# Patient Record
Sex: MALE | Race: WHITE | NOT HISPANIC OR LATINO | Employment: OTHER | URBAN - METROPOLITAN AREA
[De-identification: names, ages, dates, MRNs, and addresses within clinical notes are randomized per-mention and may not be internally consistent; named-entity substitution may affect disease eponyms.]

---

## 2017-01-06 ENCOUNTER — OFFICE VISIT (OUTPATIENT)
Dept: MEDICAL GROUP | Facility: MEDICAL CENTER | Age: 62
End: 2017-01-06
Payer: COMMERCIAL

## 2017-01-06 ENCOUNTER — OFFICE VISIT (OUTPATIENT)
Dept: CARDIOLOGY | Facility: MEDICAL CENTER | Age: 62
End: 2017-01-06
Payer: COMMERCIAL

## 2017-01-06 VITALS
HEIGHT: 71 IN | WEIGHT: 208 LBS | BODY MASS INDEX: 29.12 KG/M2 | OXYGEN SATURATION: 95 % | HEART RATE: 86 BPM | DIASTOLIC BLOOD PRESSURE: 70 MMHG | SYSTOLIC BLOOD PRESSURE: 100 MMHG

## 2017-01-06 VITALS
TEMPERATURE: 97.2 F | HEIGHT: 69 IN | WEIGHT: 208 LBS | BODY MASS INDEX: 30.81 KG/M2 | OXYGEN SATURATION: 96 % | RESPIRATION RATE: 16 BRPM | HEART RATE: 81 BPM | SYSTOLIC BLOOD PRESSURE: 142 MMHG | DIASTOLIC BLOOD PRESSURE: 74 MMHG

## 2017-01-06 DIAGNOSIS — Z00.00 HEALTH CARE MAINTENANCE: ICD-10-CM

## 2017-01-06 DIAGNOSIS — I25.84 CORONARY ARTERY DISEASE DUE TO CALCIFIED CORONARY LESION: ICD-10-CM

## 2017-01-06 DIAGNOSIS — E78.5 DYSLIPIDEMIA: ICD-10-CM

## 2017-01-06 DIAGNOSIS — F32.A DEPRESSION, UNSPECIFIED DEPRESSION TYPE: ICD-10-CM

## 2017-01-06 DIAGNOSIS — E66.9 OBESITY (BMI 30-39.9): ICD-10-CM

## 2017-01-06 DIAGNOSIS — I25.10 CORONARY ARTERY DISEASE DUE TO CALCIFIED CORONARY LESION: ICD-10-CM

## 2017-01-06 DIAGNOSIS — K21.9 GASTROESOPHAGEAL REFLUX DISEASE WITHOUT ESOPHAGITIS: ICD-10-CM

## 2017-01-06 PROCEDURE — 99204 OFFICE O/P NEW MOD 45 MIN: CPT | Performed by: FAMILY MEDICINE

## 2017-01-06 PROCEDURE — 99204 OFFICE O/P NEW MOD 45 MIN: CPT | Performed by: INTERNAL MEDICINE

## 2017-01-06 RX ORDER — POTASSIUM CHLORIDE 1500 MG/1
20 TABLET, EXTENDED RELEASE ORAL 2 TIMES DAILY
COMMUNITY
End: 2017-01-06

## 2017-01-06 RX ORDER — CLOPIDOGREL BISULFATE 75 MG/1
75 TABLET ORAL DAILY
COMMUNITY
End: 2017-01-06 | Stop reason: SDUPTHER

## 2017-01-06 RX ORDER — SERTRALINE HYDROCHLORIDE 25 MG/1
25 TABLET, FILM COATED ORAL DAILY
Qty: 30 TAB | Refills: 1 | Status: SHIPPED | OUTPATIENT
Start: 2017-01-06 | End: 2017-02-14 | Stop reason: SDUPTHER

## 2017-01-06 RX ORDER — ATORVASTATIN CALCIUM 40 MG/1
40 TABLET, FILM COATED ORAL DAILY
Qty: 30 TAB | Refills: 11 | Status: SHIPPED | OUTPATIENT
Start: 2017-01-06 | End: 2017-06-05

## 2017-01-06 RX ORDER — ATORVASTATIN CALCIUM 40 MG/1
40 TABLET, FILM COATED ORAL NIGHTLY
COMMUNITY
End: 2017-01-06 | Stop reason: SDUPTHER

## 2017-01-06 RX ORDER — CLOPIDOGREL BISULFATE 75 MG/1
75 TABLET ORAL DAILY
Qty: 30 TAB | Refills: 11 | Status: ON HOLD | OUTPATIENT
Start: 2017-01-06 | End: 2017-06-20

## 2017-01-06 RX ORDER — FUROSEMIDE 40 MG/1
40 TABLET ORAL 2 TIMES DAILY
COMMUNITY
End: 2017-01-06

## 2017-01-06 ASSESSMENT — PATIENT HEALTH QUESTIONNAIRE - PHQ9
8. MOVING OR SPEAKING SO SLOWLY THAT OTHER PEOPLE COULD HAVE NOTICED. OR THE OPPOSITE, BEING SO FIGETY OR RESTLESS THAT YOU HAVE BEEN MOVING AROUND A LOT MORE THAN USUAL: 0
4. FEELING TIRED OR HAVING LITTLE ENERGY: 0
SUM OF ALL RESPONSES TO PHQ9 QUESTIONS 1 AND 2: 3
1. LITTLE INTEREST OR PLEASURE IN DOING THINGS: 2
6. FEELING BAD ABOUT YOURSELF - OR THAT YOU ARE A FAILURE OR HAVE LET YOURSELF OR YOUR FAMILY DOWN: 1
5. POOR APPETITE OR OVEREATING: 0
7. TROUBLE CONCENTRATING ON THINGS, SUCH AS READING THE NEWSPAPER OR WATCHING TELEVISION: 1
3. TROUBLE FALLING OR STAYING ASLEEP OR SLEEPING TOO MUCH: 1
9. THOUGHTS THAT YOU WOULD BE BETTER OFF DEAD, OR OF HURTING YOURSELF: 0
2. FEELING DOWN, DEPRESSED, IRRITABLE, OR HOPELESS: 1
SUM OF ALL RESPONSES TO PHQ QUESTIONS 1-9: 6
CLINICAL INTERPRETATION OF PHQ2 SCORE: 1

## 2017-01-06 ASSESSMENT — ENCOUNTER SYMPTOMS
NERVOUS/ANXIOUS: 1
BLURRED VISION: 1
BRUISES/BLEEDS EASILY: 1
SHORTNESS OF BREATH: 1
HEARTBURN: 1
MEMORY LOSS: 1
DEPRESSION: 1
SPEECH CHANGE: 1
FOCAL WEAKNESS: 1

## 2017-01-06 NOTE — MR AVS SNAPSHOT
"Talib Ortiz   2017 1:15 PM   Office Visit   MRN: [id number]    Department:  [facility]   Dept Phone:  [phone]    Description:  Male : 1955   Provider:  Sukhi Frias M.D.           Reason for Visit     New Patient           Allergies as of 2017     No Known Allergies      You were diagnosed with     Coronary artery disease due to calcified coronary lesion   [1469225]       Dyslipidemia   [714198]         Vital Signs     Blood Pressure Pulse Height Weight Body Mass Index Oxygen Saturation    100/70 mmHg 86 1.803 m (5' 10.98\") 94.348 kg (208 lb) 29.02 kg/m2 95%    Smoking Status                   Former Smoker           Basic Information     Date Of Birth Sex Race Ethnicity Preferred Language    1955 Male White Non- English      Problem List              ICD-10-CM Priority Class Noted - Resolved    Obesity (BMI 30-39.9) E66.9   2015 - Present    Tobacco abuse Z72.0   2015 - Present    BPH (benign prostatic hyperplasia) N40.0   2015 - Present    Dyslipidemia E78.5   3/2/2015 - Present    Osteoarthritis M19.90   3/2/2015 - Present    Coronary artery disease due to calcified coronary lesion: CABG ×2 in 2016 I25.10, I25.84   2017 - Present      Health Maintenance        Date Due Completion Dates    IMM DTaP/Tdap/Td Vaccine (1 - Tdap) 10/10/1974 ---    COLONOSCOPY 10/10/2005 ---    IMM ZOSTER VACCINE 10/10/2015 ---    IMM INFLUENZA (1) 2016 10/6/2014            Current Immunizations     Influenza TIV (IM) 10/6/2014    Pneumococcal polysaccharide vaccine (PPSV-23) 10/6/2014      Below and/or attached are the medications your provider expects you to take. Review all of your home medications and newly ordered medications with your provider and/or pharmacist. Follow medication instructions as directed by your provider and/or pharmacist. Please keep your medication list with you and share with your provider. Update the information when medications " are discontinued, doses are changed, or new medications (including over-the-counter products) are added; and carry medication information at all times in the event of emergency situations     Allergies:  No Known Allergies          Medications  Valid as of: January 06, 2017 -  2:00 PM    Generic Name Brand Name Tablet Size Instructions for use    Aspirin (Tablet Delayed Response) ECOTRIN 81 MG Take 81 mg by mouth every day.        Atorvastatin Calcium (Tab) LIPITOR 40 MG Take 1 Tab by mouth every day.        Cinnamon (Cap) Cinnamon 500 MG Take 2 Caps by mouth every day.        Clopidogrel Bisulfate (Tab) PLAVIX 75 MG Take 1 Tab by mouth every day.        Garlic   Take 1 Cap by mouth every day.        Melatonin (Tab) Melatonin 10 MG Take 1 Tab by mouth at bedtime as needed.        Metoprolol Tartrate (Tab) LOPRESSOR 25 MG Take 1 Tab by mouth 2 times a day.        Misc Natural Products (Tab) URINOZINC PLUS  Take 1 Tab by mouth 2 Times a Day.        Misc Natural Products   Take  by mouth.        Omega-3 Fatty Acids   Take  by mouth.        RaNITidine HCl   Take  by mouth every day.        .                 Medicines prescribed today were sent to:     [facility] [address] [place] [address] - [address] [address]    Phone: [phone] Fax: [phone]    Open 24 Hours?: No    [facility] - [place], [place] - [address] [facility]    [address] [facility] [address]    Phone: [phone] Fax: [phone]    Open 24 Hours?: No      Medication refill instructions:       If your prescription bottle indicates you have medication refills left, it is not necessary to call your provider’s office. Please contact your pharmacy and they will refill your medication.    If your prescription bottle indicates you do not have any refills left, you may request refills at any time through one of the following ways: The online [employer] system (except Urgent Care), by  calling your provider’s office, or by asking your pharmacy to contact your provider’s office with a refill request. Medication refills are processed only during regular business hours and may not be available until the next business day. Your provider may request additional information or to have a follow-up visit with you prior to refilling your medication.   *Please Note: Medication refills are assigned a new Rx number when refilled electronically. Your pharmacy may indicate that no refills were authorized even though a new prescription for the same medication is available at the pharmacy. Please request the medicine by name with the pharmacy before contacting your provider for a refill.        Your To Do List     Future Labs/Procedures Complete By Expires    BTYPE NATRIURETIC PEPTIDE  As directed 1/6/2018    COMP METABOLIC PANEL  As directed 1/6/2018    Echocardiogram Comp w/o Cont  As directed 1/6/2018    Scheduling Instructions:    Less than one month    LIPID PROFILE  As directed 1/6/2018         [facility] Access Code: Activation code not generated  Current [facility] Status: Active

## 2017-01-06 NOTE — PROGRESS NOTES
Subjective:   Talib Ortiz is a 61 y.o. male who presents today for new patient evaluation because of a history of coronary artery disease and dyslipidemia. He was previously followed by cardiology at [facility].    In August, he suffered a myocardial infarction. He subsequently underwent cardiac catheterization and ultimately two-vessel bypass graft surgery.    He is walking 3-4 miles daily without difficulty. He does have some dyspnea if he walks uphill but otherwise is feeling fine. He notes no PND or orthopnea. He's had no edema, palpitations or lightheadedness. He still has some slight muscular skeletal-type chest pain. He's had no anginal type chest pain.    Past Medical History   Diagnosis Date   • Obesity (BMI 30-39.9) 2/12/2015   • Hyperlipidemia    • Kidney disease    • Kidney stone    • Osteoarthritis 3/2/2015     Past Surgical History   Procedure Laterality Date   • Knee arthroscopy     • Laminotomy     • Multiple coronary artery bypass       Family History   Problem Relation Age of Onset   • Arthritis Mother    • Hyperlipidemia Mother    • Hyperlipidemia Father    • Heart Attack Father 61     History   Smoking status   • Former Smoker -- 0.25 packs/day for 10 years   • Types: Cigarettes   • Quit date: 03/19/2016   Smokeless tobacco   • Never Used     No Known Allergies  Outpatient Encounter Prescriptions as of 1/6/2017   Medication Sig Dispense Refill   • atorvastatin (LIPITOR) 40 MG Tab Take 40 mg by mouth every evening.     • metoprolol (LOPRESSOR) 25 MG Tab Take 25 mg by mouth 2 times a day.     • clopidogrel (PLAVIX) 75 MG Tab Take 75 mg by mouth every day.     • RaNITidine HCl (RANITIDINE 75 PO) Take  by mouth every day.     • potassium Chloride ER (K-TAB) 20 MEQ Tab CR tablet Take 20 mEq by mouth 2 times a day.     • furosemide (LASIX) 40 MG Tab Take 40 mg by mouth 2 Times a Day.     • aspirin EC (ECOTRIN) 81 MG Tablet Delayed Response Take 81 mg by mouth every day.     • Misc  "Natural Products (GLUCOSAMINE CHOND COMPLEX/MSM PO) Take  by mouth.     • Omega-3 Fatty Acids (FISH OIL BURP-LESS PO) Take  by mouth.     • Cinnamon 500 MG CAPS Take 2 Caps by mouth every day.     • GARLIC PO Take 1 Cap by mouth every day.     • [DISCONTINUED] lovastatin (MEVACOR) 20 MG TABS Take 1 Tab by mouth every day. 90 Tab 3   • Misc Natural Products (URINOZINC PLUS) TABS Take 1 Tab by mouth 2 Times a Day.     • Melatonin 10 MG TABS Take 1 Tab by mouth at bedtime as needed.       No facility-administered encounter medications on file as of 1/6/2017.     Review of Systems   HENT: Positive for congestion, hearing loss and tinnitus.    Eyes: Positive for blurred vision.   Respiratory: Positive for shortness of breath.    Cardiovascular: Positive for chest pain.   Gastrointestinal: Positive for heartburn.   Genitourinary: Positive for urgency and frequency.   Neurological: Positive for speech change and focal weakness.   Endo/Heme/Allergies: Bruises/bleeds easily.   Psychiatric/Behavioral: Positive for depression and memory loss. The patient is nervous/anxious.         Objective:   /70 mmHg  Pulse 86  Ht 1.803 m (5' 10.98\")  Wt 94.348 kg (208 lb)  BMI 29.02 kg/m2  SpO2 95%    Physical Exam   Constitutional: He is oriented to person, place, and time. He appears well-developed. No distress.   HENT:   Mouth/Throat: Mucous membranes are normal.   Eyes: Conjunctivae and EOM are normal.   Neck: No JVD present. No tracheal deviation present. No thyroid mass and no thyromegaly present.   Cardiovascular: Normal rate, regular rhythm and intact distal pulses.    No murmur heard.  Pulmonary/Chest: Effort normal and breath sounds normal. No respiratory distress. He exhibits no tenderness.   Abdominal: Soft. There is no tenderness.   Musculoskeletal: Normal range of motion. He exhibits no edema.   Neurological: He is alert and oriented to person, place, and time. He has normal strength. He displays no tremor.   Skin: " Skin is warm and dry. He is not diaphoretic.   Psychiatric: He has a normal mood and affect. His behavior is normal.   Vitals reviewed.      Assessment:     1. Coronary artery disease due to calcified coronary lesion: CABG ×2 in August 2016     2. Dyslipidemia         Medical Decision Making:  Today's Assessment / Status / Plan:     Mr. Ortiz is clinically stable. He did have mild reduction in his ejection fraction perioperatively. We will reassess him with an echocardiogram and a BNP. He will also have a lipid and CMP prior to his follow-up appointment. He has had some difficulty with mood swings since his surgery. We will refer him to a PCP for possible anti-anxiety or antidepressant therapy. He will stop his furosemide therapy at the present time. He'll give us a call if he develops any increase in edema or dyspnea. However, he does have excellent exercise tolerance and he will probably tolerate going off of this medication. He will follow-up in about a month.

## 2017-01-06 NOTE — MR AVS SNAPSHOT
"Talib Ortiz   2017 3:40 PM   Office Visit   MRN: [id number]    Department:  [address] [place] [address]   Dept Phone:  [phone]    Description:  Male : 1955   Provider:  Belen Knowles M.D.           Reason for Visit     New Patient establish      Allergies as of 2017     No Known Allergies      You were diagnosed with     Coronary artery disease due to calcified coronary lesion   [6518634]       Dyslipidemia   [098828]       Depression, unspecified depression type   [5729992]       Gastroesophageal reflux disease without esophagitis   [437941]       Health care maintenance   [051899]       Obesity (BMI 30-39.9)   [226392]         Vital Signs     Blood Pressure Pulse Temperature Respirations Height Weight    142/74 mmHg 81 36.2 °C (97.2 °F) 16 1.753 m (5' 9.02\") 94.348 kg (208 lb)    Body Mass Index Oxygen Saturation Smoking Status             30.70 kg/m2 96% Former Smoker         Basic Information     Date Of Birth Sex Race Ethnicity Preferred Language    1955 Male White Non- English      Your appointments     2017 10:15 AM   ECHO with ECHO [facility] EXAM 12   ECHOCARDIOLOGY [facility] ([facility])    [address] [facility]  [place] [address]   [phone]           No prep            2017 10:00 AM   FOLLOW UP with Sukhi Frias M.D.   [facility] (--)    [address]  [place] [address] [phone]            2017 10:20 AM   Established Patient with Belen Knowles M.D.   [facility] [address]    [address]  [place] [address] [phone]           You will be receiving a confirmation call a few days before your appointment from our automated call confirmation system.              Problem List              ICD-10-CM Priority Class Noted - Resolved    Obesity (BMI 30-39.9) E66.9   2015 - Present    Tobacco abuse Z72.0   2015 - Present   " Dyslipidemia E78.5   3/2/2015 - Present    Osteoarthritis M19.90   3/2/2015 - Present    Coronary artery disease due to calcified coronary lesion: CABG ×2 in August 2016 I25.10, I25.84   1/6/2017 - Present      Health Maintenance        Date Due Completion Dates    IMM DTaP/Tdap/Td Vaccine (1 - Tdap) 10/10/1974 ---    COLONOSCOPY 10/10/2005 ---    IMM ZOSTER VACCINE 10/10/2015 ---    IMM INFLUENZA (1) 9/1/2016 10/6/2014            Current Immunizations     Influenza TIV (IM) 10/6/2014    Pneumococcal polysaccharide vaccine (PPSV-23) 10/6/2014      Below and/or attached are the medications your provider expects you to take. Review all of your home medications and newly ordered medications with your provider and/or pharmacist. Follow medication instructions as directed by your provider and/or pharmacist. Please keep your medication list with you and share with your provider. Update the information when medications are discontinued, doses are changed, or new medications (including over-the-counter products) are added; and carry medication information at all times in the event of emergency situations     Allergies:  No Known Allergies          Medications  Valid as of: January 06, 2017 -  4:23 PM    Generic Name Brand Name Tablet Size Instructions for use    Aspirin (Tablet Delayed Response) ECOTRIN 81 MG Take 81 mg by mouth every day.        Atorvastatin Calcium (Tab) LIPITOR 40 MG Take 1 Tab by mouth every day.        Cinnamon (Cap) Cinnamon 500 MG Take 2 Caps by mouth every day.        Clopidogrel Bisulfate (Tab) PLAVIX 75 MG Take 1 Tab by mouth every day.        Garlic   Take 1 Cap by mouth every day.        Melatonin (Tab) Melatonin 10 MG Take 1 Tab by mouth at bedtime as needed.        Metoprolol Tartrate (Tab) LOPRESSOR 25 MG Take 1 Tab by mouth 2 times a day.        Misc Natural Products (Tab) URINOZINC PLUS  Take 1 Tab by mouth 2 Times a Day.        Misc Natural Products   Take  by mouth.        Omega-3 Fatty  Acids   Take  by mouth.        RaNITidine HCl   Take  by mouth every day.        Sertraline HCl (Tab) ZOLOFT 25 MG Take 1 Tab by mouth every day.        .                 Medicines prescribed today were sent to:     [facility] [address] [place] [address] - [address] [facility]    [address] [facility] [place] [address]    Phone: [phone] Fax: [phone]    Open 24 Hours?: No    [facility] - [place], [place] - [address] [facility]    [address] [facility] [address]    Phone: [phone] Fax: [phone]    Open 24 Hours?: No      Medication refill instructions:       If your prescription bottle indicates you have medication refills left, it is not necessary to call your provider’s office. Please contact your pharmacy and they will refill your medication.    If your prescription bottle indicates you do not have any refills left, you may request refills at any time through one of the following ways: The online [employer] system (except Urgent Care), by calling your provider’s office, or by asking your pharmacy to contact your provider’s office with a refill request. Medication refills are processed only during regular business hours and may not be available until the next business day. Your provider may request additional information or to have a follow-up visit with you prior to refilling your medication.   *Please Note: Medication refills are assigned a new Rx number when refilled electronically. Your pharmacy may indicate that no refills were authorized even though a new prescription for the same medication is available at the pharmacy. Please request the medicine by name with the pharmacy before contacting your provider for a refill.        Your To Do List     Future Labs/Procedures Complete By Expires    CBC WITH DIFFERENTIAL  As directed 1/6/2018    TSH  As directed 1/7/2018      Referral     A referral request has been sent to our patient care coordination department. Please  allow 3-5 business days for us to process this request and contact you either by phone or mail. If you do not hear from us by the 5th business day, please call us at [phone].           [employer] Access Code: Activation code not generated  Current [employer] Status: Active

## 2017-01-07 NOTE — PROGRESS NOTES
CC: Establish new PCP.    HPI:  Talib presents today to establish primary care relationship. Did not see his previous PCP for more than 5 years.    Lives with his wife. Active, and independent with all ADLs. Has the following medical issues:    Coronary artery disease underwent CABG ×2 in August 2016. He has been stable, and asymptomatic.Denies chest pain, and SOB. has been  on Plavix, BB, and statin.He follows up with cardiology Dr Sukhi Fontana.    Dyslipidemia, he has been tolerating the statin. Denies muscle pain LFTs has been normal, currently on Lipitor 40 mg daily.    Depression, patient stated that his mood has not been as it was before the by pass surgery. He has been anxious, ad early irritable. He used to have a very calm personality. Does not enjoy what he used to enjoy.Denies any suicidal ideation. Wants to start antidepressant to help with his mood. PHQ9 score is 6.     Gastroesophageal reflux disease, has been doing fine with Zantac 75 mg bid.Denies epigastric and heart burn.    Obesity , his BMI is 30.7.The medical rationale for weight loss in obese individuals is that obesity is associated with a significant increase in mortality, and many health risks including type 2 diabetes mellitus, hypertension, dyslipidemia, and coronary heart disease. The benefits of weight loss include a reduction in the rate of progression from impaired glucose tolerance to diabetes, blood pressure in hypertensive patients, and lipid levels in higher risk patients. Other noncardiac benefits of weight loss include reductions in urinary incontinence, sleep apnea, and depression, and improvements in quality of life, physical functioning, and mobility. Recommend lifestyle modification: exercise 30 minutes per day 5 days per week. Recommend also portion control.    Pneumonia vaccine, and flu shot are UTD.Due for colonoscopy ( has never had one in the life).    Patient Active Problem List    Diagnosis Date Noted   • Coronary  artery disease due to calcified coronary lesion: CABG ×2 in August 2016 01/06/2017   • Dyslipidemia 03/02/2015   • Osteoarthritis 03/02/2015   • Obesity (BMI 30-39.9) 02/12/2015   • Tobacco abuse 02/12/2015       Current Outpatient Prescriptions   Medication Sig Dispense Refill   • aspirin EC (ECOTRIN) 81 MG Tablet Delayed Response Take 81 mg by mouth every day.     • sertraline (ZOLOFT) 25 MG tablet Take 1 Tab by mouth every day. 30 Tab 1   • RaNITidine HCl (RANITIDINE 75 PO) Take  by mouth every day.     • Misc Natural Products (GLUCOSAMINE CHOND COMPLEX/MSM PO) Take  by mouth.     • Omega-3 Fatty Acids (FISH OIL BURP-LESS PO) Take  by mouth.     • atorvastatin (LIPITOR) 40 MG Tab Take 1 Tab by mouth every day. 30 Tab 11   • clopidogrel (PLAVIX) 75 MG Tab Take 1 Tab by mouth every day. 30 Tab 11   • metoprolol (LOPRESSOR) 25 MG Tab Take 1 Tab by mouth 2 times a day. 60 Tab 11   • Cinnamon 500 MG CAPS Take 2 Caps by mouth every day.     • GARLIC PO Take 1 Cap by mouth every day.     • Misc Natural Products (URINOZINC PLUS) TABS Take 1 Tab by mouth 2 Times a Day.     • Melatonin 10 MG TABS Take 1 Tab by mouth at bedtime as needed.       No current facility-administered medications for this visit.         Allergies as of 01/06/2017   • (No Known Allergies)        Social History     Social History   • Marital Status:      Spouse Name: N/A   • Number of Children: N/A   • Years of Education: N/A     Occupational History   • Not on file.     Social History Main Topics   • Smoking status: Former Smoker -- 0.25 packs/day for 10 years     Types: Cigarettes     Quit date: 03/19/2016   • Smokeless tobacco: Never Used   • Alcohol Use: No      Comment: Quit years   • Drug Use: No   • Sexual Activity:     Partners: Female     Other Topics Concern   • Not on file     Social History Narrative       Family History   Problem Relation Age of Onset   • Arthritis Mother    • Hyperlipidemia Mother    • Hyperlipidemia Father    •  "Heart Attack Father 61       Past Surgical History   Procedure Laterality Date   • Knee arthroscopy     • Laminotomy     • Multiple coronary artery bypass         ROS:  Denies any Headache, Blurred Vision, Confusion Chest pain,  Shortness of breath,  Abdominal pain, Changes of bowel or bladder, Lower ext edema, Fevers, Nights sweats, Weight Changes, Focal weakness or numbness.  All other systems are negative.    /74 mmHg  Pulse 81  Temp(Src) 36.2 °C (97.2 °F)  Resp 16  Ht 1.753 m (5' 9.02\")  Wt 94.348 kg (208 lb)  BMI 30.70 kg/m2  SpO2 96%    Physical Exam:  Gen:         Alert and oriented, No apparent distress.  HEENT:   Perrla, TM clear,  Oralpharynx no erythema or exudates.  Neck:       No Jugular venous distension, Lymphadenopathy, Thyromegaly, Bruits.  Lungs:     Clear to auscultation bilaterally  CV:          Regular rate and rhythm. No murmurs, rubs or gallops.  Abd:         Soft non tender, non distended. Normal active bowel sounds. No hepatosplenomegaly, No pulsatile masses.  Ext:          No clubbing, cyanosis, edema.      Assessment and Plan.   61 y.o. male     1. Coronary artery disease : CABG ×2 in August 2016  Stable.Asymptomatic.  Continue on Plavix, BB, and statin.  Continue follow up with cardiology Dr Sukhi Fontana.    - CBC WITH DIFFERENTIAL; Future    2. Dyslipidemia  He has been tolerating the statin. Denies muscle pain LFTs has been normal  Continue on Lipitor 40 mg daily.    3. Depression, unspecified depression type  Probably reactive ( after the CABG), but mild depression. PHQ-9 score is 6. Denies suicidal ideation.  Will start on small dose of Zoloft ( 25 mg daily).    - CBC WITH DIFFERENTIAL; Future  - TSH; Future  - sertraline (ZOLOFT) 25 MG tablet; Take 1 Tab by mouth every day.  Dispense: 30 Tab; Refill: 1    4. Gastroesophageal reflux disease without esophagitis  Has been doing fine with Zantac 75 mg bid.  - CBC WITH DIFFERENTIAL; Future    5. Health care " maintenance  Pneumonia vaccine, and flu shot are UTD.  Due for colonoscopy ( has never had one in the life).    - REFERRAL TO GI FOR COLONOSCOPY    6. Obesity (BMI 30-39.9)  BMI is 30.7.  Patient is counseled about life style modification ( exercise, and low carb , salt, and fat diet).

## 2017-01-18 ENCOUNTER — HOSPITAL ENCOUNTER (OUTPATIENT)
Dept: LAB | Facility: MEDICAL CENTER | Age: 62
End: 2017-01-18
Attending: INTERNAL MEDICINE
Payer: COMMERCIAL

## 2017-01-18 DIAGNOSIS — I25.84 CORONARY ARTERY DISEASE DUE TO CALCIFIED CORONARY LESION: ICD-10-CM

## 2017-01-18 DIAGNOSIS — I25.10 CORONARY ARTERY DISEASE DUE TO CALCIFIED CORONARY LESION: ICD-10-CM

## 2017-01-18 DIAGNOSIS — E78.5 DYSLIPIDEMIA: ICD-10-CM

## 2017-01-18 LAB
ALBUMIN SERPL BCP-MCNC: 4.5 G/DL (ref 3.2–4.9)
ALBUMIN/GLOB SERPL: 1.7 G/DL
ALP SERPL-CCNC: 42 U/L (ref 30–99)
ALT SERPL-CCNC: 19 U/L (ref 2–50)
ANION GAP SERPL CALC-SCNC: 10 MMOL/L (ref 0–11.9)
AST SERPL-CCNC: 19 U/L (ref 12–45)
BILIRUB SERPL-MCNC: 0.6 MG/DL (ref 0.1–1.5)
BNP SERPL-MCNC: 85 PG/ML (ref 0–100)
BUN SERPL-MCNC: 15 MG/DL (ref 8–22)
CALCIUM SERPL-MCNC: 9.8 MG/DL (ref 8.5–10.5)
CHLORIDE SERPL-SCNC: 104 MMOL/L (ref 96–112)
CHOLEST SERPL-MCNC: 187 MG/DL (ref 100–199)
CO2 SERPL-SCNC: 25 MMOL/L (ref 20–33)
CREAT SERPL-MCNC: 1.16 MG/DL (ref 0.5–1.4)
GLOBULIN SER CALC-MCNC: 2.7 G/DL (ref 1.9–3.5)
GLUCOSE SERPL-MCNC: 93 MG/DL (ref 65–99)
HDLC SERPL-MCNC: 52 MG/DL
LDLC SERPL CALC-MCNC: 102 MG/DL
POTASSIUM SERPL-SCNC: 4.3 MMOL/L (ref 3.6–5.5)
PROT SERPL-MCNC: 7.2 G/DL (ref 6–8.2)
SODIUM SERPL-SCNC: 139 MMOL/L (ref 135–145)
TRIGL SERPL-MCNC: 165 MG/DL (ref 0–149)

## 2017-01-18 PROCEDURE — 80061 LIPID PANEL: CPT

## 2017-01-18 PROCEDURE — 83880 ASSAY OF NATRIURETIC PEPTIDE: CPT

## 2017-01-18 PROCEDURE — 36415 COLL VENOUS BLD VENIPUNCTURE: CPT

## 2017-01-18 PROCEDURE — 80053 COMPREHEN METABOLIC PANEL: CPT

## 2017-01-23 ENCOUNTER — HOSPITAL ENCOUNTER (OUTPATIENT)
Dept: CARDIOLOGY | Facility: MEDICAL CENTER | Age: 62
End: 2017-01-23
Attending: INTERNAL MEDICINE
Payer: COMMERCIAL

## 2017-01-23 DIAGNOSIS — I25.10 CORONARY ARTERY DISEASE DUE TO CALCIFIED CORONARY LESION: ICD-10-CM

## 2017-01-23 DIAGNOSIS — I25.84 CORONARY ARTERY DISEASE DUE TO CALCIFIED CORONARY LESION: ICD-10-CM

## 2017-01-23 PROCEDURE — 93306 TTE W/DOPPLER COMPLETE: CPT | Mod: 26 | Performed by: INTERNAL MEDICINE

## 2017-01-23 PROCEDURE — 93306 TTE W/DOPPLER COMPLETE: CPT

## 2017-01-24 LAB
LV EJECT FRACT  99904: 55
LV EJECT FRACT MOD 2C 99903: 52.49
LV EJECT FRACT MOD 4C 99902: 56.41
LV EJECT FRACT MOD BP 99901: 54.57

## 2017-02-01 ENCOUNTER — PATIENT MESSAGE (OUTPATIENT)
Dept: MEDICAL GROUP | Facility: MEDICAL CENTER | Age: 62
End: 2017-02-01

## 2017-02-07 ENCOUNTER — OFFICE VISIT (OUTPATIENT)
Dept: CARDIOLOGY | Facility: MEDICAL CENTER | Age: 62
End: 2017-02-07
Payer: COMMERCIAL

## 2017-02-07 VITALS
HEIGHT: 69 IN | DIASTOLIC BLOOD PRESSURE: 80 MMHG | BODY MASS INDEX: 32.29 KG/M2 | WEIGHT: 218 LBS | HEART RATE: 62 BPM | SYSTOLIC BLOOD PRESSURE: 120 MMHG

## 2017-02-07 DIAGNOSIS — I25.84 CORONARY ARTERY DISEASE DUE TO CALCIFIED CORONARY LESION: ICD-10-CM

## 2017-02-07 DIAGNOSIS — I25.10 CORONARY ARTERY DISEASE DUE TO CALCIFIED CORONARY LESION: ICD-10-CM

## 2017-02-07 DIAGNOSIS — E78.5 DYSLIPIDEMIA: ICD-10-CM

## 2017-02-07 PROCEDURE — 99214 OFFICE O/P EST MOD 30 MIN: CPT | Performed by: INTERNAL MEDICINE

## 2017-02-07 RX ORDER — ROSUVASTATIN CALCIUM 40 MG/1
40 TABLET, COATED ORAL DAILY
Qty: 30 TAB | Refills: 11 | Status: SHIPPED | OUTPATIENT
Start: 2017-02-07 | End: 2018-02-09 | Stop reason: SDUPTHER

## 2017-02-07 NOTE — MR AVS SNAPSHOT
"        Talib Ortiz   2017 10:15 AM   Office Visit   MRN: [id number]    Department:  [facility]   Dept Phone:  [phone]    Description:  Male : 1955   Provider:  Sukhi Frias M.D.           Reason for Visit     Follow-Up labs in [facility] 2017, need to talk about Rx's      Allergies as of 2017     No Known Allergies      You were diagnosed with     Coronary artery disease due to calcified coronary lesion   [6118017]       Dyslipidemia   [984096]         Vital Signs     Blood Pressure Pulse Height Weight Body Mass Index Smoking Status    120/80 mmHg 62 1.753 m (5' 9\") 98.884 kg (218 lb) 32.18 kg/m2 Former Smoker      Basic Information     Date Of Birth Sex Race Ethnicity Preferred Language    1955 Male White Non- English      Your appointments     2017 10:20 AM   Established Patient with Belen Knowles M.D.   [facility] [address]    [address] [facility] [address]  [facility] [id number] [phone]           You will be receiving a confirmation call a few days before your appointment from our automated call confirmation system.              Problem List              ICD-10-CM Priority Class Noted - Resolved    Obesity (BMI 30-39.9) E66.9   2015 - Present    Tobacco abuse Z72.0   2015 - Present    Dyslipidemia E78.5   3/2/2015 - Present    Osteoarthritis M19.90   3/2/2015 - Present    Coronary artery disease due to calcified coronary lesion: CABG ×2 in 2016 I25.10, I25.84   2017 - Present      Health Maintenance        Date Due Completion Dates    IMM DTaP/Tdap/Td Vaccine (1 - Tdap) 10/10/1974 ---    COLONOSCOPY 10/10/2005 ---    IMM ZOSTER VACCINE 10/10/2015 ---    IMM INFLUENZA (1) 2016 10/6/2014            Current Immunizations     Influenza TIV (IM) 10/6/2014    Pneumococcal polysaccharide vaccine (PPSV-23) 10/6/2014      Below and/or attached are the medications your provider expects you to take. Review all of " your home medications and newly ordered medications with your provider and/or pharmacist. Follow medication instructions as directed by your provider and/or pharmacist. Please keep your medication list with you and share with your provider. Update the information when medications are discontinued, doses are changed, or new medications (including over-the-counter products) are added; and carry medication information at all times in the event of emergency situations     Allergies:  No Known Allergies          Medications  Valid as of: February 07, 2017 - 10:42 AM    Generic Name Brand Name Tablet Size Instructions for use    Aspirin (Tablet Delayed Response) ECOTRIN 81 MG Take 81 mg by mouth every day.        Atorvastatin Calcium (Tab) LIPITOR 40 MG Take 1 Tab by mouth every day.        Cinnamon (Cap) Cinnamon 500 MG Take 2 Caps by mouth every day.        Clopidogrel Bisulfate (Tab) PLAVIX 75 MG Take 1 Tab by mouth every day.        Garlic   Take 1 Cap by mouth every day.        Melatonin (Tab) Melatonin 10 MG Take 1 Tab by mouth at bedtime as needed.        Metoprolol Tartrate (Tab) LOPRESSOR 25 MG Take 1 Tab by mouth 2 times a day.        Misc Natural Products (Tab) URINOZINC PLUS  Take 1 Tab by mouth 2 Times a Day.        Misc Natural Products   Take  by mouth.        Omega-3 Fatty Acids   Take  by mouth.        RaNITidine HCl   Take  by mouth every day.        Rosuvastatin Calcium (Tab) CRESTOR 40 MG Take 1 Tab by mouth every day.        Sertraline HCl (Tab) ZOLOFT 25 MG Take 1 Tab by mouth every day.        .                 Medicines prescribed today were sent to:     [facility] [address] [place] [address] - [address] [place] [address]    Phone: [phone] Fax: [phone]    Open 24 Hours?: No    [facility] - [place], [place] - [address]    [address] [facility] [address]    Phone: [phone] Fax: [phone]    Open 24 Hours?: No       Medication refill instructions:       If your prescription bottle indicates you have medication refills left, it is not necessary to call your provider’s office. Please contact your pharmacy and they will refill your medication.    If your prescription bottle indicates you do not have any refills left, you may request refills at any time through one of the following ways: The online [address] system (except Urgent Care), by calling your provider’s office, or by asking your pharmacy to contact your provider’s office with a refill request. Medication refills are processed only during regular business hours and may not be available until the next business day. Your provider may request additional information or to have a follow-up visit with you prior to refilling your medication.   *Please Note: Medication refills are assigned a new Rx number when refilled electronically. Your pharmacy may indicate that no refills were authorized even though a new prescription for the same medication is available at the pharmacy. Please request the medicine by name with the pharmacy before contacting your provider for a refill.        Your To Do List     Future Labs/Procedures Complete By Expires    COMP METABOLIC PANEL  As directed 2/7/2018    HEPATIC FUNCTION PANEL  As directed 2/7/2018    LIPID PROFILE  As directed 2/7/2018    LIPID PROFILE  As directed 2/7/2018         [address] Access Code: Activation code not generated  Current [address] Status: Active

## 2017-02-07 NOTE — Clinical Note
[facility]   [address] [facility] [place] [facility]  [place] [address]  Phone: [phone]  Fax: [phone]              Talib Ortiz  1955    Encounter Date: 2/7/2017    Sukhi Frias M.D.          PROGRESS NOTE:  Subjective:   Talib Ortiz is a 61 y.o. male who presents today for follow-up evaluation because of a history of coronary artery disease and dyslipidemia.     In August 2016, he suffered a myocardial infarction. He subsequently underwent cardiac catheterization and ultimately two-vessel bypass graft surgery.    He is walking daily for 3-4 miles. He has no dyspnea on exertion except if he is walking uphill. He has noted no PND or orthopnea. He denies any chest discomfort, edema, palpitations or lightheadedness.        Past Medical History   Diagnosis Date   • Obesity (BMI 30-39.9) 2/12/2015   • Hyperlipidemia    • Kidney disease    • Kidney stone    • Osteoarthritis 3/2/2015   • Heart attack (CMS-[facility])      Past Surgical History   Procedure Laterality Date   • Knee arthroscopy     • Laminotomy     • Multiple coronary artery bypass       Family History   Problem Relation Age of Onset   • Arthritis Mother    • Hyperlipidemia Mother    • Hyperlipidemia Father    • Heart Attack Father 61     History   Smoking status   • Former Smoker -- 0.25 packs/day for 10 years   • Types: Cigarettes   • Quit date: 03/19/2016   Smokeless tobacco   • Never Used     No Known Allergies  Outpatient Encounter Prescriptions as of 2/7/2017   Medication Sig Dispense Refill   • RaNITidine HCl (RANITIDINE 75 PO) Take  by mouth every day.     • aspirin EC (ECOTRIN) 81 MG Tablet Delayed Response Take 81 mg by mouth every day.     • Misc Natural Products (GLUCOSAMINE CHOND COMPLEX/MSM PO) Take  by mouth.     • Omega-3 Fatty Acids (FISH OIL BURP-LESS PO) Take  by mouth.     • atorvastatin (LIPITOR) 40 MG Tab Take 1 Tab by mouth every day. 30 Tab 11   • clopidogrel (PLAVIX) 75 MG Tab Take 1 Tab by  "mouth every day. 30 Tab 11   • metoprolol (LOPRESSOR) 25 MG Tab Take 1 Tab by mouth 2 times a day. 60 Tab 11   • sertraline (ZOLOFT) 25 MG tablet Take 1 Tab by mouth every day. 30 Tab 1   • Cinnamon 500 MG CAPS Take 2 Caps by mouth every day.     • GARLIC PO Take 1 Cap by mouth every day.     • Misc Natural Products (URINOZINC PLUS) TABS Take 1 Tab by mouth 2 Times a Day.     • Melatonin 10 MG TABS Take 1 Tab by mouth at bedtime as needed.       No facility-administered encounter medications on file as of 2/7/2017.     ROS     Objective:   /80 mmHg  Pulse 62  Ht 1.753 m (5' 9\")  Wt 98.884 kg (218 lb)  BMI 32.18 kg/m2    Physical Exam   Neck: No JVD present.   Cardiovascular: Normal rate and regular rhythm.  Exam reveals no gallop.    No murmur heard.  Pulmonary/Chest: Effort normal. He has no rales.   Abdominal: Soft. There is no tenderness.   Musculoskeletal: He exhibits no edema.     Lab Results   Component Value Date/Time    CHOLESTEROL, 01/18/2017 09:43 AM    * 01/18/2017 09:43 AM    HDL 52 01/18/2017 09:43 AM    TRIGLYCERIDES 165* 01/18/2017 09:43 AM       Lab Results   Component Value Date/Time    SODIUM 139 01/18/2017 09:43 AM    POTASSIUM 4.3 01/18/2017 09:43 AM    CHLORIDE 104 01/18/2017 09:43 AM    CO2 25 01/18/2017 09:43 AM    GLUCOSE 93 01/18/2017 09:43 AM    BUN 15 01/18/2017 09:43 AM    CREATININE 1.16 01/18/2017 09:43 AM    BUN-CREATININE RATIO 13 02/16/2015 08:55 AM     Lab Results   Component Value Date/Time    ALKALINE PHOSPHATASE 42 01/18/2017 09:43 AM    AST(SGOT) 19 01/18/2017 09:43 AM    ALT(SGPT) 19 01/18/2017 09:43 AM    TOTAL BILIRUBIN 0.6 01/18/2017 09:43 AM      Lab Results   Component Value Date/Time    B NATRIURETIC PEPTIDE 85 01/18/2017 09:43 AM      Echocardiogram:  CONCLUSIONS  Normal left ventricular systolic function.  Left ventricular ejection fraction is visually estimated to be 55%.  Mild concentric left ventricular hypertrophy.  Unable to estimate " pulmonary artery pressure due to an inadequate   tricuspid regurgitant jet.  Hypokinesis of the distal septum and portion of the apex.  Exam Date:         01/23/2017      Assessment:     1. Coronary artery disease due to calcified coronary lesion: CABG ×2 in August 2016     2. Dyslipidemia         Medical Decision Making:  Today's Assessment / Status / Plan:     Mr. Ortiz is clinically stable. He is walking regularly without difficulty. His lipid status is not under optimal control. At this time, we will change him from atorvastatin to rosuvastatin 40 mg daily. He will have lab work in about 6 weeks and prior to follow-up in 6 months. If we do not get a significant improvement in his LDL status, we may consider ezetimibe or a PCS K-9 inhibitor.      Belen Knowles M.D.  [address] [place] [address] [address]  [address]  [facility] [address]  VIA In Basket

## 2017-02-07 NOTE — PROGRESS NOTES
Subjective:   Talib Ortiz is a 61 y.o. male who presents today for follow-up evaluation because of a history of coronary artery disease and dyslipidemia.     In August 2016, he suffered a myocardial infarction. He subsequently underwent cardiac catheterization and ultimately two-vessel bypass graft surgery.    He is walking daily for 3-4 miles. He has no dyspnea on exertion except if he is walking uphill. He has noted no PND or orthopnea. He denies any chest discomfort, edema, palpitations or lightheadedness.        Past Medical History   Diagnosis Date   • Obesity (BMI 30-39.9) 2/12/2015   • Hyperlipidemia    • Kidney disease    • Kidney stone    • Osteoarthritis 3/2/2015   • Heart attack (CMS-[facility])      Past Surgical History   Procedure Laterality Date   • Knee arthroscopy     • Laminotomy     • Multiple coronary artery bypass       Family History   Problem Relation Age of Onset   • Arthritis Mother    • Hyperlipidemia Mother    • Hyperlipidemia Father    • Heart Attack Father 61     History   Smoking status   • Former Smoker -- 0.25 packs/day for 10 years   • Types: Cigarettes   • Quit date: 03/19/2016   Smokeless tobacco   • Never Used     No Known Allergies  Outpatient Encounter Prescriptions as of 2/7/2017   Medication Sig Dispense Refill   • RaNITidine HCl (RANITIDINE 75 PO) Take  by mouth every day.     • aspirin EC (ECOTRIN) 81 MG Tablet Delayed Response Take 81 mg by mouth every day.     • Misc Natural Products (GLUCOSAMINE CHOND COMPLEX/MSM PO) Take  by mouth.     • Omega-3 Fatty Acids (FISH OIL BURP-LESS PO) Take  by mouth.     • atorvastatin (LIPITOR) 40 MG Tab Take 1 Tab by mouth every day. 30 Tab 11   • clopidogrel (PLAVIX) 75 MG Tab Take 1 Tab by mouth every day. 30 Tab 11   • metoprolol (LOPRESSOR) 25 MG Tab Take 1 Tab by mouth 2 times a day. 60 Tab 11   • sertraline (ZOLOFT) 25 MG tablet Take 1 Tab by mouth every day. 30 Tab 1   • Cinnamon 500 MG CAPS Take 2 Caps by mouth every day.     • GARLIC PO  "Take 1 Cap by mouth every day.     • Misc Natural Products (URINOZINC PLUS) TABS Take 1 Tab by mouth 2 Times a Day.     • Melatonin 10 MG TABS Take 1 Tab by mouth at bedtime as needed.       No facility-administered encounter medications on file as of 2/7/2017.     ROS     Objective:   /80 mmHg  Pulse 62  Ht 1.753 m (5' 9\")  Wt 98.884 kg (218 lb)  BMI 32.18 kg/m2    Physical Exam   Neck: No JVD present.   Cardiovascular: Normal rate and regular rhythm.  Exam reveals no gallop.    No murmur heard.  Pulmonary/Chest: Effort normal. He has no rales.   Abdominal: Soft. There is no tenderness.   Musculoskeletal: He exhibits no edema.     Lab Results   Component Value Date/Time    CHOLESTEROL, 01/18/2017 09:43 AM    * 01/18/2017 09:43 AM    HDL 52 01/18/2017 09:43 AM    TRIGLYCERIDES 165* 01/18/2017 09:43 AM       Lab Results   Component Value Date/Time    SODIUM 139 01/18/2017 09:43 AM    POTASSIUM 4.3 01/18/2017 09:43 AM    CHLORIDE 104 01/18/2017 09:43 AM    CO2 25 01/18/2017 09:43 AM    GLUCOSE 93 01/18/2017 09:43 AM    BUN 15 01/18/2017 09:43 AM    CREATININE 1.16 01/18/2017 09:43 AM    BUN-CREATININE RATIO 13 02/16/2015 08:55 AM     Lab Results   Component Value Date/Time    ALKALINE PHOSPHATASE 42 01/18/2017 09:43 AM    AST(SGOT) 19 01/18/2017 09:43 AM    ALT(SGPT) 19 01/18/2017 09:43 AM    TOTAL BILIRUBIN 0.6 01/18/2017 09:43 AM      Lab Results   Component Value Date/Time    B NATRIURETIC PEPTIDE 85 01/18/2017 09:43 AM      Echocardiogram:  CONCLUSIONS  Normal left ventricular systolic function.  Left ventricular ejection fraction is visually estimated to be 55%.  Mild concentric left ventricular hypertrophy.  Unable to estimate pulmonary artery pressure due to an inadequate   tricuspid regurgitant jet.  Hypokinesis of the distal septum and portion of the apex.  Exam Date:         01/23/2017      Assessment:     1. Coronary artery disease due to calcified coronary lesion: CABG ×2 in August " 2016     2. Dyslipidemia         Medical Decision Making:  Today's Assessment / Status / Plan:     Mr. Ortiz is clinically stable. He is walking regularly without difficulty. His lipid status is not under optimal control. At this time, we will change him from atorvastatin to rosuvastatin 40 mg daily. He will have lab work in about 6 weeks and prior to follow-up in 6 months. If we do not get a significant improvement in his LDL status, we may consider ezetimibe or a PCS K-9 inhibitor.

## 2017-02-14 ENCOUNTER — TELEPHONE (OUTPATIENT)
Dept: CARDIOLOGY | Facility: MEDICAL CENTER | Age: 62
End: 2017-02-14

## 2017-02-14 DIAGNOSIS — F32.A DEPRESSION, UNSPECIFIED DEPRESSION TYPE: ICD-10-CM

## 2017-02-14 RX ORDER — SERTRALINE HYDROCHLORIDE 25 MG/1
25 TABLET, FILM COATED ORAL 2 TIMES DAILY
Qty: 60 TAB | Refills: 0 | Status: SHIPPED | OUTPATIENT
Start: 2017-02-14 | End: 2017-03-21 | Stop reason: SDUPTHER

## 2017-02-14 NOTE — TELEPHONE ENCOUNTER
----- Message from Elizabeth Shannon sent at 2/14/2017 11:19 AM PST -----  Regarding: clearance follow up  Contact: [phone]  NICO/adal Mcfarlane calling to follow up on clearance faxed in 1/11, she is refaxing you now at [phone].  Please call Maeve to advise.

## 2017-02-14 NOTE — TELEPHONE ENCOUNTER
Received clearance request for patient to have an EGD/Colonoscopy with Dr. Dakota Dawson. Patient will need to hold Plavix for 5 days prior to procedure.    Forwarded to Dr. Frias, please advise. Thank you.    HINA BELTRAN

## 2017-03-21 ENCOUNTER — PATIENT MESSAGE (OUTPATIENT)
Dept: MEDICAL GROUP | Facility: MEDICAL CENTER | Age: 62
End: 2017-03-21

## 2017-03-21 DIAGNOSIS — F32.A DEPRESSION, UNSPECIFIED DEPRESSION TYPE: ICD-10-CM

## 2017-03-21 RX ORDER — SERTRALINE HYDROCHLORIDE 25 MG/1
25 TABLET, FILM COATED ORAL 2 TIMES DAILY
Qty: 60 TAB | Refills: 0 | Status: SHIPPED | OUTPATIENT
Start: 2017-03-21 | End: 2017-04-28 | Stop reason: SDUPTHER

## 2017-03-21 NOTE — TELEPHONE ENCOUNTER
From: Talib Ortiz  To: Belen Knowles M.D.  Sent: 3/21/2017 9:54 AM PDT  Subject: Medication Renewal Request    Original authorizing provider: SAROJ Pascual would like a refill of the following medications:  sertraline (ZOLOFT) 25 MG tablet [Belen Knowles M.D.]    Preferred pharmacy: [facility] [address] [place] [address], [place] - [address]    Comment:

## 2017-03-23 ENCOUNTER — PATIENT MESSAGE (OUTPATIENT)
Dept: MEDICAL GROUP | Facility: MEDICAL CENTER | Age: 62
End: 2017-03-23

## 2017-04-05 ENCOUNTER — HOSPITAL ENCOUNTER (OUTPATIENT)
Dept: LAB | Facility: MEDICAL CENTER | Age: 62
End: 2017-04-05
Attending: INTERNAL MEDICINE
Payer: COMMERCIAL

## 2017-04-05 DIAGNOSIS — I25.10 CORONARY ARTERY DISEASE DUE TO CALCIFIED CORONARY LESION: ICD-10-CM

## 2017-04-05 DIAGNOSIS — I25.84 CORONARY ARTERY DISEASE DUE TO CALCIFIED CORONARY LESION: ICD-10-CM

## 2017-04-05 DIAGNOSIS — E78.5 DYSLIPIDEMIA: ICD-10-CM

## 2017-04-05 LAB
ALBUMIN SERPL BCP-MCNC: 4.2 G/DL (ref 3.2–4.9)
ALP SERPL-CCNC: 35 U/L (ref 30–99)
ALT SERPL-CCNC: 22 U/L (ref 2–50)
AST SERPL-CCNC: 21 U/L (ref 12–45)
BILIRUB CONJ SERPL-MCNC: <0.1 MG/DL (ref 0.1–0.5)
BILIRUB INDIRECT SERPL-MCNC: NORMAL MG/DL (ref 0–1)
BILIRUB SERPL-MCNC: 0.3 MG/DL (ref 0.1–1.5)
CHOLEST SERPL-MCNC: 122 MG/DL (ref 100–199)
HDLC SERPL-MCNC: 50 MG/DL
LDLC SERPL CALC-MCNC: 59 MG/DL
PROT SERPL-MCNC: 7.2 G/DL (ref 6–8.2)
TRIGL SERPL-MCNC: 67 MG/DL (ref 0–149)

## 2017-04-05 PROCEDURE — 36415 COLL VENOUS BLD VENIPUNCTURE: CPT

## 2017-04-05 PROCEDURE — 80061 LIPID PANEL: CPT

## 2017-04-05 PROCEDURE — 80076 HEPATIC FUNCTION PANEL: CPT

## 2017-04-06 ENCOUNTER — OFFICE VISIT (OUTPATIENT)
Dept: MEDICAL GROUP | Facility: MEDICAL CENTER | Age: 62
End: 2017-04-06
Payer: COMMERCIAL

## 2017-04-06 ENCOUNTER — HOSPITAL ENCOUNTER (OUTPATIENT)
Dept: RADIOLOGY | Facility: MEDICAL CENTER | Age: 62
End: 2017-04-06
Attending: FAMILY MEDICINE
Payer: COMMERCIAL

## 2017-04-06 VITALS
HEART RATE: 64 BPM | DIASTOLIC BLOOD PRESSURE: 80 MMHG | SYSTOLIC BLOOD PRESSURE: 118 MMHG | RESPIRATION RATE: 14 BRPM | BODY MASS INDEX: 33.5 KG/M2 | TEMPERATURE: 97.6 F | OXYGEN SATURATION: 98 % | WEIGHT: 226.19 LBS | HEIGHT: 69 IN

## 2017-04-06 DIAGNOSIS — I25.10 CORONARY ARTERY DISEASE DUE TO CALCIFIED CORONARY LESION: ICD-10-CM

## 2017-04-06 DIAGNOSIS — I25.84 CORONARY ARTERY DISEASE DUE TO CALCIFIED CORONARY LESION: ICD-10-CM

## 2017-04-06 DIAGNOSIS — E78.5 DYSLIPIDEMIA: ICD-10-CM

## 2017-04-06 DIAGNOSIS — F32.A DEPRESSION, UNSPECIFIED DEPRESSION TYPE: ICD-10-CM

## 2017-04-06 DIAGNOSIS — M17.12 PRIMARY OSTEOARTHRITIS OF LEFT KNEE: ICD-10-CM

## 2017-04-06 DIAGNOSIS — K21.9 GASTROESOPHAGEAL REFLUX DISEASE WITHOUT ESOPHAGITIS: ICD-10-CM

## 2017-04-06 PROCEDURE — 99214 OFFICE O/P EST MOD 30 MIN: CPT | Performed by: FAMILY MEDICINE

## 2017-04-06 PROCEDURE — 73562 X-RAY EXAM OF KNEE 3: CPT | Mod: LT

## 2017-04-06 NOTE — PROGRESS NOTES
CC: Multiple medica;l problems/ left Knee pain    HPI:   Talib presents today to discuss the following medical issues:    Coronary artery disease due to calcified coronary lesion: CABG ×2 in August 2016, he has been stable, and asymptomatic.No new chest pain, or SOB.has been on Plavix, BB, and statin.He follows up with cardiology Dr Sukhi Fontana.    Dyslipidemia, he has been tolerating the statin. Denies muscle pain LFTs has been normal, has been on Lipitor 40 mg daily.Most recent lipid panel was normal.    Gastroesophageal reflux disease , denies epigastric pain, nausea, vomiting, and heart burn. He has been doing fine with Zantac 75 mg bid.    Depression, patient stated that his mood has improved since he started the Zoloft.However he reported mild increase in his weight. He has been on Zoloft 25 mg daily.    Primary osteoarthritis of left knee, patient has been having the pain on and off but for the past 3 days it has been constant , to the extent that he uses a cane to help with ambulation.However Naproxen has been very helpful. Patient has had arthroscopic cleaning of this knee before.Triued the cortisone shot in the past and it helped.      Patient Active Problem List    Diagnosis Date Noted   • Primary osteoarthritis of left knee 04/06/2017   • Depression 04/06/2017   • Gastroesophageal reflux disease without esophagitis 04/06/2017   • Coronary artery disease due to calcified coronary lesion: CABG ×2 in August 2016 01/06/2017   • Dyslipidemia 03/02/2015   • Osteoarthritis 03/02/2015   • Obesity (BMI 30-39.9) 02/12/2015       Current Outpatient Prescriptions   Medication Sig Dispense Refill   • sertraline (ZOLOFT) 25 MG tablet Take 1 Tab by mouth 2 Times a Day. 60 Tab 0   • rosuvastatin (CRESTOR) 40 MG tablet Take 1 Tab by mouth every day. 30 Tab 11   • RaNITidine HCl (RANITIDINE 75 PO) Take  by mouth every day.     • aspirin EC (ECOTRIN) 81 MG Tablet Delayed Response Take 81 mg by mouth every day.     •  "Misc Natural Products (GLUCOSAMINE CHOND COMPLEX/MSM PO) Take  by mouth.     • Omega-3 Fatty Acids (FISH OIL BURP-LESS PO) Take  by mouth.     • clopidogrel (PLAVIX) 75 MG Tab Take 1 Tab by mouth every day. 30 Tab 11   • metoprolol (LOPRESSOR) 25 MG Tab Take 1 Tab by mouth 2 times a day. 60 Tab 11   • Melatonin 10 MG TABS Take 1 Tab by mouth at bedtime as needed.     • atorvastatin (LIPITOR) 40 MG Tab Take 1 Tab by mouth every day. 30 Tab 11   • Cinnamon 500 MG CAPS Take 2 Caps by mouth every day.     • GARLIC PO Take 1 Cap by mouth every day.     • Misc Natural Products (URINOZINC PLUS) TABS Take 1 Tab by mouth 2 Times a Day.       No current facility-administered medications for this visit.         Allergies as of 04/06/2017   • (No Known Allergies)        ROS: Denies any chest pain, Shortness of breath, Changes bowel or bladder, Lower extremity edema.    Physical Exam:  /80 mmHg  Pulse 64  Temp(Src) 36.4 °C (97.6 °F)  Resp 14  Ht 1.753 m (5' 9.02\")  Wt 102.6 kg (226 lb 3.1 oz)  BMI 33.39 kg/m2  SpO2 98%  Gen.: Well-developed, well-nourished, no apparent distress,pleasant and cooperative with the examination  Skin:  Warm and dry with good turgor. No rashes or suspicious lesions in visible areas  HEENT:Sinuses nontender with palpation, TMs clear, nares patent with pink mucosa and clear rhinorrhea,no septal deviation ,polyps or lesions. lips without lesions, oropharynx clear.  Neck: Trachea midline,no masses or adenopathy. No JVD.  Cor: Regular rate and rhythm without murmur, gallop or rub.  Lungs: Respirations unlabored.Clear to auscultation with equal breath sounds bilaterally. No wheezes, rhonchi.  Extremities: No cyanosis, clubbing or edema.Left knee ;Decreased ROM, no swelling or tenderness.      Assessment and Plan.   61 y.o. male     1. Coronary artery disease due to calcified coronary lesion: CABG ×2 in August 2016  Stable.Asymptomatic.  Continue on Plavix, BB, and statin.  Continue follow up " with cardiology Dr Sukhi Fontana.    2. Dyslipidemia  He has been tolerating the statin. Denies muscle pain LFTs has been normal  Continue on Lipitor 40 mg daily.    3. Gastroesophageal reflux disease without esophagitis  Has been doing fine with Zantac 75 mg bid.    4. Depression, unspecified depression type  Mood improved with Zoloft.  Continue on Zoloft 25 mg daily.    5. Primary osteoarthritis of left knee  Has been having pain on and off but has been constant for the past 3 days, uses a cane to help with ambulation.  Will do an x ray, consider a knee injection next week 9 patient advised to make an appt early next week). Wants to be seen by ortho for more evaluation.    - DX-KNEE 3 VIEWS LEFT; Future  - REFERRAL TO ORTHOPEDICS

## 2017-04-06 NOTE — MR AVS SNAPSHOT
"        Talib Angel   2017 10:20 AM   Office Visit   MRN: [id number]    Department:  [address] [place] [address]   Dept Phone:  [phone]    Description:  Male : 1955   Provider:  Belen Knowles M.D.           Reason for Visit     Knee Pain Left knee pain, states has had work done on it. Can't walk on it. left leg vein has been removed due to blood clots.      Allergies as of 2017     No Known Allergies      You were diagnosed with     Coronary artery disease due to calcified coronary lesion   [3223866]       Dyslipidemia   [142061]       Gastroesophageal reflux disease without esophagitis   [717342]       Depression, unspecified depression type   [3503258]       Primary osteoarthritis of left knee   [968064]         Vital Signs     Blood Pressure Pulse Temperature Respirations Height Weight    118/80 mmHg 64 36.4 °C (97.6 °F) 14 1.753 m (5' 9.02\") 102.6 kg (226 lb 3.1 oz)    Body Mass Index Oxygen Saturation Smoking Status             33.39 kg/m2 98% Former Smoker         Basic Information     Date Of Birth Sex Race Ethnicity Preferred Language    1955 Male White Non- English      Your appointments     2017  8:00 AM   Established Patient with Belen Knowles M.D.   [facility] [address]    [address] [place] [id number]   [phone]           You will be receiving a confirmation call a few days before your appointment from our automated call confirmation system.              Problem List              ICD-10-CM Priority Class Noted - Resolved    Obesity (BMI 30-39.9) E66.9   2015 - Present    Dyslipidemia E78.5   3/2/2015 - Present    Osteoarthritis M19.90   3/2/2015 - Present    Coronary artery disease due to calcified coronary lesion: CABG ×2 in 2016 I25.10, I25.84   2017 - Present    Primary osteoarthritis of left knee M17.12   2017 - Present    Depression F32.9   2017 - Present    Gastroesophageal " reflux disease without esophagitis K21.9   4/6/2017 - Present      Health Maintenance        Date Due Completion Dates    IMM DTaP/Tdap/Td Vaccine (1 - Tdap) 10/10/1974 ---    IMM ZOSTER VACCINE 10/10/2015 ---    COLONOSCOPY 3/3/2020 3/3/2017            Current Immunizations     Influenza TIV (IM) 10/6/2014    Pneumococcal polysaccharide vaccine (PPSV-23) 10/6/2014      Below and/or attached are the medications your provider expects you to take. Review all of your home medications and newly ordered medications with your provider and/or pharmacist. Follow medication instructions as directed by your provider and/or pharmacist. Please keep your medication list with you and share with your provider. Update the information when medications are discontinued, doses are changed, or new medications (including over-the-counter products) are added; and carry medication information at all times in the event of emergency situations     Allergies:  No Known Allergies          Medications  Valid as of: April 06, 2017 - 10:40 AM    Generic Name Brand Name Tablet Size Instructions for use    Aspirin (Tablet Delayed Response) ECOTRIN 81 MG Take 81 mg by mouth every day.        Atorvastatin Calcium (Tab) LIPITOR 40 MG Take 1 Tab by mouth every day.        Cinnamon (Cap) Cinnamon 500 MG Take 2 Caps by mouth every day.        Clopidogrel Bisulfate (Tab) PLAVIX 75 MG Take 1 Tab by mouth every day.        Garlic   Take 1 Cap by mouth every day.        Melatonin (Tab) Melatonin 10 MG Take 1 Tab by mouth at bedtime as needed.        Metoprolol Tartrate (Tab) LOPRESSOR 25 MG Take 1 Tab by mouth 2 times a day.        Misc Natural Products (Tab) URINOZINC PLUS  Take 1 Tab by mouth 2 Times a Day.        Misc Natural Products   Take  by mouth.        Omega-3 Fatty Acids   Take  by mouth.        RaNITidine HCl   Take  by mouth every day.        Rosuvastatin Calcium (Tab) CRESTOR 40 MG Take 1 Tab by mouth every day.        Sertraline HCl (Tab)  ZOLOFT 25 MG Take 1 Tab by mouth 2 Times a Day.        .                 Medicines prescribed today were sent to:     [facility] [place], [place] - [address] [facility]    [address] [facility] [place] [address]    Phone: [phone] Fax: [phone]    Open 24 Hours?: No    [facility] - [place], [place] - [address] [facility] [address] [facility]    [address] [facility] [address]    Phone: [phone] Fax: [phone]    Open 24 Hours?: No      Medication refill instructions:       If your prescription bottle indicates you have medication refills left, it is not necessary to call your provider’s office. Please contact your pharmacy and they will refill your medication.    If your prescription bottle indicates you do not have any refills left, you may request refills at any time through one of the following ways: The online [employer] system (except Urgent Care), by calling your provider’s office, or by asking your pharmacy to contact your provider’s office with a refill request. Medication refills are processed only during regular business hours and may not be available until the next business day. Your provider may request additional information or to have a follow-up visit with you prior to refilling your medication.   *Please Note: Medication refills are assigned a new Rx number when refilled electronically. Your pharmacy may indicate that no refills were authorized even though a new prescription for the same medication is available at the pharmacy. Please request the medicine by name with the pharmacy before contacting your provider for a refill.        Your To Do List     Future Labs/Procedures Complete By Expires    DX-KNEE 3 VIEWS LEFT  As directed 4/6/2018      Referral     A referral request has been sent to our patient care coordination department. Please allow 3-5 business days for us to process this request and contact you either by phone or mail. If you do not hear from us by the  5th business day, please call us at [phone].           [employer] Access Code: Activation code not generated  Current [employer] Status: Active

## 2017-04-12 ENCOUNTER — HOSPITAL ENCOUNTER (OUTPATIENT)
Dept: RADIOLOGY | Facility: MEDICAL CENTER | Age: 62
End: 2017-04-12
Attending: FAMILY MEDICINE
Payer: COMMERCIAL

## 2017-04-12 ENCOUNTER — OFFICE VISIT (OUTPATIENT)
Dept: MEDICAL GROUP | Facility: MEDICAL CENTER | Age: 62
End: 2017-04-12
Payer: COMMERCIAL

## 2017-04-12 VITALS
BODY MASS INDEX: 32.14 KG/M2 | WEIGHT: 217 LBS | DIASTOLIC BLOOD PRESSURE: 74 MMHG | HEART RATE: 95 BPM | RESPIRATION RATE: 14 BRPM | TEMPERATURE: 98.6 F | HEIGHT: 69 IN | SYSTOLIC BLOOD PRESSURE: 122 MMHG | OXYGEN SATURATION: 97 %

## 2017-04-12 DIAGNOSIS — R05.9 COUGH: ICD-10-CM

## 2017-04-12 DIAGNOSIS — G89.29 CHRONIC PAIN OF LEFT KNEE: ICD-10-CM

## 2017-04-12 DIAGNOSIS — M25.562 CHRONIC PAIN OF LEFT KNEE: ICD-10-CM

## 2017-04-12 DIAGNOSIS — R93.89 ABNORMAL CXR: ICD-10-CM

## 2017-04-12 PROCEDURE — 99214 OFFICE O/P EST MOD 30 MIN: CPT | Mod: 25 | Performed by: FAMILY MEDICINE

## 2017-04-12 PROCEDURE — 20610 DRAIN/INJ JOINT/BURSA W/O US: CPT | Performed by: FAMILY MEDICINE

## 2017-04-12 PROCEDURE — 71020 DX-CHEST-2 VIEWS: CPT

## 2017-04-12 RX ORDER — METHYLPREDNISOLONE ACETATE 80 MG/ML
80 INJECTION, SUSPENSION INTRA-ARTICULAR; INTRALESIONAL; INTRAMUSCULAR; SOFT TISSUE ONCE
Status: COMPLETED | OUTPATIENT
Start: 2017-04-12 | End: 2017-04-12

## 2017-04-12 RX ADMIN — METHYLPREDNISOLONE ACETATE 80 MG: 80 INJECTION, SUSPENSION INTRA-ARTICULAR; INTRALESIONAL; INTRAMUSCULAR; SOFT TISSUE at 08:26

## 2017-04-12 NOTE — MR AVS SNAPSHOT
"        Talib Ortiz   2017 8:00 AM   Office Visit   MRN: [id number]    Department:  [address] [place] [address]   Dept Phone:  [phone]    Description:  Male : 1955   Provider:  Belen Knowles M.D.           Reason for Visit     Knee Pain           Allergies as of 2017     No Known Allergies      You were diagnosed with     Chronic pain of left knee   [761184]       Cough   [786.2.ICD-9-CM]         Vital Signs     Blood Pressure Pulse Temperature Respirations Height Weight    122/74 mmHg 95 37 °C (98.6 °F) 14 1.753 m (5' 9.02\") 98.431 kg (217 lb)    Body Mass Index Oxygen Saturation Smoking Status             32.03 kg/m2 97% Former Smoker         Basic Information     Date Of Birth Sex Race Ethnicity Preferred Language    1955 Male White Non- English      Problem List              ICD-10-CM Priority Class Noted - Resolved    Obesity (BMI 30-39.9) E66.9   2015 - Present    Dyslipidemia E78.5   3/2/2015 - Present    Osteoarthritis M19.90   3/2/2015 - Present    Coronary artery disease due to calcified coronary lesion: CABG ×2 in 2016 I25.10, I25.84   2017 - Present    Primary osteoarthritis of left knee M17.12   2017 - Present    Depression F32.9   2017 - Present    Gastroesophageal reflux disease without esophagitis K21.9   2017 - Present      Health Maintenance        Date Due Completion Dates    IMM DTaP/Tdap/Td Vaccine (1 - Tdap) 10/10/1974 ---    IMM ZOSTER VACCINE 10/10/2015 ---    COLONOSCOPY 3/3/2020 3/3/2017            Current Immunizations     Influenza TIV (IM) 10/6/2014    Pneumococcal polysaccharide vaccine (PPSV-23) 10/6/2014      Below and/or attached are the medications your provider expects you to take. Review all of your home medications and newly ordered medications with your provider and/or pharmacist. Follow medication instructions as directed by your provider and/or pharmacist. Please keep your medication list with you and share " with your provider. Update the information when medications are discontinued, doses are changed, or new medications (including over-the-counter products) are added; and carry medication information at all times in the event of emergency situations     Allergies:  No Known Allergies          Medications  Valid as of: April 12, 2017 -  9:26 AM    Generic Name Brand Name Tablet Size Instructions for use    Aspirin (Tablet Delayed Response) ECOTRIN 81 MG Take 81 mg by mouth every day.        Atorvastatin Calcium (Tab) LIPITOR 40 MG Take 1 Tab by mouth every day.        Cinnamon (Cap) Cinnamon 500 MG Take 2 Caps by mouth every day.        Clopidogrel Bisulfate (Tab) PLAVIX 75 MG Take 1 Tab by mouth every day.        Garlic   Take 1 Cap by mouth every day.        Melatonin (Tab) Melatonin 10 MG Take 1 Tab by mouth at bedtime as needed.        Metoprolol Tartrate (Tab) LOPRESSOR 25 MG Take 1 Tab by mouth 2 times a day.        Misc Natural Products (Tab) URINOZINC PLUS  Take 1 Tab by mouth 2 Times a Day.        Misc Natural Products   Take  by mouth.        Omega-3 Fatty Acids   Take  by mouth.        RaNITidine HCl   Take  by mouth every day.        Rosuvastatin Calcium (Tab) CRESTOR 40 MG Take 1 Tab by mouth every day.        Sertraline HCl (Tab) ZOLOFT 25 MG Take 1 Tab by mouth 2 Times a Day.        .                 Medicines prescribed today were sent to:     [facility] [address] [place] [address] - [address] [address]    Phone: [phone] Fax: [phone]    Open 24 Hours?: No    [facility] - [place], [place] - [address] [facility]    [address] [facility] [address]    Phone: [phone] Fax: [phone]    Open 24 Hours?: No      Medication refill instructions:       If your prescription bottle indicates you have medication refills left, it is not necessary to call your provider’s office. Please contact your pharmacy and they will refill  your medication.    If your prescription bottle indicates you do not have any refills left, you may request refills at any time through one of the following ways: The online [employer] system (except Urgent Care), by calling your provider’s office, or by asking your pharmacy to contact your provider’s office with a refill request. Medication refills are processed only during regular business hours and may not be available until the next business day. Your provider may request additional information or to have a follow-up visit with you prior to refilling your medication.   *Please Note: Medication refills are assigned a new Rx number when refilled electronically. Your pharmacy may indicate that no refills were authorized even though a new prescription for the same medication is available at the pharmacy. Please request the medicine by name with the pharmacy before contacting your provider for a refill.        Your To Do List     Future Labs/Procedures Complete By Expires    DX-CHEST-2 VIEWS  As directed 4/12/2018         [employer] Access Code: Activation code not generated  Current [employer] Status: Active

## 2017-04-12 NOTE — PROGRESS NOTES
CC: Knee pain, came in for intraarticular cortisone shot/ cough    HPI:   Talib presents today for intracuticular left knee cortisone shot, to be evaluated for his 3 days cough.    Primary osteoarthritis of left knee, patient has been having the pain on and off but for the past 2 weeks, and it has been constant , to the extent that he uses a cane to help with ambulation.However Naproxen has been very helpful. Patient has had arthroscopic cleaning of this knee before.Tried the cortisone shot in the past and it helped, wants to try it again.    He has been having cough for 3 days, it is dry, associated with low grade fever ( subjective), and mild SOB specially at night. Denies chest pain, and labored breathing. His temp in the clinic today is normal. Denies sweating and loss of weight. Has been having normal appetite.    Patient Active Problem List    Diagnosis Date Noted   • Primary osteoarthritis of left knee 04/06/2017   • Depression 04/06/2017   • Gastroesophageal reflux disease without esophagitis 04/06/2017   • Coronary artery disease due to calcified coronary lesion: CABG ×2 in August 2016 01/06/2017   • Dyslipidemia 03/02/2015   • Osteoarthritis 03/02/2015   • Obesity (BMI 30-39.9) 02/12/2015       Current Outpatient Prescriptions   Medication Sig Dispense Refill   • sertraline (ZOLOFT) 25 MG tablet Take 1 Tab by mouth 2 Times a Day. 60 Tab 0   • rosuvastatin (CRESTOR) 40 MG tablet Take 1 Tab by mouth every day. 30 Tab 11   • RaNITidine HCl (RANITIDINE 75 PO) Take  by mouth every day.     • aspirin EC (ECOTRIN) 81 MG Tablet Delayed Response Take 81 mg by mouth every day.     • Misc Natural Products (GLUCOSAMINE CHOND COMPLEX/MSM PO) Take  by mouth.     • Omega-3 Fatty Acids (FISH OIL BURP-LESS PO) Take  by mouth.     • atorvastatin (LIPITOR) 40 MG Tab Take 1 Tab by mouth every day. 30 Tab 11   • clopidogrel (PLAVIX) 75 MG Tab Take 1 Tab by mouth every day. 30 Tab 11   • metoprolol (LOPRESSOR) 25 MG Tab Take 1  "Tab by mouth 2 times a day. 60 Tab 11   • Cinnamon 500 MG CAPS Take 2 Caps by mouth every day.     • GARLIC PO Take 1 Cap by mouth every day.     • Misc Natural Products (URINOZINC PLUS) TABS Take 1 Tab by mouth 2 Times a Day.     • Melatonin 10 MG TABS Take 1 Tab by mouth at bedtime as needed.       Current Facility-Administered Medications   Medication Dose Route Frequency Provider Last Rate Last Dose   • methylPREDNISolone acetate (DEPO-MEDROL) injection 80 mg  80 mg Intramuscular Once Belen Knowles M.D.             Allergies as of 04/12/2017   • (No Known Allergies)        ROS: Denies any chest pain, Shortness of breath, Changes bowel or bladder, Lower extremity edema.    Physical Exam:    /74 mmHg  Pulse 95  Temp(Src) 37 °C (98.6 °F)  Resp 14  Ht 1.753 m (5' 9.02\")  Wt 98.431 kg (217 lb)  BMI 32.03 kg/m2  SpO2 97%  Gen.: Well-developed, well-nourished, no apparent distress,pleasant and cooperative with the examination  HEENT:Sinuses nontender with palpation, TMs clear, nares patent with pink mucosa and clear rhinorrhea,no septal deviation ,polyps or lesions. lips without lesions, oropharynx clear.  Neck: Trachea midline,no masses or adenopathy. No JVD.  Cor: Regular rate and rhythm without murmur, gallop or rub.  Lungs: Respirations unlabored.Clear to auscultation with equal breath sounds bilaterally. No wheezes, rhonchi.  Left Knee: No swelling, or tenderness, decreased ROM      Knee injection.  Patient was consented. Risks and benefits discussed.  Rt knee was exposed. Site of injection was marked ( A point between lateral patellar ligament, lateral tibial plateau, and lateral femoral condyle). The skin was sterilized with (2% chlorhexidine , and 70% Isopropyl alcohol), the area was numbed with ethyl cholride , then 1 ml of DepoMedrol 80 mg + 4 ml of Lidocaine 1% were injected into the joint.    Assessment and Plan.   61 y.o. male     1. Chronic pain of left knee  Intra-articular " injection of the left knee joint is given.Patient joint movement improved to about 50% immediately after the injection.Precaustion was given to avoid bleeding . Patient advised to to uses worm compresses 2 times daily for 3 days, and RTC if any continuous pain on the joint.    - NE DRAIN/INJECT LARGE JOINT/BURSA  - methylPREDNISolone acetate (DEPO-MEDROL) injection 80 mg; 1 mL by Intramuscular route Once.      2. Cough  Probably viral URI.  Hydration  Cough drops.  Patient reported low grade fever, and mild SOB at home, however his O2 sat in the clinic is normal, and does not look short of breath, so will do CXR.    - DX-CHEST-2 VIEWS; Future    Addendum:  CXR showed:No active cardiopulmonary abnormalities , has a 3.5 mm a nodule in the right lung base.     A/P : patient has no previous CXR to compare so CT could be helpful for further evaluation ( ordered)    - Abnormal CXR  - CT-CHEST (THORAX) WITH; Future

## 2017-04-13 ENCOUNTER — HOSPITAL ENCOUNTER (OUTPATIENT)
Dept: LAB | Facility: MEDICAL CENTER | Age: 62
End: 2017-04-13
Attending: FAMILY MEDICINE
Payer: COMMERCIAL

## 2017-04-13 DIAGNOSIS — R93.89 ABNORMAL CXR: ICD-10-CM

## 2017-04-13 LAB
BUN SERPL-MCNC: 17 MG/DL (ref 8–22)
CREAT SERPL-MCNC: 0.99 MG/DL (ref 0.5–1.4)
GFR SERPL CREATININE-BSD FRML MDRD: >60 ML/MIN/1.73 M 2

## 2017-04-13 PROCEDURE — 36415 COLL VENOUS BLD VENIPUNCTURE: CPT

## 2017-04-13 PROCEDURE — 84520 ASSAY OF UREA NITROGEN: CPT

## 2017-04-13 PROCEDURE — 82565 ASSAY OF CREATININE: CPT

## 2017-04-19 ENCOUNTER — HOSPITAL ENCOUNTER (OUTPATIENT)
Dept: RADIOLOGY | Facility: MEDICAL CENTER | Age: 62
End: 2017-04-19
Attending: FAMILY MEDICINE
Payer: COMMERCIAL

## 2017-04-19 DIAGNOSIS — R93.89 ABNORMAL CXR: ICD-10-CM

## 2017-04-19 PROCEDURE — 700117 HCHG RX CONTRAST REV CODE 255: Performed by: FAMILY MEDICINE

## 2017-04-19 PROCEDURE — 71260 CT THORAX DX C+: CPT

## 2017-04-19 RX ADMIN — IOHEXOL 75 ML: 350 INJECTION, SOLUTION INTRAVENOUS at 09:15

## 2017-04-28 DIAGNOSIS — F32.A DEPRESSION, UNSPECIFIED DEPRESSION TYPE: ICD-10-CM

## 2017-04-28 RX ORDER — SERTRALINE HYDROCHLORIDE 25 MG/1
25 TABLET, FILM COATED ORAL 2 TIMES DAILY
Qty: 60 TAB | Refills: 1 | Status: SHIPPED | OUTPATIENT
Start: 2017-04-28 | End: 2017-07-18 | Stop reason: SDUPTHER

## 2017-05-09 ENCOUNTER — TELEPHONE (OUTPATIENT)
Dept: CARDIOLOGY | Facility: MEDICAL CENTER | Age: 62
End: 2017-05-09

## 2017-05-09 NOTE — TELEPHONE ENCOUNTER
Received a clearance request for the patient to have a left total knee arthroplasty with Dr. Charles Castellanos.    Forwarded to Dr. Frias, please advise. Thank you.    HINA BELTRAN

## 2017-05-09 NOTE — Clinical Note
PROCEDURE/SURGERY CLEARANCE FORM      Encounter Date: 5/9/2017    Patient: Talib Ortiz  YOB: 1955    CARDIOLOGIST:  Sukhi Frias M.D.    REFERRING DOCTOR:  Charles Castellanos M.D.    PATIENT DOES NOT HAVE A PPM OR AICD    The above patient is cleared from a cardiology standpoint to have the following procedure/surgery:   Left total knee arthroplasty                           MD Signature   Sukhi Frias M.D.

## 2017-05-11 NOTE — TELEPHONE ENCOUNTER
Clearance letter faxed to [facility] [place] at fax # [phone].    HINA RN

## 2017-06-05 DIAGNOSIS — Z01.812 PRE-OPERATIVE LABORATORY EXAMINATION: ICD-10-CM

## 2017-06-05 DIAGNOSIS — Z01.810 PRE-OPERATIVE CARDIOVASCULAR EXAMINATION: ICD-10-CM

## 2017-06-05 LAB
ANION GAP SERPL CALC-SCNC: 8 MMOL/L (ref 0–11.9)
APPEARANCE UR: CLEAR
BASOPHILS # BLD AUTO: 2.1 % (ref 0–1.8)
BASOPHILS # BLD: 0.14 K/UL (ref 0–0.12)
BILIRUB UR QL STRIP.AUTO: NEGATIVE
BUN SERPL-MCNC: 14 MG/DL (ref 8–22)
CALCIUM SERPL-MCNC: 9.6 MG/DL (ref 8.5–10.5)
CHLORIDE SERPL-SCNC: 106 MMOL/L (ref 96–112)
CO2 SERPL-SCNC: 24 MMOL/L (ref 20–33)
COLOR UR: YELLOW
CREAT SERPL-MCNC: 1.23 MG/DL (ref 0.5–1.4)
CULTURE IF INDICATED INDCX: NO UA CULTURE
EOSINOPHIL # BLD AUTO: 0.18 K/UL (ref 0–0.51)
EOSINOPHIL NFR BLD: 2.7 % (ref 0–6.9)
ERYTHROCYTE [DISTWIDTH] IN BLOOD BY AUTOMATED COUNT: 47.4 FL (ref 35.9–50)
GFR SERPL CREATININE-BSD FRML MDRD: 60 ML/MIN/1.73 M 2
GLUCOSE SERPL-MCNC: 98 MG/DL (ref 65–99)
GLUCOSE UR STRIP.AUTO-MCNC: NEGATIVE MG/DL
HCT VFR BLD AUTO: 36 % (ref 42–52)
HGB BLD-MCNC: 11.2 G/DL (ref 14–18)
IMM GRANULOCYTES # BLD AUTO: 0.03 K/UL (ref 0–0.11)
IMM GRANULOCYTES NFR BLD AUTO: 0.5 % (ref 0–0.9)
KETONES UR STRIP.AUTO-MCNC: NEGATIVE MG/DL
LEUKOCYTE ESTERASE UR QL STRIP.AUTO: NEGATIVE
LYMPHOCYTES # BLD AUTO: 1.59 K/UL (ref 1–4.8)
LYMPHOCYTES NFR BLD: 23.9 % (ref 22–41)
MCH RBC QN AUTO: 26.2 PG (ref 27–33)
MCHC RBC AUTO-ENTMCNC: 31.1 G/DL (ref 33.7–35.3)
MCV RBC AUTO: 84.3 FL (ref 81.4–97.8)
MICRO URNS: NORMAL
MONOCYTES # BLD AUTO: 0.6 K/UL (ref 0–0.85)
MONOCYTES NFR BLD AUTO: 9 % (ref 0–13.4)
NEUTROPHILS # BLD AUTO: 4.1 K/UL (ref 1.82–7.42)
NEUTROPHILS NFR BLD: 61.8 % (ref 44–72)
NITRITE UR QL STRIP.AUTO: NEGATIVE
NRBC # BLD AUTO: 0 K/UL
NRBC BLD AUTO-RTO: 0 /100 WBC
PH UR STRIP.AUTO: 6 [PH]
PLATELET # BLD AUTO: 372 K/UL (ref 164–446)
PMV BLD AUTO: 10.1 FL (ref 9–12.9)
POTASSIUM SERPL-SCNC: 4.8 MMOL/L (ref 3.6–5.5)
PROT UR QL STRIP: NEGATIVE MG/DL
RBC # BLD AUTO: 4.27 M/UL (ref 4.7–6.1)
RBC UR QL AUTO: NEGATIVE
SCCMEC + MECA PNL NOSE NAA+PROBE: NEGATIVE
SCCMEC + MECA PNL NOSE NAA+PROBE: POSITIVE
SODIUM SERPL-SCNC: 138 MMOL/L (ref 135–145)
SP GR UR STRIP.AUTO: 1.02
WBC # BLD AUTO: 6.6 K/UL (ref 4.8–10.8)

## 2017-06-05 PROCEDURE — 87640 STAPH A DNA AMP PROBE: CPT

## 2017-06-05 PROCEDURE — 36415 COLL VENOUS BLD VENIPUNCTURE: CPT

## 2017-06-05 PROCEDURE — 87641 MR-STAPH DNA AMP PROBE: CPT

## 2017-06-05 PROCEDURE — 85025 COMPLETE CBC W/AUTO DIFF WBC: CPT

## 2017-06-05 PROCEDURE — 80048 BASIC METABOLIC PNL TOTAL CA: CPT

## 2017-06-05 PROCEDURE — 81003 URINALYSIS AUTO W/O SCOPE: CPT

## 2017-06-05 PROCEDURE — 93005 ELECTROCARDIOGRAM TRACING: CPT

## 2017-06-05 PROCEDURE — 93010 ELECTROCARDIOGRAM REPORT: CPT | Performed by: INTERNAL MEDICINE

## 2017-06-05 RX ORDER — RANITIDINE 150 MG/1
150 TABLET ORAL 2 TIMES DAILY
COMMUNITY
End: 2022-07-27

## 2017-06-05 RX ORDER — HYDROCORTISONE ACETATE 0.5 %
CREAM (GRAM) TOPICAL DAILY
COMMUNITY
End: 2018-05-09

## 2017-06-05 NOTE — OR NURSING
Preadmit appointment:  Patient instructed to continue regularly prescribed medications through day before surgery.  Instructed to take the following medications the  day of surgery with a sip of water per anesthesia protocol: Metoprolol, Zantac

## 2017-06-05 NOTE — DISCHARGE PLANNING
DISCHARGE PLANNING NOTE - TOTAL JOINT     Procedure: Procedure(s):  KNEE ARTHROPLASTY TOTAL  Procedure Date: 6/19/2017  Insurance:  Payor: [employer] / Plan: [facility] / Product Type: *No Product type* /   Equipment currently available at home? cane and front-wheel walker  Steps into the home? 2  Steps within the home? 0  Toilet height? Standard  Type of shower? walk-in shower  Who will be with you during your recovery? spouse  Is Outpatient Physical Therapy set up after surgery? Yes   Did you take the Total Joint Class and where? Yes, at [facility]     Plan: Met Talib in pre-admit.  Information given for bedside commode and shower chair.  Has PT arranged for 3 days after surgery at [facility].  Wife is having back surgery 2 weeks after his TKA, and he will be her caregiver.  He may need home care during this time.  No further questions or DME needs identified.

## 2017-06-06 LAB — EKG IMPRESSION: NORMAL

## 2017-06-19 ENCOUNTER — APPOINTMENT (OUTPATIENT)
Dept: RADIOLOGY | Facility: MEDICAL CENTER | Age: 62
DRG: 469 | End: 2017-06-19
Attending: NURSE PRACTITIONER
Payer: COMMERCIAL

## 2017-06-19 ENCOUNTER — HOSPITAL ENCOUNTER (INPATIENT)
Facility: MEDICAL CENTER | Age: 62
LOS: 1 days | DRG: 469 | End: 2017-06-20
Attending: ORTHOPAEDIC SURGERY | Admitting: ORTHOPAEDIC SURGERY
Payer: COMMERCIAL

## 2017-06-19 PROBLEM — M17.12 DEGENERATIVE ARTHRITIS OF LEFT KNEE: Status: ACTIVE | Noted: 2017-06-19

## 2017-06-19 PROBLEM — N00.0: Status: ACTIVE | Noted: 2017-06-19

## 2017-06-19 PROCEDURE — 160002 HCHG RECOVERY MINUTES (STAT): Performed by: ORTHOPAEDIC SURGERY

## 2017-06-19 PROCEDURE — 770006 HCHG ROOM/CARE - MED/SURG/GYN SEMI*

## 2017-06-19 PROCEDURE — 501838 HCHG SUTURE GENERAL: Performed by: ORTHOPAEDIC SURGERY

## 2017-06-19 PROCEDURE — 501486 HCHG STRYKER IRRIG SET HC W/TUBING: Performed by: ORTHOPAEDIC SURGERY

## 2017-06-19 PROCEDURE — 502579 HCHG PACK, TOTAL KNEE: Performed by: ORTHOPAEDIC SURGERY

## 2017-06-19 PROCEDURE — 501487 HCHG STRYKER TIP: Performed by: ORTHOPAEDIC SURGERY

## 2017-06-19 PROCEDURE — A9270 NON-COVERED ITEM OR SERVICE: HCPCS | Performed by: NURSE PRACTITIONER

## 2017-06-19 PROCEDURE — 700111 HCHG RX REV CODE 636 W/ 250 OVERRIDE (IP): Performed by: NURSE PRACTITIONER

## 2017-06-19 PROCEDURE — 500811 HCHG LENS/HOOD FOR SPACESUIT: Performed by: ORTHOPAEDIC SURGERY

## 2017-06-19 PROCEDURE — 160009 HCHG ANES TIME/MIN: Performed by: ORTHOPAEDIC SURGERY

## 2017-06-19 PROCEDURE — A9270 NON-COVERED ITEM OR SERVICE: HCPCS

## 2017-06-19 PROCEDURE — 160048 HCHG OR STATISTICAL LEVEL 1-5: Performed by: ORTHOPAEDIC SURGERY

## 2017-06-19 PROCEDURE — 700105 HCHG RX REV CODE 258: Performed by: NURSE PRACTITIONER

## 2017-06-19 PROCEDURE — 160036 HCHG PACU - EA ADDL 30 MINS PHASE I: Performed by: ORTHOPAEDIC SURGERY

## 2017-06-19 PROCEDURE — 700101 HCHG RX REV CODE 250

## 2017-06-19 PROCEDURE — 160029 HCHG SURGERY MINUTES - 1ST 30 MINS LEVEL 4: Performed by: ORTHOPAEDIC SURGERY

## 2017-06-19 PROCEDURE — 94760 N-INVAS EAR/PLS OXIMETRY 1: CPT

## 2017-06-19 PROCEDURE — 700112 HCHG RX REV CODE 229: Performed by: NURSE PRACTITIONER

## 2017-06-19 PROCEDURE — 302135 SEQUENTIAL COMPRESSION MACHINE: Performed by: ORTHOPAEDIC SURGERY

## 2017-06-19 PROCEDURE — 160041 HCHG SURGERY MINUTES - EA ADDL 1 MIN LEVEL 4: Performed by: ORTHOPAEDIC SURGERY

## 2017-06-19 PROCEDURE — 73560 X-RAY EXAM OF KNEE 1 OR 2: CPT | Mod: LT

## 2017-06-19 PROCEDURE — 500094 HCHG BONE CEMENT MIXER (MIX-E-VAC II): Performed by: ORTHOPAEDIC SURGERY

## 2017-06-19 PROCEDURE — 500088 HCHG BLADE, SAGITTAL: Performed by: ORTHOPAEDIC SURGERY

## 2017-06-19 PROCEDURE — 500096 HCHG BONE CEMENT, SIMPLEX ANTIBIOTIC: Performed by: ORTHOPAEDIC SURGERY

## 2017-06-19 PROCEDURE — 700102 HCHG RX REV CODE 250 W/ 637 OVERRIDE(OP): Performed by: NURSE PRACTITIONER

## 2017-06-19 PROCEDURE — 500054 HCHG BANDAGE, ELASTIC 6: Performed by: ORTHOPAEDIC SURGERY

## 2017-06-19 PROCEDURE — 700102 HCHG RX REV CODE 250 W/ 637 OVERRIDE(OP)

## 2017-06-19 PROCEDURE — 160022 HCHG BLOCK: Performed by: ORTHOPAEDIC SURGERY

## 2017-06-19 PROCEDURE — 700111 HCHG RX REV CODE 636 W/ 250 OVERRIDE (IP)

## 2017-06-19 PROCEDURE — 160035 HCHG PACU - 1ST 60 MINS PHASE I: Performed by: ORTHOPAEDIC SURGERY

## 2017-06-19 PROCEDURE — 502000 HCHG MISC OR IMPLANTS RC 0278: Performed by: ORTHOPAEDIC SURGERY

## 2017-06-19 PROCEDURE — 501480 HCHG STOCKINETTE: Performed by: ORTHOPAEDIC SURGERY

## 2017-06-19 PROCEDURE — 0SRD0J9 REPLACEMENT OF LEFT KNEE JOINT WITH SYNTHETIC SUBSTITUTE, CEMENTED, OPEN APPROACH: ICD-10-PCS | Performed by: ORTHOPAEDIC SURGERY

## 2017-06-19 DEVICE — IMPLANT GNS II CMT TIB SIZE 6 LEFT (1EA): Type: IMPLANTABLE DEVICE | Status: FUNCTIONAL

## 2017-06-19 DEVICE — BONE CEMENT SIMPLEX ANTIBIO - (10/PK): Type: IMPLANTABLE DEVICE | Status: FUNCTIONAL

## 2017-06-19 DEVICE — IMPLANT LGN CR HIGH FLEX XLPE SZ 5-6 9MM: Type: IMPLANTABLE DEVICE | Status: FUNCTIONAL

## 2017-06-19 DEVICE — IMPLANTABLE DEVICE: Type: IMPLANTABLE DEVICE | Status: FUNCTIONAL

## 2017-06-19 DEVICE — IMPLANT GNS II RESURF PAT 35MM (1EA): Type: IMPLANTABLE DEVICE | Status: FUNCTIONAL

## 2017-06-19 RX ORDER — DIPHENHYDRAMINE HYDROCHLORIDE 50 MG/ML
25 INJECTION INTRAMUSCULAR; INTRAVENOUS EVERY 6 HOURS PRN
Status: DISCONTINUED | OUTPATIENT
Start: 2017-06-19 | End: 2017-06-20 | Stop reason: HOSPADM

## 2017-06-19 RX ORDER — ENEMA 19; 7 G/133ML; G/133ML
1 ENEMA RECTAL
Status: DISCONTINUED | OUTPATIENT
Start: 2017-06-19 | End: 2017-06-20 | Stop reason: HOSPADM

## 2017-06-19 RX ORDER — SODIUM CHLORIDE, SODIUM LACTATE, POTASSIUM CHLORIDE, CALCIUM CHLORIDE 600; 310; 30; 20 MG/100ML; MG/100ML; MG/100ML; MG/100ML
1000 INJECTION, SOLUTION INTRAVENOUS
Status: DISCONTINUED | OUTPATIENT
Start: 2017-06-19 | End: 2017-06-20 | Stop reason: HOSPADM

## 2017-06-19 RX ORDER — POLYETHYLENE GLYCOL 3350 17 G/17G
1 POWDER, FOR SOLUTION ORAL DAILY
Status: DISCONTINUED | OUTPATIENT
Start: 2017-06-19 | End: 2017-06-20 | Stop reason: HOSPADM

## 2017-06-19 RX ORDER — MORPHINE SULFATE 4 MG/ML
1-3 INJECTION, SOLUTION INTRAMUSCULAR; INTRAVENOUS
Status: DISCONTINUED | OUTPATIENT
Start: 2017-06-19 | End: 2017-06-20 | Stop reason: HOSPADM

## 2017-06-19 RX ORDER — TRAMADOL HYDROCHLORIDE 50 MG/1
50 TABLET ORAL EVERY 6 HOURS PRN
Status: DISCONTINUED | OUTPATIENT
Start: 2017-06-19 | End: 2017-06-20 | Stop reason: HOSPADM

## 2017-06-19 RX ORDER — OXYCODONE HCL 10 MG/1
TABLET, FILM COATED, EXTENDED RELEASE ORAL
Status: COMPLETED
Start: 2017-06-19 | End: 2017-06-19

## 2017-06-19 RX ORDER — DEXAMETHASONE SODIUM PHOSPHATE 4 MG/ML
4 INJECTION, SOLUTION INTRA-ARTICULAR; INTRALESIONAL; INTRAMUSCULAR; INTRAVENOUS; SOFT TISSUE
Status: DISCONTINUED | OUTPATIENT
Start: 2017-06-19 | End: 2017-06-20 | Stop reason: HOSPADM

## 2017-06-19 RX ORDER — LIDOCAINE HYDROCHLORIDE 10 MG/ML
INJECTION, SOLUTION INFILTRATION; PERINEURAL
Status: COMPLETED
Start: 2017-06-19 | End: 2017-06-19

## 2017-06-19 RX ORDER — TRAMADOL HYDROCHLORIDE 50 MG/1
50-100 TABLET ORAL EVERY 6 HOURS PRN
Status: DISCONTINUED | OUTPATIENT
Start: 2017-06-19 | End: 2017-06-19

## 2017-06-19 RX ORDER — ROSUVASTATIN CALCIUM 10 MG/1
40 TABLET, COATED ORAL EVERY EVENING
Status: DISCONTINUED | OUTPATIENT
Start: 2017-06-19 | End: 2017-06-20 | Stop reason: HOSPADM

## 2017-06-19 RX ORDER — SCOLOPAMINE TRANSDERMAL SYSTEM 1 MG/1
1 PATCH, EXTENDED RELEASE TRANSDERMAL
Status: DISCONTINUED | OUTPATIENT
Start: 2017-06-19 | End: 2017-06-20 | Stop reason: HOSPADM

## 2017-06-19 RX ORDER — AMOXICILLIN 250 MG
1 CAPSULE ORAL
Status: DISCONTINUED | OUTPATIENT
Start: 2017-06-19 | End: 2017-06-20 | Stop reason: HOSPADM

## 2017-06-19 RX ORDER — KETOROLAC TROMETHAMINE 30 MG/ML
15 INJECTION, SOLUTION INTRAMUSCULAR; INTRAVENOUS EVERY 6 HOURS
Status: DISCONTINUED | OUTPATIENT
Start: 2017-06-19 | End: 2017-06-20 | Stop reason: HOSPADM

## 2017-06-19 RX ORDER — BISACODYL 10 MG
10 SUPPOSITORY, RECTAL RECTAL
Status: DISCONTINUED | OUTPATIENT
Start: 2017-06-19 | End: 2017-06-20 | Stop reason: HOSPADM

## 2017-06-19 RX ORDER — OXYCODONE HYDROCHLORIDE 10 MG/1
10 TABLET ORAL EVERY 4 HOURS PRN
Status: DISCONTINUED | OUTPATIENT
Start: 2017-06-19 | End: 2017-06-20 | Stop reason: HOSPADM

## 2017-06-19 RX ORDER — TRANEXAMIC ACID 100 MG/ML
INJECTION, SOLUTION INTRAVENOUS
Status: DISPENSED
Start: 2017-06-19 | End: 2017-06-19

## 2017-06-19 RX ORDER — DOCUSATE SODIUM 100 MG/1
100 CAPSULE, LIQUID FILLED ORAL 2 TIMES DAILY
Status: DISCONTINUED | OUTPATIENT
Start: 2017-06-19 | End: 2017-06-20 | Stop reason: HOSPADM

## 2017-06-19 RX ORDER — OXYCODONE HYDROCHLORIDE 10 MG/1
20 TABLET ORAL EVERY 4 HOURS PRN
Status: DISCONTINUED | OUTPATIENT
Start: 2017-06-19 | End: 2017-06-20 | Stop reason: HOSPADM

## 2017-06-19 RX ORDER — BUPIVACAINE HYDROCHLORIDE AND EPINEPHRINE 5; 5 MG/ML; UG/ML
INJECTION, SOLUTION EPIDURAL; INTRACAUDAL; PERINEURAL
Status: DISCONTINUED | OUTPATIENT
Start: 2017-06-19 | End: 2017-06-19 | Stop reason: HOSPADM

## 2017-06-19 RX ORDER — OXYCODONE HYDROCHLORIDE 10 MG/1
10-20 TABLET ORAL EVERY 4 HOURS PRN
Status: DISCONTINUED | OUTPATIENT
Start: 2017-06-19 | End: 2017-06-19

## 2017-06-19 RX ORDER — SODIUM CHLORIDE 9 MG/ML
INJECTION, SOLUTION INTRAVENOUS CONTINUOUS
Status: DISCONTINUED | OUTPATIENT
Start: 2017-06-19 | End: 2017-06-20 | Stop reason: HOSPADM

## 2017-06-19 RX ORDER — RANITIDINE 150 MG/1
150 TABLET ORAL 2 TIMES DAILY
Status: DISCONTINUED | OUTPATIENT
Start: 2017-06-19 | End: 2017-06-20

## 2017-06-19 RX ORDER — AMOXICILLIN 250 MG
1 CAPSULE ORAL NIGHTLY
Status: DISCONTINUED | OUTPATIENT
Start: 2017-06-19 | End: 2017-06-20 | Stop reason: HOSPADM

## 2017-06-19 RX ORDER — TRANEXAMIC ACID 100 MG/ML
INJECTION, SOLUTION INTRAVENOUS
Status: DISCONTINUED | OUTPATIENT
Start: 2017-06-19 | End: 2017-06-19 | Stop reason: HOSPADM

## 2017-06-19 RX ORDER — TRAMADOL HYDROCHLORIDE 50 MG/1
100 TABLET ORAL EVERY 6 HOURS PRN
Status: DISCONTINUED | OUTPATIENT
Start: 2017-06-19 | End: 2017-06-20 | Stop reason: HOSPADM

## 2017-06-19 RX ORDER — DIPHENHYDRAMINE HCL 25 MG
25 TABLET ORAL EVERY 6 HOURS PRN
Status: DISCONTINUED | OUTPATIENT
Start: 2017-06-19 | End: 2017-06-20 | Stop reason: HOSPADM

## 2017-06-19 RX ORDER — ONDANSETRON 2 MG/ML
4 INJECTION INTRAMUSCULAR; INTRAVENOUS EVERY 4 HOURS PRN
Status: DISCONTINUED | OUTPATIENT
Start: 2017-06-19 | End: 2017-06-20 | Stop reason: HOSPADM

## 2017-06-19 RX ORDER — ACETAMINOPHEN 500 MG
TABLET ORAL
Status: COMPLETED
Start: 2017-06-19 | End: 2017-06-19

## 2017-06-19 RX ORDER — DEXAMETHASONE SODIUM PHOSPHATE 4 MG/ML
10 INJECTION, SOLUTION INTRA-ARTICULAR; INTRALESIONAL; INTRAMUSCULAR; INTRAVENOUS; SOFT TISSUE ONCE
Status: COMPLETED | OUTPATIENT
Start: 2017-06-20 | End: 2017-06-20

## 2017-06-19 RX ORDER — ROSUVASTATIN CALCIUM 10 MG/1
TABLET, COATED ORAL
Status: COMPLETED
Start: 2017-06-19 | End: 2017-06-19

## 2017-06-19 RX ORDER — HYDROCODONE BITARTRATE AND ACETAMINOPHEN 10; 325 MG/1; MG/1
1-2 TABLET ORAL EVERY 6 HOURS PRN
Status: DISCONTINUED | OUTPATIENT
Start: 2017-06-19 | End: 2017-06-20 | Stop reason: HOSPADM

## 2017-06-19 RX ADMIN — ACETAMINOPHEN 1000 MG: 500 TABLET, COATED ORAL at 10:48

## 2017-06-19 RX ADMIN — SODIUM CHLORIDE: 9 INJECTION, SOLUTION INTRAVENOUS at 21:19

## 2017-06-19 RX ADMIN — LIDOCAINE HYDROCHLORIDE 0.1 ML: 10 INJECTION, SOLUTION INFILTRATION; PERINEURAL at 10:15

## 2017-06-19 RX ADMIN — OXYCODONE HYDROCHLORIDE 10 MG: 10 TABLET ORAL at 21:18

## 2017-06-19 RX ADMIN — DOCUSATE SODIUM 100 MG: 100 CAPSULE, LIQUID FILLED ORAL at 19:31

## 2017-06-19 RX ADMIN — HYDROCODONE BITARTRATE AND ACETAMINOPHEN 1 TABLET: 10; 325 TABLET ORAL at 13:31

## 2017-06-19 RX ADMIN — SODIUM CHLORIDE, SODIUM LACTATE, POTASSIUM CHLORIDE, CALCIUM CHLORIDE 1000 ML: 600; 310; 30; 20 INJECTION, SOLUTION INTRAVENOUS at 10:15

## 2017-06-19 RX ADMIN — KETOROLAC TROMETHAMINE 15 MG: 30 INJECTION, SOLUTION INTRAMUSCULAR at 14:20

## 2017-06-19 RX ADMIN — SODIUM CHLORIDE 2 G: 9 INJECTION, SOLUTION INTRAVENOUS at 18:42

## 2017-06-19 RX ADMIN — VANCOMYCIN HYDROCHLORIDE 1.5 G: 1 INJECTION, POWDER, LYOPHILIZED, FOR SOLUTION INTRAVENOUS at 10:15

## 2017-06-19 RX ADMIN — KETOROLAC TROMETHAMINE 15 MG: 30 INJECTION, SOLUTION INTRAMUSCULAR at 19:31

## 2017-06-19 RX ADMIN — DOCUSATE SODIUM AND SENNOSIDES 1 TABLET: 8.6; 5 TABLET, FILM COATED ORAL at 19:31

## 2017-06-19 RX ADMIN — OXYCODONE HYDROCHLORIDE 10 MG: 10 TABLET, FILM COATED, EXTENDED RELEASE ORAL at 10:48

## 2017-06-19 RX ADMIN — OXYCODONE HYDROCHLORIDE 20 MG: 10 TABLET ORAL at 17:08

## 2017-06-19 RX ADMIN — ROSUVASTATIN CALCIUM 40 MG: 10 TABLET ORAL at 19:31

## 2017-06-19 ASSESSMENT — PAIN SCALES - GENERAL
PAINLEVEL_OUTOF10: ASSUMED PAIN PRESENT
PAINLEVEL_OUTOF10: 4
PAINLEVEL_OUTOF10: 5
PAINLEVEL_OUTOF10: 4
PAINLEVEL_OUTOF10: 0
PAINLEVEL_OUTOF10: 0
PAINLEVEL_OUTOF10: 4
PAINLEVEL_OUTOF10: 0
PAINLEVEL_OUTOF10: 7
PAINLEVEL_OUTOF10: 7

## 2017-06-19 ASSESSMENT — LIFESTYLE VARIABLES
EVER_SMOKED: YES
ALCOHOL_USE: NO
EVER_SMOKED: YES

## 2017-06-19 NOTE — OP REPORT
DATE OF SERVICE: 6/19/17     PREOPERATIVE DIAGNOSES:  1. left knee advanced tricompartmental arthritis.     POSTOPERATIVE DIAGNOSES:  1. left knee advanced tricompartmental arthritis.     PROCEDURE PERFORMED: left total knee arthroplasty    SURGEON: Charles Castellanos MD.     ASSISTANT: ROMEO Sheridan    ANESTHESIA: General with Adductor canal femoral nerve block for postoperative pain control.     ANESTHESIOLOGIST: Mallory    ANTIBIOTICS: Ancef and Vancomycin.     IMPLANTS: [employer] II system, size 7 oxinium femur, 6 tibial baseplate, 9 poly, and 35 patellar button with 1 bag of antibiotic Simplex cement.     COMPLICATIONS: None    BLOOD LOSS: minimal    INDICATIONS: The patient presents with a chief complaint of knee pain recalcitrant to conservative treatment. The patient has advanced knee arthritis. The risks of the surgery were discussed at length. All questions were answered, and no guarantees given. The patient elected to proceed with the proposed procedure.     DESCRIPTION OF PROCEDURE: The patient was properly identified in the preoperative holding room and the left knee was marked as the correct surgical site. The patient was taken to the operative suite and placed in supine on the operative table. The patient underwent general anesthesia. After review of allergies, antibiotics were administered, a time out was called. The left knee and lower extremity was sterilely prepped and draped in standard fashion. The limb was exsanguinated and tourniquet was inflated to 250mmHG. A standard midline incision was made followed by medial patellar arthrotomy. The medial and lateral meniscus were removed as well as the ACL. The PCL was protected. There was significant tricompartmental arthritis. The knee was placed in 90 degrees of flexion. A drill hole was made on the distal femur. Anterior referencing measured size 7. The anterior cut followed by a distal cut, followed by a 4-in-1 cutting block  followed by extramedullary tibial guide after checking appropriate rotations, slope and height. The tibia was drilled and pinned in place. The proximal tibia cut was performed. Appropriate soft tissue balance at 0 - 30 degrees. A size 6  tibial base had good coverage without overhang, was repaired and trialed, brought in extension with a 11 poly liner. The patella was measured with a caliper, a saw cut was made, drilled for a 35 button. This struck well. The femoral punch and tibial punch was used. The trial implants were removed. The implants were cemented in place, first the tibia, then the femur, brought in extension with # 11 poly and the the patella. This was soaked with betadine and saline and once the cement was thoroughly hard, the tourniquet was deflated. Good hemostasis was obtained. Once again, retrialed and the final 9 CR poly was used, irrigated and then closed in flexion with with # 2 Quill, reinforced with # 1 Vicryl, 2-0 Vicryl and staples on skin. All counts were correct. ROMEO Sheridan, was present throughout the operation and key essential part of the success of the operation assisting with patient positioning, instrumentation, retraction, and closure.

## 2017-06-19 NOTE — OR NURSING
1206 received from or  lma intact  resp spont left knee dressing c/d/i  Dp1+  Foot warm  Ice pack applied   1220 awake  lma dc'd  resp spont  No c/o pain  1310 meets discharge criteria

## 2017-06-19 NOTE — IP AVS SNAPSHOT
" Home Care Instructions                                                                                                                  Name:Talib Ortiz  Medical Record Number:[id number]  CSN: [id number]    YOB: 1955   Age: 61 y.o.  Sex: male  HT:1.803 m (5' 10.98\") WT: 100.9 kg (222 lb 7.1 oz)          Admit Date: 6/19/2017     Discharge Date:   Today's Date: 6/20/2017  Attending Doctor:  Charles Castellanos M.D.                  Allergies:  Review of patient's allergies indicates no known allergies.            Discharge Instructions       Ice. Elevate. Call for incision redness, heat, pus drainage, edema, or fever, chills, nausea, vomiting, diarrhea. Take daily stool softeners or laxatives prn as narcotic pain medications cause constipation.  Ok to shower but keep incision dry and covered. Keep dressings in place for 5 days, change as needed for drainage. Keep incision covered with clean dressing until staples/stitches are removed. There is no need to place any ointment over the incision.  F/U with Dr. Castellanos in 7-10 days as scheduled. Weight Bearing as tolerated. START Physical Therapy This Week. Place Octaviano Hose Bilateral lower extremities. You may remove them for one hour daily and for therapy; otherwise keep on until seen by Dr. Castellanos.        Discharge Instructions    Discharged to home by car with relative. Discharged via wheelchair, hospital escort: Yes.  Special equipment needed: Not Applicable    Be sure to schedule a follow-up appointment with your primary care doctor or any specialists as instructed.     Discharge Plan:   Influenza Vaccine Indication: Not indicated: Previously immunized this influenza season and > 8 years of age    I understand that a diet low in cholesterol, fat, and sodium is recommended for good health. Unless I have been given specific instructions below for another diet, I accept this instruction as my diet prescription.   Other diet: Regular      Special Instructions: " Discharge instructions for the Orthopedic Patient    Follow up with Primary Care Physician within 2 weeks of discharge to home, regarding:  Review of medications and diagnostic testing.  Surveillance for medical complications.  Workup and treatment of osteoporosis, if appropriate.     -Is this a Joint Replacement patient? Yes Total Joint Knee Replacement Discharge Instructions    Pain  - The goal is to slowly wean off the prescription pain medicine.  - Ice can be used for pain control.  20 minutes at a time is recommended, and never directly against your skin or incision.  - Most patients are off the pain pills by 3 weeks; others may require a low level of pain medications for many months.  If your pain continues to be severe, follow up with your physician.  Infection    Knee joint infections; occur in fewer than 2% of patients. The most common causes of infection following total knee replacement surgery are from bacteria that enter the bloodstream during dental procedures, urinary tract infections, or skin infections. These bacteria can lodge around your knee replacement and cause an infection.  - Keep the incision as clean and dry as possible.  - Always wash your hands before touching your incision.  - Skin infections tend to develop around 7-10 days after surgery; most can be treated with oral antibiotics.  - Dental Care should be delayed for 3 months after surgery, your surgeon recommends taking a dose of antibiotics 1 hour prior to any dental procedure. After 2 years, most surgeons recommend antibiotics only before an extensive procedure.  Ask your surgeon what he recommends.  - Signs and symptoms of infection can include:  low grade fever, redness, pain, swelling and drainage from your incision.  Notify your surgeon immediately if you develop any of these symptoms.  Other instructions  - Bowel habits - constipation is extremely common and is caused by a combination of anesthesia, lack of mobility and pain  medicine.  Use stool softeners or laxatives if necessary. It is important not to ignore this problem, as bowel obstructions can be a serious complication after joint replacement surgery.  - Mood/Energy Level - Many patients experience a lack of energy and endurance for up to 2-3 months after surgery.  Some may also feel down and can even become depressed.  This is likely due to the postoperative anemia, change in activity level, lack of sleep, pain medicine and just the emotional reaction to the surgery itself that is a big disruption in a person’s life.  This usually passes.  If symptoms persist, follow up with your primary physician.  - Returning to work - Your surgeon will give you more specific instructions. Depending on the type of activities you perform, it may be 6 to 8 weeks before you return to work.   Generally, if you work a sedentary job requiring little standing or walking, most patients may return within 2-6 weeks.  Manual labor jobs involving walking, lifting and standing may take longer. Your surgeon’s office can provide a release to part-time or light duty work early on in your recovery and progress you to full duty as able.    - Driving - If your left knee was replaced and you have an automatic transmission, you may be able to begin driving in a week or so, provided you are no longer taking narcotic pain medication. If your right knee was replaced, avoid driving for 6 to 8 weeks. Remember that your reflexes may not be as sharp as before your surgery. Ask your surgeon for specific instructions.   - Avoiding falls - A fall during the first few weeks after surgery can damage your new knee and may result in a need for further surgery.   throw rugs and tack down loose carpeting.  Be aware of floor hazards such as pets, small objects or uneven surfaces.    - [place] Metal Detectors - The sensitivity of metal detectors varies and it is likely that your prosthesis will cause an alarm.  Inform the   of your artificial joint.  Diet  - Resume your normal diet as tolerated.  - It is important to achieve a healthy nutritional status by eating a well balanced diet on a regular basis.  - Your physician may recommend that you take iron and vitamin supplements.   - Continue to drink plenty of fluids.  Shower/Bathing  - You may shower as soon as you get home from the hospital unless otherwise instructed.  - Keep your incision out of water.  To keep the incision dry when showering, cover it with a plastic bag or plastic wrap.  - Pat incision dry if it gets wet.  Don’t rub.  - Do not submerge in a bath until staples are out and the incision is completely healed. (Approximately 6-8 weeks)  Dressing Change:  Procedure (if recommended by your physician)  - Wash hands.  - Open all new dressing change materials.  - Remove old dressing and discard.  - Inspect incision for redness, increase in clear drainage, yellow/green drainage, odor and surrounding skin hot to touch.  -  ABD (large gauze) pad or “island dressing” by one corner and lay over the incision.  Be careful not to touch the inside of the dressing that will lay over the incision.  - Secure in place as instructed (Ace wrap or tape).    Swelling/Bruising    - Swelling can last from 3-6 months.  - Elevate your leg higher than your heart while reclining.   The first week you are home you should elevate your leg an equal amount of time, as you are active.    - Anti-inflammatory pills can be taken once you have stopped the blood thinners.  - The swelling is usually worse after you go home since you are upright for longer periods of time.  - Bruising is common and can involve the entire leg including the thigh, calf and even foot. Bruising often does not appear until after you arrive home and it can be quite dramatic- purple, black, and green.  The bruising you can see is not usually concerning and will subside without any treatment.      Blood Clot  Prevention  Blood clots in the legs and the less common, but frightening, clots that travel to the lungs are a real focus of our preventative. Most patients are at standard risk for them, but those patients who are at higher risk include people who have had previous clots, a family history of clotting, smoking, diabetes, obesity, advanced age, use of estrogen and a sedentary lifestyle.    - Signs of blood clots in legs - Swelling in thigh, calf or ankle that does not go down with elevation.  Pain, heat and tenderness in calf, back of calf or groin area.  NOTE: blood clots can occur in either leg.  - You have been receiving anticoagulant therapy (blood thinners) in the hospital and you may be instructed to continue at home depending on your risk factors.  - Your risk for developing a clot continues for up to 2-3 months after surgery.  You should avoid prolonged sitting and dehydration during that time (long air trips and car trips).  If you do take a trip during this time, please get up and move around every 1- 1.5 hours.  - If you are prescribed blood-thinning medication for home, follow instructions as directed. (Handouts provided if applicable).      Activity  Once home, you should continue to stay active. The key is to remember not to overdo it! While you can expect some good days and some bad days, you should notice a gradual improvement and a gradual increase in your endurance over the next 6 to 12 months. Exercise is a critical component of home care, particularly during the first few weeks after surgery.     - Normal activities of daily living You should be able to resume most within 3 to 6 weeks following surgery. Some pain with activity and at night is common for several weeks after surgery  -  Physical Therapy Exercises - Continue to do the exercises prescribed for at least two months after surgery. Riding a stationary bicycle can help maintain muscle tone and keep your knee flexible. Try to achieve the  maximum degree of bending and extension possible. (handout provided by Therapist).  - Sexual Activity -. Your surgeon can tell you when it safe to resume sexual activity.    - Sleeping Positions - You can safely sleep on your back, on either side, or on your stomach.   - Other Activities - Walk as much as you like, but remember that walking is no substitute for the exercises your doctor and physical therapist will prescribe. Lower impact activities are preferred.  If you have specific questions, consult your Surgeon.    When to Call the Doctor   Call the physician if:   - Fever over 100.5? F  - Increased pain, drainage, redness, odor or heat around the incision area  - Shaking chills  - Increased knee pain with activity and rest  - Increased pain in calf, tenderness or redness above or below the knee  - Increased swelling of calf, ankle, foot  - Sudden increased shortness of breath, sudden onset of chest pain, localized chest pain with coughing  - Incision opening  Or, if there are any questions or concerns about medications or care.       -Is this patient being discharged with medication to prevent blood clots?  Yes, Xarelto Rivaroxaban oral tablets  What is this medicine?  RIVAROXABAN (ri va LETIICA a ban) is an anticoagulant (blood thinner). It is used to treat blood clots in the lungs or in the veins. It is also used after knee or hip surgeries to prevent blood clots. It is also used to lower the chance of stroke in people with a medical condition called atrial fibrillation.  This medicine may be used for other purposes; ask your health care provider or pharmacist if you have questions.  COMMON BRAND NAME(S): Xarelto  What should I tell my health care provider before I take this medicine?  They need to know if you have any of these conditions:  -bleeding disorders  -bleeding in the brain  -blood in your stools (black or tarry stools) or if you have blood in your vomit  -history of stomach bleeding  -kidney  disease  -liver disease  -low blood counts, like low white cell, platelet, or red cell counts  -recent or planned spinal or epidural procedure  -take medicines that treat or prevent blood clots  -an unusual or allergic reaction to rivaroxaban, other medicines, foods, dyes, or preservatives  -pregnant or trying to get pregnant  -breast-feeding  How should I use this medicine?  Take this medicine by mouth with a glass of water. Follow the directions on the prescription label. Take your medicine at regular intervals. Do not take it more often than directed. Do not stop taking except on your doctor's advice.  If you are taking this medicine after hip or knee replacement surgery, take it with or without food.  If you are taking this medicine for atrial fibrillation, take it with your evening meal. If you are taking this medicine to treat blood clots, take it with food at the same time each day. If you are unable to swallow your tablet, you may crush the tablet and mix it in applesauce. Then, immediately eat the applesauce. You should eat more food right after you eat the applesauce containing the crushed tablet.  Talk to your pediatrician regarding the use of this medicine in children. Special care may be needed.  Overdosage: If you think you've taken too much of this medicine contact a poison control center or emergency room at once.  Overdosage: If you think you have taken too much of this medicine contact a poison control center or emergency room at once.  NOTE: This medicine is only for you. Do not share this medicine with others.  What if I miss a dose?  If you take your medicine once a day and miss a dose, take the missed dose as soon as you remember. If you take your medicine twice a day and miss a dose, take the missed dose immediately. In this instance, 2 tablets may be taken at the same time. The next day you should take 1 tablet twice a day as directed.  What may interact with this medicine?  -aspirin and  aspirin-like medicines  -certain antibiotics like erythromycin, azithromycin, and clarithromycin  -certain medicines for fungal infections like ketoconazole and itraconazole  -certain medicines for irregular heart beat like amiodarone, quinidine, dronedarone  -certain medicines for seizures like carbamazepine, phenytoin  -certain medicines that treat or prevent blood clots like warfarin, enoxaparin, and dalteparin   -conivaptan  -diltiazem  -felodipine  -indinavir  -lopinavir; ritonavir  -NSAIDS, medicines for pain and inflammation, like ibuprofen or naproxen  -ranolazine  -rifampin  -ritonavir  -Isaias's wort  -verapamil  This list may not describe all possible interactions. Give your health care provider a list of all the medicines, herbs, non-prescription drugs, or dietary supplements you use. Also tell them if you smoke, drink alcohol, or use illegal drugs. Some items may interact with your medicine.  What should I watch for while using this medicine?  Do not stop taking this medicine without first talking to your doctor. Stopping this medicine may increase your risk of having a stroke. Be sure to refill your prescription before you run out of medicine.  This medicine may increase your risk to bruise or bleed. Call your doctor or health care professional if you notice any unusual bleeding.  Be careful brushing and flossing your teeth or using a toothpick because you may bleed more easily. If you have any dental work done, tell your dentist you are receiving this medicine.  What side effects may I notice from receiving this medicine?  Side effects that you should report to your doctor or health care professional as soon as possible:  -allergic reactions like skin rash, itching or hives, swelling of the face, lips, or tongue  -back pain  -bloody or black, tarry stools  -changes in vision  -confusion, trouble speaking or understanding  -red or dark-brown urine  -redness, blistering, peeling or loosening of the  skin, including inside the mouth  -severe headaches  -spitting up blood or brown material that looks like coffee grounds  -sudden numbness or weakness of the face, arm or leg  -trouble walking, dizziness, loss of balance or coordination  -unusual bruising or bleeding from the eye, gums, or nose   Side effects that usually do not require medical attention (Report these to your doctor or health care professional if they continue or are bothersome.):  -dizziness  -muscle pain  This list may not describe all possible side effects. Call your doctor for medical advice about side effects. You may report side effects to FDA at 5-511-FDA-8826.  Where should I keep my medicine?  Keep out of the reach of children.  Store at room temperature between 15 and 30 degrees C (59 and 86 degrees F). Throw away any unused medicine after the expiration date.  NOTE: This sheet is a summary. It may not cover all possible information. If you have questions about this medicine, talk to your doctor, pharmacist, or health care provider.  © 2014, [employer]. (3/17/2014 3:32:09 PM)      · Is patient discharged on Warfarin / Coumadin?   No     · Is patient Post Blood Transfusion?  No    Depression / Suicide Risk    As you are discharged from this [facility] facility, it is important to learn how to keep safe from harming yourself.    Recognize the warning signs:  · Abrupt changes in personality, positive or negative- including increase in energy   · Giving away possessions  · Change in eating patterns- significant weight changes-  positive or negative  · Change in sleeping patterns- unable to sleep or sleeping all the time   · Unwillingness or inability to communicate  · Depression  · Unusual sadness, discouragement and loneliness  · Talk of wanting to die  · Neglect of personal appearance   · Rebelliousness- reckless behavior  · Withdrawal from people/activities they love  · Confusion- inability to concentrate     If you or a  loved one observes any of these behaviors or has concerns about self-harm, here's what you can do:  · Talk about it- your feelings and reasons for harming yourself  · Remove any means that you might use to hurt yourself (examples: pills, rope, extension cords, firearm)  · Get professional help from the community (Mental Health, Substance Abuse, psychological counseling)  · Do not be alone:Call your Safe Contact- someone whom you trust who will be there for you.  · Call your local CRISIS HOTLINE 561-1038 or 847-476-8290  · Call your local Children's Mobile Crisis Response Team [place] (456) 446-5743 or www.Uzabase  · Call the toll free National Suicide Prevention Hotlines   · National Suicide Prevention Lifeline 071-515-ZDNC (9383)  · [employer] Research Line Network 800-SUICIDE (413-4169)        Follow-up Information     1. Schedule an appointment as soon as possible for a visit with Charles Castellanos M.D..    Specialty:  Orthopaedics    Why:  Follow up with Dr. Castellanos in 7-10 days.     Contact information    [address]  [address]  [facility] [address]  [phone]           Discharge Medication Instructions:    Below are the medications your physician expects you to take upon discharge:    Review all your home medications and newly ordered medications with your doctor and/or pharmacist. Follow medication instructions as directed by your doctor and/or pharmacist.    Please keep your medication list with you and share with your physician.               Medication List      START taking these medications        Instructions    Morning Afternoon Evening Bedtime    oxycodone-acetaminophen  MG Tabs   Commonly known as:  PERCOCET-10        Take 1-2 Tabs by mouth every 6 hours as needed for Severe Pain (max 8 tabs daily. Wean off pain medications as tolerated.).   Dose:  1-2 Tab                        rivaroxaban 10 MG Tabs tablet   Last time this was given:  10 mg on 6/20/2017  8:19 AM   Commonly known as:   XARELTO        Take 1 Tab by mouth every day for 15 days.   Dose:  10 mg                          CHANGE how you take these medications        Instructions    Morning Afternoon Evening Bedtime    rosuvastatin 40 MG tablet   What changed:  when to take this   Last time this was given:  40 mg on 6/19/2017  7:31 PM   Commonly known as:  CRESTOR        Take 1 Tab by mouth every day.   Dose:  40 mg                        sertraline 25 MG tablet   What changed:  when to take this   Last time this was given:  25 mg on 6/20/2017  8:20 AM   Commonly known as:  ZOLOFT        Take 1 Tab by mouth 2 Times a Day.   Dose:  25 mg                          CONTINUE taking these medications        Instructions    Morning Afternoon Evening Bedtime    FISH OIL BURP-LESS PO        Take  by mouth every day.                        GLUCOSAMINE 1500 COMPLEX Caps        Take  by mouth every day.                        Melatonin 10 MG Tabs        Take 1 Tab by mouth at bedtime as needed.   Dose:  1 Tab                        metoprolol 25 MG Tabs   Last time this was given:  25 mg on 6/20/2017  8:19 AM   Commonly known as:  LOPRESSOR        Take 1 Tab by mouth 2 times a day.   Dose:  25 mg                        ranitidine 150 MG Tabs   Commonly known as:  ZANTAC        Take 150 mg by mouth 2 times a day.   Dose:  150 mg                        URINOZINC PLUS Tabs        Take 1 Tab by mouth 2 Times a Day.   Dose:  1 Tab                          STOP taking these medications     aspirin EC 81 MG Tbec   Commonly known as:  ECOTRIN               clopidogrel 75 MG Tabs   Commonly known as:  PLAVIX                    Where to Get Your Medications      You can get these medications from any pharmacy     Bring a paper prescription for each of these medications    - oxycodone-acetaminophen  MG Tabs  - rivaroxaban 10 MG Tabs tablet            Instructions           Diet / Nutrition:    Follow any diet instructions given to you by your doctor or  the dietician, including how much salt (sodium) you are allowed each day.    If you are overweight, talk to your doctor about a weight reduction plan.    Activity:    Remain physically active following your doctor's instructions about exercise and activity.    Rest often.     Any time you become even a little tired or short of breath, SIT DOWN and rest.    Worsening Symptoms:    Report any of the following signs and symptoms to the doctor's office immediately:    *Pain of jaw, arm, or neck  *Chest pain not relieved by medication                               *Dizziness or loss of consciousness  *Difficulty breathing even when at rest   *More tired than usual                                       *Bleeding drainage or swelling of surgical site  *Swelling of feet, ankles, legs or stomach                 *Fever (>100ºF)  *Pink or blood tinged sputum  *Weight gain (3lbs/day or 5lbs /week)           *Shock from internal defibrillator (if applicable)  *Palpitations or irregular heartbeats                *Cool and/or numb extremities    Stroke Awareness    Common Risk Factors for Stroke include:    Age  Atrial Fibrillation  Carotid Artery Stenosis  Diabetes Mellitus  Excessive alcohol consumption  High blood pressure  Overweight   Physical inactivity  Smoking    Warning signs and symptoms of a stroke include:    *Sudden numbness or weakness of the face, arm or leg (especially on one side of the body).  *Sudden confusion, trouble speaking or understanding.  *Sudden trouble seeing in one or both eyes.  *Sudden trouble walking, dizziness, loss of balance or coordination.Sudden severe headache with no known cause.    It is very important to get treatment quickly when a stroke occurs. If you experience any of the above warning signs, call 911 immediately.                   Disclaimer         Quit Smoking / Tobacco Use:    I understand the use of any tobacco products increases my chance of suffering from future heart disease or  stroke and could cause other illnesses which may shorten my life. Quitting the use of tobacco products is the single most important thing I can do to improve my health. For further information on smoking / tobacco cessation call a Toll Free Quit Line at 1-887.893.5532 (*National [employer] [place]) or 1-306.264.9356 (American Lung Association) or you can access the web based program at www.lungusa.org.    [place] Tobacco Users Help Line:  (564) 566-3064       Toll Free: 1-102.837.4000  Quit Tobacco Program [facility] ([phone]    Crisis Hotline:    [place] Crisis Hotline:  2-618-JXBJRUJ or 1-857.297.1348    [place] Crisis Hotline:    1-170.326.4318 or 196-056-9710    Discharge Survey:   Thank you for choosing [facility]. We hope we did everything we could to make your hospital stay a pleasant one. You may be receiving a phone survey and we would appreciate your time and participation in answering the questions. Your input is very valuable to us in our efforts to improve our service to our patients and their families.        My signature on this form indicates that:    1. I have reviewed and understand the above information.  2. My questions regarding this information have been answered to my satisfaction.  3. I have formulated a plan with my discharge nurse to obtain my prescribed medications for home.                  Disclaimer         __________________________________                     __________       ________                       Patient Signature                                                 Date                    Time

## 2017-06-19 NOTE — IP AVS SNAPSHOT
" <p align=\"LEFT\"><IMG SRC=\"//[email or web address]$/[email or web address]\" alt=\"Image\" WIDTH=\"50%\" HEIGHT=\"200\" BORDER=\"\"></p>                   Name:Talib Ortiz  Medical Record Number:[id number]  CSN: [id number]    YOB: 1955   Age: 61 y.o.  Sex: male  HT:1.803 m (5' 10.98\") WT: 100.9 kg (222 lb 7.1 oz)          Admit Date: 6/19/2017     Discharge Date:   Today's Date: 6/20/2017  Attending Doctor:  Charles Castellanos M.D.                  Allergies:  Review of patient's allergies indicates no known allergies.          Follow-up Information     1. Schedule an appointment as soon as possible for a visit with Charles Castellanos M.D..    Specialty:  Orthopaedics    Why:  Follow up with Dr. Castellanos in 7-10 days.     Contact information    [address]  [address]  [facility] [address]  [phone]           Medication List      Take these Medications        Instructions    FISH OIL BURP-LESS PO    Take  by mouth every day.       GLUCOSAMINE 1500 COMPLEX Caps    Take  by mouth every day.       Melatonin 10 MG Tabs    Take 1 Tab by mouth at bedtime as needed.   Dose:  1 Tab       metoprolol 25 MG Tabs   Commonly known as:  LOPRESSOR    Take 1 Tab by mouth 2 times a day.   Dose:  25 mg       oxycodone-acetaminophen  MG Tabs   Commonly known as:  PERCOCET-10    Take 1-2 Tabs by mouth every 6 hours as needed for Severe Pain (max 8 tabs daily. Wean off pain medications as tolerated.).   Dose:  1-2 Tab       ranitidine 150 MG Tabs   Commonly known as:  ZANTAC    Take 150 mg by mouth 2 times a day.   Dose:  150 mg       rivaroxaban 10 MG Tabs tablet   Commonly known as:  XARELTO    Take 1 Tab by mouth every day for 15 days.   Dose:  10 mg       rosuvastatin 40 MG tablet   What changed:  when to take this   Commonly known as:  CRESTOR    Take 1 Tab by mouth every day.   Dose:  40 mg       sertraline 25 MG tablet   What changed:  when to take this   Commonly known as:  ZOLOFT    Take 1 Tab by mouth 2 Times a Day.   Dose:  25 mg      " URINOZINC PLUS Tabs    Take 1 Tab by mouth 2 Times a Day.   Dose:  1 Tab

## 2017-06-19 NOTE — IP AVS SNAPSHOT
6/20/2017    Talib Ortiz  [address] [place] [address]    Dear Talib:    [facility] wants to ensure your discharge home is safe and you or your loved ones have had all of your questions answered regarding your care after you leave the hospital.    Below is a list of resources and contact information should you have any questions regarding your hospital stay, follow-up instructions, or active medical symptoms.    Questions or Concerns Regarding… Contact   Medical Questions Related to Your Discharge  (7 days a week, 8am-5pm) Contact a Nurse Care Coordinator   [phone]   Medical Questions Not Related to Your Discharge  (24 hours a day / 7 days a week)  Contact the Nurse Health Line   [phone]    Medications or Discharge Instructions Refer to your discharge packet   or contact your [facility] Primary Care Provider   [phone]   Follow-up Appointment(s) Schedule your appointment via Wote   or contact Scheduling [phone]   Billing Review your statement via Wote  or contact Billing [phone]   Medical Records Review your records via Wote   or contact Medical Records [phone]     You may receive a telephone call within two days of discharge. This call is to make certain you understand your discharge instructions and have the opportunity to have any questions answered. You can also easily access your medical information, test results and upcoming appointments via the Wote free online health management tool. You can learn more and sign up at [employer]/Wote. For assistance setting up your Wote account, please call [phone].    Once again, we want to ensure your discharge home is safe and that you have a clear understanding of any next steps in your care. If you have any questions or concerns, please do not hesitate to contact us, we are here for you. Thank you for choosing [facility] for your healthcare needs.    Sincerely,    Your [facility] Healthcare Team

## 2017-06-19 NOTE — IP AVS SNAPSHOT
[employer] Access Code: Activation code not generated  Current [employer] Status: Active    [employer]  A secure, online tool to manage your health information     [employer]® is a secure, online tool that connects you to your personalized health information from the privacy of your home -- day or night - making it very easy for you to manage your healthcare. Once the activation process is completed, you can even access your medical information using the [employer] jhonny, which is available for free in the Apple Jhonny store or Google Play store.     [employer] provides the following levels of access (as shown below):   My Chart Features   [facility] Primary Care Doctor [facility]  Specialists [facility]  Urgent  Care Non-[facility]  Primary Care  Doctor   Email your healthcare team securely and privately 24/7 X X X X   Manage appointments: schedule your next appointment; view details of past/upcoming appointments X      Request prescription refills. X      View recent personal medical records, including lab and immunizations X X X X   View health record, including health history, allergies, medications X X X X   Read reports about your outpatient visits, procedures, consult and ER notes X X X X   See your discharge summary, which is a recap of your hospital and/or ER visit that includes your diagnosis, lab results, and care plan. X X       How to register for [employer]:  1. Go to  [email or web address].  2. Click on the Sign Up Now box, which takes you to the New Member Sign Up page. You will need to provide the following information:  a. Enter your [employer] Access Code exactly as it appears at the top of this page. (You will not need to use this code after you’ve completed the sign-up process. If you do not sign up before the expiration date, you must request a new code.)   b. Enter your date of birth.   c. Enter your home email address.   d. Click Submit, and follow the next screen’s instructions.  3. Create a [employer] ID. This will  be your Viropro login ID and cannot be changed, so think of one that is secure and easy to remember.  4. Create a Viropro password. You can change your password at any time.  5. Enter your Password Reset Question and Answer. This can be used at a later time if you forget your password.   6. Enter your e-mail address. This allows you to receive e-mail notifications when new information is available in Viropro.  7. Click Sign Up. You can now view your health information.    For assistance activating your Viropro account, call [phone]

## 2017-06-20 VITALS
TEMPERATURE: 98 F | RESPIRATION RATE: 18 BRPM | SYSTOLIC BLOOD PRESSURE: 91 MMHG | DIASTOLIC BLOOD PRESSURE: 54 MMHG | HEIGHT: 71 IN | HEART RATE: 62 BPM | WEIGHT: 222.44 LBS | BODY MASS INDEX: 31.14 KG/M2 | OXYGEN SATURATION: 96 %

## 2017-06-20 PROBLEM — M17.12 DEGENERATIVE ARTHRITIS OF LEFT KNEE: Status: RESOLVED | Noted: 2017-06-19 | Resolved: 2017-06-20

## 2017-06-20 LAB
ERYTHROCYTE [DISTWIDTH] IN BLOOD BY AUTOMATED COUNT: 47.3 FL (ref 35.9–50)
HCT VFR BLD AUTO: 30 % (ref 42–52)
HGB BLD-MCNC: 9.2 G/DL (ref 14–18)
MCH RBC QN AUTO: 25.4 PG (ref 27–33)
MCHC RBC AUTO-ENTMCNC: 30.7 G/DL (ref 33.7–35.3)
MCV RBC AUTO: 82.9 FL (ref 81.4–97.8)
PLATELET # BLD AUTO: 253 K/UL (ref 164–446)
PMV BLD AUTO: 10 FL (ref 9–12.9)
RBC # BLD AUTO: 3.62 M/UL (ref 4.7–6.1)
WBC # BLD AUTO: 13.2 K/UL (ref 4.8–10.8)

## 2017-06-20 PROCEDURE — 700111 HCHG RX REV CODE 636 W/ 250 OVERRIDE (IP): Performed by: NURSE PRACTITIONER

## 2017-06-20 PROCEDURE — 700102 HCHG RX REV CODE 250 W/ 637 OVERRIDE(OP): Performed by: ORTHOPAEDIC SURGERY

## 2017-06-20 PROCEDURE — A9270 NON-COVERED ITEM OR SERVICE: HCPCS | Performed by: NURSE PRACTITIONER

## 2017-06-20 PROCEDURE — G8978 MOBILITY CURRENT STATUS: HCPCS | Mod: CK

## 2017-06-20 PROCEDURE — A9270 NON-COVERED ITEM OR SERVICE: HCPCS | Performed by: ORTHOPAEDIC SURGERY

## 2017-06-20 PROCEDURE — G8980 MOBILITY D/C STATUS: HCPCS | Mod: CJ

## 2017-06-20 PROCEDURE — 700102 HCHG RX REV CODE 250 W/ 637 OVERRIDE(OP): Performed by: NURSE PRACTITIONER

## 2017-06-20 PROCEDURE — 700105 HCHG RX REV CODE 258: Performed by: NURSE PRACTITIONER

## 2017-06-20 PROCEDURE — 97535 SELF CARE MNGMENT TRAINING: CPT

## 2017-06-20 PROCEDURE — G8979 MOBILITY GOAL STATUS: HCPCS | Mod: CJ

## 2017-06-20 PROCEDURE — 700112 HCHG RX REV CODE 229: Performed by: NURSE PRACTITIONER

## 2017-06-20 PROCEDURE — 97161 PT EVAL LOW COMPLEX 20 MIN: CPT

## 2017-06-20 PROCEDURE — 36415 COLL VENOUS BLD VENIPUNCTURE: CPT

## 2017-06-20 PROCEDURE — 85027 COMPLETE CBC AUTOMATED: CPT

## 2017-06-20 RX ORDER — FAMOTIDINE 20 MG/1
20 TABLET, FILM COATED ORAL 2 TIMES DAILY
Status: DISCONTINUED | OUTPATIENT
Start: 2017-06-20 | End: 2017-06-20 | Stop reason: HOSPADM

## 2017-06-20 RX ORDER — OXYCODONE AND ACETAMINOPHEN 10; 325 MG/1; MG/1
1-2 TABLET ORAL EVERY 6 HOURS PRN
Qty: 65 TAB | Refills: 0 | Status: SHIPPED | OUTPATIENT
Start: 2017-06-20 | End: 2018-05-09

## 2017-06-20 RX ADMIN — OXYCODONE HYDROCHLORIDE 10 MG: 10 TABLET ORAL at 12:00

## 2017-06-20 RX ADMIN — KETOROLAC TROMETHAMINE 15 MG: 30 INJECTION, SOLUTION INTRAMUSCULAR at 05:29

## 2017-06-20 RX ADMIN — TRAMADOL HYDROCHLORIDE 100 MG: 50 TABLET, FILM COATED ORAL at 05:26

## 2017-06-20 RX ADMIN — DOCUSATE SODIUM 100 MG: 100 CAPSULE, LIQUID FILLED ORAL at 08:19

## 2017-06-20 RX ADMIN — POLYETHYLENE GLYCOL 3350 1 PACKET: 17 POWDER, FOR SOLUTION ORAL at 08:20

## 2017-06-20 RX ADMIN — FAMOTIDINE 20 MG: 20 TABLET ORAL at 09:42

## 2017-06-20 RX ADMIN — SODIUM CHLORIDE 2 G: 9 INJECTION, SOLUTION INTRAVENOUS at 03:10

## 2017-06-20 RX ADMIN — DEXAMETHASONE SODIUM PHOSPHATE 10 MG: 4 INJECTION, SOLUTION INTRAMUSCULAR; INTRAVENOUS at 05:26

## 2017-06-20 RX ADMIN — KETOROLAC TROMETHAMINE 15 MG: 30 INJECTION, SOLUTION INTRAMUSCULAR at 00:50

## 2017-06-20 RX ADMIN — SODIUM CHLORIDE: 9 INJECTION, SOLUTION INTRAVENOUS at 07:00

## 2017-06-20 RX ADMIN — RIVAROXABAN 10 MG: 10 TABLET, FILM COATED ORAL at 08:19

## 2017-06-20 RX ADMIN — SERTRALINE HYDROCHLORIDE 25 MG: 50 TABLET ORAL at 08:20

## 2017-06-20 RX ADMIN — METOPROLOL TARTRATE 25 MG: 25 TABLET ORAL at 08:19

## 2017-06-20 RX ADMIN — HYDROCODONE BITARTRATE AND ACETAMINOPHEN 1 TABLET: 10; 325 TABLET ORAL at 00:50

## 2017-06-20 ASSESSMENT — ENCOUNTER SYMPTOMS
SHORTNESS OF BREATH: 0
FEVER: 0
VOMITING: 0
ABDOMINAL PAIN: 0
NAUSEA: 0
DIZZINESS: 0
CHILLS: 0

## 2017-06-20 ASSESSMENT — ACTIVITIES OF DAILY LIVING (ADL): TOILETING: INDEPENDENT

## 2017-06-20 ASSESSMENT — GAIT ASSESSMENTS
ASSISTIVE DEVICE: CRUTCHES
DEVIATION: STEP TO
GAIT LEVEL OF ASSIST: STAND BY ASSIST
DISTANCE (FEET): 200

## 2017-06-20 ASSESSMENT — PAIN SCALES - GENERAL
PAINLEVEL_OUTOF10: 4
PAINLEVEL_OUTOF10: 3
PAINLEVEL_OUTOF10: 3

## 2017-06-20 NOTE — THERAPY
"Occupational Therapy Education only completed.   Functional Status:  Pt was up walking earlier with PT, denied need to mobilize again with OT.  Pt's spouse took clothing home with her, so pt unable to dress with OT.  Educated pt on role of OT and Total Knee Replacement Protocol.  Reviewed application of precautions and use of Adaptive Equipment for dressing, bathing, toileting, grooming, kitchen activities and general functional mobility in the home. Reviewed the use of reacher, as well as shower chair and raised toilet seat to assist with ADL's.  Reviewed stall shower transfers. Pt has obtained needed items already for home.  Educated on covering incision/dressing with extra plastic wrap and waterproof tape to ensure that incision does not get wet.  Educated on around the clock pain management strategies so that pt does not wake up in a pain crisis.  Pt verbalized understanding of all education provided.      Plan of Care: Patient with no further skilled OT needs in the acute care setting at this time  Discharge Recommendations:  Equipment: No Equipment Needed. Post-acute therapy Discharge to home with outpatient or home health for additional skilled therapy services.    See \"Rehab Therapy-Acute\" Patient Summary Report for complete documentation.    "

## 2017-06-20 NOTE — DISCHARGE INSTRUCTIONS
Ice. Elevate. Call for incision redness, heat, pus drainage, edema, or fever, chills, nausea, vomiting, diarrhea. Take daily stool softeners or laxatives prn as narcotic pain medications cause constipation.  Ok to shower but keep incision dry and covered. Keep dressings in place for 5 days, change as needed for drainage. Keep incision covered with clean dressing until staples/stitches are removed. There is no need to place any ointment over the incision.  F/U with Dr. Castellanos in 7-10 days as scheduled. Weight Bearing as tolerated. START Physical Therapy This Week. Place Octaviano Hose Bilateral lower extremities. You may remove them for one hour daily and for therapy; otherwise keep on until seen by Dr. Castellanos.        Discharge Instructions    Discharged to home by car with relative. Discharged via wheelchair, hospital escort: Yes.  Special equipment needed: Not Applicable    Be sure to schedule a follow-up appointment with your primary care doctor or any specialists as instructed.     Discharge Plan:   Influenza Vaccine Indication: Not indicated: Previously immunized this influenza season and > 8 years of age    I understand that a diet low in cholesterol, fat, and sodium is recommended for good health. Unless I have been given specific instructions below for another diet, I accept this instruction as my diet prescription.   Other diet: Regular      Special Instructions: Discharge instructions for the Orthopedic Patient    Follow up with Primary Care Physician within 2 weeks of discharge to home, regarding:  Review of medications and diagnostic testing.  Surveillance for medical complications.  Workup and treatment of osteoporosis, if appropriate.     -Is this a Joint Replacement patient? Yes Total Joint Knee Replacement Discharge Instructions    Pain  - The goal is to slowly wean off the prescription pain medicine.  - Ice can be used for pain control.  20 minutes at a time is recommended, and never directly against  your skin or incision.  - Most patients are off the pain pills by 3 weeks; others may require a low level of pain medications for many months.  If your pain continues to be severe, follow up with your physician.  Infection    Knee joint infections; occur in fewer than 2% of patients. The most common causes of infection following total knee replacement surgery are from bacteria that enter the bloodstream during dental procedures, urinary tract infections, or skin infections. These bacteria can lodge around your knee replacement and cause an infection.  - Keep the incision as clean and dry as possible.  - Always wash your hands before touching your incision.  - Skin infections tend to develop around 7-10 days after surgery; most can be treated with oral antibiotics.  - Dental Care should be delayed for 3 months after surgery, your surgeon recommends taking a dose of antibiotics 1 hour prior to any dental procedure. After 2 years, most surgeons recommend antibiotics only before an extensive procedure.  Ask your surgeon what he recommends.  - Signs and symptoms of infection can include:  low grade fever, redness, pain, swelling and drainage from your incision.  Notify your surgeon immediately if you develop any of these symptoms.  Other instructions  - Bowel habits - constipation is extremely common and is caused by a combination of anesthesia, lack of mobility and pain medicine.  Use stool softeners or laxatives if necessary. It is important not to ignore this problem, as bowel obstructions can be a serious complication after joint replacement surgery.  - Mood/Energy Level - Many patients experience a lack of energy and endurance for up to 2-3 months after surgery.  Some may also feel down and can even become depressed.  This is likely due to the postoperative anemia, change in activity level, lack of sleep, pain medicine and just the emotional reaction to the surgery itself that is a big disruption in a person’s life.   This usually passes.  If symptoms persist, follow up with your primary physician.  - Returning to work - Your surgeon will give you more specific instructions. Depending on the type of activities you perform, it may be 6 to 8 weeks before you return to work.   Generally, if you work a sedentary job requiring little standing or walking, most patients may return within 2-6 weeks.  Manual labor jobs involving walking, lifting and standing may take longer. Your surgeon’s office can provide a release to part-time or light duty work early on in your recovery and progress you to full duty as able.    - Driving - If your left knee was replaced and you have an automatic transmission, you may be able to begin driving in a week or so, provided you are no longer taking narcotic pain medication. If your right knee was replaced, avoid driving for 6 to 8 weeks. Remember that your reflexes may not be as sharp as before your surgery. Ask your surgeon for specific instructions.   - Avoiding falls - A fall during the first few weeks after surgery can damage your new knee and may result in a need for further surgery.   throw rugs and tack down loose carpeting.  Be aware of floor hazards such as pets, small objects or uneven surfaces.    - [place] Metal Detectors - The sensitivity of metal detectors varies and it is likely that your prosthesis will cause an alarm.  Inform the  of your artificial joint.  Diet  - Resume your normal diet as tolerated.  - It is important to achieve a healthy nutritional status by eating a well balanced diet on a regular basis.  - Your physician may recommend that you take iron and vitamin supplements.   - Continue to drink plenty of fluids.  Shower/Bathing  - You may shower as soon as you get home from the hospital unless otherwise instructed.  - Keep your incision out of water.  To keep the incision dry when showering, cover it with a plastic bag or plastic wrap.  - Pat incision dry  if it gets wet.  Don’t rub.  - Do not submerge in a bath until staples are out and the incision is completely healed. (Approximately 6-8 weeks)  Dressing Change:  Procedure (if recommended by your physician)  - Wash hands.  - Open all new dressing change materials.  - Remove old dressing and discard.  - Inspect incision for redness, increase in clear drainage, yellow/green drainage, odor and surrounding skin hot to touch.  -  ABD (large gauze) pad or “island dressing” by one corner and lay over the incision.  Be careful not to touch the inside of the dressing that will lay over the incision.  - Secure in place as instructed (Ace wrap or tape).    Swelling/Bruising    - Swelling can last from 3-6 months.  - Elevate your leg higher than your heart while reclining.   The first week you are home you should elevate your leg an equal amount of time, as you are active.    - Anti-inflammatory pills can be taken once you have stopped the blood thinners.  - The swelling is usually worse after you go home since you are upright for longer periods of time.  - Bruising is common and can involve the entire leg including the thigh, calf and even foot. Bruising often does not appear until after you arrive home and it can be quite dramatic- purple, black, and green.  The bruising you can see is not usually concerning and will subside without any treatment.      Blood Clot Prevention  Blood clots in the legs and the less common, but frightening, clots that travel to the lungs are a real focus of our preventative. Most patients are at standard risk for them, but those patients who are at higher risk include people who have had previous clots, a family history of clotting, smoking, diabetes, obesity, advanced age, use of estrogen and a sedentary lifestyle.    - Signs of blood clots in legs - Swelling in thigh, calf or ankle that does not go down with elevation.  Pain, heat and tenderness in calf, back of calf or groin area.   NOTE: blood clots can occur in either leg.  - You have been receiving anticoagulant therapy (blood thinners) in the hospital and you may be instructed to continue at home depending on your risk factors.  - Your risk for developing a clot continues for up to 2-3 months after surgery.  You should avoid prolonged sitting and dehydration during that time (long air trips and car trips).  If you do take a trip during this time, please get up and move around every 1- 1.5 hours.  - If you are prescribed blood-thinning medication for home, follow instructions as directed. (Handouts provided if applicable).      Activity  Once home, you should continue to stay active. The key is to remember not to overdo it! While you can expect some good days and some bad days, you should notice a gradual improvement and a gradual increase in your endurance over the next 6 to 12 months. Exercise is a critical component of home care, particularly during the first few weeks after surgery.     - Normal activities of daily living You should be able to resume most within 3 to 6 weeks following surgery. Some pain with activity and at night is common for several weeks after surgery  -  Physical Therapy Exercises - Continue to do the exercises prescribed for at least two months after surgery. Riding a stationary bicycle can help maintain muscle tone and keep your knee flexible. Try to achieve the maximum degree of bending and extension possible. (handout provided by Therapist).  - Sexual Activity -. Your surgeon can tell you when it safe to resume sexual activity.    - Sleeping Positions - You can safely sleep on your back, on either side, or on your stomach.   - Other Activities - Walk as much as you like, but remember that walking is no substitute for the exercises your doctor and physical therapist will prescribe. Lower impact activities are preferred.  If you have specific questions, consult your Surgeon.    When to Call the Doctor   Call the  physician if:   - Fever over 100.5? F  - Increased pain, drainage, redness, odor or heat around the incision area  - Shaking chills  - Increased knee pain with activity and rest  - Increased pain in calf, tenderness or redness above or below the knee  - Increased swelling of calf, ankle, foot  - Sudden increased shortness of breath, sudden onset of chest pain, localized chest pain with coughing  - Incision opening  Or, if there are any questions or concerns about medications or care.       -Is this patient being discharged with medication to prevent blood clots?  Yes, Xarelto Rivaroxaban oral tablets  What is this medicine?  RIVAROXABAN (ri va LETICIA a ban) is an anticoagulant (blood thinner). It is used to treat blood clots in the lungs or in the veins. It is also used after knee or hip surgeries to prevent blood clots. It is also used to lower the chance of stroke in people with a medical condition called atrial fibrillation.  This medicine may be used for other purposes; ask your health care provider or pharmacist if you have questions.  COMMON BRAND NAME(S): Xarelto  What should I tell my health care provider before I take this medicine?  They need to know if you have any of these conditions:  -bleeding disorders  -bleeding in the brain  -blood in your stools (black or tarry stools) or if you have blood in your vomit  -history of stomach bleeding  -kidney disease  -liver disease  -low blood counts, like low white cell, platelet, or red cell counts  -recent or planned spinal or epidural procedure  -take medicines that treat or prevent blood clots  -an unusual or allergic reaction to rivaroxaban, other medicines, foods, dyes, or preservatives  -pregnant or trying to get pregnant  -breast-feeding  How should I use this medicine?  Take this medicine by mouth with a glass of water. Follow the directions on the prescription label. Take your medicine at regular intervals. Do not take it more often than directed. Do not  stop taking except on your doctor's advice.  If you are taking this medicine after hip or knee replacement surgery, take it with or without food.  If you are taking this medicine for atrial fibrillation, take it with your evening meal. If you are taking this medicine to treat blood clots, take it with food at the same time each day. If you are unable to swallow your tablet, you may crush the tablet and mix it in applesauce. Then, immediately eat the applesauce. You should eat more food right after you eat the applesauce containing the crushed tablet.  Talk to your pediatrician regarding the use of this medicine in children. Special care may be needed.  Overdosage: If you think you've taken too much of this medicine contact a poison control center or emergency room at once.  Overdosage: If you think you have taken too much of this medicine contact a poison control center or emergency room at once.  NOTE: This medicine is only for you. Do not share this medicine with others.  What if I miss a dose?  If you take your medicine once a day and miss a dose, take the missed dose as soon as you remember. If you take your medicine twice a day and miss a dose, take the missed dose immediately. In this instance, 2 tablets may be taken at the same time. The next day you should take 1 tablet twice a day as directed.  What may interact with this medicine?  -aspirin and aspirin-like medicines  -certain antibiotics like erythromycin, azithromycin, and clarithromycin  -certain medicines for fungal infections like ketoconazole and itraconazole  -certain medicines for irregular heart beat like amiodarone, quinidine, dronedarone  -certain medicines for seizures like carbamazepine, phenytoin  -certain medicines that treat or prevent blood clots like warfarin, enoxaparin, and dalteparin   -conivaptan  -diltiazem  -felodipine  -indinavir  -lopinavir; ritonavir  -NSAIDS, medicines for pain and inflammation, like ibuprofen or  naproxen  -ranolazine  -rifampin  -ritonavir  -[place]'s wort  -verapamil  This list may not describe all possible interactions. Give your health care provider a list of all the medicines, herbs, non-prescription drugs, or dietary supplements you use. Also tell them if you smoke, drink alcohol, or use illegal drugs. Some items may interact with your medicine.  What should I watch for while using this medicine?  Do not stop taking this medicine without first talking to your doctor. Stopping this medicine may increase your risk of having a stroke. Be sure to refill your prescription before you run out of medicine.  This medicine may increase your risk to bruise or bleed. Call your doctor or health care professional if you notice any unusual bleeding.  Be careful brushing and flossing your teeth or using a toothpick because you may bleed more easily. If you have any dental work done, tell your dentist you are receiving this medicine.  What side effects may I notice from receiving this medicine?  Side effects that you should report to your doctor or health care professional as soon as possible:  -allergic reactions like skin rash, itching or hives, swelling of the face, lips, or tongue  -back pain  -bloody or black, tarry stools  -changes in vision  -confusion, trouble speaking or understanding  -red or dark-brown urine  -redness, blistering, peeling or loosening of the skin, including inside the mouth  -severe headaches  -spitting up blood or brown material that looks like coffee grounds  -sudden numbness or weakness of the face, arm or leg  -trouble walking, dizziness, loss of balance or coordination  -unusual bruising or bleeding from the eye, gums, or nose   Side effects that usually do not require medical attention (Report these to your doctor or health care professional if they continue or are bothersome.):  -dizziness  -muscle pain  This list may not describe all possible side effects. Call your doctor for  medical advice about side effects. You may report side effects to FDA at 4-319-ZRG-5790.  Where should I keep my medicine?  Keep out of the reach of children.  Store at room temperature between 15 and 30 degrees C (59 and 86 degrees F). Throw away any unused medicine after the expiration date.  NOTE: This sheet is a summary. It may not cover all possible information. If you have questions about this medicine, talk to your doctor, pharmacist, or health care provider.  © 2014, [employer]. (3/17/2014 3:32:09 PM)      · Is patient discharged on Warfarin / Coumadin?   No     · Is patient Post Blood Transfusion?  No    Depression / Suicide Risk    As you are discharged from this [facility] facility, it is important to learn how to keep safe from harming yourself.    Recognize the warning signs:  · Abrupt changes in personality, positive or negative- including increase in energy   · Giving away possessions  · Change in eating patterns- significant weight changes-  positive or negative  · Change in sleeping patterns- unable to sleep or sleeping all the time   · Unwillingness or inability to communicate  · Depression  · Unusual sadness, discouragement and loneliness  · Talk of wanting to die  · Neglect of personal appearance   · Rebelliousness- reckless behavior  · Withdrawal from people/activities they love  · Confusion- inability to concentrate     If you or a loved one observes any of these behaviors or has concerns about self-harm, here's what you can do:  · Talk about it- your feelings and reasons for harming yourself  · Remove any means that you might use to hurt yourself (examples: pills, rope, extension cords, firearm)  · Get professional help from the community (Mental Health, Substance Abuse, psychological counseling)  · Do not be alone:Call your Safe Contact- someone whom you trust who will be there for you.  · Call your local CRISIS HOTLINE 244-7170 or 005-804-7556  · Call your local Children's  Mobile Crisis Response Team [place] (048) 254-5115 or www.[address]  · Call the toll free National Suicide Prevention Hotlines   · National Suicide Prevention Lifeline 886-023-OYWB (7397)  · National Hope Line Network 800-SUICIDE (451-7850)

## 2017-06-20 NOTE — CARE PLAN
Problem: Venous Thromboembolism (VTW)/Deep Vein Thrombosis (DVT) Prevention:  Goal: Patient will participate in Venous Thrombosis (VTE)/Deep Vein Thrombosis (DVT)Prevention Measures    06/19/17 1931   OTHER   Risk Assessment Score 5   VTE RISK Very High   Mechanical Prophylaxis AV Foot Pumps with NICOLE Hose (Graduated Compression Stockings);SCDs, Sequentials (Intermittent Pneumatic Compression Devices)   Pharmacologic Prophylaxis Used Rivaroxaban (Xarelto) - (ONLY for Total Knee and Total Hip Surgery)         Problem: Pain Management  Goal: Pain level will decrease to patient’s comfort goal  Scheduled & PRN pain medications given - see MAR

## 2017-06-20 NOTE — PROGRESS NOTES
Discharging pt home per MD order. Discussed discharge instructions, follow up appointments, prescriptions, and home care for Left Total Knee. Pt voiding and ambulating without difficulty, pain controlled, tolerating diet. Family at bedside, all questions answered. Pt discharged off unit with hospital escort at 1205.

## 2017-06-20 NOTE — PROGRESS NOTES
Progress Note               Author: Joanne TINAJERO Wardas Date & Time created: 2017  7:13 AM     Interval History:  POD # 1 TKA  Doing well.   Block wearing off.   Up to chair.   Voiding.   Tolerating regular diet.       Review of Systems:  Review of Systems   Constitutional: Negative for fever and chills.   Respiratory: Negative for shortness of breath.    Cardiovascular: Negative for chest pain.   Gastrointestinal: Negative for nausea, vomiting and abdominal pain.   Neurological: Negative for dizziness.       Physical Exam:  Physical Exam   AAO x 4.   Appropriate  DNVI  PF/DF intact.   Dressing c/d/i      Labs:        Invalid input(s): [id number]      No results for input(s): SODIUM, POTASSIUM, CHLORIDE, CO2, BUN, CREATININE, MAGNESIUM, PHOSPHORUS, CALCIUM in the last 72 hours.  No results for input(s): ALTSGPT, ASTSGOT, ALKPHOSPHAT, TBILIRUBIN, DBILIRUBIN, GAMMAGT, AMYLASE, LIPASE, ALB, PREALBUMIN, GLUCOSE in the last 72 hours.  Recent Labs      17   RBC  3.62*   HEMOGLOBIN  9.2*   HEMATOCRIT  30.0*   PLATELETCT  253     Recent Labs      17   WBC  13.2*     Hemodynamics:  Temp (24hrs), Av.5 °C (97.7 °F), Min:36.1 °C (97 °F), Max:36.6 °C (97.9 °F)  Temperature: 36.4 °C (97.5 °F)  Pulse  Av.8  Min: 64  Max: 75   Blood Pressure: (!) 90/55 mmHg, NIBP: 113/71 mmHg     Respiratory:    Respiration: 16, Pulse Oximetry: 97 %, O2 Daily Delivery Respiratory : Nasal Cannula        RUL Breath Sounds: Clear, RML Breath Sounds: Clear, RLL Breath Sounds: Clear;Diminished, MCKINLEY Breath Sounds: Clear, LLL Breath Sounds: Clear;Diminished  Fluids:    Intake/Output Summary (Last 24 hours) at 17 0724  Last data filed at 17 0529   Gross per 24 hour   Intake   1600 ml   Output   1775 ml   Net   -175 ml        GI/Nutrition:  Orders Placed This Encounter   Procedures   • DIET ORDER     Standing Status: Standing      Number of Occurrences: 1      Standing Expiration Date:      Order  Specific Question:  Diet:     Answer:  Regular [1]     Medical Decision Making, by Problem:  Active Hospital Problems    Diagnosis   • Degenerative arthritis of left knee [M17.12]   • Acute nephritic syndrome, minor glomerular abnormality [N00.0]       Plan:  Doing well.   Cont. PT/OT  Discussed d/c instructions.   If meeting criteria and cleared by therapy, may d/c home today.   Has outpatient therapy scheduled and a walker for home.     Core Measures

## 2017-06-20 NOTE — PROGRESS NOTES
Received report from NOC RN; assumed pt care. Pt A&Ox4, resting comfortably in recliner. +CMS, pt able to dorsi/plantar flex w/o difficulty. No pain noted. Pt complains of heart burn, medicated per MAR. Pt notified to call for assistance.

## 2017-07-18 RX ORDER — SERTRALINE HYDROCHLORIDE 25 MG/1
TABLET, FILM COATED ORAL
Qty: 180 TAB | Refills: 3 | Status: SHIPPED | OUTPATIENT
Start: 2017-07-18 | End: 2019-01-20 | Stop reason: SDUPTHER

## 2017-08-25 ENCOUNTER — HOSPITAL ENCOUNTER (OUTPATIENT)
Dept: LAB | Facility: MEDICAL CENTER | Age: 62
End: 2017-08-25
Attending: ORTHOPAEDIC SURGERY
Payer: COMMERCIAL

## 2017-08-25 LAB
BASOPHILS # BLD AUTO: 1.9 % (ref 0–1.8)
BASOPHILS # BLD: 0.12 K/UL (ref 0–0.12)
CRP SERPL HS-MCNC: 0.13 MG/DL (ref 0–0.75)
EOSINOPHIL # BLD AUTO: 0.23 K/UL (ref 0–0.51)
EOSINOPHIL NFR BLD: 3.6 % (ref 0–6.9)
ERYTHROCYTE [DISTWIDTH] IN BLOOD BY AUTOMATED COUNT: 56.4 FL (ref 35.9–50)
ERYTHROCYTE [SEDIMENTATION RATE] IN BLOOD BY WESTERGREN METHOD: 19 MM/HOUR (ref 0–20)
HCT VFR BLD AUTO: 37.4 % (ref 42–52)
HGB BLD-MCNC: 11.5 G/DL (ref 14–18)
IMM GRANULOCYTES # BLD AUTO: 0.01 K/UL (ref 0–0.11)
IMM GRANULOCYTES NFR BLD AUTO: 0.2 % (ref 0–0.9)
LYMPHOCYTES # BLD AUTO: 1.8 K/UL (ref 1–4.8)
LYMPHOCYTES NFR BLD: 28.3 % (ref 22–41)
MCH RBC QN AUTO: 24.8 PG (ref 27–33)
MCHC RBC AUTO-ENTMCNC: 30.7 G/DL (ref 33.7–35.3)
MCV RBC AUTO: 80.8 FL (ref 81.4–97.8)
MONOCYTES # BLD AUTO: 0.61 K/UL (ref 0–0.85)
MONOCYTES NFR BLD AUTO: 9.6 % (ref 0–13.4)
NEUTROPHILS # BLD AUTO: 3.59 K/UL (ref 1.82–7.42)
NEUTROPHILS NFR BLD: 56.4 % (ref 44–72)
NRBC # BLD AUTO: 0 K/UL
NRBC BLD AUTO-RTO: 0 /100 WBC
PLATELET # BLD AUTO: 387 K/UL (ref 164–446)
PMV BLD AUTO: 9.8 FL (ref 9–12.9)
RBC # BLD AUTO: 4.63 M/UL (ref 4.7–6.1)
WBC # BLD AUTO: 6.4 K/UL (ref 4.8–10.8)

## 2017-08-25 PROCEDURE — 36415 COLL VENOUS BLD VENIPUNCTURE: CPT

## 2017-08-25 PROCEDURE — 86140 C-REACTIVE PROTEIN: CPT

## 2017-08-25 PROCEDURE — 85652 RBC SED RATE AUTOMATED: CPT

## 2017-08-25 PROCEDURE — 85025 COMPLETE CBC W/AUTO DIFF WBC: CPT

## 2018-02-09 ENCOUNTER — OFFICE VISIT (OUTPATIENT)
Dept: CARDIOLOGY | Facility: MEDICAL CENTER | Age: 63
End: 2018-02-09
Payer: COMMERCIAL

## 2018-02-09 VITALS
BODY MASS INDEX: 33.18 KG/M2 | SYSTOLIC BLOOD PRESSURE: 124 MMHG | HEIGHT: 69 IN | DIASTOLIC BLOOD PRESSURE: 72 MMHG | HEART RATE: 84 BPM | WEIGHT: 224 LBS | OXYGEN SATURATION: 97 %

## 2018-02-09 DIAGNOSIS — I25.10 CORONARY ARTERY DISEASE DUE TO CALCIFIED CORONARY LESION: ICD-10-CM

## 2018-02-09 DIAGNOSIS — E78.5 DYSLIPIDEMIA: ICD-10-CM

## 2018-02-09 DIAGNOSIS — I25.84 CORONARY ARTERY DISEASE DUE TO CALCIFIED CORONARY LESION: ICD-10-CM

## 2018-02-09 PROCEDURE — 99214 OFFICE O/P EST MOD 30 MIN: CPT | Performed by: NURSE PRACTITIONER

## 2018-02-09 RX ORDER — ROSUVASTATIN CALCIUM 40 MG/1
40 TABLET, COATED ORAL EVERY EVENING
Qty: 90 TAB | Refills: 3 | Status: SHIPPED | OUTPATIENT
Start: 2018-02-09 | End: 2019-06-10 | Stop reason: SDUPTHER

## 2018-02-09 RX ORDER — CLOPIDOGREL BISULFATE 75 MG/1
75 TABLET ORAL DAILY
COMMUNITY
End: 2018-02-09 | Stop reason: SDUPTHER

## 2018-02-09 RX ORDER — CLOPIDOGREL BISULFATE 75 MG/1
75 TABLET ORAL DAILY
Qty: 90 TAB | Refills: 3 | Status: SHIPPED | OUTPATIENT
Start: 2018-02-09 | End: 2019-06-04 | Stop reason: SDUPTHER

## 2018-02-09 ASSESSMENT — ENCOUNTER SYMPTOMS
COUGH: 0
MYALGIAS: 0
ABDOMINAL PAIN: 0
SHORTNESS OF BREATH: 1
ORTHOPNEA: 0
PALPITATIONS: 0
FEVER: 0
DIZZINESS: 0
CLAUDICATION: 0
PND: 0

## 2018-02-09 NOTE — PROGRESS NOTES
"Subjective:   Talib Ortiz is a 62 y.o. male who presents today for follow up on his CAD.     Patient of Dr. Frias. Patient was last seen 2/7/17. Over the year, patient has been feeling well. He does report shortness of breath with exertion and knee pain. Patient did have a knee operation in June 2017. Patient has not been very active since his left knee surgery and feels that his shortness of breath is related to deconditioning.    Patient denies chest pain, shortness of breath with rest and ADLs, palpitations, dizziness/lightheadedness, orthopnea, PND or Edema.     Additonally, patient has the following medical problems:    -CAD with CABG ×2 or 3 in 2016 at [facility]     -Dyslipidemia: Taking rosuvastatin 40 mg nightly    Past Medical History:   Diagnosis Date   • Arthritis    • Blood clotting disorder (CMS-HCC) 1996    leg   • CAD (coronary artery disease)    • Heart attack 8/16/2016    cardiology, Dr. Frias   • Heart burn    • High cholesterol    • Hyperlipidemia    • Kidney disease    • Kidney stone    • Obesity (BMI 30-39.9) 2/12/2015   • Osteoarthritis 3/2/2015   • Pneumonia 2016   • Psychiatric problem     Depression      Past Surgical History:   Procedure Laterality Date   • KNEE ARTHROPLASTY TOTAL Left 6/19/2017    Procedure: KNEE ARTHROPLASTY TOTAL;  Surgeon: Charles Castellanos M.D.;  Location: SURGERY [facility];  Service:    • MULTIPLE CORONARY ARTERY BYPASS  8/16/2016   • LAMINOTOMY  2012    lumbar   • KNEE ARTHROSCOPY Left 1990's   • OTHER SURGICAL PROCEDURE Left 1990's    \"removal of vein left leg due to blood clot\"   • SHOULDER ARTHROTOMY Right 1980's     Family History   Problem Relation Age of Onset   • Arthritis Mother    • Hyperlipidemia Mother    • Heart Disease Mother      bypass x4   • Diabetes Mother    • Hyperlipidemia Father    • Heart Attack Father 61   • Other Sister      back surgery   • Other Brother      joint problems     History   Smoking Status   • Former Smoker   • " "Packs/day: 0.25   • Years: 10.00   • Types: Cigarettes   • Quit date: 3/19/2016   Smokeless Tobacco   • Never Used     No Known Allergies  Outpatient Encounter Prescriptions as of 2/9/2018   Medication Sig Dispense Refill   • clopidogrel (PLAVIX) 75 MG Tab Take 75 mg by mouth every day.     • sertraline (ZOLOFT) 25 MG tablet TAKE ONE TABLET BY MOUTH TWICE DAILY 180 Tab 3   • oxycodone-acetaminophen (PERCOCET-10)  MG Tab Take 1-2 Tabs by mouth every 6 hours as needed for Severe Pain (max 8 tabs daily. Wean off pain medications as tolerated.). 65 Tab 0   • ranitidine (ZANTAC) 150 MG Tab Take 150 mg by mouth 2 times a day.     • Glucosamine-Chondroit-Vit C-Mn (GLUCOSAMINE 1500 COMPLEX) Cap Take  by mouth every day.     • rosuvastatin (CRESTOR) 40 MG tablet Take 1 Tab by mouth every day. (Patient taking differently: Take 40 mg by mouth every evening.) 30 Tab 11   • metoprolol (LOPRESSOR) 25 MG Tab Take 1 Tab by mouth 2 times a day. 60 Tab 11   • [DISCONTINUED] Omega-3 Fatty Acids (FISH OIL BURP-LESS PO) Take  by mouth every day.     • [DISCONTINUED] Misc Natural Products (URINOZINC PLUS) TABS Take 1 Tab by mouth 2 Times a Day.     • [DISCONTINUED] Melatonin 10 MG TABS Take 1 Tab by mouth at bedtime as needed.       No facility-administered encounter medications on file as of 2/9/2018.      Review of Systems   Constitutional: Negative for fever and malaise/fatigue.   Respiratory: Positive for shortness of breath. Negative for cough.    Cardiovascular: Negative for chest pain, palpitations, orthopnea, claudication, leg swelling and PND.   Gastrointestinal: Negative for abdominal pain.   Musculoskeletal: Positive for joint pain. Negative for myalgias.   Neurological: Negative for dizziness.   All other systems reviewed and are negative.       Objective:   /72   Pulse 84   Ht 1.753 m (5' 9.02\")   Wt 101.6 kg (224 lb)   SpO2 97%   BMI 33.06 kg/m²     Physical Exam   Constitutional: He is oriented to person, " place, and time. He appears well-developed and well-nourished.   HENT:   Head: Normocephalic and atraumatic.   Eyes: EOM are normal.   Neck: Normal range of motion. Neck supple. No JVD present.   Cardiovascular: Normal rate, regular rhythm, normal heart sounds and intact distal pulses.    No murmur heard.  Pulmonary/Chest: Effort normal and breath sounds normal. No respiratory distress. He has no wheezes. He has no rales.   Abdominal: Soft. Bowel sounds are normal.   Musculoskeletal: Normal range of motion. He exhibits no edema.   Neurological: He is alert and oriented to person, place, and time.   Skin: Skin is warm and dry.   Psychiatric: He has a normal mood and affect.   Nursing note and vitals reviewed.       Lab Results   Component Value Date/Time    CHOLSTRLTOT 122 04/05/2017 10:39 AM    LDL 59 04/05/2017 10:39 AM    HDL 50 04/05/2017 10:39 AM    TRIGLYCERIDE 67 04/05/2017 10:39 AM       Lab Results   Component Value Date/Time    SODIUM 138 06/05/2017 10:35 AM    POTASSIUM 4.8 06/05/2017 10:35 AM    CHLORIDE 106 06/05/2017 10:35 AM    CO2 24 06/05/2017 10:35 AM    GLUCOSE 98 06/05/2017 10:35 AM    BUN 14 06/05/2017 10:35 AM    CREATININE 1.23 06/05/2017 10:35 AM    BUNCREATRAT 13 02/16/2015 08:55 AM     Lab Results   Component Value Date/Time    ALKPHOSPHAT 35 04/05/2017 10:39 AM    ASTSGOT 21 04/05/2017 10:39 AM    ALTSGPT 22 04/05/2017 10:39 AM    TBILIRUBIN 0.3 04/05/2017 10:39 AM      Transthoracic Echo Report 1/23/17  Normal left ventricular systolic function.  Left ventricular ejection fraction is visually estimated to be 55%.  Mild concentric left ventricular hypertrophy.  Unable to estimate pulmonary artery pressure due to an inadequate   tricuspid regurgitant jet.  Hypokinesis of the distal septum and portion of the apex.    Assessment:     1. Coronary artery disease due to calcified coronary lesion: CABG ×2 in August 2016     2. Dyslipidemia         Medical Decision Making:  Today's Assessment /  Status / Plan:   1. CAD, Hx CABG x 3/ DLD: asymptomatic  -Continue Plavix 75 mg daily  -Continue metoprolol 25 mg twice a day  -Continue rosuvastatin 40 mg nightly  -Repeat lipid panel and BMP before next visit    FU in clinic in 3-4 months with Dr. Frias. Sooner if needed.    Patient verbalizes understanding and agrees with the plan of care.     Collaborating MD: Michele Harvey MD

## 2018-04-20 ENCOUNTER — OFFICE VISIT (OUTPATIENT)
Dept: MEDICAL GROUP | Facility: MEDICAL CENTER | Age: 63
End: 2018-04-20
Payer: COMMERCIAL

## 2018-04-20 VITALS
OXYGEN SATURATION: 99 % | SYSTOLIC BLOOD PRESSURE: 110 MMHG | DIASTOLIC BLOOD PRESSURE: 64 MMHG | RESPIRATION RATE: 16 BRPM | HEIGHT: 69 IN | WEIGHT: 231.04 LBS | HEART RATE: 85 BPM | BODY MASS INDEX: 34.22 KG/M2 | TEMPERATURE: 98 F

## 2018-04-20 DIAGNOSIS — R21 SKIN RASH: ICD-10-CM

## 2018-04-20 DIAGNOSIS — K21.9 GASTROESOPHAGEAL REFLUX DISEASE WITHOUT ESOPHAGITIS: ICD-10-CM

## 2018-04-20 DIAGNOSIS — E78.5 DYSLIPIDEMIA: ICD-10-CM

## 2018-04-20 DIAGNOSIS — E66.9 OBESITY (BMI 30-39.9): ICD-10-CM

## 2018-04-20 DIAGNOSIS — I25.84 CORONARY ARTERY DISEASE DUE TO CALCIFIED CORONARY LESION: ICD-10-CM

## 2018-04-20 DIAGNOSIS — F32.4 MAJOR DEPRESSIVE DISORDER WITH SINGLE EPISODE, IN PARTIAL REMISSION (HCC): ICD-10-CM

## 2018-04-20 DIAGNOSIS — I25.10 CORONARY ARTERY DISEASE DUE TO CALCIFIED CORONARY LESION: ICD-10-CM

## 2018-04-20 PROCEDURE — 99214 OFFICE O/P EST MOD 30 MIN: CPT | Performed by: FAMILY MEDICINE

## 2018-04-20 RX ORDER — KETOCONAZOLE 20 MG/G
CREAM TOPICAL
Qty: 2 TUBE | Refills: 1 | Status: SHIPPED | OUTPATIENT
Start: 2018-04-20 | End: 2018-05-09

## 2018-04-20 NOTE — PROGRESS NOTES
CC: skin rash, CAD, HLD, acid reflux, depression.    HPI:   Talib presents today to discuss the following medical issues:    Coronary artery disease due to calcified coronary lesion: CABG ×2 in August 2016,   He has been stable, and asymptomatic.No new chest pain, or SOB.has been on Plavix, BB, and statin.He follows up with cardiology Dr Sukhi Fonatna.     Dyslipidemia,   He has been tolerating the statin. Denies muscle pain LFTs has been normal, has been on Lipitor 40 mg daily.Most recent lipid panel was normal.     Gastroesophageal reflux disease ,   Has has been asymptomatic, he denies epigastric pain, nausea, vomiting, and heart burn. He has been doing fine with Zantac 75 mg bid.     Depression,   Patient stated that his mood has improved since he started the Zoloft.However he reported mild increase in his weight. He has been on Zoloft 25 mg daily.    Skin rash  Started 6 weeks ago when he was in [place], he was staying a a motel there, tried otc antifungal it worked a little bit but still has the itchy rash, started as small circles, then it gets bigger , till it reaches the max size ( 2 cmX 2 cm)      Patient Active Problem List    Diagnosis Date Noted   • Primary osteoarthritis of left knee 04/06/2017   • Depression 04/06/2017   • Gastroesophageal reflux disease without esophagitis 04/06/2017   • Coronary artery disease due to calcified coronary lesion: CABG ×2 in August 2016 01/06/2017   • Dyslipidemia 03/02/2015   • Osteoarthritis 03/02/2015   • Obesity (BMI 30-39.9) 02/12/2015       Current Outpatient Prescriptions   Medication Sig Dispense Refill   • clopidogrel (PLAVIX) 75 MG Tab Take 1 Tab by mouth every day. 90 Tab 3   • metoprolol (LOPRESSOR) 25 MG Tab Take 1 Tab by mouth 2 times a day. 180 Tab 3   • rosuvastatin (CRESTOR) 40 MG tablet Take 1 Tab by mouth every evening. 90 Tab 3   • sertraline (ZOLOFT) 25 MG tablet TAKE ONE TABLET BY MOUTH TWICE DAILY 180 Tab 3   • ranitidine (ZANTAC) 150 MG Tab Take  "150 mg by mouth 2 times a day.     • oxycodone-acetaminophen (PERCOCET-10)  MG Tab Take 1-2 Tabs by mouth every 6 hours as needed for Severe Pain (max 8 tabs daily. Wean off pain medications as tolerated.). 65 Tab 0   • Glucosamine-Chondroit-Vit C-Mn (GLUCOSAMINE 1500 COMPLEX) Cap Take  by mouth every day.       No current facility-administered medications for this visit.          Allergies as of 04/20/2018   • (No Known Allergies)        ROS: Denies any chest pain, Shortness of breath, Changes bowel or bladder, Lower extremity edema.    Physical Exam:  /64   Pulse 85   Temp 36.7 °C (98 °F)   Resp 16   Ht 1.753 m (5' 9\")   Wt 104.8 kg (231 lb 0.7 oz)   SpO2 99%   BMI 34.12 kg/m²   Gen.: Well-developed, well-nourished, no apparent distress,pleasant and cooperative with the examination  Skin:  Warm and dry with good turgor. 3 Red itchy circles ,biggest one is 2 cm X 2 cm.  HEENT:Sinuses nontender with palpation, TMs clear, nares patent with pink mucosa and clear rhinorrhea,no septal deviation ,polyps or lesions. lips without lesions, oropharynx clear.  Neck: Trachea midline,no masses or adenopathy. No JVD.  Cor: Regular rate and rhythm without murmur, gallop or rub.  Lungs: Respirations unlabored.Clear to auscultation with equal breath sounds bilaterally. No wheezes, rhonchi.  Extremities: No cyanosis, clubbing or edema.        Assessment and Plan.   62 y.o. male     1. Coronary artery disease due to calcified coronary lesion: CABG ×2 in August 2016  Stable.Asymptomatic.  Continue on Plavix, BB, and statin.  Continue follow up with cardiology Dr Sukhi Fontana.     - CBC WITH DIFFERENTIAL; Future  - COMP METABOLIC PANEL; Future    2. Dyslipidemia  He has been tolerating the statin. Denies muscle pain LFTs has been normal  Continue on Lipitor 40 mg daily.      - LIPID PANEL  - TSH; Future    3. Gastroesophageal reflux disease without esophagitis  Has been doing fine with Zantac 75 mg bid.     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  Continue on Zoloft 25 mg daily.    - TSH; Future    5. Obesity (BMI 30-39.9)  BMI is 34.  Patient is counseled about life style modification.    - Patient identified as having weight management issue.  Appropriate orders and counseling given.    6. Skin rash  Probably a ring worm.    - ketoconazole (NIZORAL) 2 % Cream; Apply to affected area 2 times a day.  Dispense: 2 Tube; Refill: 1

## 2018-04-24 ENCOUNTER — HOSPITAL ENCOUNTER (OUTPATIENT)
Dept: LAB | Facility: MEDICAL CENTER | Age: 63
End: 2018-04-24
Attending: FAMILY MEDICINE
Payer: COMMERCIAL

## 2018-04-24 DIAGNOSIS — E78.5 DYSLIPIDEMIA: ICD-10-CM

## 2018-04-24 DIAGNOSIS — I25.10 CORONARY ARTERY DISEASE DUE TO CALCIFIED CORONARY LESION: ICD-10-CM

## 2018-04-24 DIAGNOSIS — I25.84 CORONARY ARTERY DISEASE DUE TO CALCIFIED CORONARY LESION: ICD-10-CM

## 2018-04-24 DIAGNOSIS — F32.4 MAJOR DEPRESSIVE DISORDER WITH SINGLE EPISODE, IN PARTIAL REMISSION (HCC): ICD-10-CM

## 2018-04-24 LAB
ALBUMIN SERPL BCP-MCNC: 4.4 G/DL (ref 3.2–4.9)
ALBUMIN/GLOB SERPL: 1.5 G/DL
ALP SERPL-CCNC: 31 U/L (ref 30–99)
ALT SERPL-CCNC: 15 U/L (ref 2–50)
ANION GAP SERPL CALC-SCNC: 11 MMOL/L (ref 0–11.9)
ANISOCYTOSIS BLD QL SMEAR: ABNORMAL
AST SERPL-CCNC: 16 U/L (ref 12–45)
BASOPHILS # BLD AUTO: 1.4 % (ref 0–1.8)
BASOPHILS # BLD: 0.08 K/UL (ref 0–0.12)
BILIRUB SERPL-MCNC: 0.5 MG/DL (ref 0.1–1.5)
BUN SERPL-MCNC: 13 MG/DL (ref 8–22)
CALCIUM SERPL-MCNC: 9.2 MG/DL (ref 8.5–10.5)
CHLORIDE SERPL-SCNC: 106 MMOL/L (ref 96–112)
CHOLEST SERPL-MCNC: 133 MG/DL (ref 100–199)
CO2 SERPL-SCNC: 22 MMOL/L (ref 20–33)
COMMENT 1642: NORMAL
CREAT SERPL-MCNC: 1 MG/DL (ref 0.5–1.4)
EOSINOPHIL # BLD AUTO: 0.08 K/UL (ref 0–0.51)
EOSINOPHIL NFR BLD: 1.4 % (ref 0–6.9)
ERYTHROCYTE [DISTWIDTH] IN BLOOD BY AUTOMATED COUNT: 47.2 FL (ref 35.9–50)
GLOBULIN SER CALC-MCNC: 3 G/DL (ref 1.9–3.5)
GLUCOSE SERPL-MCNC: 107 MG/DL (ref 65–99)
HCT VFR BLD AUTO: 29.5 % (ref 42–52)
HDLC SERPL-MCNC: 44 MG/DL
HGB BLD-MCNC: 8.4 G/DL (ref 14–18)
HYPOCHROMIA BLD QL SMEAR: ABNORMAL
IMM GRANULOCYTES # BLD AUTO: 0.02 K/UL (ref 0–0.11)
IMM GRANULOCYTES NFR BLD AUTO: 0.3 % (ref 0–0.9)
LDLC SERPL CALC-MCNC: 66 MG/DL
LYMPHOCYTES # BLD AUTO: 1.42 K/UL (ref 1–4.8)
LYMPHOCYTES NFR BLD: 24 % (ref 22–41)
MACROCYTES BLD QL SMEAR: ABNORMAL
MCH RBC QN AUTO: 20.7 PG (ref 27–33)
MCHC RBC AUTO-ENTMCNC: 28.5 G/DL (ref 33.7–35.3)
MCV RBC AUTO: 72.7 FL (ref 81.4–97.8)
MONOCYTES # BLD AUTO: 0.59 K/UL (ref 0–0.85)
MONOCYTES NFR BLD AUTO: 10 % (ref 0–13.4)
MORPHOLOGY BLD-IMP: NORMAL
NEUTROPHILS # BLD AUTO: 3.72 K/UL (ref 1.82–7.42)
NEUTROPHILS NFR BLD: 62.9 % (ref 44–72)
NRBC # BLD AUTO: 0 K/UL
NRBC BLD-RTO: 0 /100 WBC
OVALOCYTES BLD QL SMEAR: NORMAL
PLATELET # BLD AUTO: 412 K/UL (ref 164–446)
PLATELET BLD QL SMEAR: NORMAL
PMV BLD AUTO: 9.5 FL (ref 9–12.9)
POIKILOCYTOSIS BLD QL SMEAR: NORMAL
POLYCHROMASIA BLD QL SMEAR: NORMAL
POTASSIUM SERPL-SCNC: 4.1 MMOL/L (ref 3.6–5.5)
PROT SERPL-MCNC: 7.4 G/DL (ref 6–8.2)
RBC # BLD AUTO: 4.06 M/UL (ref 4.7–6.1)
RBC BLD AUTO: PRESENT
SODIUM SERPL-SCNC: 139 MMOL/L (ref 135–145)
TRIGL SERPL-MCNC: 115 MG/DL (ref 0–149)
TSH SERPL DL<=0.005 MIU/L-ACNC: 2.37 UIU/ML (ref 0.38–5.33)
WBC # BLD AUTO: 5.9 K/UL (ref 4.8–10.8)

## 2018-04-24 PROCEDURE — 80053 COMPREHEN METABOLIC PANEL: CPT

## 2018-04-24 PROCEDURE — 36415 COLL VENOUS BLD VENIPUNCTURE: CPT

## 2018-04-24 PROCEDURE — 80061 LIPID PANEL: CPT

## 2018-04-24 PROCEDURE — 85025 COMPLETE CBC W/AUTO DIFF WBC: CPT

## 2018-04-24 PROCEDURE — 84443 ASSAY THYROID STIM HORMONE: CPT

## 2018-05-09 ENCOUNTER — HOSPITAL ENCOUNTER (OUTPATIENT)
Dept: LAB | Facility: MEDICAL CENTER | Age: 63
End: 2018-05-09
Attending: FAMILY MEDICINE
Payer: COMMERCIAL

## 2018-05-09 ENCOUNTER — OFFICE VISIT (OUTPATIENT)
Dept: MEDICAL GROUP | Facility: MEDICAL CENTER | Age: 63
End: 2018-05-09
Payer: COMMERCIAL

## 2018-05-09 ENCOUNTER — APPOINTMENT (OUTPATIENT)
Dept: RADIOLOGY | Facility: MEDICAL CENTER | Age: 63
DRG: 812 | End: 2018-05-09
Attending: EMERGENCY MEDICINE
Payer: COMMERCIAL

## 2018-05-09 ENCOUNTER — HOSPITAL ENCOUNTER (INPATIENT)
Facility: MEDICAL CENTER | Age: 63
LOS: 2 days | DRG: 812 | End: 2018-05-11
Attending: EMERGENCY MEDICINE | Admitting: HOSPITALIST
Payer: COMMERCIAL

## 2018-05-09 VITALS
BODY MASS INDEX: 32.65 KG/M2 | TEMPERATURE: 97.8 F | WEIGHT: 220.46 LBS | OXYGEN SATURATION: 99 % | RESPIRATION RATE: 16 BRPM | HEIGHT: 69 IN | SYSTOLIC BLOOD PRESSURE: 104 MMHG | HEART RATE: 74 BPM | DIASTOLIC BLOOD PRESSURE: 60 MMHG

## 2018-05-09 DIAGNOSIS — D64.9 ANEMIA, UNSPECIFIED TYPE: ICD-10-CM

## 2018-05-09 DIAGNOSIS — D50.9 HYPOCHROMIC MICROCYTIC ANEMIA: ICD-10-CM

## 2018-05-09 PROBLEM — R21 RASH: Status: ACTIVE | Noted: 2018-05-09

## 2018-05-09 LAB
ABO GROUP BLD: NORMAL
ABO GROUP BLD: NORMAL
ALBUMIN SERPL BCP-MCNC: 4 G/DL (ref 3.2–4.9)
ALBUMIN/GLOB SERPL: 1.4 G/DL
ALP SERPL-CCNC: 28 U/L (ref 30–99)
ALT SERPL-CCNC: 13 U/L (ref 2–50)
ANION GAP SERPL CALC-SCNC: 11 MMOL/L (ref 0–11.9)
ANISOCYTOSIS BLD QL SMEAR: ABNORMAL
APTT PPP: 26.9 SEC (ref 24.7–36)
AST SERPL-CCNC: 15 U/L (ref 12–45)
BARCODED ABORH UBTYP: 600
BARCODED ABORH UBTYP: 6200
BARCODED PRD CODE UBPRD: NORMAL
BARCODED PRD CODE UBPRD: NORMAL
BARCODED UNIT NUM UBUNT: NORMAL
BARCODED UNIT NUM UBUNT: NORMAL
BASOPHILS # BLD AUTO: 1.5 % (ref 0–1.8)
BASOPHILS # BLD AUTO: 4.4 % (ref 0–1.8)
BASOPHILS # BLD: 0.1 K/UL (ref 0–0.12)
BASOPHILS # BLD: 0.33 K/UL (ref 0–0.12)
BILIRUB SERPL-MCNC: 0.4 MG/DL (ref 0.1–1.5)
BLD GP AB SCN SERPL QL: NORMAL
BUN SERPL-MCNC: 21 MG/DL (ref 8–22)
CALCIUM SERPL-MCNC: 9.3 MG/DL (ref 8.5–10.5)
CHLORIDE SERPL-SCNC: 109 MMOL/L (ref 96–112)
CO2 SERPL-SCNC: 20 MMOL/L (ref 20–33)
COMMENT 1642: NORMAL
COMPONENT R 8504R: NORMAL
COMPONENT R 8504R: NORMAL
CREAT SERPL-MCNC: 1.06 MG/DL (ref 0.5–1.4)
CRP SERPL HS-MCNC: 0.06 MG/DL (ref 0–0.75)
EKG IMPRESSION: NORMAL
EOSINOPHIL # BLD AUTO: 0.15 K/UL (ref 0–0.51)
EOSINOPHIL # BLD AUTO: 0.33 K/UL (ref 0–0.51)
EOSINOPHIL NFR BLD: 2.3 % (ref 0–6.9)
EOSINOPHIL NFR BLD: 4.4 % (ref 0–6.9)
ERYTHROCYTE [DISTWIDTH] IN BLOOD BY AUTOMATED COUNT: 44.7 FL (ref 35.9–50)
ERYTHROCYTE [DISTWIDTH] IN BLOOD BY AUTOMATED COUNT: 44.8 FL (ref 35.9–50)
ERYTHROCYTE [SEDIMENTATION RATE] IN BLOOD BY WESTERGREN METHOD: 16 MM/HOUR (ref 0–20)
FERRITIN SERPL-MCNC: 4.4 NG/ML (ref 22–322)
GLOBULIN SER CALC-MCNC: 2.9 G/DL (ref 1.9–3.5)
GLUCOSE SERPL-MCNC: 106 MG/DL (ref 65–99)
HCT VFR BLD AUTO: 26.5 % (ref 42–52)
HCT VFR BLD AUTO: 27 % (ref 42–52)
HGB BLD-MCNC: 7.6 G/DL (ref 14–18)
HGB BLD-MCNC: 7.7 G/DL (ref 14–18)
HGB RETIC QN AUTO: 22 PG/CELL (ref 29–35)
HYPOCHROMIA BLD QL SMEAR: ABNORMAL
IMM GRANULOCYTES # BLD AUTO: 0.03 K/UL (ref 0–0.11)
IMM GRANULOCYTES NFR BLD AUTO: 0.5 % (ref 0–0.9)
IMM RETICS NFR: 35.6 % (ref 9.3–17.4)
INR PPP: 1.13 (ref 0.87–1.13)
IRON SATN MFR SERPL: ABNORMAL % (ref 15–55)
IRON SERPL-MCNC: <10 UG/DL (ref 50–180)
LDH SERPL L TO P-CCNC: 105 U/L (ref 107–266)
LG PLATELETS BLD QL SMEAR: NORMAL
LYMPHOCYTES # BLD AUTO: 1.35 K/UL (ref 1–4.8)
LYMPHOCYTES # BLD AUTO: 1.67 K/UL (ref 1–4.8)
LYMPHOCYTES NFR BLD: 17.7 % (ref 22–41)
LYMPHOCYTES NFR BLD: 25.1 % (ref 22–41)
MANUAL DIFF BLD: NORMAL
MCH RBC QN AUTO: 20.4 PG (ref 27–33)
MCH RBC QN AUTO: 20.5 PG (ref 27–33)
MCHC RBC AUTO-ENTMCNC: 28.5 G/DL (ref 33.7–35.3)
MCHC RBC AUTO-ENTMCNC: 28.7 G/DL (ref 33.7–35.3)
MCV RBC AUTO: 71.4 FL (ref 81.4–97.8)
MCV RBC AUTO: 71.6 FL (ref 81.4–97.8)
METAMYELOCYTES NFR BLD MANUAL: 0.9 %
MICROCYTES BLD QL SMEAR: ABNORMAL
MICROCYTES BLD QL SMEAR: ABNORMAL
MONOCYTES # BLD AUTO: 0.27 K/UL (ref 0–0.85)
MONOCYTES # BLD AUTO: 0.66 K/UL (ref 0–0.85)
MONOCYTES NFR BLD AUTO: 3.6 % (ref 0–13.4)
MONOCYTES NFR BLD AUTO: 9.9 % (ref 0–13.4)
MORPHOLOGY BLD-IMP: NORMAL
MORPHOLOGY BLD-IMP: NORMAL
NEUTROPHILS # BLD AUTO: 4.05 K/UL (ref 1.82–7.42)
NEUTROPHILS # BLD AUTO: 5.24 K/UL (ref 1.82–7.42)
NEUTROPHILS NFR BLD: 60.7 % (ref 44–72)
NEUTROPHILS NFR BLD: 69 % (ref 44–72)
NRBC # BLD AUTO: 0 K/UL
NRBC # BLD AUTO: 0 K/UL
NRBC BLD-RTO: 0 /100 WBC
NRBC BLD-RTO: 0 /100 WBC
PLATELET # BLD AUTO: 371 K/UL (ref 164–446)
PLATELET # BLD AUTO: 378 K/UL (ref 164–446)
PLATELET BLD QL SMEAR: NORMAL
PMV BLD AUTO: 8.8 FL (ref 9–12.9)
PMV BLD AUTO: 9.3 FL (ref 9–12.9)
POIKILOCYTOSIS BLD QL SMEAR: NORMAL
POLYCHROMASIA BLD QL SMEAR: NORMAL
POTASSIUM SERPL-SCNC: 4.1 MMOL/L (ref 3.6–5.5)
PRODUCT TYPE UPROD: NORMAL
PRODUCT TYPE UPROD: NORMAL
PROT SERPL-MCNC: 6.9 G/DL (ref 6–8.2)
PROTHROMBIN TIME: 14.2 SEC (ref 12–14.6)
RBC # BLD AUTO: 3.7 M/UL (ref 4.7–6.1)
RBC # BLD AUTO: 3.78 M/UL (ref 4.7–6.1)
RBC BLD AUTO: PRESENT
RBC BLD AUTO: PRESENT
RETICS # AUTO: 0.08 M/UL (ref 0.04–0.06)
RETICS/RBC NFR: 2.2 % (ref 0.8–2.1)
RH BLD: NORMAL
RH BLD: NORMAL
SCHISTOCYTES BLD QL SMEAR: NORMAL
SODIUM SERPL-SCNC: 140 MMOL/L (ref 135–145)
TIBC SERPL-MCNC: 529 UG/DL (ref 250–450)
TRANSFERRIN SERPL-MCNC: 387 MG/DL (ref 200–370)
UNIT STATUS USTAT: NORMAL
UNIT STATUS USTAT: NORMAL
WBC # BLD AUTO: 6.7 K/UL (ref 4.8–10.8)
WBC # BLD AUTO: 7.6 K/UL (ref 4.8–10.8)

## 2018-05-09 PROCEDURE — 86923 COMPATIBILITY TEST ELECTRIC: CPT

## 2018-05-09 PROCEDURE — 700105 HCHG RX REV CODE 258

## 2018-05-09 PROCEDURE — 85025 COMPLETE CBC W/AUTO DIFF WBC: CPT

## 2018-05-09 PROCEDURE — 85730 THROMBOPLASTIN TIME PARTIAL: CPT

## 2018-05-09 PROCEDURE — 700102 HCHG RX REV CODE 250 W/ 637 OVERRIDE(OP): Performed by: STUDENT IN AN ORGANIZED HEALTH CARE EDUCATION/TRAINING PROGRAM

## 2018-05-09 PROCEDURE — 86850 RBC ANTIBODY SCREEN: CPT

## 2018-05-09 PROCEDURE — 36415 COLL VENOUS BLD VENIPUNCTURE: CPT

## 2018-05-09 PROCEDURE — 71045 X-RAY EXAM CHEST 1 VIEW: CPT

## 2018-05-09 PROCEDURE — 80053 COMPREHEN METABOLIC PANEL: CPT

## 2018-05-09 PROCEDURE — 82270 OCCULT BLOOD FECES: CPT

## 2018-05-09 PROCEDURE — 99285 EMERGENCY DEPT VISIT HI MDM: CPT

## 2018-05-09 PROCEDURE — 30233N1 TRANSFUSION OF NONAUTOLOGOUS RED BLOOD CELLS INTO PERIPHERAL VEIN, PERCUTANEOUS APPROACH: ICD-10-PCS | Performed by: HOSPITALIST

## 2018-05-09 PROCEDURE — 99214 OFFICE O/P EST MOD 30 MIN: CPT | Performed by: FAMILY MEDICINE

## 2018-05-09 PROCEDURE — A9270 NON-COVERED ITEM OR SERVICE: HCPCS | Performed by: STUDENT IN AN ORGANIZED HEALTH CARE EDUCATION/TRAINING PROGRAM

## 2018-05-09 PROCEDURE — 86900 BLOOD TYPING SEROLOGIC ABO: CPT

## 2018-05-09 PROCEDURE — 82728 ASSAY OF FERRITIN: CPT

## 2018-05-09 PROCEDURE — P9016 RBC LEUKOCYTES REDUCED: HCPCS | Mod: 91

## 2018-05-09 PROCEDURE — 86901 BLOOD TYPING SEROLOGIC RH(D): CPT

## 2018-05-09 PROCEDURE — 82272 OCCULT BLD FECES 1-3 TESTS: CPT

## 2018-05-09 PROCEDURE — 85007 BL SMEAR W/DIFF WBC COUNT: CPT

## 2018-05-09 PROCEDURE — 83540 ASSAY OF IRON: CPT

## 2018-05-09 PROCEDURE — 36430 TRANSFUSION BLD/BLD COMPNT: CPT

## 2018-05-09 PROCEDURE — 93005 ELECTROCARDIOGRAM TRACING: CPT

## 2018-05-09 PROCEDURE — 85610 PROTHROMBIN TIME: CPT

## 2018-05-09 PROCEDURE — 85046 RETICYTE/HGB CONCENTRATE: CPT

## 2018-05-09 PROCEDURE — 85027 COMPLETE CBC AUTOMATED: CPT

## 2018-05-09 PROCEDURE — 86140 C-REACTIVE PROTEIN: CPT

## 2018-05-09 PROCEDURE — 83615 LACTATE (LD) (LDH) ENZYME: CPT

## 2018-05-09 PROCEDURE — 93005 ELECTROCARDIOGRAM TRACING: CPT | Performed by: EMERGENCY MEDICINE

## 2018-05-09 PROCEDURE — 84466 ASSAY OF TRANSFERRIN: CPT

## 2018-05-09 PROCEDURE — 85652 RBC SED RATE AUTOMATED: CPT

## 2018-05-09 PROCEDURE — 99223 1ST HOSP IP/OBS HIGH 75: CPT | Performed by: HOSPITALIST

## 2018-05-09 PROCEDURE — 83550 IRON BINDING TEST: CPT

## 2018-05-09 PROCEDURE — 770020 HCHG ROOM/CARE - TELE (206)

## 2018-05-09 RX ORDER — CLOPIDOGREL BISULFATE 75 MG/1
75 TABLET ORAL DAILY
Status: DISCONTINUED | OUTPATIENT
Start: 2018-05-10 | End: 2018-05-09

## 2018-05-09 RX ORDER — ACETAMINOPHEN 325 MG/1
650 TABLET ORAL EVERY 6 HOURS PRN
Status: DISCONTINUED | OUTPATIENT
Start: 2018-05-09 | End: 2018-05-11 | Stop reason: HOSPADM

## 2018-05-09 RX ORDER — BISACODYL 10 MG
10 SUPPOSITORY, RECTAL RECTAL
Status: DISCONTINUED | OUTPATIENT
Start: 2018-05-09 | End: 2018-05-11 | Stop reason: HOSPADM

## 2018-05-09 RX ORDER — AMOXICILLIN 250 MG
2 CAPSULE ORAL 2 TIMES DAILY
Status: DISCONTINUED | OUTPATIENT
Start: 2018-05-09 | End: 2018-05-11 | Stop reason: HOSPADM

## 2018-05-09 RX ORDER — FAMOTIDINE 20 MG/1
20 TABLET, FILM COATED ORAL 2 TIMES DAILY
Status: DISCONTINUED | OUTPATIENT
Start: 2018-05-09 | End: 2018-05-11 | Stop reason: HOSPADM

## 2018-05-09 RX ORDER — ROSUVASTATIN CALCIUM 20 MG/1
40 TABLET, COATED ORAL EVERY EVENING
Status: DISCONTINUED | OUTPATIENT
Start: 2018-05-09 | End: 2018-05-11 | Stop reason: HOSPADM

## 2018-05-09 RX ORDER — POLYETHYLENE GLYCOL 3350 17 G/17G
1 POWDER, FOR SOLUTION ORAL
Status: DISCONTINUED | OUTPATIENT
Start: 2018-05-09 | End: 2018-05-11 | Stop reason: HOSPADM

## 2018-05-09 RX ORDER — SODIUM CHLORIDE 9 MG/ML
INJECTION, SOLUTION INTRAVENOUS
Status: COMPLETED
Start: 2018-05-09 | End: 2018-05-09

## 2018-05-09 RX ADMIN — FAMOTIDINE 20 MG: 20 TABLET ORAL at 21:48

## 2018-05-09 RX ADMIN — SODIUM CHLORIDE: 9 INJECTION, SOLUTION INTRAVENOUS at 22:15

## 2018-05-09 RX ADMIN — METOPROLOL TARTRATE 25 MG: 25 TABLET, FILM COATED ORAL at 21:48

## 2018-05-09 RX ADMIN — SERTRALINE 25 MG: 50 TABLET, FILM COATED ORAL at 21:49

## 2018-05-09 RX ADMIN — ROSUVASTATIN CALCIUM 40 MG: 20 TABLET, FILM COATED ORAL at 21:48

## 2018-05-09 ASSESSMENT — PATIENT HEALTH QUESTIONNAIRE - PHQ9
2. FEELING DOWN, DEPRESSED, IRRITABLE, OR HOPELESS: NOT AT ALL
1. LITTLE INTEREST OR PLEASURE IN DOING THINGS: NOT AT ALL
SUM OF ALL RESPONSES TO PHQ9 QUESTIONS 1 AND 2: 0

## 2018-05-09 ASSESSMENT — ENCOUNTER SYMPTOMS
BLURRED VISION: 0
BLOOD IN STOOL: 0
DIAPHORESIS: 0
FEVER: 0
POLYDIPSIA: 0
SENSORY CHANGE: 0
ORTHOPNEA: 0
DIARRHEA: 0
FOCAL WEAKNESS: 0
SORE THROAT: 0
ABDOMINAL PAIN: 0
DEPRESSION: 0
PALPITATIONS: 0
COUGH: 0
SHORTNESS OF BREATH: 1
SEIZURES: 0
FALLS: 0
DIZZINESS: 1
WHEEZING: 0
CONSTIPATION: 0
SPEECH CHANGE: 0
NAUSEA: 0
CHILLS: 0
VOMITING: 0
BRUISES/BLEEDS EASILY: 0
MYALGIAS: 0
SPUTUM PRODUCTION: 0
HEMOPTYSIS: 0
HEADACHES: 0
DOUBLE VISION: 0

## 2018-05-09 ASSESSMENT — LIFESTYLE VARIABLES
EVER_SMOKED: YES
SUBSTANCE_ABUSE: 0

## 2018-05-09 NOTE — ED TRIAGE NOTES
".  Chief Complaint   Patient presents with   • Sent by MD     Sent by PCP   • Abnormal Labs     Hgb 7.7     ./60   Pulse 95   Temp 37.1 °C (98.8 °F)   Resp 18   Ht 1.803 m (5' 11\")   Wt 100.6 kg (221 lb 12.5 oz)   SpO2 95%   BMI 30.93 kg/m²     Ambulatory to triage c/o dizziness, \"exhaustion\", seen by PCP today and sent here for low Hgb, educated on triage process, placed in lobby with significant other, told to inform staff of any changes in condition.    "

## 2018-05-09 NOTE — PROGRESS NOTES
"CC: Symptomatic anemia    HPI:   Talib presents today to discuss his labs. His Hb is 8.4. He has been feeling tired and weak, he also gets short of breath easily with mild exertion. He denies any blood in the stool, abdominal pain, nausea, vomiting blood, constipation or diarrhea. He denies bleeding from ant orifice.      Patient Active Problem List    Diagnosis Date Noted   • Primary osteoarthritis of left knee 04/06/2017   • Depression 04/06/2017   • Gastroesophageal reflux disease without esophagitis 04/06/2017   • Coronary artery disease due to calcified coronary lesion: CABG ×2 in August 2016 01/06/2017   • Dyslipidemia 03/02/2015   • Osteoarthritis 03/02/2015   • Obesity (BMI 30-39.9) 02/12/2015       Current Outpatient Prescriptions   Medication Sig Dispense Refill   • clopidogrel (PLAVIX) 75 MG Tab Take 1 Tab by mouth every day. 90 Tab 3   • metoprolol (LOPRESSOR) 25 MG Tab Take 1 Tab by mouth 2 times a day. 180 Tab 3   • rosuvastatin (CRESTOR) 40 MG tablet Take 1 Tab by mouth every evening. 90 Tab 3   • sertraline (ZOLOFT) 25 MG tablet TAKE ONE TABLET BY MOUTH TWICE DAILY 180 Tab 3   • oxycodone-acetaminophen (PERCOCET-10)  MG Tab Take 1-2 Tabs by mouth every 6 hours as needed for Severe Pain (max 8 tabs daily. Wean off pain medications as tolerated.). 65 Tab 0   • ranitidine (ZANTAC) 150 MG Tab Take 150 mg by mouth 2 times a day.     • Glucosamine-Chondroit-Vit C-Mn (GLUCOSAMINE 1500 COMPLEX) Cap Take  by mouth every day.     • ketoconazole (NIZORAL) 2 % Cream Apply to affected area 2 times a day. 2 Tube 1     No current facility-administered medications for this visit.          Allergies as of 05/09/2018   • (No Known Allergies)        ROS: Denies any chest pain, Shortness of breath, Changes bowel or bladder, Lower extremity edema.    Physical Exam:  /60   Pulse 74   Temp 36.6 °C (97.8 °F)   Resp 16   Ht 1.753 m (5' 9\")   Wt 100 kg (220 lb 7.4 oz)   SpO2 99%   BMI 32.56 kg/m²   Gen.: " Well-developed, well-nourished, no apparent distress,pleasant and cooperative with the examination  Skin:  Pale  Neck:  No JVD.  Lungs: Respirations unlabored.Clear to auscultation with equal breath sounds bilaterally. No wheezes, rhonchi.  Extremities: No edema.      Assessment and Plan.   62 y.o. male     1.Hypochromic microcytic anemia  Symptomatic. Hb 8.4  Will repeat CBC.  Will check iron level.  Denies blood in the stool, so he needs stool for occult blood.  Patient reluctant to go to ER, so will evaluate all those blood test stat, and see if he needs to go to ER for emergency colonoscopy.    - CBC WITH DIFFERENTIAL; Future  - IRON; Future  - IRON/TOTAL IRON BIND; Future  - OCCULT BLOOD FECES IMMUNOASSAY; Future      Addendum ( after reviewing his CBC):    Hb today dropped to 7.7,very low iron level, unable to do the stool test, has been having severe tiredness, and SOB.  Patient advised to go to ER

## 2018-05-09 NOTE — ED NOTES
Pt to ED 17. Denies blood in stool, urine on recent surgery or injury. Denies ETOH abuse or any recent diet changes.

## 2018-05-10 LAB
BASOPHILS # BLD AUTO: 1.9 % (ref 0–1.8)
BASOPHILS # BLD: 0.12 K/UL (ref 0–0.12)
DAT C3D-SP REAG RBC QL: NORMAL
DAT IGG-SP REAG RBC QL: NORMAL
EOSINOPHIL # BLD AUTO: 0.31 K/UL (ref 0–0.51)
EOSINOPHIL NFR BLD: 4.8 % (ref 0–6.9)
ERYTHROCYTE [DISTWIDTH] IN BLOOD BY AUTOMATED COUNT: 50.4 FL (ref 35.9–50)
FOLATE SERPL-MCNC: 14.9 NG/ML
HCT VFR BLD AUTO: 31.6 % (ref 42–52)
HEMOCCULT SP1 STL QL: NEGATIVE
HGB BLD-MCNC: 9.4 G/DL (ref 14–18)
IMM GRANULOCYTES # BLD AUTO: 0.02 K/UL (ref 0–0.11)
IMM GRANULOCYTES NFR BLD AUTO: 0.3 % (ref 0–0.9)
LYMPHOCYTES # BLD AUTO: 1.82 K/UL (ref 1–4.8)
LYMPHOCYTES NFR BLD: 28.2 % (ref 22–41)
MCH RBC QN AUTO: 22.1 PG (ref 27–33)
MCHC RBC AUTO-ENTMCNC: 29.7 G/DL (ref 33.7–35.3)
MCV RBC AUTO: 74.4 FL (ref 81.4–97.8)
MONOCYTES # BLD AUTO: 0.69 K/UL (ref 0–0.85)
MONOCYTES NFR BLD AUTO: 10.7 % (ref 0–13.4)
NEUTROPHILS # BLD AUTO: 3.49 K/UL (ref 1.82–7.42)
NEUTROPHILS NFR BLD: 54.1 % (ref 44–72)
NRBC # BLD AUTO: 0 K/UL
NRBC BLD-RTO: 0 /100 WBC
PLATELET # BLD AUTO: 337 K/UL (ref 164–446)
PMV BLD AUTO: 9.4 FL (ref 9–12.9)
RBC # BLD AUTO: 4.25 M/UL (ref 4.7–6.1)
TSH SERPL DL<=0.005 MIU/L-ACNC: 2.62 UIU/ML (ref 0.38–5.33)
VIT B12 SERPL-MCNC: 267 PG/ML (ref 211–911)
WBC # BLD AUTO: 6.5 K/UL (ref 4.8–10.8)

## 2018-05-10 PROCEDURE — 82784 ASSAY IGA/IGD/IGG/IGM EACH: CPT

## 2018-05-10 PROCEDURE — 83010 ASSAY OF HAPTOGLOBIN QUANT: CPT

## 2018-05-10 PROCEDURE — A9270 NON-COVERED ITEM OR SERVICE: HCPCS | Performed by: STUDENT IN AN ORGANIZED HEALTH CARE EDUCATION/TRAINING PROGRAM

## 2018-05-10 PROCEDURE — 83516 IMMUNOASSAY NONANTIBODY: CPT

## 2018-05-10 PROCEDURE — 36415 COLL VENOUS BLD VENIPUNCTURE: CPT

## 2018-05-10 PROCEDURE — 770020 HCHG ROOM/CARE - TELE (206)

## 2018-05-10 PROCEDURE — 85025 COMPLETE CBC W/AUTO DIFF WBC: CPT

## 2018-05-10 PROCEDURE — 82607 VITAMIN B-12: CPT

## 2018-05-10 PROCEDURE — 99232 SBSQ HOSP IP/OBS MODERATE 35: CPT | Performed by: HOSPITALIST

## 2018-05-10 PROCEDURE — 700102 HCHG RX REV CODE 250 W/ 637 OVERRIDE(OP): Performed by: STUDENT IN AN ORGANIZED HEALTH CARE EDUCATION/TRAINING PROGRAM

## 2018-05-10 PROCEDURE — 82746 ASSAY OF FOLIC ACID SERUM: CPT

## 2018-05-10 PROCEDURE — 700111 HCHG RX REV CODE 636 W/ 250 OVERRIDE (IP): Performed by: STUDENT IN AN ORGANIZED HEALTH CARE EDUCATION/TRAINING PROGRAM

## 2018-05-10 PROCEDURE — 86880 COOMBS TEST DIRECT: CPT | Mod: 91

## 2018-05-10 PROCEDURE — 83021 HEMOGLOBIN CHROMOTOGRAPHY: CPT

## 2018-05-10 PROCEDURE — 84443 ASSAY THYROID STIM HORMONE: CPT

## 2018-05-10 RX ORDER — FERROUS SULFATE 325(65) MG
325 TABLET ORAL
Status: DISCONTINUED | OUTPATIENT
Start: 2018-05-11 | End: 2018-05-11 | Stop reason: HOSPADM

## 2018-05-10 RX ORDER — M-VIT,TX,IRON,MINS/CALC/FOLIC 27MG-0.4MG
1 TABLET ORAL DAILY
Status: DISCONTINUED | OUTPATIENT
Start: 2018-05-10 | End: 2018-05-11 | Stop reason: HOSPADM

## 2018-05-10 RX ORDER — ASCORBIC ACID 500 MG
500 TABLET ORAL DAILY
Status: DISCONTINUED | OUTPATIENT
Start: 2018-05-10 | End: 2018-05-11 | Stop reason: HOSPADM

## 2018-05-10 RX ORDER — CHOLECALCIFEROL (VITAMIN D3) 125 MCG
1000 CAPSULE ORAL DAILY
Status: DISCONTINUED | OUTPATIENT
Start: 2018-05-10 | End: 2018-05-11 | Stop reason: HOSPADM

## 2018-05-10 RX ADMIN — METOPROLOL TARTRATE 12.5 MG: 25 TABLET, FILM COATED ORAL at 20:33

## 2018-05-10 RX ADMIN — STANDARDIZED SENNA CONCENTRATE AND DOCUSATE SODIUM 2 TABLET: 8.6; 5 TABLET, FILM COATED ORAL at 16:50

## 2018-05-10 RX ADMIN — IRON SUCROSE 200 MG: 20 INJECTION, SOLUTION INTRAVENOUS at 08:52

## 2018-05-10 RX ADMIN — Medication 1000 MCG: at 16:51

## 2018-05-10 RX ADMIN — FAMOTIDINE 20 MG: 20 TABLET ORAL at 16:50

## 2018-05-10 RX ADMIN — METOPROLOL TARTRATE 25 MG: 25 TABLET, FILM COATED ORAL at 05:03

## 2018-05-10 RX ADMIN — SERTRALINE 25 MG: 50 TABLET, FILM COATED ORAL at 16:51

## 2018-05-10 RX ADMIN — MULTIPLE VITAMINS W/ MINERALS TAB 1 TABLET: TAB at 16:50

## 2018-05-10 RX ADMIN — FAMOTIDINE 20 MG: 20 TABLET ORAL at 05:03

## 2018-05-10 RX ADMIN — OXYCODONE HYDROCHLORIDE AND ACETAMINOPHEN 500 MG: 500 TABLET ORAL at 16:51

## 2018-05-10 RX ADMIN — ROSUVASTATIN CALCIUM 40 MG: 20 TABLET, FILM COATED ORAL at 17:48

## 2018-05-10 RX ADMIN — SERTRALINE 25 MG: 50 TABLET, FILM COATED ORAL at 05:02

## 2018-05-10 ASSESSMENT — ENCOUNTER SYMPTOMS
PSYCHIATRIC NEGATIVE: 1
CONSTITUTIONAL NEGATIVE: 1
RESPIRATORY NEGATIVE: 1
MUSCULOSKELETAL NEGATIVE: 1
GASTROINTESTINAL NEGATIVE: 1
CARDIOVASCULAR NEGATIVE: 1
EYES NEGATIVE: 1
NEUROLOGICAL NEGATIVE: 1

## 2018-05-10 ASSESSMENT — PAIN SCALES - GENERAL
PAINLEVEL_OUTOF10: 0

## 2018-05-10 NOTE — ASSESSMENT & PLAN NOTE
2-3 weeks fatigue, BANUELOS, lightheadedness. No hematochezia, melena, hematuria, or other s/s bleeding. No changes in stool caliber. Hb down from baseline of 15 two years ago, to ~11 last year; 8.4 two weeks ago and now 7.6. Iron <10 w/ TIBC 529. Retic 2.2% w/ RPI 1.4. LDH low. TSH wnl.  Stool guaiac negative. EGD/Colonoscopy 3/2017 by [facility]; EGD was done for GERD and showed schatzki's ring and hiatal hernia; colonoscopy showed 3 polyps, recommended f/u was for 5 yrs. Family h/o NILDA in mother requiring transfusions. This does not appear to be due to blood loss. DDX includes [place] given NILDA w/ Schatzki ring, endorses brittle nails but on exam no koilonychia or glossitis, could also consider IRIDA due to a possible genetic component, patients mother required frequent transfusions.   Plan:  - Admited to tele  - Received 2 units of PRBC's  - Hold plavix; SCDs for dvt ppx  - Heme/Onc to see today  - Patient receiving iron sucrose

## 2018-05-10 NOTE — PROGRESS NOTES
Received bedside report from NOC RN, Pt A&Ox4, Vitals stable, pt denies pain. No SOB Noted.  Pt updated on plan of care, Call light within reach, personal belongings within reach.  Bed is locked and in lowest position.  Pt ambulates via upself. Tele monitor on. Will continue to monitor. Hourly rounding in place.

## 2018-05-10 NOTE — CARE PLAN
Problem: Safety  Goal: Will remain free from injury  Outcome: PROGRESSING AS EXPECTED    Intervention: Provide assistance with mobility   05/09/18 2333   OTHER   Assistance / Tolerance No assistance required     Intervention: Collaborate with Interdisciplinary Team for safe transfer and mobilization techniques   05/09/18 2333   OTHER   Assistive Devices None

## 2018-05-10 NOTE — ED PROVIDER NOTES
ED Provider Note    CHIEF COMPLAINT  Chief Complaint   Patient presents with   • Sent by MD     Sent by PCP   • Abnormal Labs     Hgb 7.7       HPI  Talib Ortiz is a 62 y.o. male with history of coronary artery disease, status post MI, status post CABG, hyperlipidemia, hypercholesterolemia, osteoarthritis, kidney stones,  who presents with complaints of lightheadedness, dizziness, fatigue, shortness of breath the past 3 weeks. The patient was seen by his PCP Dr. Knowles today was sent to the ER for further evaluation. The patient initially began feeling poorly last month. He had blood work that showed a drop in his hemoglobin from 11.5-8.4.  The patient denied having episodes of bloody stools or black stools, denies any hematemesis, nausea, or vomiting. He returned to the office for follow-up today and had repeat lab work which showed a hemoglobin of 7.7.  The patient denies any fever, chills, sore throat, cough, congestion, difficulty breathing other than with exertional activity. He is had no nausea, vomit, or diarrhea. He denies any current chest pain, back pain, or abdominal pain.    REVIEW OF SYSTEMS  See HPI for further details. All other systems are negative.     PAST MEDICAL HISTORY  Past Medical History:   Diagnosis Date   • Arthritis    • Blood clotting disorder ([facility]) 1996    leg   • CAD (coronary artery disease)    • Heart attack ([facility]) 8/16/2016    cardiology, Dr. Frias   • Heart burn    • High cholesterol    • Hyperlipidemia    • Kidney disease    • Kidney stone    • Obesity (BMI 30-39.9) 2/12/2015   • Osteoarthritis 3/2/2015   • Pneumonia 2016   • Psychiatric problem     Depression        FAMILY HISTORY  Family History   Problem Relation Age of Onset   • Arthritis Mother    • Hyperlipidemia Mother    • Heart Disease Mother      bypass x4   • Diabetes Mother    • Hyperlipidemia Father    • Heart Attack Father 61   • Other Sister      back surgery   • Other Brother      joint problems       SOCIAL  "HISTORY  Social History     Social History   • Marital status:      Spouse name: N/A   • Number of children: N/A   • Years of education: N/A     Social History Main Topics   • Smoking status: Former Smoker     Packs/day: 0.25     Years: 10.00     Types: Cigarettes     Quit date: 3/19/2016   • Smokeless tobacco: Never Used   • Alcohol use No      Comment: Quit years   • Drug use: No   • Sexual activity: Yes     Partners: Female     Other Topics Concern   • Not on file     Social History Narrative   • No narrative on file       SURGICAL HISTORY  Past Surgical History:   Procedure Laterality Date   • KNEE ARTHROPLASTY TOTAL Left 6/19/2017    Procedure: KNEE ARTHROPLASTY TOTAL;  Surgeon: Charles Castellanos M.D.;  Location: SURGERY [facility];  Service:    • MULTIPLE CORONARY ARTERY BYPASS  8/16/2016   • LAMINOTOMY  2012    lumbar   • KNEE ARTHROSCOPY Left 1990's   • OTHER SURGICAL PROCEDURE Left 1990's    \"removal of vein left leg due to blood clot\"   • SHOULDER ARTHROTOMY Right 1980's       CURRENT MEDICATIONS  Home Medications    **Home medications have not yet been reviewed for this encounter**         ALLERGIES  No Known Allergies    PHYSICAL EXAM  VITAL SIGNS: /69   Pulse 69   Temp 36.7 °C (98.1 °F)   Resp 15   Ht 1.803 m (5' 11\")   Wt 100.6 kg (221 lb 12.5 oz)   SpO2 89%   BMI 30.93 kg/m²   Constitutional: Awake, alert, in no acute distress, Non-toxic appearance. Somewhat pale in appearance.  HENT: Atraumatic. Bilateral external ears normal, mucous membranes moist, throat nonerythematous without exudates, nose is normal.  Eyes: PERRL, EOMI, conjunctiva moist, noninjected.  Neck: Nontender, Normal range of motion, No nuchal rigidity, No stridor.   Lymphatic: No lymphadenopathy noted.   Cardiovascular: Regular rate and rhythm, no murmurs, rubs, gallops.  Thorax & Lungs:  Good breath sounds bilaterally, no wheezes, rales, or retractions.  No chest tenderness.  Abdomen: Bowel sounds normal, " Soft, nontender, nondistended, no rebound, guarding, masses.  Back: No CVA or spinal tenderness.  Extremities: Intact distal pulses, No edema, No tenderness.   Skin: Warm, Dry, there is a dry macular papular rash over the right lower legs with some flaking of the skin. No petechiae or purpura.  Rectal: Normal tone, brown stool, guaiac negative.  Musculoskeletal: No joint swelling or tenderness.  Neurologic: Alert & oriented x 3, cranial nerves II through XII intact, sensory and motor function normal. No focal deficits.   Psychiatric: Affect normal, Judgment normal, Mood normal.     EKG  12-lead EKG shows normal sinus rhythm, rate of 67, right bundle branch block, normal intervals, normal axis, no acute ST wave elevations, mild ST depressions in leads V3 through V6, 1, aVL, Q waves in leads 3, aVF, no evidence of an acute injury pattern, the ST depressions may indicate ischemia, there is no previous EKG for comparison.        RADIOLOGY/PROCEDURES  DX-CHEST-PORTABLE (1 VIEW)   Final Result      New airspace disease in the left base which may represent infiltrate and/or atelectasis.            COURSE & MEDICAL DECISION MAKING  Pertinent Labs & Imaging studies reviewed. (See chart for details)  The patient presents with above complaints. He appears to be quite anemic, may have had a acute blood loss about 3 weeks ago when he first became symptomatic. Rectal exam was performed which shows brown stool, guaiac negative.  Repeat laboratory shows a white count of 7600, hemoglobin 7.6, weight is 378, 69% polys, 18% lymphs, reticulocyte count 2.2, sedimentation rate 16, chemistry shows a glucose of 106, alk phos is 28, otherwise normal, , transferring 387, ferritin 4.4, C-reactive protein 0.06.  The patient appears to  have iron deficient anemia.  I initially discussed the case with Dr. Lynch on call for Dr. Dawson [facility]. The patient had a colonoscopy about 2 years ago which was unremarkable except for  some polyps. He felt the patient could  follow-up as an outpatient. I discussed the case with Dr. Sales and given that the patient is symptomatic, she recommended transfusion of 2 units of packed red blood cells. The first unit will be given in the ER. She will see the patient in consult tomorrow. Case was discussed with the medical school for admission    FINAL IMPRESSION  1. Acute anemia  2.   3.         Electronically signed by: Phan Robles, 5/9/2018 8:04 PM

## 2018-05-10 NOTE — ED NOTES
Med rec completed per pt at bedside  Interviewed pt with family at bedside with permission from pt  Allergies reviewed - NKDA  No ABX in last 30 days

## 2018-05-10 NOTE — PROGRESS NOTES
Report received and care assumed. Pt transported to floor and oriented to unit routine. 1st unit of blood infusing that was started in ER. Pt has no new complaints at this time. No signs of distress noted.

## 2018-05-10 NOTE — CARE PLAN
Problem: Communication  Goal: The ability to communicate needs accurately and effectively will improve  Listened to patients discussion of concerns related to disease process and treatment plan. Discussed with patient concerns, goals and management. Discussed importance of patient reporting new signs and symptoms related to disease process. Patient verbalized understanding and understands importance of communicating needs.  Supported and educated patient by addressing specific questions regarding diagnosis, risks, benefits of pain management treatment.              Problem: Safety  Goal: Will remain free from falls  No slip socks on pt. Bed is locked an in lowest position. Call light in place. Pt verbalized understanding of using call light for assistance.

## 2018-05-10 NOTE — CONSULTS
DATE OF SERVICE:  05/10/2018    ADMITTING PHYSICIAN:  Lul Grimes MD    REQUESTING PHYSICIAN:  Lul Grimes MD    CONSULTING PHYSICIAN:  Wen Sales MD    REASON FOR CONSULTATION:  Patient with severe iron-deficiency anemia to   further evaluation.    HISTORY OF PRESENT ILLNESS:  The patient is a 62-year-old male who does have a   history of coronary artery disease status post CABG in August 2016.  He had a   completely normal CBC in February 2015.  In June, he had a drop in his   hemoglobin and according to patient, he did have a left knee surgery at that   time.  Again, his blood counts have normalized.  He recently saw his primary   care physician for a routine physical exam and had a CBC.  He was checking his   blood work on [place] at [facility] and he noticed himself being anemic with a   hemoglobin of 8.4 and MCV of 72.7.  He called his primary care physician, made   an appointment with him to review the lab work.  In the meanwhile, within the   last 1 week, he has been having progressive dyspnea on exertion and also   fatigue.  No chest pain.  He denies of any black colored stools or bright red   blood per rectum.  No hematuria.  No hemoptysis or hematemesis.  He does not   recall taking any increased doses of NSAIDs either.  No abdominal pain,   nausea, vomiting or diarrhea.  No recent weight loss.  He had a colonoscopy 2   years ago by Dr. Dawson that was normal according to patient.  He denies of   any gastric surgeries.  On presentation to the ER, he had hemoglobin of 7.7   yesterday.  He had lab work that showed severe iron deficiency with a ferritin   of 4.4.  The patient received 2 units of PRBCs and also started on IV iron   Venofer by primary service.  Patient at the present time feels much better.    He also feels that energy level has improved.    PAST MEDICAL HISTORY:  Coronary artery disease, depression.    PAST SURGICAL HISTORY:  Left knee surgery on June 2017, right shoulder    surgery, CABG in August 2016, and colonoscopy in 2016.    ALLERGIES:  No known drug allergies.    MEDICATIONS:  Currently, he is on metoprolol, Venofer, famotidine, Crestor,   Zoloft, milk of magnesia, and Dulcolax.    SOCIAL HISTORY:  Patient was remote smoker, stopped in August 2016.  No   illicit drugs.  He is .  He does have 3 children.  He is retired.    FAMILY HISTORY:  Both parents are living in 80s, healthy.  Grandfather was   diagnosed with colon cancer on paternal side.  He has 3 siblings, all living   and healthy.    REVIEW OF SYSTEMS:  CONSTITUTIONAL:  Positive for fatigue; no fevers, chills or night sweats.  No   recent weight loss.  RESPIRATORY:  No cough or shortness of breath.  CARDIOVASCULAR:  No chest pain, dyspnea on exertion, no palpitations.  GASTROINTESTINAL:  No abdominal pain, nausea, vomiting or diarrhea.  No black   colored stools or bright red blood per rectum.  PSYCHIATRY:  Positive history of depression, on Zoloft, no anxiety.  NEUROLOGICAL:  No headaches, no seizure activity, no vision changes or focal   weakness.    PHYSICAL EXAMINATION:  GENERAL:  He is alert, oriented x3.  VITAL SIGNS:  Temperature 36.4, pulse was 55, respiratory rate was 16, blood   pressure 104/68, and on room air saturating about 94%.  HEENT:  Pupils are equal, reactive to light, anicteric.  NECK:  Supple.  CHEST:  Clear to auscultation bilaterally, symmetrical.  No wheezing, no   crackles.  CARDIAC:  S1, S2 heard; regular rate and rhythm.  ABDOMEN:  Soft, nontender, nondistended.  Positive bowel sounds, no hernia.  EXTREMITIES:  No edema bilaterally, no cyanosis or clubbing.  PSYCHIATRY:  Mood appropriate.  Normal affect.  NEUROLOGY:  Cranial nerves are intact.  Strength 5/5, sensation is intact.    LABORATORY DATA:  WBC count is 6.5, hemoglobin 9.4, MCV 74.4, RDW is 50.4,   platelet count is 337, normal differential.  Sodium 140, potassium 4.1, BUN is   21, creatinine 1.06, calcium is 9.3, AST 15, ALT  13, alkaline phosphatase 28,   total bilirubin 0.4.  Serum iron less than 10.  TIBC 529, ferritin level 4.4,   vitamin B 267, folic acid 14.9.  TSH 2.6, retic count 2.2 with absolute retic   count was 0.08, slightly high.  ESR was 16.    ASSESSMENT AND PLAN:  The patient is a 62-year-old male who was now diagnosed   with severe iron-deficiency anemia based on the iron studies with low ferritin   level, low serum iron and high TIBC, which is consistent,  Etiology unknown.    He denies of any obvious gastrointestinal bleed.  My recommendation would be   still to proceed with a GI workup both upper endoscopy and colonoscopy.  If   both are negative, patient will also need a pill endoscopy to look at a small   bowel for any source of bleeding.  If the GI workup is negative, then he might   have some absorption problems, which again the etiology is unknown since he   does not have any gastric procedures.  He has never had any gastric surgeries, but he will   need  outpatient periodic IV Iron infusions. I advised patient to make an   appointment with me after his GI workup if no source  identified.  Also,   his vitamin B12 is lower end of normal.  I would recommend primary service to   place him on oral subinguinal B12 supplements about 2000 mg daily.  I have   reviewed his lab work, no evidence of hemolysis.  Reticulocyte count was   slightly high, but bilirubin level, LDH and Kellee were negative.  I am still   concerned about chronic gastrointestinal loss that could explain the iron   deficiency anemia.  I will not be following this patient during   hospitalization.  As mentioned above, we will see him as an outpatient after   GI workup for evaluation.  If he gets discharged, he should follow up with his   primary care doctor for a repeat CBC again in 2 weeks to make sure that he   continues to improve.    Thank you for allowing me to participate in his care.  Please call with any   questions arise.        ____________________________________     MD SR Pradeep MARI    DD:  05/10/2018 11:40:14  DT:  05/10/2018 13:21:47    D#:  [id number]  Job#:  [id number]

## 2018-05-10 NOTE — ASSESSMENT & PLAN NOTE
Rash over bilateral lower legs, onset several weeks ago after staying in a motel in [place]. Has treated this w/ 14-day course of ketoconazole w/ no improvement.

## 2018-05-10 NOTE — SENIOR ADMIT NOTE
62 year old male with history of CAD s/p CABG, DLD, GERD is coming in with 3 week history of progressively worsening fatigue, dyspnea on exertion, lightheadedness, brittle nails and cold intolerance. He was noted to have Low HGB around 8.4 2 weeks ago and today in the ER he was found to have Hgb of 7,7 and symptomatic anemia. Family history of anemia requiring transfusions in mother. Denies any chest pain/palpitations, fever/chills, hematemesis, hematochezia, melena, hemoptysis and or visible signs of bleed or hematuria. He reports a rash bilateral around his ankles noticed 2 months ago after staying in a hotel in [place] and was antifungal, no other rashes/bruises around the body.     Heme/Onc was contacted(Dr Sales) and recommended transfusion of 2 units PRBCs, and they will see the pt tomorrow.     Per ERP note :Discussed case with Dr. Lynch on call for Dr. [facility]. The patient had a colonoscopy about 2 years ago which was unremarkable except for some polyps. He felt the patient could  follow-up as an outpatient.    Physical Exam:  Consitutional:Sitting in bed and looks comfortable eating and not in acute distress.  HEENT: No scleral icterus, looks pale.  CVS: S1, S2 normal with no rales/murmurs/gallops.  Resp: CTAB  Abd: Soft, normoactive bowel sounds.  Extremities: no cyanosis, edema, erythematous macular rash around ankles patchy.  Neuro:No focal deficits.  : guaic test done in ER negative.    Assessment:  1)Microcytic Anemia -Iron deficiency low iron stores. EGD/Colonoscopy 3/2017 [facility]; EGD was done for GERD and showed schatzki's ring and hiatal hernia; colonoscopy showed 3 polyps, recommended f/u was for 5 yrs.  2) GERD  3) History of CAD x2 s/p CABG  4)DLD        Plan:  - Transfuse 2 units PRBCs, cbc [id number], tele  -Follow recs of hematology.      DVT Prophylaxis: SCDs  Code Status: Full Code

## 2018-05-10 NOTE — PROGRESS NOTES
Internal Medicine Interval Note  Note Author: Rui Hameed M.D.     Name Talib Ortiz     1955   Age/Sex 62 y.o. male   MRN [id number]   Code Status Full Code     After 5PM or if no immediate response to page, please call for cross-coverage  Attending/Team: Dr. Westbrook See Patient List for primary contact information  Call [phone] to page    1st Call - Day Intern (R1):   Dr. Hameed 2nd Call - Day Sr. Resident (R2/R3):   Dr. Collins     Reason for interval visit  (Principal Problem)   Iron deficiency anemia    Interval Problem Daily Status Update  (24 hours)   Patient states his symptoms have improved after receiving transfusion. Patient relays that he has a diet which is heavy in red meat, he also relays eating wheat products without any bloating, diarrhea, or indigestion.     Anti tg and h.pylori labs sent. Consider IRIDA    Review of Systems   Constitutional: Negative.    HENT: Negative.    Eyes: Negative.    Respiratory: Negative.    Cardiovascular: Negative.    Gastrointestinal: Negative.    Genitourinary: Negative.    Musculoskeletal: Negative.    Skin: Negative.    Neurological: Negative.    Endo/Heme/Allergies: Negative.    Psychiatric/Behavioral: Negative.      Consultants/Specialty  GI  Hematology    Disposition  In patient for transfusions    Quality Measures  Quality-Core Measures   Reviewed items::  Labs reviewed and Medications reviewed  DVT prophylaxis pharmacological::  Contraindicated - Anemia requiring blood transfusion  DVT prophylaxis - mechanical:  SCDs  Ulcer Prophylaxis::  Yes    Physical Exam     Vitals:    05/10/18 0200 05/10/18 0400 05/10/18 0820 05/10/18 1230   BP: 105/64 (!) 98/60 104/68 100/66   Pulse: 66 (!) 58 (!) 55 (!) 58   Resp:  18 16 16   Temp: 36.7 °C (98.1 °F) 36.6 °C (97.9 °F) 36.4 °C (97.6 °F) 36.4 °C (97.5 °F)   SpO2: 95% 95% 94% 95%   Weight:       Height:         Body mass index is 30.56 kg/m². Weight: 99.4 kg (219 lb 2.2 oz)  Oxygen Therapy:   Pulse Oximetry: 95 %, O2 (LPM): 0, O2 Delivery: None (Room Air)    Physical Exam   Constitutional: He is oriented to person, place, and time and well-developed, well-nourished, and in no distress. No distress.   HENT:   Head: Normocephalic and atraumatic.   Eyes: EOM are normal. Pupils are equal, round, and reactive to light.   Neck: Normal range of motion. Neck supple.   Cardiovascular: Normal rate, regular rhythm and normal heart sounds.    Pulmonary/Chest: Effort normal and breath sounds normal. No respiratory distress. He has no wheezes.   Abdominal: Soft. Bowel sounds are normal.   Musculoskeletal: Normal range of motion. He exhibits no edema.   Neurological: He is alert and oriented to person, place, and time. Gait normal. GCS score is 15.   Skin: Skin is warm. Rash noted. He is not diaphoretic.   Psychiatric: Mood, memory, affect and judgment normal.     Lab Data Review:   5/10/2018  1:22 PM    Recent Labs      05/09/18   1635   SODIUM  140   POTASSIUM  4.1   CHLORIDE  109   CO2  20   BUN  21   CREATININE  1.06   CALCIUM  9.3     Recent Labs      05/09/18   1635   ALTSGPT  13   ASTSGOT  15   ALKPHOSPHAT  28*   TBILIRUBIN  0.4   GLUCOSE  106*     Recent Labs      05/09/18   1143  05/09/18   1635  05/10/18   0257   RBC  3.78*  3.70*  4.25*   HEMOGLOBIN  7.7*  7.6*  9.4*   HEMATOCRIT  27.0*  26.5*  31.6*   PLATELETCT  371  378  337   PROTHROMBTM   --   14.2   --    APTT   --   26.9   --    INR   --   1.13   --    IRON  <10*   --    --    FERRITIN   --   4.4*   --    TOTIRONBC  529*   --    --      Recent Labs      05/09/18   1143  05/09/18   1635  05/10/18   0257   WBC  6.7  7.6  6.5   NEUTSPOLYS  60.70  69.00  54.10   LYMPHOCYTES  25.10  17.70*  28.20   MONOCYTES  9.90  3.60  10.70   EOSINOPHILS  2.30  4.40  4.80   BASOPHILS  1.50  4.40*  1.90*   ASTSGOT   --   15   --    ALTSGPT   --   13   --    ALKPHOSPHAT   --   28*   --    TBILIRUBIN   --   0.4   --      Assessment/Plan     * Iron deficiency anemia    Assessment & Plan    2-3 weeks fatigue, BANUELOS, lightheadedness. No hematochezia, melena, hematuria, or other s/s bleeding. No changes in stool caliber. Hb down from baseline of 15 two years ago, to ~11 last year; 8.4 two weeks ago and now 7.6. Iron <10 w/ TIBC 529. Retic 2.2% w/ RPI 1.4. LDH low. TSH wnl.  Stool guaiac negative. EGD/Colonoscopy [facility]/2017 [facility]; EGD was done for GERD and showed schatzki's ring and hiatal hernia; colonoscopy showed 3 polyps, recommended f/u was for 5 yrs. Family h/o NILDA in mother requiring transfusions. This does not appear to be due to blood loss. DDX includes [place] given NILDA w/ Schatzki ring, endorses brittle nails but on exam no koilonychia or glossitis, thalassemia, could also consider IRIDA due to a possible genetic component, patients mother required frequent transfusions.   Plan:  - Admited to tele  - Received 2 units of PRBC's  - Hold plavix; SCDs for dvt ppx  - Heme/Onc to see today  - Patient receiving iron sucrose        Rash   Assessment & Plan    Rash over bilateral lower legs, onset several weeks ago after staying in a motel in [place]. Has treated this w/ 14-day course of ketoconazole w/ no improvement.         Gastroesophageal reflux disease without esophagitis- (present on admission)   Assessment & Plan    Cont famotidine        Depression- (present on admission)   Assessment & Plan    Cont sertraline        Coronary artery disease due to calcified coronary lesion: CABG ×2 in August 2016- (present on admission)   Assessment & Plan    Cont statin, metoprolol. Hold plavix for now        Dyslipidemia- (present on admission)   Assessment & Plan    Lipid panel 4/2018: , , HDL 44, LDL 66  Cont statin        Rui Hameed MD    The patient was seen by me face to face. I personally had a discussion with the patient. The case was discussed with our resident team, I examined the patient and confirmed the essential components of the history,  physical examination, diagnosis and treatment plan as needed. I agree with the patient care as documented by the resident and edited as above by me. See resident's note above for complete details of service. The overall treatment regimen will be carried out as described above.     Thank you:  Lobo Westbrook MD, FACP  [facility]  Pager: Use [employer] Text (use resident info under treatment team for day to day floor issues)  Office: [phone] Ext. [phone]  Fax: [phone] (non PHI only)

## 2018-05-10 NOTE — H&P
Internal Medicine Admitting History and Physical    Note Author: Lul Grimes M.D.       Name Talib Ortiz     1955   Age/Sex 62 y.o. male   MRN [id number]   Code Status Full     After 5PM or if no immediate response to page, please call for cross-coverage  Attending/Team: Dariana/Devonte See Patient List for primary contact information  Call [phone] to page    1st Call - Day Intern (R1):   Dr. Hameed 2nd Call - Day Sr. Resident (R2/R3):   Dr. Collins       Chief Complaint:  Fatigue/BANUELOS; sent from PCP for anemia    HPI:     Talib Ortiz is a very pleasant 61yo male PMHx CAD s/p CABG 2016, DLD, and GERD. He reports 2-3 weeks of progressive fatigue, dyspnea on exertion, and lightheadedness, as well as brittle nails and cold intolerance. He was following with his PCP for this and was noted to have a Hb of 8.4 at his visit 2 weeks ago. He followed up today and was found to have a Hb of 7.7, so his PCP referred him to the ED for emergency colonoscopy. Pt denies any hematochezia or melena, abd pain, diarrhea or changes in stool caliber, vomiting or hematemesis, hematuria, or easy bruising/bleeding. Denies any difficulty swallowing. His last colonscopy/EGD was 3/2017 by [facility]; EGD was done for GERD and showed schatzki's ring and hiatal hernia; colonoscopy showed 3 polyps, recommended f/u was for 5 yrs. He does report a family h/o iron deficiency anemia in his mother requiring transfusions. ROS negative for CP/palp, dyspnea at rest, cough/congestion, N/V, dysuria, or LE swelling. He does report a rash over his b/l LE which he noticed about 2 months ago after staying in a motel in [place]; he has been prescribed ketoconazole for this.      In the ED he was afebrile, w/ normal HR and bordline hypotension in the high 90's to low 100's. Labs significant for microcytic anemia w/ Hb 7.6; WBC and plt wnl. Iron <10 w/ TIBC 529. Retic 2.2% w/ RPI 1.4. Stool guaiac was negative. [facility] was  contacted and stated there was no need to be seen by GI while inpatient. Heme/Onc was contacted and recommended transfusion of 2 units PRBCs, and they will see the pt tomorrow.    Review of Systems   Constitutional: Positive for malaise/fatigue. Negative for chills, diaphoresis and fever.        Cold intolerance   HENT: Negative for congestion, nosebleeds and sore throat.    Eyes: Negative for blurred vision and double vision.   Respiratory: Positive for shortness of breath (on exertion). Negative for cough, hemoptysis, sputum production and wheezing.    Cardiovascular: Negative for chest pain, palpitations, orthopnea and leg swelling.   Gastrointestinal: Negative for abdominal pain, blood in stool, constipation, diarrhea, melena, nausea and vomiting.   Genitourinary: Negative for dysuria, frequency and urgency.   Musculoskeletal: Negative for falls, joint pain and myalgias.   Skin: Positive for itching and rash.   Neurological: Positive for dizziness (lightheadedness). Negative for sensory change, speech change, focal weakness, seizures and headaches.   Endo/Heme/Allergies: Negative for polydipsia. Does not bruise/bleed easily.   Psychiatric/Behavioral: Negative for depression, substance abuse and suicidal ideas.             Past Medical History:   Past Medical History:   Diagnosis Date   • Arthritis    • Blood clotting disorder (HCC) 1996    leg   • CAD (coronary artery disease)    • Heart attack (HCC) 8/16/2016    cardiology, Dr. Frias   • Heart burn    • High cholesterol    • Hyperlipidemia    • Kidney disease    • Kidney stone    • Obesity (BMI 30-39.9) 2/12/2015   • Osteoarthritis 3/2/2015   • Pneumonia 2016   • Psychiatric problem     Depression        Past Surgical History:  Past Surgical History:   Procedure Laterality Date   • KNEE ARTHROPLASTY TOTAL Left 6/19/2017    Procedure: KNEE ARTHROPLASTY TOTAL;  Surgeon: Charles Castellanos M.D.;  Location: SURGERY [facility];  Service:    • MULTIPLE CORONARY  "ARTERY BYPASS  8/16/2016   • LAMINOTOMY  2012    lumbar   • KNEE ARTHROSCOPY Left 1990's   • OTHER SURGICAL PROCEDURE Left 1990's    \"removal of vein left leg due to blood clot\"   • SHOULDER ARTHROTOMY Right 1980's       Current Outpatient Medications:  Home Medications     Reviewed by Farrukh Field (Pharmacy Tech) on 05/09/18 at 2012  Med List Status: Complete   Medication Last Dose Status   clopidogrel (PLAVIX) 75 MG Tab 5/8/2018 Active   metoprolol (LOPRESSOR) 25 MG Tab 5/9/2018 Active   ranitidine (ZANTAC) 150 MG Tab 5/9/2018 Active   rosuvastatin (CRESTOR) 40 MG tablet 5/8/2018 Active   sertraline (ZOLOFT) 25 MG tablet 5/9/2018 Active                Medication Allergy/Sensitivities:  No Known Allergies      Family History:  Family History   Problem Relation Age of Onset   • Arthritis Mother    • Hyperlipidemia Mother    • Heart Disease Mother      bypass x4   • Diabetes Mother    • Hyperlipidemia Father    • Heart Attack Father 61   • Other Sister      back surgery   • Other Brother      joint problems       Social History:  Social History     Social History   • Marital status:      Spouse name: N/A   • Number of children: N/A   • Years of education: N/A     Occupational History   • Not on file.     Social History Main Topics   • Smoking status: Former Smoker     Packs/day: 0.25     Years: 10.00     Types: Cigarettes     Quit date: 3/19/2016   • Smokeless tobacco: Never Used   • Alcohol use No      Comment: Quit years   • Drug use: No   • Sexual activity: Yes     Partners: Female     Other Topics Concern   • Not on file     Social History Narrative   • No narrative on file     PCP : Belen Knowles M.D.      Physical Exam     Vitals:    05/09/18 1930 05/09/18 1933 05/09/18 1945 05/09/18 1947   BP:  115/69     Pulse: 86 71 69    Resp: (!) 34 (!) 22 15    Temp:  36.9 °C (98.4 °F)  36.7 °C (98.1 °F)   SpO2: 95% 97% 89%    Weight:       Height:         Body mass index is 30.93 kg/m².  /69   " "Pulse 69   Temp 36.7 °C (98.1 °F)   Resp 15   Ht 1.803 m (5' 11\")   Wt 100.6 kg (221 lb 12.5 oz)   SpO2 89%   BMI 30.93 kg/m²   O2 therapy: Pulse Oximetry: 89 %, O2 Delivery: None (Room Air)    Physical Exam   Constitutional: He is oriented to person, place, and time and well-developed, well-nourished, and in no distress.   HENT:   Head: Normocephalic and atraumatic.   Mouth/Throat: Oropharynx is clear and moist. No oropharyngeal exudate.   No glossitis   Eyes: EOM are normal. Pupils are equal, round, and reactive to light. No scleral icterus.   Neck: No JVD present. No tracheal deviation present.   Cardiovascular: Normal rate, regular rhythm, normal heart sounds and intact distal pulses.    No murmur heard.  Pulmonary/Chest: Effort normal and breath sounds normal. No respiratory distress. He has no wheezes. He has no rales.   Abdominal: Soft. Bowel sounds are normal. He exhibits no distension. There is no tenderness. There is no rebound.   Musculoskeletal: Normal range of motion. He exhibits no edema or tenderness.   Lymphadenopathy:     He has no cervical adenopathy.   Neurological: He is alert and oriented to person, place, and time. No cranial nerve deficit.   Skin: Skin is warm and dry. Rash (b/l LE) noted. He is not diaphoretic.   No koilonychia   Psychiatric: Mood and affect normal.             Data Review       Old Records Request:   Completed  Current Records review and summary: Completed    Lab Data Review:  Recent Results (from the past 24 hour(s))   CBC WITH DIFFERENTIAL    Collection Time: 05/09/18 11:43 AM   Result Value Ref Range    WBC 6.7 4.8 - 10.8 K/uL    RBC 3.78 (L) 4.70 - 6.10 M/uL    Hemoglobin 7.7 (L) 14.0 - 18.0 g/dL    Hematocrit 27.0 (L) 42.0 - 52.0 %    MCV 71.4 (L) 81.4 - 97.8 fL    MCH 20.4 (L) 27.0 - 33.0 pg    MCHC 28.5 (L) 33.7 - 35.3 g/dL    RDW 44.7 35.9 - 50.0 fL    Platelet Count 371 164 - 446 K/uL    MPV 8.8 (L) 9.0 - 12.9 fL    Nucleated RBC 0.00 /100 WBC    NRBC " (Absolute) 0.00 K/uL    Neutrophils-Polys 60.70 44.00 - 72.00 %    Lymphocytes 25.10 22.00 - 41.00 %    Monocytes 9.90 0.00 - 13.40 %    Eosinophils 2.30 0.00 - 6.90 %    Basophils 1.50 0.00 - 1.80 %    Immature Granulocytes 0.50 0.00 - 0.90 %    Lymphs (Absolute) 1.67 1.00 - 4.80 K/uL    Monos (Absolute) 0.66 0.00 - 0.85 K/uL    Eos (Absolute) 0.15 0.00 - 0.51 K/uL    Baso (Absolute) 0.10 0.00 - 0.12 K/uL    Immature Granulocytes (abs) 0.03 0.00 - 0.11 K/uL    Neutrophils (Absolute) 4.05 1.82 - 7.42 K/uL    Hypochromia 1+     Microcytosis 2+    IRON/TOTAL IRON BIND    Collection Time: 05/09/18 11:43 AM   Result Value Ref Range    Iron <10 (L) 50 - 180 ug/dL    Total Iron Binding 529 (H) 250 - 450 ug/dL    % Saturation see below 15 - 55 %   PERIPHERAL SMEAR REVIEW    Collection Time: 05/09/18 11:43 AM   Result Value Ref Range    Peripheral Smear Review see below    PLATELET ESTIMATE    Collection Time: 05/09/18 11:43 AM   Result Value Ref Range    Plt Estimation Normal    MORPHOLOGY    Collection Time: 05/09/18 11:43 AM   Result Value Ref Range    RBC Morphology Present    DIFFERENTIAL COMMENT    Collection Time: 05/09/18 11:43 AM   Result Value Ref Range    Comments-Diff see below    CBC WITH DIFFERENTIAL    Collection Time: 05/09/18  4:35 PM   Result Value Ref Range    WBC 7.6 4.8 - 10.8 K/uL    RBC 3.70 (L) 4.70 - 6.10 M/uL    Hemoglobin 7.6 (L) 14.0 - 18.0 g/dL    Hematocrit 26.5 (L) 42.0 - 52.0 %    MCV 71.6 (L) 81.4 - 97.8 fL    MCH 20.5 (L) 27.0 - 33.0 pg    MCHC 28.7 (L) 33.7 - 35.3 g/dL    RDW 44.8 35.9 - 50.0 fL    Platelet Count 378 164 - 446 K/uL    MPV 9.3 9.0 - 12.9 fL    Nucleated RBC 0.00 /100 WBC    NRBC (Absolute) 0.00 K/uL    Neutrophils-Polys 69.00 44.00 - 72.00 %    Lymphocytes 17.70 (L) 22.00 - 41.00 %    Monocytes 3.60 0.00 - 13.40 %    Eosinophils 4.40 0.00 - 6.90 %    Basophils 4.40 (H) 0.00 - 1.80 %    Neutrophils (Absolute) 5.24 1.82 - 7.42 K/uL    Lymphs (Absolute) 1.35 1.00 - 4.80 K/uL     Monos (Absolute) 0.27 0.00 - 0.85 K/uL    Eos (Absolute) 0.33 0.00 - 0.51 K/uL    Baso (Absolute) 0.33 (H) 0.00 - 0.12 K/uL    Anisocytosis 1+     Microcytosis 1+    COMP METABOLIC PANEL    Collection Time: 05/09/18  4:35 PM   Result Value Ref Range    Sodium 140 135 - 145 mmol/L    Potassium 4.1 3.6 - 5.5 mmol/L    Chloride 109 96 - 112 mmol/L    Co2 20 20 - 33 mmol/L    Anion Gap 11.0 0.0 - 11.9    Glucose 106 (H) 65 - 99 mg/dL    Bun 21 8 - 22 mg/dL    Creatinine 1.06 0.50 - 1.40 mg/dL    Calcium 9.3 8.5 - 10.5 mg/dL    AST(SGOT) 15 12 - 45 U/L    ALT(SGPT) 13 2 - 50 U/L    Alkaline Phosphatase 28 (L) 30 - 99 U/L    Total Bilirubin 0.4 0.1 - 1.5 mg/dL    Albumin 4.0 3.2 - 4.9 g/dL    Total Protein 6.9 6.0 - 8.2 g/dL    Globulin 2.9 1.9 - 3.5 g/dL    A-G Ratio 1.4 g/dL   APTT    Collection Time: 05/09/18  4:35 PM   Result Value Ref Range    APTT 26.9 24.7 - 36.0 sec   PROTHROMBIN TIME (INR)    Collection Time: 05/09/18  4:35 PM   Result Value Ref Range    PT 14.2 12.0 - 14.6 sec    INR 1.13 0.87 - 1.13   FERRITIN    Collection Time: 05/09/18  4:35 PM   Result Value Ref Range    Ferritin 4.4 (L) 22.0 - 322.0 ng/mL   WESTERGREN SED RATE    Collection Time: 05/09/18  4:35 PM   Result Value Ref Range    Sed Rate Westergren 16 0 - 20 mm/hour   CRP QUANTITIVE (NON-CARDIAC)    Collection Time: 05/09/18  4:35 PM   Result Value Ref Range    Stat C-Reactive Protein 0.06 0.00 - 0.75 mg/dL   LDH    Collection Time: 05/09/18  4:35 PM   Result Value Ref Range    LDH Total 105 (L) 107 - 266 U/L   ABO AND RH CONFIRMATION    Collection Time: 05/09/18  4:35 PM   Result Value Ref Range    ABO Confirm A     Second Rh Group POS    RETICULOCYTES COUNT    Collection Time: 05/09/18  4:35 PM   Result Value Ref Range    Reticulocyte Count 2.2 (H) 0.8 - 2.1 %    Retic, Absolute 0.08 (H) 0.04 - 0.06 M/uL    Imm. Reticulocyte Fraction 35.6 (H) 9.3 - 17.4 %    Retic Hgb Equivalent 22.0 (L) 29.0 - 35.0 pg/cell   TRANSFERRIN    Collection  Time: 05/09/18  4:35 PM   Result Value Ref Range    Transferrin 387 (H) 200 - 370 mg/dL   ESTIMATED GFR    Collection Time: 05/09/18  4:35 PM   Result Value Ref Range    GFR If African American >60 >60 mL/min/1.73 m 2    GFR If Non African American >60 >60 mL/min/1.73 m 2   DIFFERENTIAL MANUAL    Collection Time: 05/09/18  4:35 PM   Result Value Ref Range    Metamyelocytes 0.90 %    Manual Diff Status PERFORMED    PERIPHERAL SMEAR REVIEW    Collection Time: 05/09/18  4:35 PM   Result Value Ref Range    Peripheral Smear Review see below    MORPHOLOGY    Collection Time: 05/09/18  4:35 PM   Result Value Ref Range    RBC Morphology Present     Large Platelets 1+     Polychromia 1+     Poikilocytosis 1+     Schistocytes 1+    COD (ADULT)    Collection Time: 05/09/18  4:45 PM   Result Value Ref Range    ABO Grouping Only A     Rh Grouping Only POS     Antibody Screen-Cod NEG     Component R       R3                  Red Blood Cells3    [id number]   transfused   05/09/18   19:22      Product Type Red Blood Cells LR Pheresis     Dispense Status Transfused     Unit Number (Barcoded) [id number]     Product Code (Barcoded) N1815K05     Blood Type (Barcoded) 6200        Imaging/Procedures Review:    ndependant Imaging Review: Completed  DX-CHEST-PORTABLE (1 VIEW)   Final Result      New airspace disease in the left base which may represent infiltrate and/or atelectasis.                      Assessment/Plan     * Iron deficiency anemia   Assessment & Plan    - 2-3 weeks fatigue, BANUELOS, lightheadedness. No hematochezia, melena, hematuria, or other s/s bleeding. No changes in stool caliber.    - Hb down from baseline of  15 two years ago, to ~11 last year; 8.4 two weeks ago and now 7.6.   - Iron <10 w/ TIBC 529. Retic 2.2% w/ RPI 1.4. LDH low. Stool guaiac negative.  - EGD/Colonoscopy 3/2017 by [facility]; EGD was done for GERD and showed schatzki's ring and hiatal hernia; colonoscopy showed 3 polyps, recommended f/u was  for 5 yrs.  - Family h/o NILDA in mother requiring transfusions  - This does not appear to be due to blood loss. ?[place] given NILDA w/ Schatzki ring, endorses brittle nails but on exam no koilonychia or glossitis.   Plan:  - Admit to tele  - Received 1 unit PRBC in ED; will transfuse 2nd unit on floor  - Further anemia w/u including B12/folate, haptoglobin.  - Fatigue and cold intolerance likely 2/2 anemia, though will also check TSH  - f/u CBC  - Hold plavix; SCDs for dvt ppx  - Heme/Onc to see tomorrow        Rash   Assessment & Plan    Rash over bilateral lower legs, onset several weeks ago after staying in a motel in [place]. Has treated this w/ 14-day course of ketoconazole w/ no improvement.         Gastroesophageal reflux disease without esophagitis- (present on admission)   Assessment & Plan    Cont famotidine        Depression- (present on admission)   Assessment & Plan    Cont sertraline        Coronary artery disease due to calcified coronary lesion: CABG ×2 in August 2016- (present on admission)   Assessment & Plan    Cont statin, metoprolol. Hold plavix for now        Dyslipidemia- (present on admission)   Assessment & Plan    Lipid panel 4/2018: , , HDL 44, LDL 66  Cont statin            Anticipated Hospital stay:  >2 midnights        Quality Measures  Quality-Core Measures   Reviewed items::  Labs reviewed, Medications reviewed and Radiology images reviewed  Marquez catheter::  No Marquez  DVT prophylaxis pharmacological::  Contraindicated - High bleeding risk  DVT prophylaxis - mechanical:  SCDs    Lul Grimes M.D.    The patient was seen by me face to face. I personally had a discussion with the patient. The case was discussed with our resident team, I examined the patient and confirmed the essential components of the history, physical examination, diagnosis and treatment plan as needed. I agree with the patient care as documented by the resident and edited as above by me. See  resident's note above for complete details of service. The overall treatment regimen will be carried out as described above.   Hem Dr. Sales to see today, iv iron.  Likely d/c tomorrow.   Thank you:  Lobo Westbrook MD, FACP  [facility]  Pager: Use Tiger Text (use resident info under treatment team for day to day floor issues)  Office: [phone] Ext. [phone]  Fax: [phone] (non PHI only)

## 2018-05-11 VITALS
DIASTOLIC BLOOD PRESSURE: 79 MMHG | OXYGEN SATURATION: 91 % | BODY MASS INDEX: 30.74 KG/M2 | RESPIRATION RATE: 16 BRPM | HEART RATE: 60 BPM | SYSTOLIC BLOOD PRESSURE: 116 MMHG | HEIGHT: 71 IN | TEMPERATURE: 97 F | WEIGHT: 219.58 LBS

## 2018-05-11 LAB
ANION GAP SERPL CALC-SCNC: 11 MMOL/L (ref 0–11.9)
BUN SERPL-MCNC: 13 MG/DL (ref 8–22)
CALCIUM SERPL-MCNC: 9 MG/DL (ref 8.5–10.5)
CHLORIDE SERPL-SCNC: 107 MMOL/L (ref 96–112)
CO2 SERPL-SCNC: 24 MMOL/L (ref 20–33)
CREAT SERPL-MCNC: 1.1 MG/DL (ref 0.5–1.4)
ERYTHROCYTE [DISTWIDTH] IN BLOOD BY AUTOMATED COUNT: 50.1 FL (ref 35.9–50)
GLUCOSE SERPL-MCNC: 104 MG/DL (ref 65–99)
HAPTOGLOB SERPL-MCNC: 123 MG/DL (ref 30–200)
HCT VFR BLD AUTO: 33.8 % (ref 42–52)
HGB A1 MFR BLD: 97.5 % (ref 95–97.9)
HGB A2 MFR BLD: 2.2 % (ref 2–3.5)
HGB BLD-MCNC: 10.2 G/DL (ref 14–18)
HGB C MFR BLD: 0 % (ref 0–0)
HGB E MFR BLD: 0 % (ref 0–0)
HGB F MFR BLD: 0.3 % (ref 0–2.1)
HGB FRACT BLD ELPH-IMP: NORMAL
HGB OTHER MFR BLD: 0 % (ref 0–0)
HGB S BLD QL SOLY: NORMAL
HGB S MFR BLD: 0 % (ref 0–0)
IGA SERPL-MCNC: 206 MG/DL (ref 68–408)
MCH RBC QN AUTO: 22.4 PG (ref 27–33)
MCHC RBC AUTO-ENTMCNC: 30.2 G/DL (ref 33.7–35.3)
MCV RBC AUTO: 74.1 FL (ref 81.4–97.8)
PATH INTERP BLD-IMP: NORMAL
PLATELET # BLD AUTO: 324 K/UL (ref 164–446)
PMV BLD AUTO: 8.9 FL (ref 9–12.9)
POTASSIUM SERPL-SCNC: 4 MMOL/L (ref 3.6–5.5)
RBC # BLD AUTO: 4.56 M/UL (ref 4.7–6.1)
SODIUM SERPL-SCNC: 142 MMOL/L (ref 135–145)
TTG IGA SER IA-ACNC: 1 U/ML (ref 0–3)
WBC # BLD AUTO: 8.8 K/UL (ref 4.8–10.8)

## 2018-05-11 PROCEDURE — A9270 NON-COVERED ITEM OR SERVICE: HCPCS | Performed by: STUDENT IN AN ORGANIZED HEALTH CARE EDUCATION/TRAINING PROGRAM

## 2018-05-11 PROCEDURE — 85027 COMPLETE CBC AUTOMATED: CPT

## 2018-05-11 PROCEDURE — 700102 HCHG RX REV CODE 250 W/ 637 OVERRIDE(OP): Performed by: STUDENT IN AN ORGANIZED HEALTH CARE EDUCATION/TRAINING PROGRAM

## 2018-05-11 PROCEDURE — 700111 HCHG RX REV CODE 636 W/ 250 OVERRIDE (IP): Performed by: STUDENT IN AN ORGANIZED HEALTH CARE EDUCATION/TRAINING PROGRAM

## 2018-05-11 PROCEDURE — 99239 HOSP IP/OBS DSCHRG MGMT >30: CPT | Performed by: HOSPITALIST

## 2018-05-11 PROCEDURE — 80048 BASIC METABOLIC PNL TOTAL CA: CPT

## 2018-05-11 PROCEDURE — 36415 COLL VENOUS BLD VENIPUNCTURE: CPT

## 2018-05-11 RX ORDER — FERROUS SULFATE 325(65) MG
325 TABLET ORAL
Qty: 30 TAB | Refills: 3 | Status: SHIPPED | OUTPATIENT
Start: 2018-05-12

## 2018-05-11 RX ORDER — ASCORBIC ACID 500 MG
500 TABLET ORAL DAILY
Qty: 30 TAB | Refills: 3 | Status: SHIPPED | OUTPATIENT
Start: 2018-05-12

## 2018-05-11 RX ORDER — M-VIT,TX,IRON,MINS/CALC/FOLIC 27MG-0.4MG
1 TABLET ORAL DAILY
Qty: 30 TAB | Refills: 11 | Status: SHIPPED | OUTPATIENT
Start: 2018-05-12

## 2018-05-11 RX ADMIN — STANDARDIZED SENNA CONCENTRATE AND DOCUSATE SODIUM 2 TABLET: 8.6; 5 TABLET, FILM COATED ORAL at 05:35

## 2018-05-11 RX ADMIN — FAMOTIDINE 20 MG: 20 TABLET ORAL at 05:35

## 2018-05-11 RX ADMIN — Medication 1000 MCG: at 05:35

## 2018-05-11 RX ADMIN — OXYCODONE HYDROCHLORIDE AND ACETAMINOPHEN 500 MG: 500 TABLET ORAL at 05:34

## 2018-05-11 RX ADMIN — IRON SUCROSE 200 MG: 20 INJECTION, SOLUTION INTRAVENOUS at 05:36

## 2018-05-11 RX ADMIN — METOPROLOL TARTRATE 12.5 MG: 25 TABLET, FILM COATED ORAL at 05:35

## 2018-05-11 RX ADMIN — Medication 325 MG: at 09:02

## 2018-05-11 RX ADMIN — MULTIPLE VITAMINS W/ MINERALS TAB 1 TABLET: TAB at 05:34

## 2018-05-11 RX ADMIN — SERTRALINE 25 MG: 50 TABLET, FILM COATED ORAL at 05:34

## 2018-05-11 ASSESSMENT — PAIN SCALES - GENERAL
PAINLEVEL_OUTOF10: 0
PAINLEVEL_OUTOF10: 0

## 2018-05-11 NOTE — PROGRESS NOTES
Pt discharged via walking with wife, to home. He is AOX4 at time of discharge. All PIVs removed, tele box taken off. Went over discharge instructions thoroughly including medications, diet, activity, signs and symptoms of infection, heart attack and stroke. Paperwork signed and all questions answered. Follow up appointments set and pt verbally understands that following through with the appointments is needed. All personal belongings with patient. Signed prescriptions given to patient and a copy of AVS discharge instructions.

## 2018-05-11 NOTE — PROGRESS NOTES
Report received and care assumed. Pt voices no complaints at this time. No signs of distress noted. Will continue to monitor.

## 2018-05-11 NOTE — CARE PLAN
Problem: Safety  Goal: Will remain free from injury  Outcome: PROGRESSING AS EXPECTED    Intervention: Provide assistance with mobility   05/10/18 2000   OTHER   Assistance / Tolerance No assistance required     Intervention: Collaborate with Interdisciplinary Team for safe transfer and mobilization techniques   05/10/18 0800   OTHER   Assistive Devices None

## 2018-05-11 NOTE — DISCHARGE INSTRUCTIONS
Discharge Instructions    Discharged to home by car with relative. Discharged via walking, hospital escort: Yes.  Special equipment needed: Not Applicable    Be sure to schedule a follow-up appointment with your primary care doctor or any specialists as instructed.     Discharge Plan:   Diet Plan: Discussed  Activity Level: Discussed  Confirmed Follow up Appointment: Appointment Scheduled  Confirmed Symptoms Management: Discussed  Medication Reconciliation Updated: Yes  Influenza Vaccine Indication: Not indicated: Previously immunized this influenza season and > 8 years of age    I understand that a diet low in cholesterol, fat, and sodium is recommended for good health. Unless I have been given specific instructions below for another diet, I accept this instruction as my diet prescription.   Other diet: heart healthy    Special Instructions: None    · Is patient discharged on Warfarin / Coumadin?   No     Depression / Suicide Risk    As you are discharged from this [facility] facility, it is important to learn how to keep safe from harming yourself.    Recognize the warning signs:  · Abrupt changes in personality, positive or negative- including increase in energy   · Giving away possessions  · Change in eating patterns- significant weight changes-  positive or negative  · Change in sleeping patterns- unable to sleep or sleeping all the time   · Unwillingness or inability to communicate  · Depression  · Unusual sadness, discouragement and loneliness  · Talk of wanting to die  · Neglect of personal appearance   · Rebelliousness- reckless behavior  · Withdrawal from people/activities they love  · Confusion- inability to concentrate     If you or a loved one observes any of these behaviors or has concerns about self-harm, here's what you can do:  · Talk about it- your feelings and reasons for harming yourself  · Remove any means that you might use to hurt yourself (examples: pills, rope, extension cords,  firearm)  · Get professional help from the community (Mental Health, Substance Abuse, psychological counseling)  · Do not be alone:Call your Safe Contact- someone whom you trust who will be there for you.  · Call your local CRISIS HOTLINE 796-6281 or 006-667-4478  · Call your local Children's Mobile Crisis Response Team [place] (946) 979-7015 or www.[place]  · Call the toll free National Suicide Prevention Hotlines   · National Suicide Prevention Lifeline 639-336-QTYP (1382)  · [employer] Line Network 800-SUICIDE (727-8627)    Anemia, Nonspecific  Anemia is a condition in which the concentration of red blood cells or hemoglobin in the blood is below normal. Hemoglobin is a substance in red blood cells that carries oxygen to the tissues of the body. Anemia results in not enough oxygen reaching these tissues.  What are the causes?  Common causes of anemia include:  · Excessive bleeding. Bleeding may be internal or external. This includes excessive bleeding from periods (in women) or from the intestine.  · Poor nutrition.  · Chronic kidney, thyroid, and liver disease.  · Bone marrow disorders that decrease red blood cell production.  · Cancer and treatments for cancer.  · HIV, AIDS, and their treatments.  · Spleen problems that increase red blood cell destruction.  · Blood disorders.  · Excess destruction of red blood cells due to infection, medicines, and autoimmune disorders.  What are the signs or symptoms?  · Minor weakness.  · Dizziness.  · Headache.  · Palpitations.  · Shortness of breath, especially with exercise.  · Paleness.  · Cold sensitivity.  · Indigestion.  · Nausea.  · Difficulty sleeping.  · Difficulty concentrating.  Symptoms may occur suddenly or they may develop slowly.  How is this diagnosed?  Additional blood tests are often needed. These help your health care provider determine the best treatment. Your health care provider will check your stool for blood and look for other causes  of blood loss.  How is this treated?  Treatment varies depending on the cause of the anemia. Treatment can include:  · Supplements of iron, vitamin B12, or folic acid.  · Hormone medicines.  · A blood transfusion. This may be needed if blood loss is severe.  · Hospitalization. This may be needed if there is significant continual blood loss.  · Dietary changes.  · Spleen removal.  Follow these instructions at home:  Keep all follow-up appointments. It often takes many weeks to correct anemia, and having your health care provider check on your condition and your response to treatment is very important.  Get help right away if:  · You develop extreme weakness, shortness of breath, or chest pain.  · You become dizzy or have trouble concentrating.  · You develop heavy vaginal bleeding.  · You develop a rash.  · You have bloody or black, tarry stools.  · You faint.  · You vomit up blood.  · You vomit repeatedly.  · You have abdominal pain.  · You have a fever or persistent symptoms for more than 2-3 days.  · You have a fever and your symptoms suddenly get worse.  · You are dehydrated.  This information is not intended to replace advice given to you by your health care provider. Make sure you discuss any questions you have with your health care provider.  Document Released: 01/25/2006 Document Revised: 05/31/2017 Document Reviewed: 06/13/2014  Elsevier Interactive Patient Education © 2017 [employer]

## 2018-05-11 NOTE — DISCHARGE SUMMARY
Internal Medicine Discharge Summary  Note Author: Rui Hameed M.D.       Admit Date:  5/9/2018       Discharge Date:   5/11/18    Service:   R Internal Medicine Red Team  Attending Physician(s):   Dr. Westbrook       Senior Resident(s):   Dr. Collins  [place] Resident(s):   Dr. Hameed      Primary Diagnosis:   Iron deficiency anemia    Secondary Diagnoses:                Principal Problem:    Iron deficiency anemia POA: Unknown  Active Problems:    Dyslipidemia POA: Yes      Overview: Has been below 300 in the past      Has been on meds in the past                        HDL 41          Coronary artery disease due to calcified coronary lesion: CABG ×2 in August 2016 POA: Yes    Depression POA: Yes    Gastroesophageal reflux disease without esophagitis POA: Yes    Rash POA: Unknown  Resolved Problems:    * No resolved hospital problems. *      Hospital Summary (Brief Narrative):       62 year old male, presented to the ED complaining of a  3 week history of fatigue, lightheadedness, dyspnea on exertion, and cold intolerance. Patient has a history of CAD s/p CABG 8/2016, DLD, and GERD. Patient had been following up with his PCP due to a previously low hgb, 8.4, with subsequent test revealing a level of 7.7, his PCP referred him to the ED for further work up. Patient did not have any GI complaints and denied dysphagia, nausea, vomiting, hematemesis, abdominal pain, hematochezia, melena, diarrhea, changes in stool caliber, hematuria, or easy bruising/bleeding. Patient last EGD and Colonoscopy was 3/2017 by [facility]; EGD was done for GERD and showed schatzki's ring and hiatal hernia; colonoscopy showed 3 polyps, recommended f/u was for 5 yrs. Patient relays family history of NILDA in his mother requiring transfusions. PE in the ED revealed a rash and was negative for blood in stool. Patient was transfused two units of PRBC and was started on iron sucrose infusions. Patient was seen by  hematology while in patient and recommended F/U with GI and Hematology. GI saw patient and recommended outpatient follow up. Patient was discharged with appointments for hematology and pcp, unable to make GI appointment due to GI not seeing patient in hospital. This appears to be an absorption issue with possible genetic factor.     Patient /Hospital Summary (Details -- Problem Oriented) :          * Iron deficiency anemia   Assessment & Plan    2-3 weeks fatigue, BANUELOS, lightheadedness. No hematochezia, melena, hematuria, or other s/s bleeding. No changes in stool caliber. Hb down from baseline of 15 two years ago, to ~11 last year; 8.4 two weeks ago and now 7.6. Iron <10 w/ TIBC 529. Retic 2.2% w/ RPI 1.4. LDH low. TSH wnl.  Stool guaiac negative. EGD/Colonoscopy 3/2017 by [facility]; EGD was done for GERD and showed schatzki's ring and hiatal hernia; colonoscopy showed 3 polyps, recommended f/u was for 5 yrs. Family h/o NILDA in mother requiring transfusions. This does not appear to be due to blood loss. DDX includes [place] given NILDA w/ Schatzki ring, endorses brittle nails but on exam no koilonychia or glossitis, could also consider IRIDA due to a possible genetic component, patients mother required frequent transfusions. Patient was transfused 2 units of PRBC's and was started on iron infusions, patient will be discharged on oral iron supplementation. He will also be discharged on multivitamin, B12, and vitamin C.      Rash   Assessment & Plan    Rash over bilateral lower legs, onset several weeks ago after staying in a motel in [place]. Has treated this w/ 14-day course of ketoconazole w/ no improvement.       Gastroesophageal reflux disease without esophagitis   Assessment & Plan    Cont famotidine      Depression   Assessment & Plan    Cont sertraline      Coronary artery disease due to calcified coronary lesion: CABG ×2 in August 2016   Assessment & Plan    Cont statin, metoprolol, andf plavix       Dyslipidemia   Assessment & Plan    Lipid panel 4/2018: , , HDL 44, LDL 66  Cont statin          Consultants:     Hematology    Procedures:        None    Imaging/ Testing:      DX-CHEST-PORTABLE (1 VIEW)   Final Result      New airspace disease in the left base which may represent infiltrate and/or atelectasis.            Discharge Medications:         Medication Reconciliation: Completed       Medication List      START taking these medications      Instructions   ascorbic acid 500 MG tablet  Start taking on:  5/12/2018  Commonly known as:  VITAMIN C   Take 1 Tab by mouth every day.  Dose:  500 mg     cyanocobalamin 1000 MCG Tabs  Start taking on:  5/12/2018  Commonly known as:  VITAMIN B12   Take 2 Tabs by mouth every day.  Dose:  2000 mcg     ferrous sulfate 325 (65 Fe) MG tablet  Start taking on:  5/12/2018   Take 1 Tab by mouth every morning with breakfast.  Dose:  325 mg     therapeutic multivitamin-minerals Tabs  Start taking on:  5/12/2018   Take 1 Tab by mouth every day.  Dose:  1 Tab        CHANGE how you take these medications      Instructions   metoprolol 25 MG Tabs  What changed:  how much to take  Commonly known as:  LOPRESSOR   Take 0.5 Tabs by mouth 2 times a day.  Dose:  12.5 mg        CONTINUE taking these medications      Instructions   clopidogrel 75 MG Tabs  Commonly known as:  PLAVIX   Take 1 Tab by mouth every day.  Dose:  75 mg     raNITidine 150 MG Tabs  Commonly known as:  ZANTAC   Take 150 mg by mouth 2 times a day.  Dose:  150 mg     rosuvastatin 40 MG tablet  Commonly known as:  CRESTOR   Take 1 Tab by mouth every evening.  Dose:  40 mg     sertraline 25 MG tablet  Commonly known as:  ZOLOFT   TAKE ONE TABLET BY MOUTH TWICE DAILY            Can use .DISCHARGEMEDSLIST if going to another facility         Disposition:   Medically stable for discharge    Diet:   As tolerated, heart healthy    Activity:   As tolerated    Instructions:        Follow up with PCP and  hematology     The patient was instructed to return to the ER in the event of worsening symptoms. I have counseled the patient on the importance of compliance and the patient has agreed to proceed with all medical recommendations and follow up plan indicated above.   The patient understands that all medications come with benefits and risks. Risks may include permanent injury or death and these risks can be minimized with close reassessment and monitoring.        Primary Care Provider:    Belen Knowles M.D.    Discharge summary faxed to primary care provider:  Deferred  Copy of discharge summary given to the patient: Completed      Follow up appointment details :      Please refer to appointment section for dates and times    Pending Studies:        CBC    Time spent on discharge day patient visit, preparing discharge paperwork and arranging for patient follow up.    Summary of follow up issues:   Follow up on CBC    Discharge Time (Minutes) :    45 minutes  Hospital Course Type:  Inpatient Stay >2 midnights      Condition on Discharge    ______________________________________________________________________    Interval history/exam for day of discharge:    Patient relays symptom improvement. No acute complaints.     Vitals:    05/10/18 1958 05/10/18 2301 05/11/18 0400 05/11/18 0800   BP: 104/58 103/52 100/57 116/79   Pulse: 63 60 95 60   Resp: 17 18 18 16   Temp: 36.8 °C (98.2 °F) 36.7 °C (98 °F) 36.7 °C (98.1 °F) 36.1 °C (97 °F)   SpO2: 93% 95% 95% 91%   Weight: 99.6 kg (219 lb 9.3 oz)      Height:         Weight/BMI: Body mass index is 30.62 kg/m².  Pulse Oximetry: 91 %, O2 (LPM): 0, O2 Delivery: None (Room Air)    Physical Exam   Constitutional: He is oriented to person, place, and time and well-developed, well-nourished, and in no distress. No distress.   HENT:   Head: Normocephalic and atraumatic.   Eyes: EOM are normal. Pupils are equal, round, and reactive to light.   Neck: Normal range of motion. Neck  supple.   Cardiovascular: Normal rate, regular rhythm and normal heart sounds.    Pulmonary/Chest: Effort normal and breath sounds normal. No respiratory distress. He has no wheezes.   Abdominal: Soft. Bowel sounds are normal.   Musculoskeletal: Normal range of motion. He exhibits no edema.   Neurological: He is alert and oriented to person, place, and time. Gait normal. GCS score is 15.   Skin: Skin is warm. Rash noted. He is not diaphoretic.   Psychiatric: Mood, memory, affect and judgment normal.     Most Recent Labs:    Lab Results   Component Value Date/Time    WBC 8.8 05/11/2018 02:52 AM    RBC 4.56 (L) 05/11/2018 02:52 AM    HEMOGLOBIN 10.2 (L) 05/11/2018 02:52 AM    HEMATOCRIT 33.8 (L) 05/11/2018 02:52 AM    MCV 74.1 (L) 05/11/2018 02:52 AM    MCH 22.4 (L) 05/11/2018 02:52 AM    MCHC 30.2 (L) 05/11/2018 02:52 AM    MPV 8.9 (L) 05/11/2018 02:52 AM    NEUTSPOLYS 54.10 05/10/2018 02:57 AM    LYMPHOCYTES 28.20 05/10/2018 02:57 AM    MONOCYTES 10.70 05/10/2018 02:57 AM    EOSINOPHILS 4.80 05/10/2018 02:57 AM    BASOPHILS 1.90 (H) 05/10/2018 02:57 AM    HYPOCHROMIA 1+ 05/09/2018 11:43 AM    ANISOCYTOSIS 1+ 05/09/2018 04:35 PM      Lab Results   Component Value Date/Time    SODIUM 142 05/11/2018 02:52 AM    POTASSIUM 4.0 05/11/2018 02:52 AM    CHLORIDE 107 05/11/2018 02:52 AM    CO2 24 05/11/2018 02:52 AM    GLUCOSE 104 (H) 05/11/2018 02:52 AM    BUN 13 05/11/2018 02:52 AM    CREATININE 1.10 05/11/2018 02:52 AM    BUNCREATRAT 13 02/16/2015 08:55 AM      Lab Results   Component Value Date/Time    ALTSGPT 13 05/09/2018 04:35 PM    ASTSGOT 15 05/09/2018 04:35 PM    ALKPHOSPHAT 28 (L) 05/09/2018 04:35 PM    TBILIRUBIN 0.4 05/09/2018 04:35 PM    DBILIRUBIN <0.1 04/05/2017 10:39 AM    ALBUMIN 4.0 05/09/2018 04:35 PM    GLOBULIN 2.9 05/09/2018 04:35 PM    INR 1.13 05/09/2018 04:35 PM    MACROCYTOSIS 1+ 04/24/2018 08:55 AM     Lab Results   Component Value Date/Time    PROTHROMBTM 14.2 05/09/2018 04:35 PM    INR 1.13  05/09/2018 04:35 PM      Rui Hameed MD    The patient was seen by me face to face. I personally had a discussion with the patient. The case was discussed with our resident team, I examined the patient and confirmed the essential components of the history, physical examination, diagnosis and treatment plan as needed. I agree with the patient care as documented by the resident and edited as above by me. See resident's note above for complete details of service. The overall treatment regimen will be carried out as described above.     Thank you:  Lobo Westbrook MD, [facility]  [facility] Internal Medicine  Pager: Use Tiger Text (use resident info under treatment team for day to day floor issues)  Office: [phone] Ext. [phone]  Fax: [phone] (non PHI only)

## 2018-05-12 NOTE — PROGRESS NOTES
Report received and care assumed. Pt has no concerns at this time. Will D/C this afternoon. No signs of distress noted. Will continue to monitor.

## 2018-05-14 ENCOUNTER — PATIENT OUTREACH (OUTPATIENT)
Dept: HEALTH INFORMATION MANAGEMENT | Facility: OTHER | Age: 63
End: 2018-05-14

## 2018-05-16 ENCOUNTER — OFFICE VISIT (OUTPATIENT)
Dept: MEDICAL GROUP | Facility: CLINIC | Age: 63
End: 2018-05-16
Payer: COMMERCIAL

## 2018-05-16 VITALS
TEMPERATURE: 97.8 F | WEIGHT: 219 LBS | DIASTOLIC BLOOD PRESSURE: 70 MMHG | SYSTOLIC BLOOD PRESSURE: 118 MMHG | HEIGHT: 69 IN | OXYGEN SATURATION: 99 % | HEART RATE: 76 BPM | BODY MASS INDEX: 32.44 KG/M2 | RESPIRATION RATE: 14 BRPM

## 2018-05-16 DIAGNOSIS — Z09 HOSPITAL DISCHARGE FOLLOW-UP: ICD-10-CM

## 2018-05-16 DIAGNOSIS — K21.9 GASTROESOPHAGEAL REFLUX DISEASE WITHOUT ESOPHAGITIS: ICD-10-CM

## 2018-05-16 DIAGNOSIS — E53.8 LOW SERUM VITAMIN B12: ICD-10-CM

## 2018-05-16 DIAGNOSIS — D50.9 IRON DEFICIENCY ANEMIA, UNSPECIFIED IRON DEFICIENCY ANEMIA TYPE: ICD-10-CM

## 2018-05-16 DIAGNOSIS — I25.84 CORONARY ARTERY DISEASE DUE TO CALCIFIED CORONARY LESION: ICD-10-CM

## 2018-05-16 DIAGNOSIS — I25.10 CORONARY ARTERY DISEASE DUE TO CALCIFIED CORONARY LESION: ICD-10-CM

## 2018-05-16 PROCEDURE — 99215 OFFICE O/P EST HI 40 MIN: CPT | Performed by: FAMILY MEDICINE

## 2018-05-16 NOTE — PATIENT INSTRUCTIONS
If you need further assistance, or have any questions; concerns or lingering symptoms before seeing your Primary Care Provider or specialist.     Do not hesitate to contact us.     Please contact us at the Post-Hospital Follow Up Program at [phone].   Our offices hours are Monday-Friday 8 am-5 pm.

## 2018-05-16 NOTE — PROGRESS NOTES
Subjective:     Talib Ortiz is a 62 y.o. male who presents for Hospital Follow-up.  Chart and discharge summary reviewed.   Date of discharge 5/11/18  48- hour post discharge RN call completed on 5/14/18 and documented in the medical record by Aline Byrnes RN:  POST DISCHARGE CALL:  Discharge Date:5/11/2018   Date of Outreach Call: 5/14/2018  1:23 PM  Now that you're home, how are you doing? Good  Comment:Patient reports still feeling tired. Pt reports  he was instructed to get CBC in the near future. Pt not sure  when to get labs. He will check at post D/C appt.  Do you have questions about your medications? No    Did you fill your medications? Yes    Do you have a follow-up appointment scheduled?Yes  Comment:Post discharge clinic on 5/16/18    Discharging Department: Telemetry 7    Number of Attempts: 1  Current or previous attempts completed within two business days of discharge? Yes  Provided education regarding treatment plan, medication, self-management, ADLs? Yes  Has patient completed Advance Directive? If yes, advise them to bring to appointment. Yes  Care Manager phone number provided? Yes  Is there anything else I can help you with? No        HPI: Recently hospitalized for acute on chronic iron deficiency anemia. His hemoglobin was 7.7 and he received a transfusion of 2 units packed red blood cells. He also received an iron transfusion. Upon discharge his hemoglobin was 10.2. Approximately one year ago he had a colonoscopy and endoscopy which did not reveal any bleeding site.  Hematology consultation was obtained with Dr. Sales who recommended GI follow-up to include capsule endoscopy. If this was negative she would see him as an outpatient. He was also found to have low normal B12 level so was started on B12 2000 µg daily.    Since returning home, patient reports feeling good.     The patient has questions regarding hospitalization or discharge: All his questions were answered  The patient  "denies weakness; denies difficulty taking care of self at home.  Patient reports taking medications as instructed.      Allergies:   Patient has no known allergies.    Social History:  Social History   Substance Use Topics   • Smoking status: Former Smoker     Packs/day: 0.25     Years: 10.00     Types: Cigarettes     Quit date: 3/19/2016   • Smokeless tobacco: Never Used   • Alcohol use No      Comment: Quit years        ROS:    Constitutional:  Denies fever, chills, night sweats, fatigue or malaise  HENT: Denies head congestion, ear pain or drainage, decreased hearing, sore throat  Eyes: Denies visual changes, eye drainage or redness, eye pain  Neck: Denies pain, swollen glands, decreased range of motion  Lungs: Denies shortness of breath, wheezing, cough  Cardiovascular: Denies chest pain, orthopnea, lower extremity edema, palpitations  Abdominal: Denies abdominal pain, change in bowel or bladder habits, nausea or vomiting  Musculoskeletal: Denies back pain, joint pains, decreased range of motion  Endocrine: Denies unexplained weight changes, heat or cold intolerance, hair loss, polyuria or polydipsia  Neurological: Denies dizziness, headache, confusion, focal weakness or numbness, memory loss  Psychiatric: Denies depression, anxiety, insomnia       Objective:     Blood pressure 118/70, pulse 76, temperature 36.6 °C (97.8 °F), resp. rate 14, height 1.753 m (5' 9\"), weight 99.3 kg (219 lb), SpO2 99 %.     Physical Exam:    GEN:  Alert, oriented, in no distress  HEENT:  PERRLA, EOMI  NECK;  Supple without adenopathy   LUNGS:  Clear to auscultation without rales, rhonci, or wheezes.  CV:  Heart RRR without murmurs or S3 or S4  EXT:  Without cyanosis, clubbing, or edema.  NEURO:  Cranial nerves II through XII intact.  Motor function and sensation intact.  SKIN: Rash bilateral lower legs which she says is resolving with ketoconazole cream  PSYCH:  Behavior is appropriate.      Assessment and Plan:     1. [facility] " follow-up:   Hospitalization and results reviewed with patient. High risk conditions requiring teaching or care coordination were identified and addressed.The patient demonstrate understanding of admission and underlying conditions. The patient understands discharge instructions and when to seek medical attention. Medications reviewed including instructions regarding high risk medications, dosing and side effects.    The patient is able to safely adhere to ADL/IADL, treatment and medication regimen, self-manage of high-risk conditions? Yes   The patient requires physical therapy/home health/DME referral? No   The patient requires referral to care coordination/behavioral health/social work?  No   Patient requires referral for pharmacy consult? No   Advance directive/POLST on file?  Yes   Required counseling on advance directive?  No      2. Iron deficiency anemia, unspecified iron deficiency anemia type    -Urgent REFERRAL TO GASTROENTEROLOGY  -Will continue iron supplements  -At discharge she was given an order to recheck CBC in 2 weeks.    3. Gastroesophageal reflux disease without esophagitis  -Continue Zantac    4. Coronary artery disease due to calcified coronary lesion: CABG ×2 in August 2016  -Continue Plavix, cardiology follow-up    5. Low serum vitamin B12  -Just started on B12 2000 µg daily.        Medication Reconciliation  Medication list at end of encounter:   Current Outpatient Prescriptions   Medication Sig Dispense Refill   • metoprolol (LOPRESSOR) 25 MG Tab Take 0.5 Tabs by mouth 2 times a day. 60 Tab 0   • cyanocobalamin (VITAMIN B12) 1000 MCG Tab Take 2 Tabs by mouth every day. 30 Tab 3   • ferrous sulfate 325 (65 Fe) MG tablet Take 1 Tab by mouth every morning with breakfast. 30 Tab 3   • therapeutic multivitamin-minerals (THERAGRAN-M) Tab Take 1 Tab by mouth every day. 30 Tab 11   • ascorbic acid (VITAMIN C) 500 MG tablet Take 1 Tab by mouth every day. 30 Tab 3   • clopidogrel (PLAVIX) 75 MG Tab  Take 1 Tab by mouth every day. 90 Tab 3   • rosuvastatin (CRESTOR) 40 MG tablet Take 1 Tab by mouth every evening. 90 Tab 3   • sertraline (ZOLOFT) 25 MG tablet TAKE ONE TABLET BY MOUTH TWICE DAILY 180 Tab 3   • ranitidine (ZANTAC) 150 MG Tab Take 150 mg by mouth 2 times a day.       No current facility-administered medications for this visit.        Primary care follow-up:  New health conditions identified during hospitalization? No   Labs/pathology/imaging requires future PCP follow-up?  Yes , CBC  Changes to medications during hospitalization or today? Yes     Recommended followup:  with Belen Knowles M.D. on 6/4/18  Future Appointments       Provider Department Center    5/18/2018 12:45 PM Sukhi Frias M.D. [facility]           Patient Instruction  Patient provided with educational material on discharge diagnosis and management of symptoms/red flags. Patient instructed to keep follow-up appointments and to bring written questions and and actual medications to each office visit. Patient instructed to call PCP/specialist with any problems/questions/concerns. Patient verbalizes understanding and has no further questions at this time.    Total of 40 minutes face-to-face time was spent with patient, with greater than 50% of the time spent in counseling and coordination of care.

## 2018-05-18 ENCOUNTER — OFFICE VISIT (OUTPATIENT)
Dept: CARDIOLOGY | Facility: MEDICAL CENTER | Age: 63
End: 2018-05-18
Payer: COMMERCIAL

## 2018-05-18 ENCOUNTER — TELEPHONE (OUTPATIENT)
Dept: HEALTH INFORMATION MANAGEMENT | Facility: OTHER | Age: 63
End: 2018-05-18

## 2018-05-18 VITALS
HEIGHT: 69 IN | BODY MASS INDEX: 32.88 KG/M2 | WEIGHT: 222 LBS | OXYGEN SATURATION: 96 % | HEART RATE: 94 BPM | DIASTOLIC BLOOD PRESSURE: 84 MMHG | SYSTOLIC BLOOD PRESSURE: 130 MMHG

## 2018-05-18 DIAGNOSIS — I25.84 CORONARY ARTERY DISEASE DUE TO CALCIFIED CORONARY LESION: ICD-10-CM

## 2018-05-18 DIAGNOSIS — R07.89 CHEST DISCOMFORT: ICD-10-CM

## 2018-05-18 DIAGNOSIS — I25.10 CORONARY ARTERY DISEASE DUE TO CALCIFIED CORONARY LESION: ICD-10-CM

## 2018-05-18 DIAGNOSIS — D50.9 IRON DEFICIENCY ANEMIA, UNSPECIFIED IRON DEFICIENCY ANEMIA TYPE: ICD-10-CM

## 2018-05-18 DIAGNOSIS — E78.5 DYSLIPIDEMIA: ICD-10-CM

## 2018-05-18 PROCEDURE — 99214 OFFICE O/P EST MOD 30 MIN: CPT | Performed by: INTERNAL MEDICINE

## 2018-05-18 NOTE — TELEPHONE ENCOUNTER
Patient called CM to clarify when he was suppose to get his next CBC. Patient states he was told by Dr. Hameed at discharge but can't remember when the CBC was to be completed.  CM did see in post discharge provider notes that CBC order for recheck was in 2 weeks.  CM provided patient with this information. Patient states he has the order and will get CBC completed at the end of next week. CM recommended if patient has any questions or concerns to contact his PCP. Pt verbalized understanding.

## 2018-05-18 NOTE — PROGRESS NOTES
Chief Complaint   Patient presents with   • Coronary Artery Disease       Subjective:   Talib Ortiz is a 62 y.o. male who presents today for follow-up evaluation because of a history of coronary artery disease and dyslipidemia.     He was just discharged 6 days ago after an admission for severe anemia which appears to be iron deficient.  He is scheduled for further GI evaluation though he did have a negative EGD and colonoscopy about a year ago.    When he was anemic with a hemoglobin down below about 8, he was quite dyspneic.  He was getting dyspneic with minimal exertion.  He has had no PND or orthopnea.  He has had a rash on his lower extremities but no edema.  He was lightheaded before his transfusions.  He has had no palpitations but has had some chest discomfort.    With activity and his dyspnea, he would note associated substernal chest discomfort which he describes as a dull ache.  This would resolve in less than a minute if he sat down to rest.  He rated this discomfort as 3/10.  It was associated with dyspnea but no nausea, vomiting or diaphoresis.  Once his hemoglobin was above 10 his symptoms resolved.    He continues have dyspnea on exertion about a block.  However, he has had no recurrent chest discomfort.        Past Medical History:   Diagnosis Date   • Arthritis    • Blood clotting disorder ([facility]) 1996    leg   • CAD (coronary artery disease)    • Heart attack (HCC) 8/16/2016    cardiology, Dr. Frias   • Heart burn    • High cholesterol    • Hyperlipidemia    • Kidney disease    • Kidney stone    • Obesity (BMI 30-39.9) 2/12/2015   • Osteoarthritis 3/2/2015   • Pneumonia 2016   • Psychiatric problem     Depression      Past Surgical History:   Procedure Laterality Date   • KNEE ARTHROPLASTY TOTAL Left 6/19/2017    Procedure: KNEE ARTHROPLASTY TOTAL;  Surgeon: Charles Castellanos M.D.;  Location: SURGERY [facility];  Service:    • MULTIPLE CORONARY ARTERY BYPASS  8/16/2016   • LAMINOTOMY   "2012    lumbar   • KNEE ARTHROSCOPY Left 1990's   • OTHER SURGICAL PROCEDURE Left 1990's    \"removal of vein left leg due to blood clot\"   • SHOULDER ARTHROTOMY Right 1980's     Family History   Problem Relation Age of Onset   • Arthritis Mother    • Hyperlipidemia Mother    • Heart Disease Mother      bypass x4   • Diabetes Mother    • Hyperlipidemia Father    • Heart Attack Father 61   • Other Sister      back surgery   • Other Brother      joint problems     Social History     Social History   • Marital status:      Spouse name: N/A   • Number of children: N/A   • Years of education: N/A     Occupational History   • Not on file.     Social History Main Topics   • Smoking status: Former Smoker     Packs/day: 0.25     Years: 10.00     Types: Cigarettes     Quit date: 3/19/2016   • Smokeless tobacco: Never Used   • Alcohol use No      Comment: Quit years   • Drug use: No   • Sexual activity: Yes     Partners: Female     Other Topics Concern   • Not on file     Social History Narrative   • No narrative on file     No Known Allergies  Outpatient Encounter Prescriptions as of 5/18/2018   Medication Sig Dispense Refill   • OMEPRAZOLE PO Take  by mouth.     • metoprolol (LOPRESSOR) 25 MG Tab Take 0.5 Tabs by mouth 2 times a day. 60 Tab 0   • cyanocobalamin (VITAMIN B12) 1000 MCG Tab Take 2 Tabs by mouth every day. 30 Tab 3   • ferrous sulfate 325 (65 Fe) MG tablet Take 1 Tab by mouth every morning with breakfast. 30 Tab 3   • therapeutic multivitamin-minerals (THERAGRAN-M) Tab Take 1 Tab by mouth every day. 30 Tab 11   • ascorbic acid (VITAMIN C) 500 MG tablet Take 1 Tab by mouth every day. 30 Tab 3   • clopidogrel (PLAVIX) 75 MG Tab Take 1 Tab by mouth every day. 90 Tab 3   • rosuvastatin (CRESTOR) 40 MG tablet Take 1 Tab by mouth every evening. 90 Tab 3   • sertraline (ZOLOFT) 25 MG tablet TAKE ONE TABLET BY MOUTH TWICE DAILY 180 Tab 3   • ranitidine (ZANTAC) 150 MG Tab Take 150 mg by mouth 2 times a day.   " "    No facility-administered encounter medications on file as of 5/18/2018.      ROS     Objective:   /84   Pulse 94   Ht 1.753 m (5' 9\")   Wt 100.7 kg (222 lb)   SpO2 96%   BMI 32.78 kg/m²     Physical Exam   Constitutional: He appears well-developed and well-nourished.   Neck: No JVD present.   Cardiovascular: Normal rate and regular rhythm.    No murmur heard.  Pulmonary/Chest: Effort normal and breath sounds normal. He has no rales.   Abdominal: Soft. There is no tenderness.   Musculoskeletal: He exhibits no edema.     Lab Results   Component Value Date/Time    CHOLSTRLTOT 133 04/24/2018 08:55 AM    LDL 66 04/24/2018 08:55 AM    HDL 44 04/24/2018 08:55 AM    TRIGLYCERIDE 115 04/24/2018 08:55 AM       Lab Results   Component Value Date/Time    SODIUM 142 05/11/2018 02:52 AM    POTASSIUM 4.0 05/11/2018 02:52 AM    CHLORIDE 107 05/11/2018 02:52 AM    CO2 24 05/11/2018 02:52 AM    GLUCOSE 104 (H) 05/11/2018 02:52 AM    BUN 13 05/11/2018 02:52 AM    CREATININE 1.10 05/11/2018 02:52 AM    BUNCREATRAT 13 02/16/2015 08:55 AM     Lab Results   Component Value Date/Time    ALKPHOSPHAT 28 (L) 05/09/2018 04:35 PM    ASTSGOT 15 05/09/2018 04:35 PM    ALTSGPT 13 05/09/2018 04:35 PM    TBILIRUBIN 0.4 05/09/2018 04:35 PM      Lab Results   Component Value Date/Time    BNPBTYPENAT 85 01/18/2017 09:43 AM          Assessment:     1. Coronary artery disease due to calcified coronary lesion: CABG ×2 in August 2016     2. Dyslipidemia     3. Chest discomfort     4. Iron deficiency anemia, unspecified iron deficiency anemia type           Medical Decision Making:  Today's Assessment / Status / Plan:     Mr. Ortiz had difficulty with chest discomfort when his hemoglobin dropped.  The exact etiology of his anemia is unclear but it is most likely due to chronic blood loss.  He is been evaluated by GI.  At this time, we will increase his beta-blocker therapy and have him undergo myocardial perfusion imaging.  He will follow-up " in about a week for repeat evaluation.  We will continue to increase his beta-blocker therapy as tolerated.  Unfortunately, until his GI issues have been evaluated and resolved, he will be difficult to proceed to coronary intervention because he would require dual antiplatelet therapy.  Will continue to adjust his medications as necessary.

## 2018-05-18 NOTE — LETTER
[facility]   [address], [address]  [place] [address]  Phone: [phone]  Fax: [phone]              Talib Ortiz  1955    Encounter Date: 5/18/2018    Sukhi Frias M.D.          PROGRESS NOTE:  Chief Complaint   Patient presents with   • Coronary Artery Disease       Subjective:   Talib Ortiz is a 62 y.o. male who presents today for follow-up evaluation because of a history of coronary artery disease and dyslipidemia.     He was just discharged 6 days ago after an admission for severe anemia which appears to be iron deficient.  He is scheduled for further GI evaluation though he did have a negative EGD and colonoscopy about a year ago.    When he was anemic with a hemoglobin down below about 8, he was quite dyspneic.  He was getting dyspneic with minimal exertion.  He has had no PND or orthopnea.  He has had a rash on his lower extremities but no edema.  He was lightheaded before his transfusions.  He has had no palpitations but has had some chest discomfort.    With activity and his dyspnea, he would note associated substernal chest discomfort which he describes as a dull ache.  This would resolve in less than a minute if he sat down to rest.  He rated this discomfort as 3/10.  It was associated with dyspnea but no nausea, vomiting or diaphoresis.  Once his hemoglobin was above 10 his symptoms resolved.    He continues have dyspnea on exertion about a block.  However, he has had no recurrent chest discomfort.        Past Medical History:   Diagnosis Date   • Arthritis    • Blood clotting disorder ([facility]) 1996    leg   • CAD (coronary artery disease)    • Heart attack (HCC) 8/16/2016    cardiology, Dr. Frias   • Heart burn    • High cholesterol    • Hyperlipidemia    • Kidney disease    • Kidney stone    • Obesity (BMI 30-39.9) 2/12/2015   • Osteoarthritis 3/2/2015   • Pneumonia 2016   • Psychiatric problem     Depression      Past Surgical  "History:   Procedure Laterality Date   • KNEE ARTHROPLASTY TOTAL Left 6/19/2017    Procedure: KNEE ARTHROPLASTY TOTAL;  Surgeon: Charles Castellanos M.D.;  Location: SURGERY [facility];  Service:    • MULTIPLE CORONARY ARTERY BYPASS  8/16/2016   • LAMINOTOMY  2012    lumbar   • KNEE ARTHROSCOPY Left 1990's   • OTHER SURGICAL PROCEDURE Left 1990's    \"removal of vein left leg due to blood clot\"   • SHOULDER ARTHROTOMY Right 1980's     Family History   Problem Relation Age of Onset   • Arthritis Mother    • Hyperlipidemia Mother    • Heart Disease Mother      bypass x4   • Diabetes Mother    • Hyperlipidemia Father    • Heart Attack Father 61   • Other Sister      back surgery   • Other Brother      joint problems     Social History     Social History   • Marital status:      Spouse name: N/A   • Number of children: N/A   • Years of education: N/A     Occupational History   • Not on file.     Social History Main Topics   • Smoking status: Former Smoker     Packs/day: 0.25     Years: 10.00     Types: Cigarettes     Quit date: 3/19/2016   • Smokeless tobacco: Never Used   • Alcohol use No      Comment: Quit years   • Drug use: No   • Sexual activity: Yes     Partners: Female     Other Topics Concern   • Not on file     Social History Narrative   • No narrative on file     No Known Allergies  Outpatient Encounter Prescriptions as of 5/18/2018   Medication Sig Dispense Refill   • OMEPRAZOLE PO Take  by mouth.     • metoprolol (LOPRESSOR) 25 MG Tab Take 0.5 Tabs by mouth 2 times a day. 60 Tab 0   • cyanocobalamin (VITAMIN B12) 1000 MCG Tab Take 2 Tabs by mouth every day. 30 Tab 3   • ferrous sulfate 325 (65 Fe) MG tablet Take 1 Tab by mouth every morning with breakfast. 30 Tab 3   • therapeutic multivitamin-minerals (THERAGRAN-M) Tab Take 1 Tab by mouth every day. 30 Tab 11   • ascorbic acid (VITAMIN C) 500 MG tablet Take 1 Tab by mouth every day. 30 Tab 3   • clopidogrel (PLAVIX) 75 MG Tab Take 1 Tab by mouth " "every day. 90 Tab 3   • rosuvastatin (CRESTOR) 40 MG tablet Take 1 Tab by mouth every evening. 90 Tab 3   • sertraline (ZOLOFT) 25 MG tablet TAKE ONE TABLET BY MOUTH TWICE DAILY 180 Tab 3   • ranitidine (ZANTAC) 150 MG Tab Take 150 mg by mouth 2 times a day.       No facility-administered encounter medications on file as of 5/18/2018.      ROS     Objective:   /84   Pulse 94   Ht 1.753 m (5' 9\")   Wt 100.7 kg (222 lb)   SpO2 96%   BMI 32.78 kg/m²      Physical Exam   Constitutional: He appears well-developed and well-nourished.   Neck: No JVD present.   Cardiovascular: Normal rate and regular rhythm.    No murmur heard.  Pulmonary/Chest: Effort normal and breath sounds normal. He has no rales.   Abdominal: Soft. There is no tenderness.   Musculoskeletal: He exhibits no edema.     Lab Results   Component Value Date/Time    CHOLSTRLTOT 133 04/24/2018 08:55 AM    LDL 66 04/24/2018 08:55 AM    HDL 44 04/24/2018 08:55 AM    TRIGLYCERIDE 115 04/24/2018 08:55 AM       Lab Results   Component Value Date/Time    SODIUM 142 05/11/2018 02:52 AM    POTASSIUM 4.0 05/11/2018 02:52 AM    CHLORIDE 107 05/11/2018 02:52 AM    CO2 24 05/11/2018 02:52 AM    GLUCOSE 104 (H) 05/11/2018 02:52 AM    BUN 13 05/11/2018 02:52 AM    CREATININE 1.10 05/11/2018 02:52 AM    BUNCREATRAT 13 02/16/2015 08:55 AM     Lab Results   Component Value Date/Time    ALKPHOSPHAT 28 (L) 05/09/2018 04:35 PM    ASTSGOT 15 05/09/2018 04:35 PM    ALTSGPT 13 05/09/2018 04:35 PM    TBILIRUBIN 0.4 05/09/2018 04:35 PM      Lab Results   Component Value Date/Time    BNPBTYPENAT 85 01/18/2017 09:43 AM          Assessment:     1. Coronary artery disease due to calcified coronary lesion: CABG ×2 in August 2016     2. Dyslipidemia     3. Chest discomfort     4. Iron deficiency anemia, unspecified iron deficiency anemia type           Medical Decision Making:  Today's Assessment / Status / Plan:     Mr. Ortiz had difficulty with chest discomfort when his " hemoglobin dropped.  The exact etiology of his anemia is unclear but it is most likely due to chronic blood loss.  He has been evaluated by GI.  At this time, we will increase his beta-blocker therapy and have him undergo myocardial perfusion imaging.  He will follow-up in about a week for repeat evaluation.  We will continue to increase his beta-blocker therapy as tolerated.         Belen Knowles M.D.  [address] [place] [address] [address] [place] [id number]  VIA In Basket

## 2018-05-23 ENCOUNTER — HOSPITAL ENCOUNTER (OUTPATIENT)
Dept: LAB | Facility: MEDICAL CENTER | Age: 63
End: 2018-05-23
Attending: STUDENT IN AN ORGANIZED HEALTH CARE EDUCATION/TRAINING PROGRAM
Payer: COMMERCIAL

## 2018-05-23 LAB
ERYTHROCYTE [DISTWIDTH] IN BLOOD BY AUTOMATED COUNT: 76.4 FL (ref 35.9–50)
HCT VFR BLD AUTO: 42.6 % (ref 42–52)
HGB BLD-MCNC: 12.7 G/DL (ref 14–18)
MCH RBC QN AUTO: 24.1 PG (ref 27–33)
MCHC RBC AUTO-ENTMCNC: 29.6 G/DL (ref 33.7–35.3)
MCV RBC AUTO: 81.5 FL (ref 81.4–97.8)
PLATELET # BLD AUTO: 345 K/UL (ref 164–446)
PMV BLD AUTO: 9.8 FL (ref 9–12.9)
RBC # BLD AUTO: 5.19 M/UL (ref 4.7–6.1)
WBC # BLD AUTO: 5.5 K/UL (ref 4.8–10.8)

## 2018-05-23 PROCEDURE — 85027 COMPLETE CBC AUTOMATED: CPT

## 2018-05-23 PROCEDURE — 36415 COLL VENOUS BLD VENIPUNCTURE: CPT

## 2018-05-25 ENCOUNTER — TELEPHONE (OUTPATIENT)
Dept: MEDICAL GROUP | Facility: MEDICAL CENTER | Age: 63
End: 2018-05-25

## 2018-05-25 ENCOUNTER — HOSPITAL ENCOUNTER (OUTPATIENT)
Dept: RADIOLOGY | Facility: MEDICAL CENTER | Age: 63
End: 2018-05-25
Attending: INTERNAL MEDICINE
Payer: COMMERCIAL

## 2018-05-25 DIAGNOSIS — R07.89 CHEST DISCOMFORT: ICD-10-CM

## 2018-05-25 DIAGNOSIS — I25.10 CORONARY ARTERY DISEASE DUE TO CALCIFIED CORONARY LESION: ICD-10-CM

## 2018-05-25 DIAGNOSIS — I25.84 CORONARY ARTERY DISEASE DUE TO CALCIFIED CORONARY LESION: ICD-10-CM

## 2018-05-25 PROCEDURE — 700111 HCHG RX REV CODE 636 W/ 250 OVERRIDE (IP)

## 2018-05-25 PROCEDURE — A9502 TC99M TETROFOSMIN: HCPCS

## 2018-05-25 RX ORDER — REGADENOSON 0.08 MG/ML
INJECTION, SOLUTION INTRAVENOUS
Status: COMPLETED
Start: 2018-05-25 | End: 2018-05-25

## 2018-05-25 RX ADMIN — REGADENOSON 0.4 MG: 0.08 INJECTION, SOLUTION INTRAVENOUS at 12:06

## 2018-05-25 NOTE — TELEPHONE ENCOUNTER
1. Caller Name: Johana                      Call Back Number: [phone] (home)       2. Message: Patient would like to know his CBC results done on the 23rd. They wanted to make sure they were ok before the long weekend    3. Patient approves office to leave a detailed voicemail/MyChart message: N\A

## 2018-05-30 ENCOUNTER — OFFICE VISIT (OUTPATIENT)
Dept: CARDIOLOGY | Facility: MEDICAL CENTER | Age: 63
End: 2018-05-30
Payer: COMMERCIAL

## 2018-05-30 VITALS
OXYGEN SATURATION: 93 % | HEART RATE: 78 BPM | BODY MASS INDEX: 33.18 KG/M2 | SYSTOLIC BLOOD PRESSURE: 106 MMHG | HEIGHT: 69 IN | DIASTOLIC BLOOD PRESSURE: 68 MMHG | RESPIRATION RATE: 14 BRPM | WEIGHT: 224 LBS

## 2018-05-30 DIAGNOSIS — I25.10 CORONARY ARTERY DISEASE DUE TO CALCIFIED CORONARY LESION: ICD-10-CM

## 2018-05-30 DIAGNOSIS — I25.84 CORONARY ARTERY DISEASE DUE TO CALCIFIED CORONARY LESION: ICD-10-CM

## 2018-05-30 DIAGNOSIS — E78.5 DYSLIPIDEMIA: ICD-10-CM

## 2018-05-30 DIAGNOSIS — R07.89 CHEST DISCOMFORT: ICD-10-CM

## 2018-05-30 PROCEDURE — 99214 OFFICE O/P EST MOD 30 MIN: CPT | Performed by: NURSE PRACTITIONER

## 2018-05-30 ASSESSMENT — ENCOUNTER SYMPTOMS
COUGH: 0
FEVER: 0
CLAUDICATION: 0
ORTHOPNEA: 0
ABDOMINAL PAIN: 0
DIZZINESS: 0
PALPITATIONS: 0
PND: 0
MYALGIAS: 0
SHORTNESS OF BREATH: 1

## 2018-05-30 NOTE — PROGRESS NOTES
"Subjective:   Talib Ortiz is a 62 y.o. male who presents today for follow up on his CAD.     Patient of Dr. Frias. Patient was last seen 5/18/18.  During his last visit, patient had some chest discomfort with low hemoglobin.  Metoprolol was increased to 25 mg twice a day and he was sent for a stress test.    Patient is being evaluated for anemia by GI.  Patient did have a EGD and colonoscopy but is planning on having the capsule endoscopy soon.    For his symptoms, he denies chest pain, shortness of breath with ADLs and rest, palpitations, fatigue, orthopnea, PND, edema, dizziness or lightheadedness.  He does report being winded with exertion.  Patient was able to mow the lawn this past weekend with minimal shortness of breath.    Additonally, patient has the following medical problems:    -CAD with CABG ×2 or 3 in 2016 at [place]     -Dyslipidemia: Taking rosuvastatin 40 mg nightly    Past Medical History:   Diagnosis Date   • Arthritis    • Blood clotting disorder ([facility]) 1996    leg   • CAD (coronary artery disease)    • Heart attack (HCC) 8/16/2016    cardiology, Dr. Frias   • Heart burn    • High cholesterol    • Hyperlipidemia    • Kidney disease    • Kidney stone    • Obesity (BMI 30-39.9) 2/12/2015   • Osteoarthritis 3/2/2015   • Pneumonia 2016   • Psychiatric problem     Depression      Past Surgical History:   Procedure Laterality Date   • KNEE ARTHROPLASTY TOTAL Left 6/19/2017    Procedure: KNEE ARTHROPLASTY TOTAL;  Surgeon: Charles Castellanos M.D.;  Location: SURGERY [facility];  Service:    • MULTIPLE CORONARY ARTERY BYPASS  8/16/2016   • LAMINOTOMY  2012    lumbar   • KNEE ARTHROSCOPY Left 1990's   • OTHER SURGICAL PROCEDURE Left 1990's    \"removal of vein left leg due to blood clot\"   • SHOULDER ARTHROTOMY Right 1980's     Family History   Problem Relation Age of Onset   • Arthritis Mother    • Hyperlipidemia Mother    • Heart Disease Mother      bypass x4   • Diabetes Mother    • " Hyperlipidemia Father    • Heart Attack Father 61   • Other Sister      back surgery   • Other Brother      joint problems     History   Smoking Status   • Former Smoker   • Packs/day: 0.25   • Years: 10.00   • Types: Cigarettes   • Quit date: 3/19/2016   Smokeless Tobacco   • Never Used     No Known Allergies  Outpatient Encounter Prescriptions as of 5/30/2018   Medication Sig Dispense Refill   • aspirin EC (ECOTRIN) 81 MG Tablet Delayed Response Take 81 mg by mouth every day.     • OMEPRAZOLE PO Take 2 Each by mouth every day at 6 PM. (20mg)     • metoprolol (LOPRESSOR) 25 MG Tab Take 1 Tab by mouth 2 times a day. 60 Tab 11   • cyanocobalamin (VITAMIN B12) 1000 MCG Tab Take 2 Tabs by mouth every day. (Patient taking differently: Take 1,000 mcg by mouth every day.) 30 Tab 3   • ferrous sulfate 325 (65 Fe) MG tablet Take 1 Tab by mouth every morning with breakfast. 30 Tab 3   • therapeutic multivitamin-minerals (THERAGRAN-M) Tab Take 1 Tab by mouth every day. 30 Tab 11   • ascorbic acid (VITAMIN C) 500 MG tablet Take 1 Tab by mouth every day. 30 Tab 3   • clopidogrel (PLAVIX) 75 MG Tab Take 1 Tab by mouth every day. 90 Tab 3   • rosuvastatin (CRESTOR) 40 MG tablet Take 1 Tab by mouth every evening. 90 Tab 3   • sertraline (ZOLOFT) 25 MG tablet TAKE ONE TABLET BY MOUTH TWICE DAILY 180 Tab 3   • ranitidine (ZANTAC) 150 MG Tab Take 150 mg by mouth 2 times a day.       No facility-administered encounter medications on file as of 5/30/2018.      Review of Systems   Constitutional: Negative for fever and malaise/fatigue.   Respiratory: Positive for shortness of breath. Negative for cough.    Cardiovascular: Negative for chest pain, palpitations, orthopnea, claudication, leg swelling and PND.   Gastrointestinal: Negative for abdominal pain.   Musculoskeletal: Positive for joint pain. Negative for myalgias.   Neurological: Negative for dizziness.   All other systems reviewed and are negative.       Objective:   /68    "Pulse 78   Resp 14   Ht 1.753 m (5' 9\")   Wt 101.6 kg (224 lb)   SpO2 93%   BMI 33.08 kg/m²     Physical Exam   Constitutional: He is oriented to person, place, and time. He appears well-developed and well-nourished.   HENT:   Head: Normocephalic and atraumatic.   Eyes: EOM are normal.   Neck: Normal range of motion. Neck supple. No JVD present.   Cardiovascular: Normal rate, regular rhythm, normal heart sounds and intact distal pulses.    No murmur heard.  Pulmonary/Chest: Effort normal and breath sounds normal. No respiratory distress. He has no wheezes. He has no rales.   Abdominal: Soft. Bowel sounds are normal.   Musculoskeletal: Normal range of motion. He exhibits no edema.   Neurological: He is alert and oriented to person, place, and time.   Skin: Skin is warm and dry.   Psychiatric: He has a normal mood and affect.   Nursing note and vitals reviewed.       Lab Results   Component Value Date/Time    CHOLSTRLTOT 133 04/24/2018 08:55 AM    LDL 66 04/24/2018 08:55 AM    HDL 44 04/24/2018 08:55 AM    TRIGLYCERIDE 115 04/24/2018 08:55 AM       Lab Results   Component Value Date/Time    SODIUM 142 05/11/2018 02:52 AM    POTASSIUM 4.0 05/11/2018 02:52 AM    CHLORIDE 107 05/11/2018 02:52 AM    CO2 24 05/11/2018 02:52 AM    GLUCOSE 104 (H) 05/11/2018 02:52 AM    BUN 13 05/11/2018 02:52 AM    CREATININE 1.10 05/11/2018 02:52 AM    BUNCREATRAT 13 02/16/2015 08:55 AM     Lab Results   Component Value Date/Time    ALKPHOSPHAT 28 (L) 05/09/2018 04:35 PM    ASTSGOT 15 05/09/2018 04:35 PM    ALTSGPT 13 05/09/2018 04:35 PM    TBILIRUBIN 0.4 05/09/2018 04:35 PM      Transthoracic Echo Report 1/23/17  Normal left ventricular systolic function.  Left ventricular ejection fraction is visually estimated to be 55%.  Mild concentric left ventricular hypertrophy.  Unable to estimate pulmonary artery pressure due to an inadequate   tricuspid regurgitant jet.  Hypokinesis of the distal septum and portion of the apex.   "   Myocardial Perfusion Report 5/25/18   NUCLEAR IMAGING INTERPRETATION   Small defect of moderately reduced uptake in the entire inferior wall that is more present in stress images than rest imaging suggestive of scar and ischemia.   Normal left ventricular wall motion.  LV ejection fraction = 61%.   ECG INTERPRETATION   Negative stress ECG for ischemia.    Assessment:     1. Coronary artery disease due to calcified coronary lesion: CABG ×2 in August 2016     2. Dyslipidemia     3. Chest discomfort         Medical Decision Making:  Today's Assessment / Status / Plan:   1. CAD, Hx CABG x 3/ DLD: asymptomatic at this time, except for being mild winded, no chest pain with exertion. Pt feels ok being on Metoprolol 25 mg BID and would like to continue medical management. Chest discomfort likely d/t anemia when Hemoglobin drops. Last LDL 66 on 4/24/18  -Continue Aspirin 81 mg  -Continue Plavix 75 mg daily  -Continue metoprolol 25 mg twice a day (unable to increase d/t bp)  -Continue rosuvastatin 40 mg nightly  -ER precautions discussed.    FU in clinic in 2 months with Dr. Frias. Sooner if needed.    Patient verbalizes understanding and agrees with the plan of care.     Collaborating MD: Michele Harvey MD    Physical Exam   Constitutional: He is oriented to person, place, and time. He appears well-developed and well-nourished.   HENT:   Head: Normocephalic and atraumatic.   Eyes: EOM are normal.   Neck: Normal range of motion. Neck supple. No JVD present.   Cardiovascular: Normal rate, regular rhythm, normal heart sounds and intact distal pulses.    No murmur heard.  Pulmonary/Chest: Effort normal and breath sounds normal. No respiratory distress. He has no wheezes. He has no rales.   Abdominal: Soft. Bowel sounds are normal.   Musculoskeletal: Normal range of motion. He exhibits no edema.   Neurological: He is alert and oriented to person, place, and time.   Skin: Skin is warm and dry.   Psychiatric: He has a normal mood  and affect.   Nursing note and vitals reviewed.

## 2018-06-04 ENCOUNTER — OFFICE VISIT (OUTPATIENT)
Dept: MEDICAL GROUP | Facility: MEDICAL CENTER | Age: 63
End: 2018-06-04
Payer: COMMERCIAL

## 2018-06-04 ENCOUNTER — HOSPITAL ENCOUNTER (OUTPATIENT)
Dept: LAB | Facility: MEDICAL CENTER | Age: 63
End: 2018-06-04
Attending: FAMILY MEDICINE
Payer: COMMERCIAL

## 2018-06-04 VITALS
WEIGHT: 223 LBS | HEART RATE: 100 BPM | DIASTOLIC BLOOD PRESSURE: 66 MMHG | HEIGHT: 69 IN | RESPIRATION RATE: 16 BRPM | OXYGEN SATURATION: 94 % | SYSTOLIC BLOOD PRESSURE: 122 MMHG | BODY MASS INDEX: 33.03 KG/M2 | TEMPERATURE: 98.2 F

## 2018-06-04 DIAGNOSIS — Z09 HOSPITAL DISCHARGE FOLLOW-UP: ICD-10-CM

## 2018-06-04 DIAGNOSIS — D64.9 ANEMIA, UNSPECIFIED TYPE: ICD-10-CM

## 2018-06-04 LAB
ANISOCYTOSIS BLD QL SMEAR: ABNORMAL
BASOPHILS # BLD AUTO: 0.7 % (ref 0–1.8)
BASOPHILS # BLD: 0.05 K/UL (ref 0–0.12)
EOSINOPHIL # BLD AUTO: 0.05 K/UL (ref 0–0.51)
EOSINOPHIL NFR BLD: 0.7 % (ref 0–6.9)
HCT VFR BLD AUTO: 45.2 % (ref 42–52)
HGB BLD-MCNC: 13.8 G/DL (ref 14–18)
HYPOCHROMIA BLD QL SMEAR: ABNORMAL
LYMPHOCYTES # BLD AUTO: 2.07 K/UL (ref 1–4.8)
LYMPHOCYTES NFR BLD: 26.9 % (ref 22–41)
MACROCYTES BLD QL SMEAR: ABNORMAL
MANUAL DIFF BLD: NORMAL
MCH RBC QN AUTO: 25.2 PG (ref 27–33)
MCHC RBC AUTO-ENTMCNC: 30.5 G/DL (ref 33.7–35.3)
MCV RBC AUTO: 82.5 FL (ref 81.4–97.8)
MONOCYTES # BLD AUTO: 0.23 K/UL (ref 0–0.85)
MONOCYTES NFR BLD AUTO: 3 % (ref 0–13.4)
MORPHOLOGY BLD-IMP: NORMAL
NEUTROPHILS # BLD AUTO: 5.29 K/UL (ref 1.82–7.42)
NEUTROPHILS NFR BLD: 68.7 % (ref 44–72)
NRBC # BLD AUTO: 0 K/UL
NRBC BLD-RTO: 0 /100 WBC
PLATELET # BLD AUTO: 321 K/UL (ref 164–446)
PLATELET BLD QL SMEAR: NORMAL
PMV BLD AUTO: 10.5 FL (ref 9–12.9)
RBC # BLD AUTO: 5.48 M/UL (ref 4.7–6.1)
RBC BLD AUTO: PRESENT
WBC # BLD AUTO: 7.7 K/UL (ref 4.8–10.8)

## 2018-06-04 PROCEDURE — 85007 BL SMEAR W/DIFF WBC COUNT: CPT

## 2018-06-04 PROCEDURE — 85027 COMPLETE CBC AUTOMATED: CPT

## 2018-06-04 PROCEDURE — 36415 COLL VENOUS BLD VENIPUNCTURE: CPT

## 2018-06-04 PROCEDURE — 99214 OFFICE O/P EST MOD 30 MIN: CPT | Performed by: FAMILY MEDICINE

## 2018-06-04 NOTE — PROGRESS NOTES
CC: Hospital discharge follow up for Anemia    HPI:   Patient  Was admitted to hospital on 5/9/2018 after I send her to ER for her anemia, his Hb dropped form 8.4 to 7.7.and has been feeling tired , lightheadedness, dyspnea on exertion, and cold intolerance.But did not have any GI complaints and denied dysphagia, nausea, vomiting, hematemesis, abdominal pain, hematochezia, melena, diarrhea, changes in stool caliber, hematuria, or easy bruising/bleeding. Patient last EGD and Colonoscopy was 3/2017 by GI consultants; EGD was done for GERD and showed schatzki's ring and hiatal hernia; colonoscopy showed 3 polyps, recommended f/u was for 5 yrs. Patient has family history of NILDA in his mother requiring transfusions. In ED FIT was negative for blood in stool. Patient was transfused two units of PRBC and was started on iron sucrose infusions. Patient was seen by hematology while in patient and recommended F/U with GI and Hematology. Patient was discharged on 5/11/2018 in a good condition.    Came in for follow up. His tiredness has improved, denies any lightheadedness, and SOB. Last Hb was 12.7. Was seen by hematology, recommend out patient follow up.Patient is being evaluated for anemia by GI before in March 2018.  Patient did have a EGD and colonoscopy but is planning on having the capsule endoscopy soon.         Patient Active Problem List    Diagnosis Date Noted   • Iron deficiency anemia 05/09/2018     Priority: High   • Chest discomfort 05/18/2018   • Low serum vitamin B12 05/16/2018   • Rash 05/09/2018   • Primary osteoarthritis of left knee 04/06/2017   • Depression 04/06/2017   • Gastroesophageal reflux disease without esophagitis 04/06/2017   • Coronary artery disease due to calcified coronary lesion: CABG ×2 in August 2016 01/06/2017   • Dyslipidemia 03/02/2015   • Osteoarthritis 03/02/2015   • Obesity (BMI 30-39.9) 02/12/2015       Current Outpatient Prescriptions   Medication Sig Dispense Refill   • aspirin EC  "(ECOTRIN) 81 MG Tablet Delayed Response Take 81 mg by mouth every day.     • OMEPRAZOLE PO Take 2 Each by mouth every day at 6 PM. (20mg)     • metoprolol (LOPRESSOR) 25 MG Tab Take 1 Tab by mouth 2 times a day. 60 Tab 11   • cyanocobalamin (VITAMIN B12) 1000 MCG Tab Take 2 Tabs by mouth every day. (Patient taking differently: Take 1,000 mcg by mouth every day.) 30 Tab 3   • ferrous sulfate 325 (65 Fe) MG tablet Take 1 Tab by mouth every morning with breakfast. 30 Tab 3   • therapeutic multivitamin-minerals (THERAGRAN-M) Tab Take 1 Tab by mouth every day. 30 Tab 11   • ascorbic acid (VITAMIN C) 500 MG tablet Take 1 Tab by mouth every day. 30 Tab 3   • clopidogrel (PLAVIX) 75 MG Tab Take 1 Tab by mouth every day. 90 Tab 3   • rosuvastatin (CRESTOR) 40 MG tablet Take 1 Tab by mouth every evening. 90 Tab 3   • sertraline (ZOLOFT) 25 MG tablet TAKE ONE TABLET BY MOUTH TWICE DAILY 180 Tab 3   • ranitidine (ZANTAC) 150 MG Tab Take 150 mg by mouth 2 times a day.       No current facility-administered medications for this visit.          Allergies as of 06/04/2018   • (No Known Allergies)        ROS: Denies any chest pain, Shortness of breath, Changes bowel or bladder, Lower extremity edema.    Physical Exam:  /66   Pulse 100   Temp 36.8 °C (98.2 °F)   Resp 16   Ht 1.753 m (5' 9\")   Wt 101.2 kg (223 lb)   SpO2 94%   BMI 32.93 kg/m²   Gen.: Well-developed, well-nourished, no apparent distress,pleasant and cooperative with the examination  Skin:  Warm and dry with good turgor. No rashes or suspicious lesions in visible areas  Neck: Trachea midline,no masses or adenopathy. No JVD.  Cor: Regular rate and rhythm without murmur, gallop or rub.  Lungs: Clear to auscultation with equal breath sounds bilaterally.   Extremities: No cyanosis, clubbing or edema.        Assessment and Plan.   62 y.o. male     1. Anemia, unspecified type  S/P 2 transfusion. Last Hb was 12.7.  Follow up with hematology ( referral placed " today).  Follow up with colonoscopy ( has scheduled appt), R/O Peng Gomes.    - REFERRAL TO HEMATOLOGY ONCOLOGY Referral to? [facility]  - CBC WITH DIFFERENTIAL; Future    2. Hospital discharge follow-up  Discharge stacie reviewed.

## 2018-06-14 ENCOUNTER — HOSPITAL ENCOUNTER (OUTPATIENT)
Dept: LAB | Facility: MEDICAL CENTER | Age: 63
End: 2018-06-14
Attending: NURSE PRACTITIONER
Payer: COMMERCIAL

## 2018-06-14 ENCOUNTER — HOSPITAL ENCOUNTER (OUTPATIENT)
Facility: MEDICAL CENTER | Age: 63
End: 2018-06-14
Attending: FAMILY MEDICINE
Payer: COMMERCIAL

## 2018-06-14 LAB
ANISOCYTOSIS BLD QL SMEAR: ABNORMAL
BASOPHILS # BLD AUTO: 1.8 % (ref 0–1.8)
BASOPHILS # BLD: 0.12 K/UL (ref 0–0.12)
COMMENT 1642: NORMAL
EOSINOPHIL # BLD AUTO: 0.17 K/UL (ref 0–0.51)
EOSINOPHIL NFR BLD: 2.6 % (ref 0–6.9)
HCT VFR BLD AUTO: 47 % (ref 42–52)
HGB BLD-MCNC: 14.5 G/DL (ref 14–18)
IMM GRANULOCYTES # BLD AUTO: 0.02 K/UL (ref 0–0.11)
IMM GRANULOCYTES NFR BLD AUTO: 0.3 % (ref 0–0.9)
IRON SATN MFR SERPL: 20 % (ref 15–55)
IRON SERPL-MCNC: 90 UG/DL (ref 50–180)
LYMPHOCYTES # BLD AUTO: 1.68 K/UL (ref 1–4.8)
LYMPHOCYTES NFR BLD: 25.9 % (ref 22–41)
MCH RBC QN AUTO: 25.8 PG (ref 27–33)
MCHC RBC AUTO-ENTMCNC: 30.9 G/DL (ref 33.7–35.3)
MCV RBC AUTO: 83.8 FL (ref 81.4–97.8)
MICROCYTES BLD QL SMEAR: ABNORMAL
MONOCYTES # BLD AUTO: 0.47 K/UL (ref 0–0.85)
MONOCYTES NFR BLD AUTO: 7.2 % (ref 0–13.4)
MORPHOLOGY BLD-IMP: NORMAL
NEUTROPHILS # BLD AUTO: 4.03 K/UL (ref 1.82–7.42)
NEUTROPHILS NFR BLD: 62.2 % (ref 44–72)
NRBC # BLD AUTO: 0 K/UL
NRBC BLD-RTO: 0 /100 WBC
PLATELET # BLD AUTO: 259 K/UL (ref 164–446)
PLATELET BLD QL SMEAR: NORMAL
PMV BLD AUTO: 10.3 FL (ref 9–12.9)
RBC # BLD AUTO: 5.61 M/UL (ref 4.7–6.1)
RBC BLD AUTO: PRESENT
TIBC SERPL-MCNC: 451 UG/DL (ref 250–450)
WBC # BLD AUTO: 6.5 K/UL (ref 4.8–10.8)

## 2018-06-14 PROCEDURE — 36415 COLL VENOUS BLD VENIPUNCTURE: CPT

## 2018-06-14 PROCEDURE — 82274 ASSAY TEST FOR BLOOD FECAL: CPT

## 2018-06-14 PROCEDURE — 83540 ASSAY OF IRON: CPT

## 2018-06-14 PROCEDURE — 83550 IRON BINDING TEST: CPT

## 2018-06-14 PROCEDURE — 85025 COMPLETE CBC W/AUTO DIFF WBC: CPT

## 2018-06-16 DIAGNOSIS — D64.9 ANEMIA, UNSPECIFIED TYPE: ICD-10-CM

## 2018-06-18 LAB — HEMOCCULT STL QL IA: POSITIVE

## 2018-06-19 ENCOUNTER — OFFICE VISIT (OUTPATIENT)
Dept: HEMATOLOGY ONCOLOGY | Facility: MEDICAL CENTER | Age: 63
End: 2018-06-19
Payer: COMMERCIAL

## 2018-06-19 ENCOUNTER — HOSPITAL ENCOUNTER (OUTPATIENT)
Facility: MEDICAL CENTER | Age: 63
End: 2018-06-19
Attending: FAMILY MEDICINE
Payer: COMMERCIAL

## 2018-06-19 VITALS
DIASTOLIC BLOOD PRESSURE: 75 MMHG | TEMPERATURE: 98.1 F | BODY MASS INDEX: 32.95 KG/M2 | RESPIRATION RATE: 18 BRPM | OXYGEN SATURATION: 93 % | HEIGHT: 69 IN | WEIGHT: 222.44 LBS | HEART RATE: 73 BPM | SYSTOLIC BLOOD PRESSURE: 127 MMHG

## 2018-06-19 DIAGNOSIS — D50.0 IRON DEFICIENCY ANEMIA DUE TO CHRONIC BLOOD LOSS: ICD-10-CM

## 2018-06-19 DIAGNOSIS — R19.5 OCCULT BLOOD POSITIVE STOOL: ICD-10-CM

## 2018-06-19 PROCEDURE — 82274 ASSAY TEST FOR BLOOD FECAL: CPT

## 2018-06-19 PROCEDURE — 99204 OFFICE O/P NEW MOD 45 MIN: CPT | Performed by: INTERNAL MEDICINE

## 2018-06-19 ASSESSMENT — PAIN SCALES - GENERAL: PAINLEVEL: NO PAIN

## 2018-06-19 NOTE — PROGRESS NOTES
"Consult Note: Hematology    Date of consultation: 6/19/2018     Referring provider: Belen Knowles M.D.    Reason for consultation:   CC: anemia    History of presenting illness:   First seen in on 6/19/2018  Talib Ortiz  is a 62 y.o. year old male admitted to the hospital on may 9 for progressive fatigue, dyspnea on exertion, and lightheadedness, as well as brittle nails and cold intolerance. He was diagnosed with iron def anemia and discharged with oral iron      Review of Systems:  Constitutional: No fever, chills, weight loss ,malaise/fatigue.      All other review of systems are negative except what was mentioned above in the HPI.    Past Medical History:    Past Medical History:   Diagnosis Date   • Arthritis    • Blood clotting disorder (HCC) 1996    leg   • CAD (coronary artery disease)    • Heart attack (HCC) 8/16/2016    cardiology, Dr. Frias   • Heart burn    • High cholesterol    • Hyperlipidemia    • Kidney disease    • Kidney stone    • Obesity (BMI 30-39.9) 2/12/2015   • Osteoarthritis 3/2/2015   • Pneumonia 2016   • Psychiatric problem     Depression        Past surgical history:    Past Surgical History:   Procedure Laterality Date   • KNEE ARTHROPLASTY TOTAL Left 6/19/2017    Procedure: KNEE ARTHROPLASTY TOTAL;  Surgeon: Charles Castellanos M.D.;  Location: SURGERY [facility];  Service:    • MULTIPLE CORONARY ARTERY BYPASS  8/16/2016   • LAMINOTOMY  2012    lumbar   • KNEE ARTHROSCOPY Left 1990's   • OTHER SURGICAL PROCEDURE Left 1990's    \"removal of vein left leg due to blood clot\"   • SHOULDER ARTHROTOMY Right 1980's       Allergies:  Patient has no known allergies.    Medications:    Current Outpatient Prescriptions   Medication Sig Dispense Refill   • aspirin EC (ECOTRIN) 81 MG Tablet Delayed Response Take 81 mg by mouth every day.     • OMEPRAZOLE PO Take 2 Each by mouth every day at 6 PM. (20mg)     • metoprolol (LOPRESSOR) 25 MG Tab Take 1 Tab by mouth 2 times a day. 60 Tab 11 " "  • cyanocobalamin (VITAMIN B12) 1000 MCG Tab Take 2 Tabs by mouth every day. (Patient taking differently: Take 1,000 mcg by mouth every day.) 30 Tab 3   • ferrous sulfate 325 (65 Fe) MG tablet Take 1 Tab by mouth every morning with breakfast. 30 Tab 3   • therapeutic multivitamin-minerals (THERAGRAN-M) Tab Take 1 Tab by mouth every day. 30 Tab 11   • ascorbic acid (VITAMIN C) 500 MG tablet Take 1 Tab by mouth every day. 30 Tab 3   • clopidogrel (PLAVIX) 75 MG Tab Take 1 Tab by mouth every day. 90 Tab 3   • rosuvastatin (CRESTOR) 40 MG tablet Take 1 Tab by mouth every evening. 90 Tab 3   • sertraline (ZOLOFT) 25 MG tablet TAKE ONE TABLET BY MOUTH TWICE DAILY 180 Tab 3   • ranitidine (ZANTAC) 150 MG Tab Take 150 mg by mouth 2 times a day.       No current facility-administered medications for this visit.        Social History:     Social History     Social History   • Marital status:      Spouse name: N/A   • Number of children: N/A   • Years of education: N/A     Occupational History   • Not on file.     Social History Main Topics   • Smoking status: Former Smoker     Packs/day: 0.25     Years: 10.00     Types: Cigarettes     Quit date: 3/19/2016   • Smokeless tobacco: Never Used   • Alcohol use No      Comment: Quit years   • Drug use: No   • Sexual activity: Yes     Partners: Female     Other Topics Concern   • Not on file     Social History Narrative   • No narrative on file       Family History:     Family History   Problem Relation Age of Onset   • Arthritis Mother    • Hyperlipidemia Mother    • Heart Disease Mother      bypass x4   • Diabetes Mother    • Hyperlipidemia Father    • Heart Attack Father 61   • Other Sister      back surgery   • Other Brother      joint problems       Physical Exam:  Vitals:   /75   Pulse 73   Temp 36.7 °C (98.1 °F)   Resp 18   Ht 1.753 m (5' 9\")   Wt 100.9 kg (222 lb 7.1 oz)   SpO2 93%   BMI 32.85 kg/m²     General: Not in acute distress,   HEENT: No pallor, " icterus. Oropharynx clear.   Neck: Supple, no palpable masses.  Lymph nodes: No palpable cervical, supraclavicular, axillary or inguinal lymphadenopathy.    CVS: regular rate and rhythm, no rubs or gallops  RESP: Clear to auscultate bilaterally, no wheezing or crackles.   ABD: Soft, non tender, non distended, positive bowel sounds, no palpable organomegaly  EXT: No edema or cyanosis  CNS: Alert and oriented x3, No focal deficits.  Skin- No rash      Labs:   Results for BRETT PATTERSON (MRN [id number]) as of 6/19/2018 13:18   5/10/2018 02:57 5/11/2018 02:52 5/23/2018 09:16 6/4/2018 12:05 6/14/2018 09:26   WBC 6.5 8.8 5.5 7.7 6.5   RBC 4.25 (L) 4.56 (L) 5.19 5.48 5.61   Hemoglobin 9.4 (L) 10.2 (L) 12.7 (L) 13.8 (L) 14.5   Hematocrit 31.6 (L) 33.8 (L) 42.6 45.2 47.0   MCV 74.4 (L) 74.1 (L) 81.5 82.5 83.8   MCH 22.1 (L) 22.4 (L) 24.1 (L) 25.2 (L) 25.8 (L)   MCHC 29.7 (L) 30.2 (L) 29.6 (L) 30.5 (L) 30.9 (L)   RDW 50.4 (H) 50.1 (H) 76.4 (H)     Platelet Count 337 324 345 321 259   MPV 9.4 8.9 (L) 9.8 10.5 10.3   Neutrophils-Polys 54.10   68.70 62.20   Neutrophils (Absolute) 3.49   5.29 4.03   Lymphocytes 28.20   26.90 25.90   Lymphs (Absolute) 1.82   2.07 1.68   Monocytes 10.70   3.00 7.20   Monos (Absolute) 0.69   0.23 0.47   Eosinophils 4.80   0.70 2.60   Eos (Absolute) 0.31   0.05 0.17   Basophils 1.90 (H)   0.70 1.80   Baso (Absolute) 0.12   0.05 0.12   Immature Granulocytes 0.30    0.30   Immature Granulocytes (abs) 0.02    0.02   Nucleated RBC 0.00   0.00 0.00   NRBC (Absolute) 0.00   0.00 0.00   Plt Estimation    Normal #CAC   RBC Morphology    Present Present   Hypochromia    1+    Anisocytosis    2+ 1+       Imaging:    Assessment and Plan:  Anemia   secondary to iron def, now improved with iron supplementation  Stool positive for occult blood    Iron def   patient had neg gi work up last year  Continue oral iron and B12  Follow up with GI as scheduled next month    Follow up with PCP , return to hematology clinic  as needed      He agreed and verbalized his agreement and understanding with the current plan.  I answered all questions and concerns he has at this time.  Dear  Belen Knowles M.D.,  Thank you for allowing me to participate in his care.    All labs and/or imaging studies mentioned in the note above were reviewed with and explained to the patient as a pertain to medical decision making.    Please note that this dictation was created using voice recognition software. I have made every reasonable attempt to correct obvious errors, but I expect that there are errors of grammar and possibly content that I did not discover before finalizing the note.      SIGNATURES:  Eh Nick    CC:  SAROJ Valdovinos Nazar E, M.D.

## 2018-06-24 DIAGNOSIS — R19.5 OCCULT BLOOD POSITIVE STOOL: ICD-10-CM

## 2018-06-26 LAB — HEMOCCULT STL QL IA: POSITIVE

## 2018-09-18 ENCOUNTER — OFFICE VISIT (OUTPATIENT)
Dept: CARDIOLOGY | Facility: MEDICAL CENTER | Age: 63
End: 2018-09-18
Payer: COMMERCIAL

## 2018-09-18 VITALS
OXYGEN SATURATION: 94 % | WEIGHT: 221 LBS | HEART RATE: 104 BPM | SYSTOLIC BLOOD PRESSURE: 110 MMHG | HEIGHT: 69 IN | DIASTOLIC BLOOD PRESSURE: 62 MMHG | BODY MASS INDEX: 32.73 KG/M2

## 2018-09-18 DIAGNOSIS — I25.10 CORONARY ARTERY DISEASE DUE TO CALCIFIED CORONARY LESION: ICD-10-CM

## 2018-09-18 DIAGNOSIS — I25.84 CORONARY ARTERY DISEASE DUE TO CALCIFIED CORONARY LESION: ICD-10-CM

## 2018-09-18 DIAGNOSIS — E78.5 DYSLIPIDEMIA: ICD-10-CM

## 2018-09-18 PROCEDURE — 99214 OFFICE O/P EST MOD 30 MIN: CPT | Performed by: INTERNAL MEDICINE

## 2018-09-18 NOTE — PROGRESS NOTES
"Chief Complaint   Patient presents with   • Coronary Artery Bypass Graft (CABG)       Subjective:   Talib Ortiz is a 62 y.o. male who presents today for follow-up evaluation because of a history of coronary artery disease and dyslipidemia.     He was hospitalized in May 2018 with severe anemia.  He had some difficulty with dyspnea with this.  He does have occult blood positive stools and is undergoing GI evaluation.  He has had an EGD and colonoscopy.  He is awaiting small bowel evaluation.    His dyspnea on exertion has improved but he still is dyspneic at 3-4 blocks when walking on level ground.  He has had no PND, orthopnea or edema.  He denies any chest discomfort, palpitations or lightheadedness.      Past Medical History:   Diagnosis Date   • Arthritis    • Blood clotting disorder ([facility]) 1996    leg   • CAD (coronary artery disease)    • Heart attack ([facility]) 8/16/2016    cardiology, Dr. Frias   • Heart burn    • High cholesterol    • Hyperlipidemia    • Kidney disease    • Kidney stone    • Obesity (BMI 30-39.9) 2/12/2015   • Osteoarthritis 3/2/2015   • Pneumonia 2016   • Psychiatric problem     Depression      Past Surgical History:   Procedure Laterality Date   • KNEE ARTHROPLASTY TOTAL Left 6/19/2017    Procedure: KNEE ARTHROPLASTY TOTAL;  Surgeon: Charles Castellanos M.D.;  Location: SURGERY [facility];  Service:    • MULTIPLE CORONARY ARTERY BYPASS  8/16/2016   • LAMINOTOMY  2012    lumbar   • KNEE ARTHROSCOPY Left 1990's   • OTHER SURGICAL PROCEDURE Left 1990's    \"removal of vein left leg due to blood clot\"   • SHOULDER ARTHROTOMY Right 1980's     Family History   Problem Relation Age of Onset   • Arthritis Mother    • Hyperlipidemia Mother    • Heart Disease Mother         bypass x4   • Diabetes Mother    • Hyperlipidemia Father    • Heart Attack Father 61   • Other Sister         back surgery   • Other Brother         joint problems     Social History     Social History   • Marital status: " "     Spouse name: N/A   • Number of children: N/A   • Years of education: N/A     Occupational History   • Not on file.     Social History Main Topics   • Smoking status: Former Smoker     Packs/day: 0.25     Years: 10.00     Types: Cigarettes     Quit date: 3/19/2016   • Smokeless tobacco: Never Used   • Alcohol use No      Comment: Quit years   • Drug use: No   • Sexual activity: Yes     Partners: Female     Other Topics Concern   • Not on file     Social History Narrative   • No narrative on file     No Known Allergies  Outpatient Encounter Prescriptions as of 9/18/2018   Medication Sig Dispense Refill   • aspirin EC (ECOTRIN) 81 MG Tablet Delayed Response Take 81 mg by mouth every day.     • metoprolol (LOPRESSOR) 25 MG Tab Take 1 Tab by mouth 2 times a day. 60 Tab 11   • clopidogrel (PLAVIX) 75 MG Tab Take 1 Tab by mouth every day. 90 Tab 3   • rosuvastatin (CRESTOR) 40 MG tablet Take 1 Tab by mouth every evening. 90 Tab 3   • sertraline (ZOLOFT) 25 MG tablet TAKE ONE TABLET BY MOUTH TWICE DAILY 180 Tab 3   • OMEPRAZOLE PO Take 2 Each by mouth every day at 6 PM. (20mg)     • cyanocobalamin (VITAMIN B12) 1000 MCG Tab Take 2 Tabs by mouth every day. (Patient taking differently: Take 1,000 mcg by mouth every day.) 30 Tab 3   • ferrous sulfate 325 (65 Fe) MG tablet Take 1 Tab by mouth every morning with breakfast. 30 Tab 3   • therapeutic multivitamin-minerals (THERAGRAN-M) Tab Take 1 Tab by mouth every day. 30 Tab 11   • ascorbic acid (VITAMIN C) 500 MG tablet Take 1 Tab by mouth every day. 30 Tab 3   • ranitidine (ZANTAC) 150 MG Tab Take 150 mg by mouth 2 times a day.       No facility-administered encounter medications on file as of 9/18/2018.      ROS       Objective:   /62   Pulse (!) 104   Ht 1.753 m (5' 9\")   Wt 100.2 kg (221 lb)   SpO2 94%   BMI 32.64 kg/m²     Physical Exam   Constitutional: He appears well-developed and well-nourished.   Neck: No JVD present.   Cardiovascular: Normal " rate and regular rhythm.    No murmur heard.  Pulmonary/Chest: Effort normal and breath sounds normal. He has no rales.   Abdominal: Soft. There is no tenderness.   Musculoskeletal: He exhibits no edema.     Lab Results   Component Value Date/Time    CHOLSTRLTOT 133 04/24/2018 08:55 AM    LDL 66 04/24/2018 08:55 AM    HDL 44 04/24/2018 08:55 AM    TRIGLYCERIDE 115 04/24/2018 08:55 AM       Lab Results   Component Value Date/Time    SODIUM 142 05/11/2018 02:52 AM    POTASSIUM 4.0 05/11/2018 02:52 AM    CHLORIDE 107 05/11/2018 02:52 AM    CO2 24 05/11/2018 02:52 AM    GLUCOSE 104 (H) 05/11/2018 02:52 AM    BUN 13 05/11/2018 02:52 AM    CREATININE 1.10 05/11/2018 02:52 AM    BUNCREATRAT 13 02/16/2015 08:55 AM     Lab Results   Component Value Date/Time    ALKPHOSPHAT 28 (L) 05/09/2018 04:35 PM    ASTSGOT 15 05/09/2018 04:35 PM    ALTSGPT 13 05/09/2018 04:35 PM    TBILIRUBIN 0.4 05/09/2018 04:35 PM      Lab Results   Component Value Date/Time    BNPBTYPENAT 85 01/18/2017 09:43 AM      Myocardial Perfusion   Report   NUCLEAR IMAGING INTERPRETATION   Small defect of moderately reduced uptake in the entire inferior wall that is      more present in stress images than rest imaging suggestive of scar and    ischemia.   Normal left ventricular wall motion.  LV ejection fraction = 61%.   ECG INTERPRETATION   Negative stress ECG for ischemia.   Exam Date: 05/25/2018 11:23    Assessment:     1. Coronary artery disease due to calcified coronary lesion: CABG ×2 in August 2016     2. Dyslipidemia           Medical Decision Making:  Today's Assessment / Status / Plan:     Mr. Ortiz is still undergoing GI evaluation.  He does have a mildly abnormal perfusion study was certainly could be secondary to ischemia.  However there is no evidence of significant infarction given the fact that the ejection fraction is normal and no segmental wall motion abnormality was present.  The defect was described as small in size.  In any event, we  cannot proceed any cardiac catheterization and revascularization until he has completed his GI evaluation and hopefully any cause of anemia has been corrected.  His lipid status appears to be under fairly good control on high-dose statin therapy.  He will follow-up in about 3 months.

## 2018-09-18 NOTE — LETTER
"     [facility]   [address]  [place] [address]  Phone: [phone]  Fax: [phone]              Talib Ortiz  1955    Encounter Date: 9/18/2018    Sukhi Frias M.D.          PROGRESS NOTE:  Chief Complaint   Patient presents with   • Coronary Artery Bypass Graft (CABG)       Subjective:   Talib Ortiz is a 62 y.o. male who presents today for follow-up evaluation because of a history of coronary artery disease and dyslipidemia.     He was hospitalized in May 2018 with severe anemia.  He had some difficulty with dyspnea with this.  He does have occult blood positive stools and is undergoing GI evaluation.  He has had an EGD and colonoscopy.  He is awaiting small bowel evaluation.    His dyspnea on exertion has improved but he still is dyspneic at 3-4 blocks when walking on level ground.  He has had no PND, orthopnea or edema.  He denies any chest discomfort, palpitations or lightheadedness.      Past Medical History:   Diagnosis Date   • Arthritis    • Blood clotting disorder (HCC) 1996    leg   • CAD (coronary artery disease)    • Heart attack (HCC) 8/16/2016    cardiology, Dr. Frias   • Heart burn    • High cholesterol    • Hyperlipidemia    • Kidney disease    • Kidney stone    • Obesity (BMI 30-39.9) 2/12/2015   • Osteoarthritis 3/2/2015   • Pneumonia 2016   • Psychiatric problem     Depression      Past Surgical History:   Procedure Laterality Date   • KNEE ARTHROPLASTY TOTAL Left 6/19/2017    Procedure: KNEE ARTHROPLASTY TOTAL;  Surgeon: Charles Castellanos M.D.;  Location: SURGERY [facility];  Service:    • MULTIPLE CORONARY ARTERY BYPASS  8/16/2016   • LAMINOTOMY  2012    lumbar   • KNEE ARTHROSCOPY Left 1990's   • OTHER SURGICAL PROCEDURE Left 1990's    \"removal of vein left leg due to blood clot\"   • SHOULDER ARTHROTOMY Right 1980's     Family History   Problem Relation Age of Onset   • Arthritis Mother    • Hyperlipidemia " Mother    • Heart Disease Mother         bypass x4   • Diabetes Mother    • Hyperlipidemia Father    • Heart Attack Father 61   • Other Sister         back surgery   • Other Brother         joint problems     Social History     Social History   • Marital status:      Spouse name: N/A   • Number of children: N/A   • Years of education: N/A     Occupational History   • Not on file.     Social History Main Topics   • Smoking status: Former Smoker     Packs/day: 0.25     Years: 10.00     Types: Cigarettes     Quit date: 3/19/2016   • Smokeless tobacco: Never Used   • Alcohol use No      Comment: Quit years   • Drug use: No   • Sexual activity: Yes     Partners: Female     Other Topics Concern   • Not on file     Social History Narrative   • No narrative on file     No Known Allergies  Outpatient Encounter Prescriptions as of 9/18/2018   Medication Sig Dispense Refill   • aspirin EC (ECOTRIN) 81 MG Tablet Delayed Response Take 81 mg by mouth every day.     • metoprolol (LOPRESSOR) 25 MG Tab Take 1 Tab by mouth 2 times a day. 60 Tab 11   • clopidogrel (PLAVIX) 75 MG Tab Take 1 Tab by mouth every day. 90 Tab 3   • rosuvastatin (CRESTOR) 40 MG tablet Take 1 Tab by mouth every evening. 90 Tab 3   • sertraline (ZOLOFT) 25 MG tablet TAKE ONE TABLET BY MOUTH TWICE DAILY 180 Tab 3   • OMEPRAZOLE PO Take 2 Each by mouth every day at 6 PM. (20mg)     • cyanocobalamin (VITAMIN B12) 1000 MCG Tab Take 2 Tabs by mouth every day. (Patient taking differently: Take 1,000 mcg by mouth every day.) 30 Tab 3   • ferrous sulfate 325 (65 Fe) MG tablet Take 1 Tab by mouth every morning with breakfast. 30 Tab 3   • therapeutic multivitamin-minerals (THERAGRAN-M) Tab Take 1 Tab by mouth every day. 30 Tab 11   • ascorbic acid (VITAMIN C) 500 MG tablet Take 1 Tab by mouth every day. 30 Tab 3   • ranitidine (ZANTAC) 150 MG Tab Take 150 mg by mouth 2 times a day.       No facility-administered encounter medications on file as of 9/18/2018.      "    ROS       Objective:   /62   Pulse (!) 104   Ht 1.753 m (5' 9\")   Wt 100.2 kg (221 lb)   SpO2 94%   BMI 32.64 kg/m²      Physical Exam   Constitutional: He appears well-developed and well-nourished.   Neck: No JVD present.   Cardiovascular: Normal rate and regular rhythm.    No murmur heard.  Pulmonary/Chest: Effort normal and breath sounds normal. He has no rales.   Abdominal: Soft. There is no tenderness.   Musculoskeletal: He exhibits no edema.     Lab Results   Component Value Date/Time    CHOLSTRLTOT 133 04/24/2018 08:55 AM    LDL 66 04/24/2018 08:55 AM    HDL 44 04/24/2018 08:55 AM    TRIGLYCERIDE 115 04/24/2018 08:55 AM       Lab Results   Component Value Date/Time    SODIUM 142 05/11/2018 02:52 AM    POTASSIUM 4.0 05/11/2018 02:52 AM    CHLORIDE 107 05/11/2018 02:52 AM    CO2 24 05/11/2018 02:52 AM    GLUCOSE 104 (H) 05/11/2018 02:52 AM    BUN 13 05/11/2018 02:52 AM    CREATININE 1.10 05/11/2018 02:52 AM    BUNCREATRAT 13 02/16/2015 08:55 AM     Lab Results   Component Value Date/Time    ALKPHOSPHAT 28 (L) 05/09/2018 04:35 PM    ASTSGOT 15 05/09/2018 04:35 PM    ALTSGPT 13 05/09/2018 04:35 PM    TBILIRUBIN 0.4 05/09/2018 04:35 PM      Lab Results   Component Value Date/Time    BNPBTYPENAT 85 01/18/2017 09:43 AM      Myocardial Perfusion   Report   NUCLEAR IMAGING INTERPRETATION   Small defect of moderately reduced uptake in the entire inferior wall that is      more present in stress images than rest imaging suggestive of scar and    ischemia.   Normal left ventricular wall motion.  LV ejection fraction = 61%.   ECG INTERPRETATION   Negative stress ECG for ischemia.   Exam Date: 05/25/2018 11:23    Assessment:     1. Coronary artery disease due to calcified coronary lesion: CABG ×2 in August 2016     2. Dyslipidemia           Medical Decision Making:  Today's Assessment / Status / Plan:     Mr. Ortiz is still undergoing GI evaluation.  He does have a mildly abnormal perfusion study was " certainly could be secondary to ischemia.  However there is no evidence of significant infarction given the fact that the ejection fraction is normal and no segmental wall motion abnormality was present.  The defect was described as small in size.  In any event, we cannot proceed any cardiac catheterization and revascularization until he has completed his GI evaluation and hopefully any cause of anemia has been corrected.  His lipid status appears to be under fairly good control on high-dose statin therapy.  He will follow-up in about 3 months.      Belen Knowles M.D.  [address] [place] [address]  [facility] [address]  VIA In Basket

## 2018-10-10 ENCOUNTER — HOSPITAL ENCOUNTER (OUTPATIENT)
Dept: LAB | Facility: MEDICAL CENTER | Age: 63
End: 2018-10-10
Attending: NURSE PRACTITIONER
Payer: COMMERCIAL

## 2018-10-10 LAB
BASOPHILS # BLD AUTO: 1.9 % (ref 0–1.8)
BASOPHILS # BLD: 0.1 K/UL (ref 0–0.12)
EOSINOPHIL # BLD AUTO: 0.28 K/UL (ref 0–0.51)
EOSINOPHIL NFR BLD: 5.3 % (ref 0–6.9)
ERYTHROCYTE [DISTWIDTH] IN BLOOD BY AUTOMATED COUNT: 50.2 FL (ref 35.9–50)
FERRITIN SERPL-MCNC: 29.8 NG/ML (ref 22–322)
HCT VFR BLD AUTO: 46.7 % (ref 42–52)
HGB BLD-MCNC: 14.8 G/DL (ref 14–18)
IMM GRANULOCYTES # BLD AUTO: 0.01 K/UL (ref 0–0.11)
IMM GRANULOCYTES NFR BLD AUTO: 0.2 % (ref 0–0.9)
IRON SATN MFR SERPL: 21 % (ref 15–55)
IRON SERPL-MCNC: 89 UG/DL (ref 50–180)
LYMPHOCYTES # BLD AUTO: 1.56 K/UL (ref 1–4.8)
LYMPHOCYTES NFR BLD: 29.7 % (ref 22–41)
MCH RBC QN AUTO: 31.8 PG (ref 27–33)
MCHC RBC AUTO-ENTMCNC: 31.7 G/DL (ref 33.7–35.3)
MCV RBC AUTO: 100.2 FL (ref 81.4–97.8)
MONOCYTES # BLD AUTO: 0.47 K/UL (ref 0–0.85)
MONOCYTES NFR BLD AUTO: 8.9 % (ref 0–13.4)
NEUTROPHILS # BLD AUTO: 2.84 K/UL (ref 1.82–7.42)
NEUTROPHILS NFR BLD: 54 % (ref 44–72)
NRBC # BLD AUTO: 0 K/UL
NRBC BLD-RTO: 0 /100 WBC
PLATELET # BLD AUTO: 253 K/UL (ref 164–446)
PMV BLD AUTO: 10.2 FL (ref 9–12.9)
RBC # BLD AUTO: 4.66 M/UL (ref 4.7–6.1)
TIBC SERPL-MCNC: 426 UG/DL (ref 250–450)
WBC # BLD AUTO: 5.3 K/UL (ref 4.8–10.8)

## 2018-10-10 PROCEDURE — 82728 ASSAY OF FERRITIN: CPT

## 2018-10-10 PROCEDURE — 85025 COMPLETE CBC W/AUTO DIFF WBC: CPT

## 2018-10-10 PROCEDURE — 83540 ASSAY OF IRON: CPT

## 2018-10-10 PROCEDURE — 83550 IRON BINDING TEST: CPT

## 2018-10-10 PROCEDURE — 36415 COLL VENOUS BLD VENIPUNCTURE: CPT

## 2018-10-15 ENCOUNTER — OFFICE VISIT (OUTPATIENT)
Dept: MEDICAL GROUP | Facility: MEDICAL CENTER | Age: 63
End: 2018-10-15
Payer: COMMERCIAL

## 2018-10-15 VITALS
OXYGEN SATURATION: 98 % | DIASTOLIC BLOOD PRESSURE: 72 MMHG | SYSTOLIC BLOOD PRESSURE: 112 MMHG | BODY MASS INDEX: 33.11 KG/M2 | TEMPERATURE: 98.5 F | HEIGHT: 69 IN | WEIGHT: 223.55 LBS | RESPIRATION RATE: 16 BRPM | HEART RATE: 82 BPM

## 2018-10-15 DIAGNOSIS — K21.9 GASTROESOPHAGEAL REFLUX DISEASE WITHOUT ESOPHAGITIS: ICD-10-CM

## 2018-10-15 DIAGNOSIS — F32.4 MAJOR DEPRESSIVE DISORDER WITH SINGLE EPISODE, IN PARTIAL REMISSION (HCC): ICD-10-CM

## 2018-10-15 DIAGNOSIS — Z95.1 S/P CABG X 2: ICD-10-CM

## 2018-10-15 DIAGNOSIS — E78.5 DYSLIPIDEMIA: ICD-10-CM

## 2018-10-15 DIAGNOSIS — D50.0 IRON DEFICIENCY ANEMIA DUE TO CHRONIC BLOOD LOSS: ICD-10-CM

## 2018-10-15 PROCEDURE — 99214 OFFICE O/P EST MOD 30 MIN: CPT | Performed by: FAMILY MEDICINE

## 2018-10-15 NOTE — PROGRESS NOTES
CC: CAD, hyperlipidemia, acid reflux, depression, I'll deficiency anemia    HPI:   Talib presents today to discuss the following issues    Coronary artery disease due to calcified coronary lesion: CABG ×2 in August 2016,   He has been stable, and asymptomatic.No new chest pain, or SOB.has been on Plavix, BB, and statin.He follows up with cardiology Dr Sukhi Fontana.     Dyslipidemia,   He has been tolerating the statin. Denies muscle pain LFTs has been normal, has been on Lipitor 40 mg daily.Most recent lipid panel was normal.     Gastroesophageal reflux disease ,   Has has been asymptomatic, he denies epigastric pain, nausea, vomiting, and heart burn. He has been doing fine with Zantac 75 mg bid.     Depression,   Patient stated that his mood has improved since he started the Zoloft.However he reported mild increase in his weight. He has been on Zoloft 25 mg daily.     Iron deficiency anemia due to chronic blood loss  Patient has been asymptomatic.Last CBC showed normal hemoglobin, and iron level was normal.Patient has had GI evaluation, capsule endoscopy reveal mild erosion in the small intestine otherwise normal.      Patient Active Problem List    Diagnosis Date Noted   • Iron deficiency anemia 05/09/2018     Priority: High   • Chest discomfort 05/18/2018   • Low serum vitamin B12 05/16/2018   • Rash 05/09/2018   • Primary osteoarthritis of left knee 04/06/2017   • Depression 04/06/2017   • Gastroesophageal reflux disease without esophagitis 04/06/2017   • Coronary artery disease due to calcified coronary lesion: CABG ×2 in August 2016 01/06/2017   • Dyslipidemia 03/02/2015   • Osteoarthritis 03/02/2015   • Obesity (BMI 30-39.9) 02/12/2015       Current Outpatient Prescriptions   Medication Sig Dispense Refill   • aspirin EC (ECOTRIN) 81 MG Tablet Delayed Response Take 81 mg by mouth every day.     • OMEPRAZOLE PO Take 2 Each by mouth every day at 6 PM. (20mg)     • metoprolol (LOPRESSOR) 25 MG Tab Take 1 Tab  "by mouth 2 times a day. 60 Tab 11   • cyanocobalamin (VITAMIN B12) 1000 MCG Tab Take 2 Tabs by mouth every day. (Patient taking differently: Take 1,000 mcg by mouth every day.) 30 Tab 3   • ferrous sulfate 325 (65 Fe) MG tablet Take 1 Tab by mouth every morning with breakfast. 30 Tab 3   • therapeutic multivitamin-minerals (THERAGRAN-M) Tab Take 1 Tab by mouth every day. 30 Tab 11   • ascorbic acid (VITAMIN C) 500 MG tablet Take 1 Tab by mouth every day. 30 Tab 3   • clopidogrel (PLAVIX) 75 MG Tab Take 1 Tab by mouth every day. 90 Tab 3   • rosuvastatin (CRESTOR) 40 MG tablet Take 1 Tab by mouth every evening. 90 Tab 3   • sertraline (ZOLOFT) 25 MG tablet TAKE ONE TABLET BY MOUTH TWICE DAILY 180 Tab 3   • ranitidine (ZANTAC) 150 MG Tab Take 150 mg by mouth 2 times a day.       No current facility-administered medications for this visit.          Allergies as of 10/15/2018   • (No Known Allergies)        ROS: Denies any chest pain, Shortness of breath, Changes bowel or bladder, Lower extremity edema.    Physical Exam:  /72 (BP Location: Right arm, Patient Position: Sitting, BP Cuff Size: Adult)   Pulse 82   Temp 36.9 °C (98.5 °F)   Resp 16   Ht 1.753 m (5' 9\")   Wt 101.4 kg (223 lb 8.7 oz)   SpO2 98%   BMI 33.01 kg/m²   Gen.: Well-developed, well-nourished, no apparent distress,pleasant and cooperative with the examination  Skin:  Warm and dry with good turgor. No rashes or suspicious lesions in visible areas  HEENT:Sinuses nontender with palpation, TMs clear, nares patent with pink mucosa and clear rhinorrhea,no septal deviation ,polyps or lesions. lips without lesions, oropharynx clear.  Neck: Trachea midline,no masses or adenopathy. No JVD.  Cor: Regular rate and rhythm without murmur, gallop or rub.  Lungs: Respirations unlabored.Clear to auscultation with equal breath sounds bilaterally. No wheezes, rhonchi.  Extremities: No cyanosis, clubbing or edema.      Assessment and Plan.   63 y.o. male "     1. S/P CABG x 2, in August 2016  Stable.Asymptomatic.  Continue on Plavix, BB, and statin.  Continue follow up with cardiology Dr Sukhi Fontana.    2. Dyslipidemia  He has been tolerating the statin. Denies muscle pain LFTs has been normal  Continue on Lipitor 40 mg daily.    3. Major depressive disorder with single episode, in partial remission (HCC)  Mood improved with Zoloft.  Continue on Zoloft 25 mg daily.    4. Gastroesophageal reflux disease without esophagitis  Has been doing fine with Zantac 75 mg bid.    5. Iron deficiency anemia due to chronic blood loss  Stable. Has improved. Last CBC showed normal hemoglobin, and iron level was normal.Has had GI evaluation, capsule endoscopy reveal mild erosion in the small intestine otherwise normal.

## 2018-12-19 ENCOUNTER — OFFICE VISIT (OUTPATIENT)
Dept: CARDIOLOGY | Facility: MEDICAL CENTER | Age: 63
End: 2018-12-19
Payer: COMMERCIAL

## 2018-12-19 VITALS
SYSTOLIC BLOOD PRESSURE: 128 MMHG | BODY MASS INDEX: 32.58 KG/M2 | DIASTOLIC BLOOD PRESSURE: 72 MMHG | WEIGHT: 220 LBS | HEIGHT: 69 IN | HEART RATE: 94 BPM | OXYGEN SATURATION: 94 %

## 2018-12-19 DIAGNOSIS — I25.84 CORONARY ARTERY DISEASE DUE TO CALCIFIED CORONARY LESION: ICD-10-CM

## 2018-12-19 DIAGNOSIS — E78.5 DYSLIPIDEMIA: ICD-10-CM

## 2018-12-19 DIAGNOSIS — I25.10 CORONARY ARTERY DISEASE DUE TO CALCIFIED CORONARY LESION: ICD-10-CM

## 2018-12-19 PROCEDURE — 99214 OFFICE O/P EST MOD 30 MIN: CPT | Performed by: INTERNAL MEDICINE

## 2018-12-19 NOTE — LETTER
"     [facility]   [address]  [place] [address]  Phone: [phone]  Fax: [phone]              Talib Ortiz  1955    Encounter Date: 12/19/2018    Sukhi Frias M.D.          PROGRESS NOTE:  Chief Complaint   Patient presents with   • Coronary Artery Disease       Subjective:   Talib Ortiz is a 63 y.o. male who presents today for follow-up evaluation because of a history of coronary artery disease and dyslipidemia.     He is walking 3 times a week for 30 minutes without difficulty.  He denies any chest discomfort, dyspnea on exertion, PND, orthopnea or edema.  He has had no palpitations or lightheadedness.        Past Medical History:   Diagnosis Date   • Arthritis    • Blood clotting disorder (HCC) 1996    leg   • CAD (coronary artery disease)    • Heart attack (HCC) 8/16/2016    cardiology, Dr. Frias   • Heart burn    • High cholesterol    • Hyperlipidemia    • Kidney disease    • Kidney stone    • Obesity (BMI 30-39.9) 2/12/2015   • Osteoarthritis 3/2/2015   • Pneumonia 2016   • Psychiatric problem     Depression      Past Surgical History:   Procedure Laterality Date   • KNEE ARTHROPLASTY TOTAL Left 6/19/2017    Procedure: KNEE ARTHROPLASTY TOTAL;  Surgeon: Charles Castellanos M.D.;  Location: SURGERY [facility];  Service:    • MULTIPLE CORONARY ARTERY BYPASS  8/16/2016   • LAMINOTOMY  2012    lumbar   • KNEE ARTHROSCOPY Left 1990's   • OTHER SURGICAL PROCEDURE Left 1990's    \"removal of vein left leg due to blood clot\"   • SHOULDER ARTHROTOMY Right 1980's     Family History   Problem Relation Age of Onset   • Arthritis Mother    • Hyperlipidemia Mother    • Heart Disease Mother         bypass x4   • Diabetes Mother    • Hyperlipidemia Father    • Heart Attack Father 61   • Other Sister         back surgery   • Other Brother         joint problems     Social History     Social History   • Marital status:      Spouse name: " "N/A   • Number of children: N/A   • Years of education: N/A     Occupational History   • Not on file.     Social History Main Topics   • Smoking status: Former Smoker     Packs/day: 0.25     Years: 10.00     Types: Cigarettes     Quit date: 3/19/2016   • Smokeless tobacco: Never Used   • Alcohol use No      Comment: Quit years   • Drug use: No   • Sexual activity: Yes     Partners: Female     Other Topics Concern   • Not on file     Social History Narrative   • No narrative on file     No Known Allergies  Outpatient Encounter Prescriptions as of 12/19/2018   Medication Sig Dispense Refill   • aspirin EC (ECOTRIN) 81 MG Tablet Delayed Response Take 81 mg by mouth every day.     • metoprolol (LOPRESSOR) 25 MG Tab Take 1 Tab by mouth 2 times a day. 60 Tab 11   • cyanocobalamin (VITAMIN B12) 1000 MCG Tab Take 2 Tabs by mouth every day. (Patient taking differently: Take 1,000 mcg by mouth every day.) 30 Tab 3   • ferrous sulfate 325 (65 Fe) MG tablet Take 1 Tab by mouth every morning with breakfast. 30 Tab 3   • therapeutic multivitamin-minerals (THERAGRAN-M) Tab Take 1 Tab by mouth every day. 30 Tab 11   • ascorbic acid (VITAMIN C) 500 MG tablet Take 1 Tab by mouth every day. 30 Tab 3   • clopidogrel (PLAVIX) 75 MG Tab Take 1 Tab by mouth every day. 90 Tab 3   • rosuvastatin (CRESTOR) 40 MG tablet Take 1 Tab by mouth every evening. 90 Tab 3   • sertraline (ZOLOFT) 25 MG tablet TAKE ONE TABLET BY MOUTH TWICE DAILY 180 Tab 3   • ranitidine (ZANTAC) 150 MG Tab Take 150 mg by mouth 2 times a day.     • OMEPRAZOLE PO Take 2 Each by mouth every day at 6 PM. (20mg)       No facility-administered encounter medications on file as of 12/19/2018.      ROS     Objective:   /72 (BP Location: Left arm, Patient Position: Sitting)   Pulse 94   Ht 1.753 m (5' 9\")   Wt 99.8 kg (220 lb)   SpO2 94%   BMI 32.49 kg/m²      Physical Exam   Constitutional: He appears well-developed and well-nourished.   Neck: No JVD present.   "   Cardiovascular: Normal rate and regular rhythm.    No murmur heard.  Pulmonary/Chest: Effort normal and breath sounds normal. He has no rales.   Abdominal: Soft. There is no tenderness.   Musculoskeletal: He exhibits no edema.     Lab Results   Component Value Date/Time    CHOLSTRLTOT 133 04/24/2018 08:55 AM    LDL 66 04/24/2018 08:55 AM    HDL 44 04/24/2018 08:55 AM    TRIGLYCERIDE 115 04/24/2018 08:55 AM       Lab Results   Component Value Date/Time    SODIUM 142 05/11/2018 02:52 AM    POTASSIUM 4.0 05/11/2018 02:52 AM    CHLORIDE 107 05/11/2018 02:52 AM    CO2 24 05/11/2018 02:52 AM    GLUCOSE 104 (H) 05/11/2018 02:52 AM    BUN 13 05/11/2018 02:52 AM    CREATININE 1.10 05/11/2018 02:52 AM    BUNCREATRAT 13 02/16/2015 08:55 AM     Lab Results   Component Value Date/Time    ALKPHOSPHAT 28 (L) 05/09/2018 04:35 PM    ASTSGOT 15 05/09/2018 04:35 PM    ALTSGPT 13 05/09/2018 04:35 PM    TBILIRUBIN 0.4 05/09/2018 04:35 PM      Lab Results   Component Value Date/Time    BNPBTYPENAT 85 01/18/2017 09:43 AM          Assessment:     1. Coronary artery disease due to calcified coronary lesion: CABG ×2 in August 2016     2. Dyslipidemia           Medical Decision Making:  Today's Assessment / Status / Plan:     Mr. Ortiz is clinically stable.  He is exercising regularly and asymptomatic from a vascular standpoint.  His lipid status appears to be under good control.  However, given the new guidelines, we will obtain a high sensitivity CRP along with his lipid and CMP at the time of his next follow-up visit in 6 months.      Belen Knowles M.D.  [address]  [facility] [address]  [place] [address]  VIA In Basket

## 2018-12-19 NOTE — PROGRESS NOTES
"Chief Complaint   Patient presents with   • Coronary Artery Disease       Subjective:   Talib Ortiz is a 63 y.o. male who presents today for follow-up evaluation because of a history of coronary artery disease and dyslipidemia.     He is walking 3 times a week for 30 minutes without difficulty.  He denies any chest discomfort, dyspnea on exertion, PND, orthopnea or edema.  He has had no palpitations or lightheadedness.        Past Medical History:   Diagnosis Date   • Arthritis    • Blood clotting disorder ([facility]) 1996    leg   • CAD (coronary artery disease)    • Heart attack ([facility]) 8/16/2016    cardiology, Dr. Frias   • Heart burn    • High cholesterol    • Hyperlipidemia    • Kidney disease    • Kidney stone    • Obesity (BMI 30-39.9) 2/12/2015   • Osteoarthritis 3/2/2015   • Pneumonia 2016   • Psychiatric problem     Depression      Past Surgical History:   Procedure Laterality Date   • KNEE ARTHROPLASTY TOTAL Left 6/19/2017    Procedure: KNEE ARTHROPLASTY TOTAL;  Surgeon: Charles Castellanos M.D.;  Location: SURGERY [facility];  Service:    • MULTIPLE CORONARY ARTERY BYPASS  8/16/2016   • LAMINOTOMY  2012    lumbar   • KNEE ARTHROSCOPY Left 1990's   • OTHER SURGICAL PROCEDURE Left 1990's    \"removal of vein left leg due to blood clot\"   • SHOULDER ARTHROTOMY Right 1980's     Family History   Problem Relation Age of Onset   • Arthritis Mother    • Hyperlipidemia Mother    • Heart Disease Mother         bypass x4   • Diabetes Mother    • Hyperlipidemia Father    • Heart Attack Father 61   • Other Sister         back surgery   • Other Brother         joint problems     Social History     Social History   • Marital status:      Spouse name: N/A   • Number of children: N/A   • Years of education: N/A     Occupational History   • Not on file.     Social History Main Topics   • Smoking status: Former Smoker     Packs/day: 0.25     Years: 10.00     Types: Cigarettes     Quit date: 3/19/2016   • Smokeless " "tobacco: Never Used   • Alcohol use No      Comment: Quit years   • Drug use: No   • Sexual activity: Yes     Partners: Female     Other Topics Concern   • Not on file     Social History Narrative   • No narrative on file     No Known Allergies  Outpatient Encounter Prescriptions as of 12/19/2018   Medication Sig Dispense Refill   • aspirin EC (ECOTRIN) 81 MG Tablet Delayed Response Take 81 mg by mouth every day.     • metoprolol (LOPRESSOR) 25 MG Tab Take 1 Tab by mouth 2 times a day. 60 Tab 11   • cyanocobalamin (VITAMIN B12) 1000 MCG Tab Take 2 Tabs by mouth every day. (Patient taking differently: Take 1,000 mcg by mouth every day.) 30 Tab 3   • ferrous sulfate 325 (65 Fe) MG tablet Take 1 Tab by mouth every morning with breakfast. 30 Tab 3   • therapeutic multivitamin-minerals (THERAGRAN-M) Tab Take 1 Tab by mouth every day. 30 Tab 11   • ascorbic acid (VITAMIN C) 500 MG tablet Take 1 Tab by mouth every day. 30 Tab 3   • clopidogrel (PLAVIX) 75 MG Tab Take 1 Tab by mouth every day. 90 Tab 3   • rosuvastatin (CRESTOR) 40 MG tablet Take 1 Tab by mouth every evening. 90 Tab 3   • sertraline (ZOLOFT) 25 MG tablet TAKE ONE TABLET BY MOUTH TWICE DAILY 180 Tab 3   • ranitidine (ZANTAC) 150 MG Tab Take 150 mg by mouth 2 times a day.     • OMEPRAZOLE PO Take 2 Each by mouth every day at 6 PM. (20mg)       No facility-administered encounter medications on file as of 12/19/2018.      ROS     Objective:   /72 (BP Location: Left arm, Patient Position: Sitting)   Pulse 94   Ht 1.753 m (5' 9\")   Wt 99.8 kg (220 lb)   SpO2 94%   BMI 32.49 kg/m²     Physical Exam   Constitutional: He appears well-developed and well-nourished.   Neck: No JVD present.   Cardiovascular: Normal rate and regular rhythm.    No murmur heard.  Pulmonary/Chest: Effort normal and breath sounds normal. He has no rales.   Abdominal: Soft. There is no tenderness.   Musculoskeletal: He exhibits no edema.     Lab Results   Component Value Date/Time "    CHOLSTRLTOT 133 04/24/2018 08:55 AM    LDL 66 04/24/2018 08:55 AM    HDL 44 04/24/2018 08:55 AM    TRIGLYCERIDE 115 04/24/2018 08:55 AM       Lab Results   Component Value Date/Time    SODIUM 142 05/11/2018 02:52 AM    POTASSIUM 4.0 05/11/2018 02:52 AM    CHLORIDE 107 05/11/2018 02:52 AM    CO2 24 05/11/2018 02:52 AM    GLUCOSE 104 (H) 05/11/2018 02:52 AM    BUN 13 05/11/2018 02:52 AM    CREATININE 1.10 05/11/2018 02:52 AM    BUNCREATRAT 13 02/16/2015 08:55 AM     Lab Results   Component Value Date/Time    ALKPHOSPHAT 28 (L) 05/09/2018 04:35 PM    ASTSGOT 15 05/09/2018 04:35 PM    ALTSGPT 13 05/09/2018 04:35 PM    TBILIRUBIN 0.4 05/09/2018 04:35 PM      Lab Results   Component Value Date/Time    BNPBTYPENAT 85 01/18/2017 09:43 AM          Assessment:     1. Coronary artery disease due to calcified coronary lesion: CABG ×2 in August 2016     2. Dyslipidemia           Medical Decision Making:  Today's Assessment / Status / Plan:     Mr. Ortiz is clinically stable.  He is exercising regularly and asymptomatic from a vascular standpoint.  His lipid status appears to be under good control.  However, given the new guidelines, we will obtain a high sensitivity CRP along with his lipid and CMP at the time of his next follow-up visit in 6 months.

## 2019-01-21 RX ORDER — SERTRALINE HYDROCHLORIDE 25 MG/1
TABLET, FILM COATED ORAL
Qty: 180 TAB | Refills: 3 | Status: SHIPPED | OUTPATIENT
Start: 2019-01-21 | End: 2020-04-07

## 2019-04-23 ENCOUNTER — HOSPITAL ENCOUNTER (OUTPATIENT)
Dept: LAB | Facility: MEDICAL CENTER | Age: 64
End: 2019-04-23
Attending: INTERNAL MEDICINE
Payer: COMMERCIAL

## 2019-04-23 DIAGNOSIS — I25.10 CORONARY ARTERY DISEASE DUE TO CALCIFIED CORONARY LESION: ICD-10-CM

## 2019-04-23 DIAGNOSIS — I25.84 CORONARY ARTERY DISEASE DUE TO CALCIFIED CORONARY LESION: ICD-10-CM

## 2019-04-23 DIAGNOSIS — E78.5 DYSLIPIDEMIA: ICD-10-CM

## 2019-04-23 LAB — CRP SERPL HS-MCNC: 0.8 MG/L (ref 0–7.5)

## 2019-04-23 PROCEDURE — 86141 C-REACTIVE PROTEIN HS: CPT

## 2019-04-23 PROCEDURE — 36415 COLL VENOUS BLD VENIPUNCTURE: CPT

## 2019-05-29 ENCOUNTER — OFFICE VISIT (OUTPATIENT)
Dept: CARDIOLOGY | Facility: MEDICAL CENTER | Age: 64
End: 2019-05-29
Payer: COMMERCIAL

## 2019-05-29 VITALS
SYSTOLIC BLOOD PRESSURE: 114 MMHG | OXYGEN SATURATION: 93 % | HEIGHT: 69 IN | HEART RATE: 70 BPM | DIASTOLIC BLOOD PRESSURE: 62 MMHG | BODY MASS INDEX: 32.73 KG/M2 | WEIGHT: 221 LBS

## 2019-05-29 DIAGNOSIS — E78.5 DYSLIPIDEMIA: ICD-10-CM

## 2019-05-29 DIAGNOSIS — I25.10 CORONARY ARTERY DISEASE DUE TO CALCIFIED CORONARY LESION: ICD-10-CM

## 2019-05-29 DIAGNOSIS — I25.84 CORONARY ARTERY DISEASE DUE TO CALCIFIED CORONARY LESION: ICD-10-CM

## 2019-05-29 PROBLEM — R07.89 CHEST DISCOMFORT: Status: RESOLVED | Noted: 2018-05-18 | Resolved: 2019-05-29

## 2019-05-29 PROCEDURE — 99214 OFFICE O/P EST MOD 30 MIN: CPT | Performed by: INTERNAL MEDICINE

## 2019-05-29 NOTE — LETTER
"     [facility]   [address]  [place] [address]  Phone: [phone]  Fax: [phone]              Talib Ortiz  1955    Encounter Date: 5/29/2019    Sukhi Firas M.D.          PROGRESS NOTE:  Chief Complaint   Patient presents with   • Coronary Artery Disease     F/V: 6 MO       Subjective:   Talib Ortiz is a 63 y.o. male who presents today for follow-up evaluation because of a history of coronary artery disease and dyslipidemia.     He is walking about 3 times a week for about 15 minutes.    He denies any chest discomfort, dyspnea on exertion, PND, orthopnea or edema.  He has had no or lightheadedness.        Past Medical History:   Diagnosis Date   • Arthritis    • Blood clotting disorder (HCC) 1996    leg   • CAD (coronary artery disease)    • Heart attack (HCC) 8/16/2016    cardiology, Dr. Frias   • Heart burn    • High cholesterol    • Hyperlipidemia    • Kidney disease    • Kidney stone    • Obesity (BMI 30-39.9) 2/12/2015   • Osteoarthritis 3/2/2015   • Pneumonia 2016   • Psychiatric problem     Depression      Past Surgical History:   Procedure Laterality Date   • KNEE ARTHROPLASTY TOTAL Left 6/19/2017    Procedure: KNEE ARTHROPLASTY TOTAL;  Surgeon: Charles Castellanos M.D.;  Location: SURGERY [facility];  Service:    • MULTIPLE CORONARY ARTERY BYPASS  8/16/2016   • LAMINOTOMY  2012    lumbar   • KNEE ARTHROSCOPY Left 1990's   • OTHER SURGICAL PROCEDURE Left 1990's    \"removal of vein left leg due to blood clot\"   • SHOULDER ARTHROTOMY Right 1980's     Family History   Problem Relation Age of Onset   • Arthritis Mother    • Hyperlipidemia Mother    • Heart Disease Mother         bypass x4   • Diabetes Mother    • Hyperlipidemia Father    • Heart Attack Father 61   • Other Sister         back surgery   • Other Brother         joint problems     Social History     Social History   • Marital status:      Spouse name: N/A  " "  • Number of children: N/A   • Years of education: N/A     Occupational History   • Not on file.     Social History Main Topics   • Smoking status: Former Smoker     Packs/day: 0.25     Years: 10.00     Types: Cigarettes     Quit date: 3/19/2016   • Smokeless tobacco: Never Used   • Alcohol use No      Comment: Quit years   • Drug use: No   • Sexual activity: Yes     Partners: Female     Other Topics Concern   • Not on file     Social History Narrative   • No narrative on file     No Known Allergies  Outpatient Encounter Prescriptions as of 5/29/2019   Medication Sig Dispense Refill   • sertraline (ZOLOFT) 25 MG tablet TAKE ONE TABLET BY MOUTH TWICE DAILY 180 Tab 3   • aspirin EC (ECOTRIN) 81 MG Tablet Delayed Response Take 81 mg by mouth every day.     • OMEPRAZOLE PO Take 2 Each by mouth every day at 6 PM. (20mg)     • metoprolol (LOPRESSOR) 25 MG Tab Take 1 Tab by mouth 2 times a day. 60 Tab 11   • cyanocobalamin (VITAMIN B12) 1000 MCG Tab Take 2 Tabs by mouth every day. (Patient taking differently: Take 1,000 mcg by mouth every day.) 30 Tab 3   • ferrous sulfate 325 (65 Fe) MG tablet Take 1 Tab by mouth every morning with breakfast. 30 Tab 3   • therapeutic multivitamin-minerals (THERAGRAN-M) Tab Take 1 Tab by mouth every day. 30 Tab 11   • ascorbic acid (VITAMIN C) 500 MG tablet Take 1 Tab by mouth every day. 30 Tab 3   • clopidogrel (PLAVIX) 75 MG Tab Take 1 Tab by mouth every day. 90 Tab 3   • rosuvastatin (CRESTOR) 40 MG tablet Take 1 Tab by mouth every evening. 90 Tab 3   • ranitidine (ZANTAC) 150 MG Tab Take 150 mg by mouth 2 times a day.       No facility-administered encounter medications on file as of 5/29/2019.      ROS     Objective:   /62 (BP Location: Left arm, Patient Position: Sitting, BP Cuff Size: Adult)   Pulse 70   Ht 1.753 m (5' 9\")   Wt 100.2 kg (221 lb)   SpO2 93%   BMI 32.64 kg/m²      Physical Exam   Constitutional: He appears well-developed and well-nourished.   Neck: No JVD " present.   Cardiovascular: Normal rate and regular rhythm.    No murmur heard.  Pulmonary/Chest: Effort normal and breath sounds normal. He has no rales.   Abdominal: Soft. There is no tenderness.   Musculoskeletal: He exhibits no edema.     Lab Results   Component Value Date/Time    CHOLSTRLTOT 133 04/24/2018 08:55 AM    LDL 66 04/24/2018 08:55 AM    HDL 44 04/24/2018 08:55 AM    TRIGLYCERIDE 115 04/24/2018 08:55 AM       Lab Results   Component Value Date/Time    SODIUM 142 05/11/2018 02:52 AM    POTASSIUM 4.0 05/11/2018 02:52 AM    CHLORIDE 107 05/11/2018 02:52 AM    CO2 24 05/11/2018 02:52 AM    GLUCOSE 104 (H) 05/11/2018 02:52 AM    BUN 13 05/11/2018 02:52 AM    CREATININE 1.10 05/11/2018 02:52 AM    BUNCREATRAT 13 02/16/2015 08:55 AM     Lab Results   Component Value Date/Time    ALKPHOSPHAT 28 (L) 05/09/2018 04:35 PM    ASTSGOT 15 05/09/2018 04:35 PM    ALTSGPT 13 05/09/2018 04:35 PM    TBILIRUBIN 0.4 05/09/2018 04:35 PM      Lab Results   Component Value Date/Time    BNPBTYPENAT 85 01/18/2017 09:43 AM      Hs-CRP from April 2019: 0.8.    Assessment:     1. Coronary artery disease due to calcified coronary lesion: CABG ×2 in August 2016     2. Dyslipidemia         Medical Decision Making:  Today's Assessment / Status / Plan:     Mr. Ortiz is clinically stable.  He is on high-dose rosuvastatin and his hs-CRP is low.  We will continue him on his current medications.  He has not had a recent lipid panel and one will be obtained.  He will otherwise follow-up in a year with repeat lab work.  We did discuss a better walking program.      Belen Knowles M.D.  [address] [place], [place] [address]  [facility] [address]  [place] [address]  VIA In Basket

## 2019-05-29 NOTE — PROGRESS NOTES
"Chief Complaint   Patient presents with   • Coronary Artery Disease     F/V: 6 MO       Subjective:   Talib Ortiz is a 63 y.o. male who presents today for follow-up evaluation because of a history of coronary artery disease and dyslipidemia.     He is walking about 3 times a week for about 15 minutes.    He denies any chest discomfort, dyspnea on exertion, PND, orthopnea or edema.  He has had no or lightheadedness.        Past Medical History:   Diagnosis Date   • Arthritis    • Blood clotting disorder ([facility]) 1996    leg   • CAD (coronary artery disease)    • Heart attack ([facility]) 8/16/2016    cardiology, Dr. Frias   • Heart burn    • High cholesterol    • Hyperlipidemia    • Kidney disease    • Kidney stone    • Obesity (BMI 30-39.9) 2/12/2015   • Osteoarthritis 3/2/2015   • Pneumonia 2016   • Psychiatric problem     Depression      Past Surgical History:   Procedure Laterality Date   • KNEE ARTHROPLASTY TOTAL Left 6/19/2017    Procedure: KNEE ARTHROPLASTY TOTAL;  Surgeon: Charles Castellanos M.D.;  Location: SURGERY [facility];  Service:    • MULTIPLE CORONARY ARTERY BYPASS  8/16/2016   • LAMINOTOMY  2012    lumbar   • KNEE ARTHROSCOPY Left 1990's   • OTHER SURGICAL PROCEDURE Left 1990's    \"removal of vein left leg due to blood clot\"   • SHOULDER ARTHROTOMY Right 1980's     Family History   Problem Relation Age of Onset   • Arthritis Mother    • Hyperlipidemia Mother    • Heart Disease Mother         bypass x4   • Diabetes Mother    • Hyperlipidemia Father    • Heart Attack Father 61   • Other Sister         back surgery   • Other Brother         joint problems     Social History     Social History   • Marital status:      Spouse name: N/A   • Number of children: N/A   • Years of education: N/A     Occupational History   • Not on file.     Social History Main Topics   • Smoking status: Former Smoker     Packs/day: 0.25     Years: 10.00     Types: Cigarettes     Quit date: 3/19/2016   • Smokeless " "tobacco: Never Used   • Alcohol use No      Comment: Quit years   • Drug use: No   • Sexual activity: Yes     Partners: Female     Other Topics Concern   • Not on file     Social History Narrative   • No narrative on file     No Known Allergies  Outpatient Encounter Prescriptions as of 5/29/2019   Medication Sig Dispense Refill   • sertraline (ZOLOFT) 25 MG tablet TAKE ONE TABLET BY MOUTH TWICE DAILY 180 Tab 3   • aspirin EC (ECOTRIN) 81 MG Tablet Delayed Response Take 81 mg by mouth every day.     • OMEPRAZOLE PO Take 2 Each by mouth every day at 6 PM. (20mg)     • metoprolol (LOPRESSOR) 25 MG Tab Take 1 Tab by mouth 2 times a day. 60 Tab 11   • cyanocobalamin (VITAMIN B12) 1000 MCG Tab Take 2 Tabs by mouth every day. (Patient taking differently: Take 1,000 mcg by mouth every day.) 30 Tab 3   • ferrous sulfate 325 (65 Fe) MG tablet Take 1 Tab by mouth every morning with breakfast. 30 Tab 3   • therapeutic multivitamin-minerals (THERAGRAN-M) Tab Take 1 Tab by mouth every day. 30 Tab 11   • ascorbic acid (VITAMIN C) 500 MG tablet Take 1 Tab by mouth every day. 30 Tab 3   • clopidogrel (PLAVIX) 75 MG Tab Take 1 Tab by mouth every day. 90 Tab 3   • rosuvastatin (CRESTOR) 40 MG tablet Take 1 Tab by mouth every evening. 90 Tab 3   • ranitidine (ZANTAC) 150 MG Tab Take 150 mg by mouth 2 times a day.       No facility-administered encounter medications on file as of 5/29/2019.      ROS     Objective:   /62 (BP Location: Left arm, Patient Position: Sitting, BP Cuff Size: Adult)   Pulse 70   Ht 1.753 m (5' 9\")   Wt 100.2 kg (221 lb)   SpO2 93%   BMI 32.64 kg/m²     Physical Exam   Constitutional: He appears well-developed and well-nourished.   Neck: No JVD present.   Cardiovascular: Normal rate and regular rhythm.    No murmur heard.  Pulmonary/Chest: Effort normal and breath sounds normal. He has no rales.   Abdominal: Soft. There is no tenderness.   Musculoskeletal: He exhibits no edema.     Lab Results "   Component Value Date/Time    CHOLSTRLTOT 133 04/24/2018 08:55 AM    LDL 66 04/24/2018 08:55 AM    HDL 44 04/24/2018 08:55 AM    TRIGLYCERIDE 115 04/24/2018 08:55 AM       Lab Results   Component Value Date/Time    SODIUM 142 05/11/2018 02:52 AM    POTASSIUM 4.0 05/11/2018 02:52 AM    CHLORIDE 107 05/11/2018 02:52 AM    CO2 24 05/11/2018 02:52 AM    GLUCOSE 104 (H) 05/11/2018 02:52 AM    BUN 13 05/11/2018 02:52 AM    CREATININE 1.10 05/11/2018 02:52 AM    BUNCREATRAT 13 02/16/2015 08:55 AM     Lab Results   Component Value Date/Time    ALKPHOSPHAT 28 (L) 05/09/2018 04:35 PM    ASTSGOT 15 05/09/2018 04:35 PM    ALTSGPT 13 05/09/2018 04:35 PM    TBILIRUBIN 0.4 05/09/2018 04:35 PM      Lab Results   Component Value Date/Time    BNPBTYPENAT 85 01/18/2017 09:43 AM      Hs-CRP from April 2019: 0.8.    Assessment:     1. Coronary artery disease due to calcified coronary lesion: CABG ×2 in August 2016     2. Dyslipidemia         Medical Decision Making:  Today's Assessment / Status / Plan:     Mr. Ortiz is clinically stable.  He is on high-dose rosuvastatin and his hs-CRP is low.  We will continue him on his current medications.  He has not had a recent lipid panel and one will be obtained.  He will otherwise follow-up in a year.  We did discuss a better walking program.

## 2019-06-03 ENCOUNTER — HOSPITAL ENCOUNTER (OUTPATIENT)
Dept: LAB | Facility: MEDICAL CENTER | Age: 64
End: 2019-06-03
Attending: INTERNAL MEDICINE
Payer: COMMERCIAL

## 2019-06-03 DIAGNOSIS — I25.10 CORONARY ARTERY DISEASE DUE TO CALCIFIED CORONARY LESION: ICD-10-CM

## 2019-06-03 DIAGNOSIS — E78.5 DYSLIPIDEMIA: ICD-10-CM

## 2019-06-03 DIAGNOSIS — I25.84 CORONARY ARTERY DISEASE DUE TO CALCIFIED CORONARY LESION: ICD-10-CM

## 2019-06-03 LAB
ALBUMIN SERPL BCP-MCNC: 4.4 G/DL (ref 3.2–4.9)
ALBUMIN/GLOB SERPL: 1.6 G/DL
ALP SERPL-CCNC: 30 U/L (ref 30–99)
ALT SERPL-CCNC: 24 U/L (ref 2–50)
ANION GAP SERPL CALC-SCNC: 9 MMOL/L (ref 0–11.9)
AST SERPL-CCNC: 25 U/L (ref 12–45)
BILIRUB SERPL-MCNC: 0.6 MG/DL (ref 0.1–1.5)
BUN SERPL-MCNC: 16 MG/DL (ref 8–22)
CALCIUM SERPL-MCNC: 9 MG/DL (ref 8.5–10.5)
CHLORIDE SERPL-SCNC: 106 MMOL/L (ref 96–112)
CHOLEST SERPL-MCNC: 167 MG/DL (ref 100–199)
CO2 SERPL-SCNC: 24 MMOL/L (ref 20–33)
CREAT SERPL-MCNC: 1.1 MG/DL (ref 0.5–1.4)
FASTING STATUS PATIENT QL REPORTED: NORMAL
GLOBULIN SER CALC-MCNC: 2.7 G/DL (ref 1.9–3.5)
GLUCOSE SERPL-MCNC: 118 MG/DL (ref 65–99)
HDLC SERPL-MCNC: 44 MG/DL
LDLC SERPL CALC-MCNC: 87 MG/DL
POTASSIUM SERPL-SCNC: 4.1 MMOL/L (ref 3.6–5.5)
PROT SERPL-MCNC: 7.1 G/DL (ref 6–8.2)
SODIUM SERPL-SCNC: 139 MMOL/L (ref 135–145)
TRIGL SERPL-MCNC: 180 MG/DL (ref 0–149)

## 2019-06-03 PROCEDURE — 80061 LIPID PANEL: CPT

## 2019-06-03 PROCEDURE — 36415 COLL VENOUS BLD VENIPUNCTURE: CPT

## 2019-06-03 PROCEDURE — 80053 COMPREHEN METABOLIC PANEL: CPT

## 2019-06-04 ENCOUNTER — PATIENT MESSAGE (OUTPATIENT)
Dept: CARDIOLOGY | Facility: MEDICAL CENTER | Age: 64
End: 2019-06-04

## 2019-06-04 DIAGNOSIS — E78.5 DYSLIPIDEMIA: ICD-10-CM

## 2019-06-04 DIAGNOSIS — I25.10 CORONARY ARTERY DISEASE DUE TO CALCIFIED CORONARY LESION: ICD-10-CM

## 2019-06-04 DIAGNOSIS — E78.5 HYPERLIPIDEMIA, UNSPECIFIED HYPERLIPIDEMIA TYPE: ICD-10-CM

## 2019-06-04 DIAGNOSIS — I25.84 CORONARY ARTERY DISEASE DUE TO CALCIFIED CORONARY LESION: ICD-10-CM

## 2019-06-04 DIAGNOSIS — Z79.899 HIGH RISK MEDICATION USE: ICD-10-CM

## 2019-06-04 NOTE — PATIENT COMMUNICATION
FASTING STATUS   Order: [id number]   Status:  Final result   Visible to patient:  Yes (MyChart)   Notes recorded by Sukhi Frias M.D. on 6/4/2019 at 7:29 AM PDT  Please let patient know that his LDL has gone up significantly.  He has been as low was in the 50s in the past.  Reinforced diet and to repeat lipid panel in 3 months.  If his LDL does not come down, we may want to add ezetimibe.     My Chart message sent  Lipid profile ordered

## 2019-06-05 RX ORDER — CLOPIDOGREL BISULFATE 75 MG/1
75 TABLET ORAL DAILY
Qty: 90 TAB | Refills: 3 | Status: SHIPPED | OUTPATIENT
Start: 2019-06-05 | End: 2020-07-09

## 2019-06-10 DIAGNOSIS — E78.5 DYSLIPIDEMIA: ICD-10-CM

## 2019-06-10 DIAGNOSIS — I25.84 CORONARY ARTERY DISEASE DUE TO CALCIFIED CORONARY LESION: ICD-10-CM

## 2019-06-10 DIAGNOSIS — I25.10 CORONARY ARTERY DISEASE DUE TO CALCIFIED CORONARY LESION: ICD-10-CM

## 2019-06-11 RX ORDER — ROSUVASTATIN CALCIUM 40 MG/1
40 TABLET, COATED ORAL EVERY EVENING
Qty: 90 TAB | Refills: 3 | Status: SHIPPED | OUTPATIENT
Start: 2019-06-11 | End: 2020-07-09 | Stop reason: SDUPTHER

## 2019-10-10 DIAGNOSIS — I25.10 CORONARY ARTERY DISEASE DUE TO CALCIFIED CORONARY LESION: ICD-10-CM

## 2019-10-10 DIAGNOSIS — I25.84 CORONARY ARTERY DISEASE DUE TO CALCIFIED CORONARY LESION: ICD-10-CM

## 2020-04-07 RX ORDER — SERTRALINE HYDROCHLORIDE 25 MG/1
TABLET, FILM COATED ORAL
Qty: 180 TAB | Refills: 0 | Status: SHIPPED | OUTPATIENT
Start: 2020-04-07 | End: 2020-07-09

## 2020-07-09 ENCOUNTER — OFFICE VISIT (OUTPATIENT)
Dept: CARDIOLOGY | Facility: MEDICAL CENTER | Age: 65
End: 2020-07-09
Payer: COMMERCIAL

## 2020-07-09 VITALS
SYSTOLIC BLOOD PRESSURE: 110 MMHG | WEIGHT: 212 LBS | BODY MASS INDEX: 30.35 KG/M2 | OXYGEN SATURATION: 97 % | DIASTOLIC BLOOD PRESSURE: 62 MMHG | HEART RATE: 72 BPM | HEIGHT: 70 IN

## 2020-07-09 DIAGNOSIS — Z79.899 HIGH RISK MEDICATION USE: ICD-10-CM

## 2020-07-09 DIAGNOSIS — I10 HTN (HYPERTENSION), MALIGNANT: ICD-10-CM

## 2020-07-09 DIAGNOSIS — E78.5 DYSLIPIDEMIA: ICD-10-CM

## 2020-07-09 DIAGNOSIS — I25.10 CORONARY ARTERY DISEASE DUE TO CALCIFIED CORONARY LESION: ICD-10-CM

## 2020-07-09 DIAGNOSIS — I25.84 CORONARY ARTERY DISEASE DUE TO CALCIFIED CORONARY LESION: ICD-10-CM

## 2020-07-09 DIAGNOSIS — E78.2 MIXED HYPERLIPIDEMIA: ICD-10-CM

## 2020-07-09 PROCEDURE — 99215 OFFICE O/P EST HI 40 MIN: CPT | Performed by: INTERNAL MEDICINE

## 2020-07-09 RX ORDER — ROSUVASTATIN CALCIUM 40 MG/1
40 TABLET, COATED ORAL EVERY EVENING
Qty: 90 TAB | Refills: 3 | Status: SHIPPED | OUTPATIENT
Start: 2020-07-09 | End: 2020-10-13 | Stop reason: SDUPTHER

## 2020-07-09 ASSESSMENT — ENCOUNTER SYMPTOMS
DEPRESSION: 0
NAUSEA: 0
BLOOD IN STOOL: 0
SPEECH CHANGE: 0
FEVER: 0
CLAUDICATION: 0
BRUISES/BLEEDS EASILY: 0
DIZZINESS: 0
LOSS OF CONSCIOUSNESS: 0
MYALGIAS: 0
FALLS: 0
HEADACHES: 0
SHORTNESS OF BREATH: 0
HALLUCINATIONS: 0
SENSORY CHANGE: 0
PND: 0
VOMITING: 0
COUGH: 0
BLURRED VISION: 0
EYE DISCHARGE: 0
WEIGHT LOSS: 0
ORTHOPNEA: 0
PALPITATIONS: 0
EYE PAIN: 0
DOUBLE VISION: 0
CHILLS: 0
ABDOMINAL PAIN: 0

## 2020-07-09 ASSESSMENT — FIBROSIS 4 INDEX: FIB4 SCORE: 1.29

## 2020-07-09 NOTE — PROGRESS NOTES
"Chief Complaint   Patient presents with   • Coronary Artery Bypass Graft (CABG)   • Coronary Artery Disease       Subjective:   Talib Ortiz is a 64 y.o. male who presents today for cardiac care and management of established coronary arterial disease status post CABG x2 in 2016, hypertension, hyperlipidemia.    Patient also had the most recent nuclear stress test done in May 2018 which showed no evidence of coronary ischemia.  His most recent transthoracic echocardiogram in January 2017 showed normal LV function with no significant valvular disease.  I personally interpreted the images of his transthoracic echocardiogram.    I have independently interpreted and reviewed blood tests results with patient in clinic which shows normal LDL level 87, triglycerides 180, renal and liver function.    In the interim, patient has been doing well without having any symptoms. Patient denies having chest pain, dyspnea, palpitation, presyncope, syncope episodes. Able to climb up at least 2 flights of stairs.    Past Medical History:   Diagnosis Date   • Arthritis    • Blood clotting disorder ([facility]) 1996    leg   • CAD (coronary artery disease)    • Heart attack ([facility]) 8/16/2016    cardiology, Dr. Frias   • Heart burn    • High cholesterol    • Hyperlipidemia    • Kidney disease    • Kidney stone    • Obesity (BMI 30-39.9) 2/12/2015   • Osteoarthritis 3/2/2015   • Pneumonia 2016   • Psychiatric problem     Depression      Past Surgical History:   Procedure Laterality Date   • KNEE ARTHROPLASTY TOTAL Left 6/19/2017    Procedure: KNEE ARTHROPLASTY TOTAL;  Surgeon: Charles Castellanos M.D.;  Location: SURGERY [facility];  Service:    • MULTIPLE CORONARY ARTERY BYPASS  8/16/2016   • LAMINOTOMY  2012    lumbar   • KNEE ARTHROSCOPY Left 1990's   • OTHER SURGICAL PROCEDURE Left 1990's    \"removal of vein left leg due to blood clot\"   • SHOULDER ARTHROTOMY Right 1980's     Family History   Problem Relation Age of Onset   • Arthritis " Mother    • Hyperlipidemia Mother    • Heart Disease Mother         bypass x4   • Diabetes Mother    • Hyperlipidemia Father    • Heart Attack Father 61   • Other Sister         back surgery   • Other Brother         joint problems     Social History     Socioeconomic History   • Marital status:      Spouse name: Not on file   • Number of children: Not on file   • Years of education: Not on file   • Highest education level: Not on file   Occupational History   • Not on file   Social Needs   • Financial resource strain: Not on file   • Food insecurity     Worry: Not on file     Inability: Not on file   • Transportation needs     Medical: Not on file     Non-medical: Not on file   Tobacco Use   • Smoking status: Former Smoker     Packs/day: 0.25     Years: 10.00     Pack years: 2.50     Types: Cigarettes     Last attempt to quit: 3/19/2016     Years since quittin.3   • Smokeless tobacco: Never Used   Substance and Sexual Activity   • Alcohol use: No     Comment: Quit years   • Drug use: No   • Sexual activity: Yes     Partners: Female   Lifestyle   • Physical activity     Days per week: Not on file     Minutes per session: Not on file   • Stress: Not on file   Relationships   • Social connections     Talks on phone: Not on file     Gets together: Not on file     Attends [employer] service: Not on file     Active member of club or organization: Not on file     Attends meetings of clubs or organizations: Not on file     Relationship status: Not on file   • Intimate partner violence     Fear of current or ex partner: Not on file     Emotionally abused: Not on file     Physically abused: Not on file     Forced sexual activity: Not on file   Other Topics Concern   • Not on file   Social History Narrative   • Not on file     No Known Allergies  Outpatient Encounter Medications as of 2020   Medication Sig Dispense Refill   • sertraline (ZOLOFT) 50 MG Tab TAKE 1 2 (ONE HALF) TABLET BY MOUTH TWICE DAILY     •  metoprolol (LOPRESSOR) 25 MG Tab TAKE 1 TABLET BY MOUTH TWICE DAILY 180 Tab 3   • rosuvastatin (CRESTOR) 40 MG tablet Take 1 Tab by mouth every evening. 90 Tab 3   • clopidogrel (PLAVIX) 75 MG Tab Take 1 Tab by mouth every day. 90 Tab 3   • aspirin EC (ECOTRIN) 81 MG Tablet Delayed Response Take 81 mg by mouth every day.     • OMEPRAZOLE PO Take 2 Each by mouth every day at 6 PM. (20mg)     • cyanocobalamin (VITAMIN B12) 1000 MCG Tab Take 2 Tabs by mouth every day. (Patient taking differently: Take 1,000 mcg by mouth every day.) 30 Tab 3   • ferrous sulfate 325 (65 Fe) MG tablet Take 1 Tab by mouth every morning with breakfast. 30 Tab 3   • therapeutic multivitamin-minerals (THERAGRAN-M) Tab Take 1 Tab by mouth every day. 30 Tab 11   • ascorbic acid (VITAMIN C) 500 MG tablet Take 1 Tab by mouth every day. 30 Tab 3   • ranitidine (ZANTAC) 150 MG Tab Take 150 mg by mouth 2 times a day.     • sertraline (ZOLOFT) 25 MG tablet Take 1 tablet by mouth twice daily (Patient not taking: Reported on 7/9/2020) 180 Tab 0     No facility-administered encounter medications on file as of 7/9/2020.      Review of Systems   Constitutional: Negative for chills, fever, malaise/fatigue and weight loss.   HENT: Negative for ear discharge, ear pain, hearing loss and nosebleeds.    Eyes: Negative for blurred vision, double vision, pain and discharge.   Respiratory: Negative for cough and shortness of breath.    Cardiovascular: Negative for chest pain, palpitations, orthopnea, claudication, leg swelling and PND.   Gastrointestinal: Negative for abdominal pain, blood in stool, melena, nausea and vomiting.   Genitourinary: Negative for dysuria and hematuria.   Musculoskeletal: Negative for falls, joint pain and myalgias.   Skin: Negative for itching and rash.   Neurological: Negative for dizziness, sensory change, speech change, loss of consciousness and headaches.   Endo/Heme/Allergies: Negative for environmental allergies. Does not  "bruise/bleed easily.   Psychiatric/Behavioral: Negative for depression, hallucinations and suicidal ideas.        Objective:   /62 (BP Location: Left arm, Patient Position: Sitting, BP Cuff Size: Adult)   Pulse 72   Ht 1.778 m (5' 10\")   Wt 96.2 kg (212 lb)   SpO2 97%   BMI 30.42 kg/m²     Physical Exam   Constitutional: He is oriented to person, place, and time. No distress.   HENT:   Head: Normocephalic and atraumatic.   Right Ear: External ear normal.   Left Ear: External ear normal.   Eyes: Right eye exhibits no discharge. Left eye exhibits no discharge.   Neck: No JVD present. No thyromegaly present.   Cardiovascular: Normal rate, regular rhythm, normal heart sounds and intact distal pulses. Exam reveals no gallop and no friction rub.   No murmur heard.  Pulmonary/Chest: Breath sounds normal. No respiratory distress.   Abdominal: Bowel sounds are normal. He exhibits no distension. There is no abdominal tenderness.   Musculoskeletal:         General: No tenderness or edema.   Neurological: He is alert and oriented to person, place, and time. No cranial nerve deficit.   Skin: Skin is warm and dry. He is not diaphoretic.   Psychiatric: He has a normal mood and affect. His behavior is normal.   Nursing note and vitals reviewed.      Assessment:     1. Coronary artery disease due to calcified coronary lesion: CABG ×2 in August 2016     2. Mixed hyperlipidemia     3. HTN (hypertension), malignant     4. High risk medication use         Medical Decision Making:  Today's Assessment / Status / Plan:   Coronary arterial disease s/p CABG x 2 in 2016:  Betablocker as Metoprolol 25 mg po 2x daily.  ASA 81 mg po daily.  Statin as Rosuvastatin 40 mg po daily  Not on ACE-I or ARB for unknown reason. Will consider starting at next visit once patient is more acquainted with me (used to see Dr. Hawk before).    Based on recent data, in patient with established coronary to disease, addition of Xarelto 2.5 mg twice a " day to Aspirin 81 mg daily has been shown to further reduce CV mortality.  We will start patient on Xarelto 2.5 mg twice a day and will stop Clopidogrel.     Hypertension:  Optimize control with BB, ACE-I or ARB as permitted above in conjunction with CAD treatment.     Hyperlipidemia:  Optimize statin as within guidelines of CAD treatment as above.    Overall, based on prior history of obstructive coronary arterial disease, patient is at at high risk for recurrent events and will require consistent ongoing guidelines directed medical therapy to reduce his cardiovascular mortality and associated complications.    I will see patient back in clinic with lab tests and studies results in 3 months.    I thank you for referring patient to our Cardiology Clinic today.      Marlee Harvey MD.   [facility].  [phone]  [place], [place].

## 2020-10-13 ENCOUNTER — OFFICE VISIT (OUTPATIENT)
Dept: CARDIOLOGY | Facility: MEDICAL CENTER | Age: 65
End: 2020-10-13
Payer: COMMERCIAL

## 2020-10-13 VITALS
BODY MASS INDEX: 29.63 KG/M2 | HEIGHT: 70 IN | WEIGHT: 207 LBS | DIASTOLIC BLOOD PRESSURE: 74 MMHG | SYSTOLIC BLOOD PRESSURE: 130 MMHG | HEART RATE: 80 BPM | OXYGEN SATURATION: 96 %

## 2020-10-13 DIAGNOSIS — Z79.899 HIGH RISK MEDICATION USE: ICD-10-CM

## 2020-10-13 DIAGNOSIS — E78.2 MIXED HYPERLIPIDEMIA: ICD-10-CM

## 2020-10-13 DIAGNOSIS — E78.5 DYSLIPIDEMIA: ICD-10-CM

## 2020-10-13 DIAGNOSIS — I25.84 CORONARY ARTERY DISEASE DUE TO CALCIFIED CORONARY LESION: ICD-10-CM

## 2020-10-13 DIAGNOSIS — I25.10 CORONARY ARTERY DISEASE DUE TO CALCIFIED CORONARY LESION: ICD-10-CM

## 2020-10-13 DIAGNOSIS — I10 HTN (HYPERTENSION), MALIGNANT: ICD-10-CM

## 2020-10-13 PROCEDURE — 99214 OFFICE O/P EST MOD 30 MIN: CPT | Performed by: INTERNAL MEDICINE

## 2020-10-13 RX ORDER — ROSUVASTATIN CALCIUM 40 MG/1
40 TABLET, COATED ORAL EVERY EVENING
Qty: 90 TAB | Refills: 3 | Status: SHIPPED | OUTPATIENT
Start: 2020-10-13 | End: 2021-11-30 | Stop reason: SDUPTHER

## 2020-10-13 RX ORDER — LOSARTAN POTASSIUM 25 MG/1
25 TABLET ORAL DAILY
Qty: 90 TAB | Refills: 3 | Status: SHIPPED | OUTPATIENT
Start: 2020-10-13 | End: 2021-11-30 | Stop reason: SDUPTHER

## 2020-10-13 ASSESSMENT — ENCOUNTER SYMPTOMS
DIZZINESS: 0
DOUBLE VISION: 0
VOMITING: 0
SPEECH CHANGE: 0
CHILLS: 0
SHORTNESS OF BREATH: 0
BRUISES/BLEEDS EASILY: 0
PALPITATIONS: 0
ABDOMINAL PAIN: 0
WEIGHT LOSS: 0
EYE PAIN: 0
NAUSEA: 0
EYE DISCHARGE: 0
ORTHOPNEA: 0
BLOOD IN STOOL: 0
BLURRED VISION: 0
HEADACHES: 0
SENSORY CHANGE: 0
FEVER: 0
FALLS: 0
MYALGIAS: 0
PND: 0
COUGH: 0
LOSS OF CONSCIOUSNESS: 0
DEPRESSION: 0
CLAUDICATION: 0
HALLUCINATIONS: 0

## 2020-10-13 NOTE — PROGRESS NOTES
"Chief Complaint   Patient presents with   • Coronary Artery Disease      due to calcified coronary lesion: CABG ×2 in August 2016   • Dyslipidemia       Subjective:   Talib Ortiz is a 64 y.o. male who presents today for cardiac care and management of established coronary arterial disease status post CABG x2 in 2016, hypertension, hyperlipidemia.    Patient also had the most recent nuclear stress test done in May 2018 which showed no evidence of coronary ischemia.  His most recent transthoracic echocardiogram in January 2017 showed normal LV function with no significant valvular disease.  I personally interpreted the images of his transthoracic echocardiogram.    I have independently interpreted and reviewed blood tests results with patient in clinic which shows normal LDL level 87, triglycerides 180, renal and liver function.    In the interim, patient has been doing well without having any symptoms. Patient denies having chest pain, dyspnea, palpitation, presyncope, syncope episodes. Able to climb up at least 2 flights of stairs.    NO bleeding on Xarelto 2.5 mg bid dosing.    Past Medical History:   Diagnosis Date   • Arthritis    • Blood clotting disorder ([facility]) 1996    leg   • CAD (coronary artery disease)    • Heart attack ([facility]) 8/16/2016    cardiology, Dr. Frias   • Heart burn    • High cholesterol    • Hyperlipidemia    • Kidney disease    • Kidney stone    • Obesity (BMI 30-39.9) 2/12/2015   • Osteoarthritis 3/2/2015   • Pneumonia 2016   • Psychiatric problem     Depression      Past Surgical History:   Procedure Laterality Date   • KNEE ARTHROPLASTY TOTAL Left 6/19/2017    Procedure: KNEE ARTHROPLASTY TOTAL;  Surgeon: Charles Castellanos M.D.;  Location: SURGERY [facility];  Service:    • MULTIPLE CORONARY ARTERY BYPASS  8/16/2016   • LAMINOTOMY  2012    lumbar   • KNEE ARTHROSCOPY Left 1990's   • OTHER SURGICAL PROCEDURE Left 1990's    \"removal of vein left leg due to blood clot\"   • SHOULDER " ARTHROTOMY Right      Family History   Problem Relation Age of Onset   • Arthritis Mother    • Hyperlipidemia Mother    • Heart Disease Mother         bypass x4   • Diabetes Mother    • Hyperlipidemia Father    • Heart Attack Father 61   • Other Sister         back surgery   • Other Brother         joint problems     Social History     Socioeconomic History   • Marital status:      Spouse name: Not on file   • Number of children: Not on file   • Years of education: Not on file   • Highest education level: Not on file   Occupational History   • Not on file   Social Needs   • Financial resource strain: Not on file   • Food insecurity     Worry: Not on file     Inability: Not on file   • Transportation needs     Medical: Not on file     Non-medical: Not on file   Tobacco Use   • Smoking status: Former Smoker     Packs/day: 0.25     Years: 10.00     Pack years: 2.50     Types: Cigarettes     Quit date: 3/19/2016     Years since quittin.5   • Smokeless tobacco: Never Used   Substance and Sexual Activity   • Alcohol use: No     Comment: Quit years   • Drug use: No   • Sexual activity: Yes     Partners: Female   Lifestyle   • Physical activity     Days per week: Not on file     Minutes per session: Not on file   • Stress: Not on file   Relationships   • Social connections     Talks on phone: Not on file     Gets together: Not on file     Attends Gnosticist service: Not on file     Active member of club or organization: Not on file     Attends meetings of clubs or organizations: Not on file     Relationship status: Not on file   • Intimate partner violence     Fear of current or ex partner: Not on file     Emotionally abused: Not on file     Physically abused: Not on file     Forced sexual activity: Not on file   Other Topics Concern   • Not on file   Social History Narrative   • Not on file     No Known Allergies  Outpatient Encounter Medications as of 10/13/2020   Medication Sig Dispense Refill   •  sertraline (ZOLOFT) 50 MG Tab Take 1/2 (one-half) tablet by mouth twice daily 90 Tab 0   • rivaroxaban (XARELTO) 2.5 MG tablet Take 1 Tab by mouth 2 times a day. 60 Tab 11   • metoprolol (LOPRESSOR) 25 MG Tab Take 1 Tab by mouth 2 times a day. 180 Tab 4   • rosuvastatin (CRESTOR) 40 MG tablet Take 1 Tab by mouth every evening. 90 Tab 3   • aspirin EC (ECOTRIN) 81 MG Tablet Delayed Response Take 81 mg by mouth every day.     • OMEPRAZOLE PO Take 2 Each by mouth every day at 6 PM. (20mg)     • ferrous sulfate 325 (65 Fe) MG tablet Take 1 Tab by mouth every morning with breakfast. 30 Tab 3   • therapeutic multivitamin-minerals (THERAGRAN-M) Tab Take 1 Tab by mouth every day. 30 Tab 11   • ascorbic acid (VITAMIN C) 500 MG tablet Take 1 Tab by mouth every day. 30 Tab 3   • ranitidine (ZANTAC) 150 MG Tab Take 150 mg by mouth 2 times a day.       No facility-administered encounter medications on file as of 10/13/2020.      Review of Systems   Constitutional: Negative for chills, fever, malaise/fatigue and weight loss.   HENT: Negative for ear discharge, ear pain, hearing loss and nosebleeds.    Eyes: Negative for blurred vision, double vision, pain and discharge.   Respiratory: Negative for cough and shortness of breath.    Cardiovascular: Negative for chest pain, palpitations, orthopnea, claudication, leg swelling and PND.   Gastrointestinal: Negative for abdominal pain, blood in stool, melena, nausea and vomiting.   Genitourinary: Negative for dysuria and hematuria.   Musculoskeletal: Negative for falls, joint pain and myalgias.   Skin: Negative for itching and rash.   Neurological: Negative for dizziness, sensory change, speech change, loss of consciousness and headaches.   Endo/Heme/Allergies: Negative for environmental allergies. Does not bruise/bleed easily.   Psychiatric/Behavioral: Negative for depression, hallucinations and suicidal ideas.        Objective:   /74 (BP Location: Left arm, Patient Position:  "Sitting, BP Cuff Size: Adult)   Pulse 80   Ht 1.778 m (5' 10\")   Wt 93.9 kg (207 lb)   SpO2 96%   BMI 29.70 kg/m²     Physical Exam   Constitutional: He is oriented to person, place, and time. No distress.   HENT:   Head: Normocephalic and atraumatic.   Right Ear: External ear normal.   Left Ear: External ear normal.   Eyes: Right eye exhibits no discharge. Left eye exhibits no discharge.   Neck: No JVD present. No thyromegaly present.   Cardiovascular: Normal rate, regular rhythm, normal heart sounds and intact distal pulses. Exam reveals no gallop and no friction rub.   No murmur heard.  Pulmonary/Chest: Breath sounds normal. No respiratory distress.   Abdominal: Bowel sounds are normal. He exhibits no distension. There is no abdominal tenderness.   Musculoskeletal:         General: No tenderness or edema.   Neurological: He is alert and oriented to person, place, and time. No cranial nerve deficit.   Skin: Skin is warm and dry. He is not diaphoretic.   Psychiatric: He has a normal mood and affect. His behavior is normal.   Nursing note and vitals reviewed.      Assessment:     1. Coronary artery disease due to calcified coronary lesion: CABG ×2 in August 2016     2. Mixed hyperlipidemia     3. HTN (hypertension), malignant     4. Dyslipidemia     5. High risk medication use         Medical Decision Making:  Today's Assessment / Status / Plan:   Coronary arterial disease s/p CABG x 2 in 2016:  Betablocker as Metoprolol 25 mg po 2x daily.  ASA 81 mg po daily.  Statin as Rosuvastatin 40 mg po daily.  Will start Losartan 25 mg po daily.    Based on recent data, in patient with established coronary to disease, addition of Xarelto 2.5 mg twice a day to Aspirin 81 mg daily has been shown to further reduce CV mortality.  We will continue patient on Xarelto 2.5 mg twice a day.    Hypertension:  Optimize control with BB, ARB as permitted above in conjunction with CAD treatment.     Hyperlipidemia:  Optimize statin as " within guidelines of CAD treatment as above.    Overall, based on prior history of obstructive coronary arterial disease, patient is at at high risk for recurrent events and will require consistent ongoing guidelines directed medical therapy to reduce his cardiovascular mortality and associated complications.    I will see patient back in clinic with lab tests and studies results in 6 months.    I thank you for referring patient to our Cardiology Clinic today.      Marlee Harvey MD.   [facility].  [phone]  [place].

## 2020-10-19 ENCOUNTER — HOSPITAL ENCOUNTER (OUTPATIENT)
Dept: LAB | Facility: MEDICAL CENTER | Age: 65
End: 2020-10-19
Attending: NURSE PRACTITIONER
Payer: COMMERCIAL

## 2020-10-19 LAB
ANION GAP SERPL CALC-SCNC: 12 MMOL/L (ref 7–16)
BUN SERPL-MCNC: 24 MG/DL (ref 8–22)
CALCIUM SERPL-MCNC: 9.2 MG/DL (ref 8.5–10.5)
CHLORIDE SERPL-SCNC: 103 MMOL/L (ref 96–112)
CO2 SERPL-SCNC: 23 MMOL/L (ref 20–33)
CREAT SERPL-MCNC: 1.02 MG/DL (ref 0.5–1.4)
GLUCOSE SERPL-MCNC: 114 MG/DL (ref 65–99)
POTASSIUM SERPL-SCNC: 4.5 MMOL/L (ref 3.6–5.5)
SODIUM SERPL-SCNC: 138 MMOL/L (ref 135–145)

## 2020-10-19 PROCEDURE — 80048 BASIC METABOLIC PNL TOTAL CA: CPT

## 2020-10-19 PROCEDURE — 36415 COLL VENOUS BLD VENIPUNCTURE: CPT

## 2020-10-21 ENCOUNTER — TELEPHONE (OUTPATIENT)
Dept: CARDIOLOGY | Facility: MEDICAL CENTER | Age: 65
End: 2020-10-21

## 2020-10-21 NOTE — TELEPHONE ENCOUNTER
Received clearance request from [facility] for pt's EGD/Colonoscopy with propofol sedation on 12/3/20. They would also like pt to hold Xarelto for 2 days prior.     Hx: CAD, s/p CABG in 2016, HTN, HLD    To TT

## 2020-10-22 NOTE — TELEPHONE ENCOUNTER
You  Marlee Harvey M.D. 16 hours ago (3:14 PM)     Ok to proceed with EGD/Colonoscopy and hold Xarelto 2 days prior?      Marlee Harvey M.D.  You 15 hours ago (4:30 PM)     Ok to proceed and hold Xarelto 2 days prior.     Marlee Harvey M.D.      Form completed, signed by GEORGE, faxed to [phone]. Receipt confirmed, form to scanning.

## 2020-10-28 ENCOUNTER — OFFICE VISIT (OUTPATIENT)
Dept: URGENT CARE | Facility: CLINIC | Age: 65
End: 2020-10-28
Payer: COMMERCIAL

## 2020-10-28 ENCOUNTER — APPOINTMENT (OUTPATIENT)
Dept: MEDICAL GROUP | Facility: MEDICAL CENTER | Age: 65
End: 2020-10-28
Payer: COMMERCIAL

## 2020-10-28 ENCOUNTER — HOSPITAL ENCOUNTER (OUTPATIENT)
Facility: MEDICAL CENTER | Age: 65
End: 2020-10-28
Attending: NURSE PRACTITIONER
Payer: COMMERCIAL

## 2020-10-28 VITALS
WEIGHT: 201.2 LBS | BODY MASS INDEX: 28.8 KG/M2 | TEMPERATURE: 97.9 F | OXYGEN SATURATION: 95 % | HEIGHT: 70 IN | HEART RATE: 97 BPM | RESPIRATION RATE: 14 BRPM | SYSTOLIC BLOOD PRESSURE: 102 MMHG | DIASTOLIC BLOOD PRESSURE: 58 MMHG

## 2020-10-28 DIAGNOSIS — J06.9 VIRAL UPPER RESPIRATORY TRACT INFECTION WITH COUGH: ICD-10-CM

## 2020-10-28 PROCEDURE — U0003 INFECTIOUS AGENT DETECTION BY NUCLEIC ACID (DNA OR RNA); SEVERE ACUTE RESPIRATORY SYNDROME CORONAVIRUS 2 (SARS-COV-2) (CORONAVIRUS DISEASE [COVID-19]), AMPLIFIED PROBE TECHNIQUE, MAKING USE OF HIGH THROUGHPUT TECHNOLOGIES AS DESCRIBED BY CMS-2020-01-R: HCPCS

## 2020-10-28 PROCEDURE — 99214 OFFICE O/P EST MOD 30 MIN: CPT | Mod: CS | Performed by: NURSE PRACTITIONER

## 2020-10-28 RX ORDER — BENZONATATE 100 MG/1
100 CAPSULE ORAL 3 TIMES DAILY PRN
Qty: 30 CAP | Refills: 0 | Status: SHIPPED | OUTPATIENT
Start: 2020-10-28 | End: 2022-12-04

## 2020-10-29 DIAGNOSIS — J06.9 VIRAL UPPER RESPIRATORY TRACT INFECTION WITH COUGH: ICD-10-CM

## 2020-10-29 LAB
COVID ORDER STATUS COVID19: NORMAL
SARS-COV-2 RNA RESP QL NAA+PROBE: DETECTED
SPECIMEN SOURCE: ABNORMAL

## 2020-10-29 NOTE — PROGRESS NOTES
Chief Complaint   Patient presents with   • Fatigue     x 2 days.  Yesterday cough, sore throat, congestion, headache, chills and low grade fever.        HISTORY OF PRESENT ILLNESS: Patient is a 65 y.o. male with a history of cardiac disease who presents today due to symptoms which started two days ago. Pt reports a cough, rhinitis, sore throat, fever, chills, fatigue, malaise, and body aches. Denies nausea, diarrhea, chest pain, shortness of breath, or wheezing. Denies h/o asthma/copd. Admits to distant h/o CAP. Has tried OTC cold medications without significant relief of symptoms. No recent ABX use. No other aggravating or alleviating factors. Reports no known exposure to COVID-19.       Patient Active Problem List    Diagnosis Date Noted   • Iron deficiency anemia 05/09/2018     Priority: High   • Low serum vitamin B12 05/16/2018   • Rash 05/09/2018   • Primary osteoarthritis of left knee 04/06/2017   • Depression 04/06/2017   • Gastroesophageal reflux disease without esophagitis 04/06/2017   • Coronary artery disease due to calcified coronary lesion: CABG ×2 in August 2016 01/06/2017   • Dyslipidemia 03/02/2015   • Osteoarthritis 03/02/2015   • Obesity (BMI 30-39.9) 02/12/2015       Allergies:Patient has no known allergies.    Current Outpatient Medications Ordered in Epic   Medication Sig Dispense Refill   • benzonatate (TESSALON) 100 MG Cap Take 1 Cap by mouth 3 times a day as needed. 30 Cap 0   • losartan (COZAAR) 25 MG Tab Take 1 Tab by mouth every day. 90 Tab 3   • rosuvastatin (CRESTOR) 40 MG tablet Take 1 Tab by mouth every evening. 90 Tab 3   • rivaroxaban (XARELTO) 2.5 MG tablet Take 1 Tab by mouth 2 times a day. 180 Tab 3   • metoprolol (LOPRESSOR) 25 MG Tab Take 1 Tab by mouth 2 times a day. 180 Tab 4   • sertraline (ZOLOFT) 50 MG Tab Take 1/2 (one-half) tablet by mouth twice daily 90 Tab 0   • aspirin EC (ECOTRIN) 81 MG Tablet Delayed Response Take 81 mg by mouth every day.     • OMEPRAZOLE PO Take 2  Each by mouth every day at 6 PM. (20mg)     • ferrous sulfate 325 (65 Fe) MG tablet Take 1 Tab by mouth every morning with breakfast. 30 Tab 3   • therapeutic multivitamin-minerals (THERAGRAN-M) Tab Take 1 Tab by mouth every day. 30 Tab 11   • ascorbic acid (VITAMIN C) 500 MG tablet Take 1 Tab by mouth every day. 30 Tab 3   • ranitidine (ZANTAC) 150 MG Tab Take 150 mg by mouth 2 times a day.       No current [facility]-ordered facility-administered medications on file.        Past Medical History:   Diagnosis Date   • Arthritis    • Blood clotting disorder ([facility])     leg   • CAD (coronary artery disease)    • Heart attack ([facility]) 2016    cardiology, Dr. Frias   • Heart burn    • High cholesterol    • Hyperlipidemia    • Kidney disease    • Kidney stone    • Obesity (BMI 30-39.9) 2015   • Osteoarthritis 3/2/2015   • Pneumonia    • Psychiatric problem     Depression        Social History     Tobacco Use   • Smoking status: Former Smoker     Packs/day: 0.25     Years: 10.00     Pack years: 2.50     Types: Cigarettes     Quit date: 3/19/2016     Years since quittin.6   • Smokeless tobacco: Never Used   Substance Use Topics   • Alcohol use: No     Comment: Quit years   • Drug use: No       Family Status   Relation Name Status   • Mo  Alive   • Fa  Alive   • Sis Johnna Alive   • Bro  Alive   • Sis  Alive     Family History   Problem Relation Age of Onset   • Arthritis Mother    • Hyperlipidemia Mother    • Heart Disease Mother         bypass x4   • Diabetes Mother    • Hyperlipidemia Father    • Heart Attack Father 61   • Other Sister         back surgery   • Other Brother         joint problems       ROS:  Review of Systems   Constitutional: Positive for subjective fever, chills, fatigue, malaise. Negative for weight loss.  HENT: Positive for rhinitis, sore throat. Negative for ear pain, nosebleeds, and neck pain.    Eyes: Negative for vision changes.   Cardiovascular: Negative for chest pain, palpitations,  "orthopnea and leg swelling.   Respiratory: Positive for cough without sputum production. Negative for shortness of breath and wheezing.   Gastrointestinal: Negative for abdominal pain, vomiting or diarrhea.   Skin: Negative for rash, diaphoresis.     Exam:  /58   Pulse 97   Temp 36.6 °C (97.9 °F) (Temporal)   Resp 14   Ht 1.778 m (5' 10\")   Wt 91.3 kg (201 lb 3.2 oz)   SpO2 95%   General: well-nourished, well-developed male in NAD  Head: normocephalic, atraumatic  Eyes: PERRLA, EOM within normal limits, no conjunctival injection, no scleral icterus, visual fields and acuity grossly intact.  Ears: normal shape and symmetry, no tenderness, no discharge. External canals are without any significant edema or erythema. Tympanic membranes are without any inflammation, no effusion. Gross auditory acuity is intact.  Nose: symmetrical without tenderness, mild discharge, erythema present bilateral nares.  Mouth/Throat: reasonable hygiene, no exudates or tonsillar enlargement. Mild erythema present.   Neck: no masses, range of motion within normal limits, no tracheal deviation.  Lymph: mild cervical adenopathy. No supraclavicular adenopathy.   Neuro: alert and oriented. Cranial nerves 1-12 grossly intact.   Cardiovascular: regular rate and rhythm without murmurs, rubs, or gallops. No edema.   Pulmonary: no distress. Chest is symmetrical with respiration. No wheezes, crackles, or rhonchi.   Musculoskeletal: appropriate muscle tone, gait is stable.  Skin: warm, dry, intact, no clubbing, no cyanosis.   Psych: appropriate mood, affect, judgement.         Assessment/Plan:  1. Viral upper respiratory tract infection with cough  COVID/SARS CoV-2 PCR    benzonatate (TESSALON) 100 MG Cap           Discussed symptoms most likely viral, will test for COVID. Home quarantine advised. Discussed natural progression and sx care. Tessalon for cough, please have non ill family member retrieve from pharmacy.  Rest, increase fluids, " hand and respiratory hygiene. May take OTC medications as directed for symptom relief. STRICT ER precautions.   Supportive care, differential diagnoses, and indications for immediate follow-up discussed with patient.   Pathogenesis of diagnosis discussed including typical length and natural progression.  Instructed to return to clinic or nearest emergency department for any change in condition, further concerns, or worsening of symptoms.  Patient states understanding of the plan of care and discharge instructions.          VERITO Ayala.

## 2021-03-03 DIAGNOSIS — Z23 NEED FOR VACCINATION: ICD-10-CM

## 2021-10-18 ENCOUNTER — TELEPHONE (OUTPATIENT)
Dept: CARDIOLOGY | Facility: MEDICAL CENTER | Age: 66
End: 2021-10-18

## 2021-10-18 NOTE — TELEPHONE ENCOUNTER
Marlee Harvey M.D.  You 2 hours ago (2:24 PM)     yes    Message text      Clearance response faxed, receipt confirmed.

## 2021-10-18 NOTE — TELEPHONE ENCOUNTER
Clearance request received from [facility] for pt's extractions, crowns, fillings, and cleaning. Fax clearance response to [phone].     To TT, ok for pt to proceed w/out the need for prophylactic abx?

## 2021-11-22 ENCOUNTER — TELEPHONE (OUTPATIENT)
Dept: CARDIOLOGY | Facility: MEDICAL CENTER | Age: 66
End: 2021-11-22

## 2021-11-22 DIAGNOSIS — E78.5 DYSLIPIDEMIA: ICD-10-CM

## 2021-11-22 DIAGNOSIS — I10 HTN (HYPERTENSION), MALIGNANT: ICD-10-CM

## 2021-11-22 NOTE — TELEPHONE ENCOUNTER
TT     Pt called wanting to know if labs are needing to be completed prior to his upcoming appointment. If so pt will be going to a [facility] lab    Thank you,   Jonh HAQ

## 2021-11-24 ENCOUNTER — HOSPITAL ENCOUNTER (OUTPATIENT)
Dept: LAB | Facility: MEDICAL CENTER | Age: 66
End: 2021-11-24
Attending: INTERNAL MEDICINE
Payer: COMMERCIAL

## 2021-11-24 DIAGNOSIS — E78.5 DYSLIPIDEMIA: ICD-10-CM

## 2021-11-24 DIAGNOSIS — I10 HTN (HYPERTENSION), MALIGNANT: ICD-10-CM

## 2021-11-24 LAB
ANION GAP SERPL CALC-SCNC: 12 MMOL/L (ref 7–16)
BUN SERPL-MCNC: 20 MG/DL (ref 8–22)
CALCIUM SERPL-MCNC: 9.3 MG/DL (ref 8.5–10.5)
CHLORIDE SERPL-SCNC: 107 MMOL/L (ref 96–112)
CHOLEST SERPL-MCNC: 142 MG/DL (ref 100–199)
CO2 SERPL-SCNC: 22 MMOL/L (ref 20–33)
CREAT SERPL-MCNC: 0.94 MG/DL (ref 0.5–1.4)
GLUCOSE SERPL-MCNC: 105 MG/DL (ref 65–99)
HDLC SERPL-MCNC: 52 MG/DL
LDLC SERPL CALC-MCNC: 77 MG/DL
POTASSIUM SERPL-SCNC: 4.4 MMOL/L (ref 3.6–5.5)
SODIUM SERPL-SCNC: 141 MMOL/L (ref 135–145)
TRIGL SERPL-MCNC: 66 MG/DL (ref 0–149)

## 2021-11-24 PROCEDURE — 80061 LIPID PANEL: CPT

## 2021-11-24 PROCEDURE — 80048 BASIC METABOLIC PNL TOTAL CA: CPT

## 2021-11-24 PROCEDURE — 36415 COLL VENOUS BLD VENIPUNCTURE: CPT

## 2021-11-30 ENCOUNTER — OFFICE VISIT (OUTPATIENT)
Dept: CARDIOLOGY | Facility: MEDICAL CENTER | Age: 66
End: 2021-11-30
Payer: COMMERCIAL

## 2021-11-30 VITALS
SYSTOLIC BLOOD PRESSURE: 110 MMHG | HEART RATE: 87 BPM | HEIGHT: 71 IN | RESPIRATION RATE: 14 BRPM | WEIGHT: 197 LBS | OXYGEN SATURATION: 99 % | BODY MASS INDEX: 27.58 KG/M2 | DIASTOLIC BLOOD PRESSURE: 78 MMHG

## 2021-11-30 DIAGNOSIS — I10 HTN (HYPERTENSION), MALIGNANT: ICD-10-CM

## 2021-11-30 DIAGNOSIS — E78.2 MIXED HYPERLIPIDEMIA: ICD-10-CM

## 2021-11-30 DIAGNOSIS — I25.84 CORONARY ARTERY DISEASE DUE TO CALCIFIED CORONARY LESION: ICD-10-CM

## 2021-11-30 DIAGNOSIS — E78.5 DYSLIPIDEMIA: ICD-10-CM

## 2021-11-30 DIAGNOSIS — Z79.899 HIGH RISK MEDICATION USE: ICD-10-CM

## 2021-11-30 DIAGNOSIS — I25.10 CORONARY ARTERY DISEASE DUE TO CALCIFIED CORONARY LESION: ICD-10-CM

## 2021-11-30 DIAGNOSIS — R73.09 ELEVATED RANDOM BLOOD GLUCOSE LEVEL: ICD-10-CM

## 2021-11-30 PROCEDURE — 99214 OFFICE O/P EST MOD 30 MIN: CPT | Performed by: INTERNAL MEDICINE

## 2021-11-30 RX ORDER — ROSUVASTATIN CALCIUM 40 MG/1
40 TABLET, COATED ORAL EVERY EVENING
Qty: 90 TABLET | Refills: 4 | Status: SHIPPED | OUTPATIENT
Start: 2021-11-30 | End: 2022-07-27 | Stop reason: SDUPTHER

## 2021-11-30 RX ORDER — LOSARTAN POTASSIUM 25 MG/1
25 TABLET ORAL DAILY
Qty: 90 TABLET | Refills: 4 | Status: SHIPPED | OUTPATIENT
Start: 2021-11-30 | End: 2022-07-27 | Stop reason: SDUPTHER

## 2021-11-30 ASSESSMENT — ENCOUNTER SYMPTOMS
FEVER: 0
NAUSEA: 0
FALLS: 0
PND: 0
HALLUCINATIONS: 0
DEPRESSION: 0
CLAUDICATION: 0
LOSS OF CONSCIOUSNESS: 0
HEADACHES: 0
DIZZINESS: 0
COUGH: 0
BRUISES/BLEEDS EASILY: 0
VOMITING: 0
BLOOD IN STOOL: 0
BLURRED VISION: 0
PALPITATIONS: 0
SENSORY CHANGE: 0
WEIGHT LOSS: 0
DOUBLE VISION: 0
ABDOMINAL PAIN: 0
EYE DISCHARGE: 0
CHILLS: 0
ORTHOPNEA: 0
SHORTNESS OF BREATH: 0
MYALGIAS: 0
SPEECH CHANGE: 0
EYE PAIN: 0

## 2021-11-30 NOTE — PROGRESS NOTES
"No chief complaint on file.      Subjective:   Talib Ortiz is a 64 y.o. male who presents today for cardiac care and management of established coronary arterial disease status post CABG x2 in 2016, hypertension, hyperlipidemia.    Patient also had the most recent nuclear stress test done in May 2018 which showed no evidence of coronary ischemia.  His most recent transthoracic echocardiogram in January 2017 showed normal LV function with no significant valvular disease.  I personally interpreted the images of his transthoracic echocardiogram.    I have independently interpreted and reviewed blood tests results with patient in clinic which shows normal LDL level 77, triglycerides 66, renal and liver function.    In the interim, patient has been doing well without having any symptoms. Patient denies having chest pain, dyspnea, palpitation, presyncope, syncope episodes. Able to climb up at least 2 flights of stairs.    NO bleeding on Xarelto 2.5 mg bid dosing.    Past Medical History:   Diagnosis Date   • Arthritis    • Blood clotting disorder (HCC) 1996    leg   • CAD (coronary artery disease)    • Heart attack (HCC) 8/16/2016    cardiology, Dr. Frias   • Heart burn    • High cholesterol    • Hyperlipidemia    • Kidney disease    • Kidney stone    • Obesity (BMI 30-39.9) 2/12/2015   • Osteoarthritis 3/2/2015   • Pneumonia 2016   • Psychiatric problem     Depression      Past Surgical History:   Procedure Laterality Date   • KNEE ARTHROPLASTY TOTAL Left 6/19/2017    Procedure: KNEE ARTHROPLASTY TOTAL;  Surgeon: Charles Castellanos M.D.;  Location: SURGERY [facility];  Service:    • MULTIPLE CORONARY ARTERY BYPASS  8/16/2016   • LAMINOTOMY  2012    lumbar   • KNEE ARTHROSCOPY Left 1990's   • OTHER SURGICAL PROCEDURE Left 1990's    \"removal of vein left leg due to blood clot\"   • SHOULDER ARTHROTOMY Right 1980's     Family History   Problem Relation Age of Onset   • Arthritis Mother    • Hyperlipidemia Mother  "   • Heart Disease Mother         bypass x4   • Diabetes Mother    • Hyperlipidemia Father    • Heart Attack Father 61   • Other Sister         back surgery   • Other Brother         joint problems     Social History     Socioeconomic History   • Marital status:      Spouse name: Not on file   • Number of children: Not on file   • Years of education: Not on file   • Highest education level: Not on file   Occupational History   • Not on file   Tobacco Use   • Smoking status: Former Smoker     Packs/day: 0.25     Years: 10.00     Pack years: 2.50     Types: Cigarettes     Quit date: 3/19/2016     Years since quittin.7   • Smokeless tobacco: Never Used   Vaping Use   • Vaping Use: Never used   Substance and Sexual Activity   • Alcohol use: No     Comment: Quit years   • Drug use: No   • Sexual activity: Yes     Partners: Female   Other Topics Concern   • Not on file   Social History Narrative   • Not on file     Social Determinants of Health     Financial Resource Strain:    • Difficulty of Paying Living Expenses: Not on file   Food Insecurity:    • Worried About Running Out of Food in the Last Year: Not on file   • Ran Out of Food in the Last Year: Not on file   Transportation Needs:    • Lack of Transportation (Medical): Not on file   • Lack of Transportation (Non-Medical): Not on file   Physical Activity:    • Days of Exercise per Week: Not on file   • Minutes of Exercise per Session: Not on file   Stress:    • Feeling of Stress : Not on file   Social Connections:    • Frequency of Communication with Friends and Family: Not on file   • Frequency of Social Gatherings with Friends and Family: Not on file   • Attends Confucianism Services: Not on file   • Active Member of Clubs or Organizations: Not on file   • Attends Club or Organization Meetings: Not on file   • Marital Status: Not on file   Intimate Partner Violence:    • Fear of Current or Ex-Partner: Not on file   • Emotionally Abused: Not on file   •  Physically Abused: Not on file   • Sexually Abused: Not on file   Housing Stability:    • Unable to Pay for Housing in the Last Year: Not on file   • Number of Places Lived in the Last Year: Not on file   • Unstable Housing in the Last Year: Not on file     No Known Allergies  Outpatient Encounter Medications as of 11/30/2021   Medication Sig Dispense Refill   • sertraline (ZOLOFT) 50 MG Tab Take 1/2 (one-half) tablet by mouth twice daily 90 Tab 0   • benzonatate (TESSALON) 100 MG Cap Take 1 Cap by mouth 3 times a day as needed. 30 Cap 0   • losartan (COZAAR) 25 MG Tab Take 1 Tab by mouth every day. 90 Tab 3   • rosuvastatin (CRESTOR) 40 MG tablet Take 1 Tab by mouth every evening. 90 Tab 3   • rivaroxaban (XARELTO) 2.5 MG tablet Take 1 Tab by mouth 2 times a day. 180 Tab 3   • metoprolol (LOPRESSOR) 25 MG Tab Take 1 Tab by mouth 2 times a day. 180 Tab 4   • aspirin EC (ECOTRIN) 81 MG Tablet Delayed Response Take 81 mg by mouth every day.     • OMEPRAZOLE PO Take 2 Each by mouth every day at 6 PM. (20mg)     • ferrous sulfate 325 (65 Fe) MG tablet Take 1 Tab by mouth every morning with breakfast. 30 Tab 3   • therapeutic multivitamin-minerals (THERAGRAN-M) Tab Take 1 Tab by mouth every day. 30 Tab 11   • ascorbic acid (VITAMIN C) 500 MG tablet Take 1 Tab by mouth every day. 30 Tab 3   • ranitidine (ZANTAC) 150 MG Tab Take 150 mg by mouth 2 times a day.       No facility-administered encounter medications on file as of 11/30/2021.     Review of Systems   Constitutional: Negative for chills, fever, malaise/fatigue and weight loss.   HENT: Negative for ear discharge, ear pain, hearing loss and nosebleeds.    Eyes: Negative for blurred vision, double vision, pain and discharge.   Respiratory: Negative for cough and shortness of breath.    Cardiovascular: Negative for chest pain, palpitations, orthopnea, claudication, leg swelling and PND.   Gastrointestinal: Negative for abdominal pain, blood in stool, melena, nausea  "and vomiting.   Genitourinary: Negative for dysuria and hematuria.   Musculoskeletal: Negative for falls, joint pain and myalgias.   Skin: Negative for itching and rash.   Neurological: Negative for dizziness, sensory change, speech change, loss of consciousness and headaches.   Endo/Heme/Allergies: Negative for environmental allergies. Does not bruise/bleed easily.   Psychiatric/Behavioral: Negative for depression, hallucinations and suicidal ideas.        Objective:   /78 (BP Location: Left arm, Patient Position: Sitting, BP Cuff Size: Adult)   Pulse 87   Resp 14   Ht 1.803 m (5' 11\")   Wt 89.4 kg (197 lb)   SpO2 99%   BMI 27.48 kg/m²     Physical Exam  Vitals and nursing note reviewed.   Constitutional:       General: He is not in acute distress.     Appearance: He is not diaphoretic.   HENT:      Head: Normocephalic and atraumatic.      Right Ear: External ear normal.      Left Ear: External ear normal.      Nose: No congestion or rhinorrhea.   Eyes:      General:         Right eye: No discharge.         Left eye: No discharge.   Neck:      Thyroid: No thyromegaly.      Vascular: No JVD.   Cardiovascular:      Rate and Rhythm: Normal rate and regular rhythm.      Pulses: Normal pulses.   Pulmonary:      Effort: No respiratory distress.   Abdominal:      General: There is no distension.      Tenderness: There is no abdominal tenderness.   Musculoskeletal:         General: No swelling or tenderness.      Right lower leg: No edema.      Left lower leg: No edema.   Skin:     General: Skin is warm and dry.   Neurological:      Mental Status: He is alert and oriented to person, place, and time.      Cranial Nerves: No cranial nerve deficit.   Psychiatric:         Behavior: Behavior normal.         Assessment:     1. HTN (hypertension), malignant     2. Dyslipidemia     3. Coronary artery disease due to calcified coronary lesion: CABG ×2 in August 2016     4. Mixed hyperlipidemia     5. High risk medication " use         Medical Decision Making:  Today's Assessment / Status / Plan:   Coronary arterial disease s/p CABG x 2 in 2016:  Betablocker as Metoprolol 25 mg po 2x daily.  ASA 81 mg po daily.  Statin as Rosuvastatin 40 mg po daily.  Will continue Losartan 25 mg po daily.    Based on recent data, in patient with established coronary to disease, addition of Xarelto 2.5 mg twice a day to Aspirin 81 mg daily has been shown to further reduce CV mortality.  We will continue patient on Xarelto 2.5 mg twice a day.    Hypertension:  Optimize control with BB, ARB as permitted above in conjunction with CAD treatment.     Hyperlipidemia:  Optimize statin as within guidelines of CAD treatment as above.    Overall, based on prior history of obstructive coronary arterial disease, patient is at at high risk for recurrent events and will require consistent ongoing guidelines directed medical therapy to reduce his cardiovascular mortality and associated complications.    I will see patient back in clinic with lab tests and studies results in 6 months.    I thank you for referring patient to our Cardiology Clinic today.      Marlee Harvey MD.   [facility].  [phone]  [place], [place].

## 2022-04-05 ENCOUNTER — HOSPITAL ENCOUNTER (OUTPATIENT)
Dept: LAB | Facility: MEDICAL CENTER | Age: 67
End: 2022-04-05
Attending: INTERNAL MEDICINE
Payer: COMMERCIAL

## 2022-04-05 DIAGNOSIS — E78.2 MIXED HYPERLIPIDEMIA: ICD-10-CM

## 2022-04-05 LAB
ANION GAP SERPL CALC-SCNC: 13 MMOL/L (ref 7–16)
BUN SERPL-MCNC: 18 MG/DL (ref 8–22)
CALCIUM SERPL-MCNC: 9.1 MG/DL (ref 8.5–10.5)
CHLORIDE SERPL-SCNC: 105 MMOL/L (ref 96–112)
CHOLEST SERPL-MCNC: 165 MG/DL (ref 100–199)
CO2 SERPL-SCNC: 21 MMOL/L (ref 20–33)
CREAT SERPL-MCNC: 1.05 MG/DL (ref 0.5–1.4)
EST. AVERAGE GLUCOSE BLD GHB EST-MCNC: 120 MG/DL
FASTING STATUS PATIENT QL REPORTED: NORMAL
GFR SERPLBLD CREATININE-BSD FMLA CKD-EPI: 78 ML/MIN/1.73 M 2
GLUCOSE SERPL-MCNC: 103 MG/DL (ref 65–99)
HBA1C MFR BLD: 5.8 % (ref 4–5.6)
HDLC SERPL-MCNC: 57 MG/DL
LDLC SERPL CALC-MCNC: 85 MG/DL
POTASSIUM SERPL-SCNC: 4.5 MMOL/L (ref 3.6–5.5)
SODIUM SERPL-SCNC: 139 MMOL/L (ref 135–145)
TRIGL SERPL-MCNC: 116 MG/DL (ref 0–149)

## 2022-04-05 PROCEDURE — 80061 LIPID PANEL: CPT

## 2022-04-05 PROCEDURE — 36415 COLL VENOUS BLD VENIPUNCTURE: CPT

## 2022-04-05 PROCEDURE — 83036 HEMOGLOBIN GLYCOSYLATED A1C: CPT

## 2022-04-05 PROCEDURE — 80048 BASIC METABOLIC PNL TOTAL CA: CPT

## 2022-04-26 ENCOUNTER — OFFICE VISIT (OUTPATIENT)
Dept: CARDIOLOGY | Facility: MEDICAL CENTER | Age: 67
End: 2022-04-26
Payer: COMMERCIAL

## 2022-04-26 VITALS
DIASTOLIC BLOOD PRESSURE: 60 MMHG | SYSTOLIC BLOOD PRESSURE: 102 MMHG | HEART RATE: 89 BPM | OXYGEN SATURATION: 95 % | WEIGHT: 193 LBS | RESPIRATION RATE: 16 BRPM | HEIGHT: 71 IN | BODY MASS INDEX: 27.02 KG/M2

## 2022-04-26 DIAGNOSIS — E11.65 TYPE 2 DIABETES MELLITUS WITH HYPERGLYCEMIA, WITHOUT LONG-TERM CURRENT USE OF INSULIN (HCC): ICD-10-CM

## 2022-04-26 DIAGNOSIS — E78.2 MIXED HYPERLIPIDEMIA: ICD-10-CM

## 2022-04-26 DIAGNOSIS — Z79.899 HIGH RISK MEDICATION USE: ICD-10-CM

## 2022-04-26 DIAGNOSIS — I25.84 CORONARY ARTERY DISEASE DUE TO CALCIFIED CORONARY LESION: ICD-10-CM

## 2022-04-26 DIAGNOSIS — I10 HTN (HYPERTENSION), MALIGNANT: ICD-10-CM

## 2022-04-26 DIAGNOSIS — E78.5 DYSLIPIDEMIA: ICD-10-CM

## 2022-04-26 DIAGNOSIS — I25.10 CORONARY ARTERY DISEASE DUE TO CALCIFIED CORONARY LESION: ICD-10-CM

## 2022-04-26 PROCEDURE — 99214 OFFICE O/P EST MOD 30 MIN: CPT | Performed by: INTERNAL MEDICINE

## 2022-04-26 RX ORDER — EMPAGLIFLOZIN 10 MG/1
10 TABLET, FILM COATED ORAL DAILY
Qty: 30 TABLET | Refills: 11 | Status: SHIPPED | OUTPATIENT
Start: 2022-04-26 | End: 2022-05-04

## 2022-04-26 ASSESSMENT — ENCOUNTER SYMPTOMS
COUGH: 0
EYE PAIN: 0
EYE DISCHARGE: 0
PALPITATIONS: 0
BLURRED VISION: 0
SPEECH CHANGE: 0
BLOOD IN STOOL: 0
FALLS: 0
BRUISES/BLEEDS EASILY: 0
WEIGHT LOSS: 0
PND: 0
CHILLS: 0
SHORTNESS OF BREATH: 0
HALLUCINATIONS: 0
DIZZINESS: 0
DEPRESSION: 0
SENSORY CHANGE: 0
CLAUDICATION: 0
ORTHOPNEA: 0
MYALGIAS: 0
LOSS OF CONSCIOUSNESS: 0
ABDOMINAL PAIN: 0
VOMITING: 0
NAUSEA: 0
HEADACHES: 0
FEVER: 0
DOUBLE VISION: 0

## 2022-04-26 NOTE — PROGRESS NOTES
Chief Complaint   Patient presents with   • Hypertension   • Coronary Artery Disease     F/V DX: Coronary artery disease due to calcified coronary lesion: CABG ×2 in August 2016   • Dyslipidemia       Subjective:   Talib Ortiz is a 66  y.o. male who presents today for cardiac care and management of established coronary arterial disease status post CABG x2 in 2016, hypertension, hyperlipidemia.    Patient also had the most recent nuclear stress test done in May 2018 which showed no evidence of coronary ischemia.  His most recent transthoracic echocardiogram in January 2017 showed normal LV function with no significant valvular disease.  I personally interpreted the images of his transthoracic echocardiogram.    I have independently interpreted and reviewed blood tests results with patient in clinic which shows normal LDL level 85, triglycerides 116, renal and liver function. HgbA1c of 5.8.    In the interim, patient has been doing well without having any symptoms. Patient denies having chest pain, dyspnea, palpitation, presyncope, syncope episodes. Able to climb up at least 2 flights of stairs.    NO bleeding on Xarelto 2.5 mg bid dosing.    Past Medical History:   Diagnosis Date   • Arthritis    • Blood clotting disorder ([facility]) 1996    leg   • CAD (coronary artery disease)    • Heart attack (HCC) 8/16/2016    cardiology, Dr. Frias   • Heart burn    • High cholesterol    • Hyperlipidemia    • Kidney disease    • Kidney stone    • Obesity (BMI 30-39.9) 2/12/2015   • Osteoarthritis 3/2/2015   • Pneumonia 2016   • Psychiatric problem     Depression      Past Surgical History:   Procedure Laterality Date   • KNEE ARTHROPLASTY TOTAL Left 6/19/2017    Procedure: KNEE ARTHROPLASTY TOTAL;  Surgeon: Charles Castellanos M.D.;  Location: SURGERY [facility];  Service:    • MULTIPLE CORONARY ARTERY BYPASS  8/16/2016   • LAMINOTOMY  2012    lumbar   • KNEE ARTHROSCOPY Left 1990's   • OTHER SURGICAL PROCEDURE Left 1990's     "\"removal of vein left leg due to blood clot\"   • SHOULDER ARTHROTOMY Right      Family History   Problem Relation Age of Onset   • Arthritis Mother    • Hyperlipidemia Mother    • Heart Disease Mother         bypass x4   • Diabetes Mother    • Hyperlipidemia Father    • Heart Attack Father 61   • Other Sister         back surgery   • Other Brother         joint problems     Social History     Socioeconomic History   • Marital status:      Spouse name: Not on file   • Number of children: Not on file   • Years of education: Not on file   • Highest education level: Not on file   Occupational History   • Not on file   Tobacco Use   • Smoking status: Former Smoker     Packs/day: 0.25     Years: 10.00     Pack years: 2.50     Types: Cigarettes     Quit date: 3/19/2016     Years since quittin.1   • Smokeless tobacco: Never Used   Vaping Use   • Vaping Use: Never used   Substance and Sexual Activity   • Alcohol use: No     Comment: Quit years   • Drug use: No   • Sexual activity: Yes     Partners: Female   Other Topics Concern   • Not on file   Social History Narrative   • Not on file     Social Determinants of Health     Financial Resource Strain: Not on file   Food Insecurity: Not on file   Transportation Needs: Not on file   Physical Activity: Not on file   Stress: Not on file   Social Connections: Not on file   Intimate Partner Violence: Not on file   Housing Stability: Not on file     No Known Allergies  Outpatient Encounter Medications as of 2022   Medication Sig Dispense Refill   • Empagliflozin (JARDIANCE) 10 MG Tab Take 10 mg by mouth every day. 30 Tablet 11   • metoprolol tartrate (LOPRESSOR) 25 MG Tab Take 1 Tablet by mouth 2 times a day. 180 Tablet 4   • rosuvastatin (CRESTOR) 40 MG tablet Take 1 Tablet by mouth every evening. 90 Tablet 4   • rivaroxaban (XARELTO) 2.5 MG tablet Take 1 Tablet by mouth 2 times a day. 180 Tablet 4   • losartan (COZAAR) 25 MG Tab Take 1 Tablet by mouth every " "day. 90 Tablet 4   • sertraline (ZOLOFT) 50 MG Tab Take 1/2 (one-half) tablet by mouth twice daily 90 Tab 0   • benzonatate (TESSALON) 100 MG Cap Take 1 Cap by mouth 3 times a day as needed. 30 Cap 0   • aspirin EC (ECOTRIN) 81 MG Tablet Delayed Response Take 81 mg by mouth every day.     • OMEPRAZOLE PO Take 2 Each by mouth every day at 6 PM. (20mg)     • ferrous sulfate 325 (65 Fe) MG tablet Take 1 Tab by mouth every morning with breakfast. 30 Tab 3   • therapeutic multivitamin-minerals (THERAGRAN-M) Tab Take 1 Tab by mouth every day. 30 Tab 11   • ascorbic acid (VITAMIN C) 500 MG tablet Take 1 Tab by mouth every day. 30 Tab 3   • ranitidine (ZANTAC) 150 MG Tab Take 150 mg by mouth 2 times a day.       No facility-administered encounter medications on file as of 4/26/2022.     Review of Systems   Constitutional: Negative for chills, fever, malaise/fatigue and weight loss.   HENT: Negative for ear discharge, ear pain, hearing loss and nosebleeds.    Eyes: Negative for blurred vision, double vision, pain and discharge.   Respiratory: Negative for cough and shortness of breath.    Cardiovascular: Negative for chest pain, palpitations, orthopnea, claudication, leg swelling and PND.   Gastrointestinal: Negative for abdominal pain, blood in stool, melena, nausea and vomiting.   Genitourinary: Negative for dysuria and hematuria.   Musculoskeletal: Negative for falls, joint pain and myalgias.   Skin: Negative for itching and rash.   Neurological: Negative for dizziness, sensory change, speech change, loss of consciousness and headaches.   Endo/Heme/Allergies: Negative for environmental allergies. Does not bruise/bleed easily.   Psychiatric/Behavioral: Negative for depression, hallucinations and suicidal ideas.        Objective:   /60 (BP Location: Left arm, Patient Position: Sitting, BP Cuff Size: Adult)   Pulse 89   Resp 16   Ht 1.803 m (5' 11\")   Wt 87.5 kg (193 lb)   SpO2 95%   BMI 26.92 kg/m²     Physical " Exam  Vitals and nursing note reviewed.   Constitutional:       General: He is not in acute distress.     Appearance: He is not diaphoretic.   HENT:      Head: Normocephalic and atraumatic.      Right Ear: External ear normal.      Left Ear: External ear normal.      Nose: No congestion or rhinorrhea.   Eyes:      General:         Right eye: No discharge.         Left eye: No discharge.   Neck:      Thyroid: No thyromegaly.      Vascular: No JVD.   Cardiovascular:      Rate and Rhythm: Normal rate and regular rhythm.      Pulses: Normal pulses.   Pulmonary:      Effort: No respiratory distress.   Abdominal:      General: There is no distension.      Tenderness: There is no abdominal tenderness.   Musculoskeletal:         General: No swelling or tenderness.      Right lower leg: No edema.      Left lower leg: No edema.   Skin:     General: Skin is warm and dry.   Neurological:      Mental Status: He is alert and oriented to person, place, and time.      Cranial Nerves: No cranial nerve deficit.   Psychiatric:         Behavior: Behavior normal.         Assessment:     1. HTN (hypertension), malignant     2. Dyslipidemia     3. Coronary artery disease due to calcified coronary lesion: CABG ×2 in August 2016  Empagliflozin (JARDIANCE) 10 MG Tab   4. Mixed hyperlipidemia  LIPID PANEL   5. High risk medication use  Basic Metabolic Panel   6. Type 2 diabetes mellitus with hyperglycemia, without long-term current use of insulin (HCC)  HEMOGLOBIN A1C (Glycohemoglobin GHB Total/A1C with MBG Estimate)       Medical Decision Making:  Today's Assessment / Status / Plan:   Coronary arterial disease s/p CABG x 2 in 2016:  Betablocker as Metoprolol 25 mg po 2x daily.  ASA 81 mg po daily.  Statin as Rosuvastatin 40 mg po daily.  Will continue Losartan 25 mg po daily.    Based on recent data, in patient with established coronary to disease, addition of Xarelto 2.5 mg twice a day to Aspirin 81 mg daily has been shown to further reduce  CV mortality.  We will continue patient on Xarelto 2.5 mg twice a day.    In terms of patient's co-morbiditie such as established cardiovascular disease and type II diabetes mellitus, based on recent trial, patient is indicated to be placed on Empagliflozin for mortality reduction benefit. Based on recent data, such therapy has a relative risk reduction of 38% and absolute risk reduction of 2.2% for cardiovascular death. I will start patient on Jardiance 10 mg once a day. I discussed with patient about potential benefits and risks. Patient understands that this is an adjunct to current regimen of sugar lowering agents and cardiac agents.    Hypertension:  Optimize control with BB, ARB as permitted above in conjunction with CAD treatment.     Hyperlipidemia:  Optimize statin as within guidelines of CAD treatment as above.    Overall, based on prior history of obstructive coronary arterial disease, patient is at at high risk for recurrent events and will require consistent ongoing guidelines directed medical therapy to reduce his cardiovascular mortality and associated complications.    I will see patient back in clinic with lab tests and studies results in 6 months.    I thank you for referring patient to our Cardiology Clinic today.      Marlee Harvey MD.   [facility].  [phone]  [place], [place].

## 2022-04-27 ENCOUNTER — TELEPHONE (OUTPATIENT)
Dept: CARDIOLOGY | Facility: MEDICAL CENTER | Age: 67
End: 2022-04-27
Payer: COMMERCIAL

## 2022-04-27 NOTE — TELEPHONE ENCOUNTER
Talib Ortiz Key: [id number] - PA Case ID: [id number] - Rx #: [id number] help? Call us at [phone]  Status  Sent to [facility]  Drug  Jardiance 10MG tablets  Form  [place] [employer] PA Form (2017 NCPDP)  Original Claim Info  76,67

## 2022-05-04 RX ORDER — DAPAGLIFLOZIN 10 MG/1
10 TABLET, FILM COATED ORAL DAILY
Qty: 90 TABLET | Refills: 3 | Status: SHIPPED | OUTPATIENT
Start: 2022-05-04 | End: 2022-07-27

## 2022-05-04 NOTE — TELEPHONE ENCOUNTER
Marlee Harvey M.D.  You 11 hours ago (9:02 PM)     sure    Message text      You  Marlee Harvey M.D. 17 hours ago (3:02 PM)       Pt's insurance is denying Jardiance. Would you like to try Farxiga 10 mg daily instead?     Message text      Andrew Schultz Ass't  You 17 hours ago (2:58 PM)       Hi.. INS denying     I saw you mention Farxiga to the patient.. let me know if we end up switching to that instead. Thanks    Routing comment      Farlevon ordered.     To MACK Lou

## 2022-05-04 NOTE — TELEPHONE ENCOUNTER
Goal Outcome Evaluation:  Plan of Care Reviewed With: patient        Progress: no change VSS, 3L/NC, Started on Levemir today do to increased glucose. Pt solumedrol changed to every 8hrs. Will continue to monitor at this time.      Was the patient seen in the last year in this department? Yes     Does patient have an active prescription for medications requested? No     Received Request Via: Patient

## 2022-05-05 ENCOUNTER — TELEPHONE (OUTPATIENT)
Dept: CARDIOLOGY | Facility: MEDICAL CENTER | Age: 67
End: 2022-05-05
Payer: COMMERCIAL

## 2022-05-05 NOTE — TELEPHONE ENCOUNTER
Talib Angel Key: [id number] - PA Case ID: [id number] - Rx #: [id number] help? Call us at [phone]  Status  Sent to [employer]  Drug  Farxiga 10MG tablets  Form  [employer] Electronic PA Form (2017 NCPDP)  Original Claim Info  76,75

## 2022-05-05 NOTE — TELEPHONE ENCOUNTER
Dasha Christian, Med Ass't  You 10 minutes ago (10:40 AM)       Submitted Thanks     Message text

## 2022-05-16 NOTE — TELEPHONE ENCOUNTER
Received denial letter from patients plan. INS denying due to patients glomerular filtration rate. Letter sent to scan.

## 2022-05-23 ENCOUNTER — HOSPITAL ENCOUNTER (OUTPATIENT)
Dept: LAB | Facility: MEDICAL CENTER | Age: 67
End: 2022-05-23
Attending: INTERNAL MEDICINE
Payer: COMMERCIAL

## 2022-05-23 DIAGNOSIS — E11.65 TYPE 2 DIABETES MELLITUS WITH HYPERGLYCEMIA, WITHOUT LONG-TERM CURRENT USE OF INSULIN (HCC): ICD-10-CM

## 2022-05-23 DIAGNOSIS — Z79.899 HIGH RISK MEDICATION USE: ICD-10-CM

## 2022-05-23 LAB
ANION GAP SERPL CALC-SCNC: 12 MMOL/L (ref 7–16)
BUN SERPL-MCNC: 17 MG/DL (ref 8–22)
CALCIUM SERPL-MCNC: 9.3 MG/DL (ref 8.5–10.5)
CHLORIDE SERPL-SCNC: 103 MMOL/L (ref 96–112)
CO2 SERPL-SCNC: 22 MMOL/L (ref 20–33)
CREAT SERPL-MCNC: 0.99 MG/DL (ref 0.5–1.4)
EST. AVERAGE GLUCOSE BLD GHB EST-MCNC: 120 MG/DL
FASTING STATUS PATIENT QL REPORTED: NORMAL
GFR SERPLBLD CREATININE-BSD FMLA CKD-EPI: 84 ML/MIN/1.73 M 2
GLUCOSE SERPL-MCNC: 111 MG/DL (ref 65–99)
HBA1C MFR BLD: 5.8 % (ref 4–5.6)
POTASSIUM SERPL-SCNC: 4.2 MMOL/L (ref 3.6–5.5)
SODIUM SERPL-SCNC: 137 MMOL/L (ref 135–145)

## 2022-05-23 PROCEDURE — 83036 HEMOGLOBIN GLYCOSYLATED A1C: CPT

## 2022-05-23 PROCEDURE — 36415 COLL VENOUS BLD VENIPUNCTURE: CPT

## 2022-05-23 PROCEDURE — 80048 BASIC METABOLIC PNL TOTAL CA: CPT

## 2022-06-22 NOTE — PROGRESS NOTES
SR [id number]  .14/.12/.40     Provider Procedure Text (A): After obtaining clear surgical margins the defect was repaired by another provider.

## 2022-07-27 ENCOUNTER — OFFICE VISIT (OUTPATIENT)
Dept: CARDIOLOGY | Facility: MEDICAL CENTER | Age: 67
End: 2022-07-27
Payer: COMMERCIAL

## 2022-07-27 VITALS
HEART RATE: 99 BPM | SYSTOLIC BLOOD PRESSURE: 110 MMHG | DIASTOLIC BLOOD PRESSURE: 68 MMHG | BODY MASS INDEX: 27.1 KG/M2 | OXYGEN SATURATION: 93 % | HEIGHT: 71 IN | RESPIRATION RATE: 16 BRPM | WEIGHT: 193.6 LBS

## 2022-07-27 DIAGNOSIS — E78.5 DYSLIPIDEMIA: ICD-10-CM

## 2022-07-27 DIAGNOSIS — I10 HTN (HYPERTENSION), MALIGNANT: ICD-10-CM

## 2022-07-27 DIAGNOSIS — I25.84 CORONARY ARTERY DISEASE DUE TO CALCIFIED CORONARY LESION: ICD-10-CM

## 2022-07-27 DIAGNOSIS — E11.65 TYPE 2 DIABETES MELLITUS WITH HYPERGLYCEMIA, WITHOUT LONG-TERM CURRENT USE OF INSULIN (HCC): ICD-10-CM

## 2022-07-27 DIAGNOSIS — Z79.899 HIGH RISK MEDICATION USE: ICD-10-CM

## 2022-07-27 DIAGNOSIS — I25.10 CORONARY ARTERY DISEASE DUE TO CALCIFIED CORONARY LESION: ICD-10-CM

## 2022-07-27 DIAGNOSIS — E78.2 MIXED HYPERLIPIDEMIA: ICD-10-CM

## 2022-07-27 PROCEDURE — 99214 OFFICE O/P EST MOD 30 MIN: CPT | Performed by: INTERNAL MEDICINE

## 2022-07-27 RX ORDER — ROSUVASTATIN CALCIUM 40 MG/1
40 TABLET, COATED ORAL EVERY EVENING
Qty: 90 TABLET | Refills: 4 | Status: SHIPPED | OUTPATIENT
Start: 2022-07-27 | End: 2023-08-15 | Stop reason: SDUPTHER

## 2022-07-27 RX ORDER — LOSARTAN POTASSIUM 25 MG/1
25 TABLET ORAL DAILY
Qty: 90 TABLET | Refills: 4 | Status: SHIPPED | OUTPATIENT
Start: 2022-07-27 | End: 2023-08-15 | Stop reason: SDUPTHER

## 2022-07-27 ASSESSMENT — ENCOUNTER SYMPTOMS
CHILLS: 0
FEVER: 0
EYE DISCHARGE: 0
BLURRED VISION: 0
DIZZINESS: 0
VOMITING: 0
SPEECH CHANGE: 0
PALPITATIONS: 0
SENSORY CHANGE: 0
BRUISES/BLEEDS EASILY: 0
MYALGIAS: 0
PND: 0
SHORTNESS OF BREATH: 0
HEADACHES: 0
BLOOD IN STOOL: 0
DOUBLE VISION: 0
ABDOMINAL PAIN: 0
LOSS OF CONSCIOUSNESS: 0
EYE PAIN: 0
NAUSEA: 0
ORTHOPNEA: 0
DEPRESSION: 0
HALLUCINATIONS: 0
WEIGHT LOSS: 0
COUGH: 0
FALLS: 0
CLAUDICATION: 0

## 2022-07-27 NOTE — PROGRESS NOTES
Chief Complaint   Patient presents with   • Coronary Artery Disease     F/V Dx: Coronary artery disease due to calcified coronary lesion: CABG ×2 in August 2016   • Dyslipidemia       Subjective:   Talib Ortiz is a 66  y.o. male who presents today for cardiac care and management of established coronary arterial disease status post CABG x2 in 2016, hypertension, hyperlipidemia.    Patient also had the most recent nuclear stress test done in May 2018 which showed no evidence of coronary ischemia.  His most recent transthoracic echocardiogram in January 2017 showed normal LV function with no significant valvular disease.  I personally interpreted the images of his transthoracic echocardiogram.    I have independently interpreted and reviewed blood tests results with patient in clinic which shows normal LDL level 85, triglycerides 116, renal and liver function. HgbA1c of 5.8.    In the interim, patient has been doing well without having any symptoms. Patient denies having chest pain, dyspnea, palpitation, presyncope, syncope episodes. Able to climb up at least 2 flights of stairs.    NO bleeding on Xarelto 2.5 mg bid dosing.    Insurance did not approve Jardiance.    Past Medical History:   Diagnosis Date   • Arthritis    • Blood clotting disorder (HCC) 1996    leg   • CAD (coronary artery disease)    • Heart attack (HCC) 8/16/2016    cardiology, Dr. Frias   • Heart burn    • High cholesterol    • Hyperlipidemia    • Kidney disease    • Kidney stone    • Obesity (BMI 30-39.9) 2/12/2015   • Osteoarthritis 3/2/2015   • Pneumonia 2016   • Psychiatric problem     Depression      Past Surgical History:   Procedure Laterality Date   • KNEE ARTHROPLASTY TOTAL Left 6/19/2017    Procedure: KNEE ARTHROPLASTY TOTAL;  Surgeon: Charles Castellanos M.D.;  Location: SURGERY [facility];  Service:    • MULTIPLE CORONARY ARTERY BYPASS  8/16/2016   • LAMINOTOMY  2012    lumbar   • KNEE ARTHROSCOPY Left 1990's   • OTHER SURGICAL  "PROCEDURE Left     \"removal of vein left leg due to blood clot\"   • SHOULDER ARTHROTOMY Right      Family History   Problem Relation Age of Onset   • Arthritis Mother    • Hyperlipidemia Mother    • Heart Disease Mother         bypass x4   • Diabetes Mother    • Hyperlipidemia Father    • Heart Attack Father 61   • Other Sister         back surgery   • Other Brother         joint problems     Social History     Socioeconomic History   • Marital status:      Spouse name: Not on file   • Number of children: Not on file   • Years of education: Not on file   • Highest education level: Not on file   Occupational History   • Not on file   Tobacco Use   • Smoking status: Former Smoker     Packs/day: 0.25     Years: 10.00     Pack years: 2.50     Types: Cigarettes     Quit date: 3/19/2016     Years since quittin.3   • Smokeless tobacco: Never Used   Vaping Use   • Vaping Use: Never used   Substance and Sexual Activity   • Alcohol use: No     Comment: Quit years   • Drug use: No   • Sexual activity: Yes     Partners: Female   Other Topics Concern   • Not on file   Social History Narrative   • Not on file     Social Determinants of Health     Financial Resource Strain: Not on file   Food Insecurity: Not on file   Transportation Needs: Not on file   Physical Activity: Not on file   Stress: Not on file   Social Connections: Not on file   Intimate Partner Violence: Not on file   Housing Stability: Not on file     No Known Allergies  Outpatient Encounter Medications as of 2022   Medication Sig Dispense Refill   • Dapagliflozin Propanediol 10 MG Tab Take 10 mg by mouth every day. 90 Tablet 3   • metoprolol tartrate (LOPRESSOR) 25 MG Tab Take 1 Tablet by mouth 2 times a day. 180 Tablet 4   • rosuvastatin (CRESTOR) 40 MG tablet Take 1 Tablet by mouth every evening. 90 Tablet 4   • rivaroxaban (XARELTO) 2.5 MG tablet Take 1 Tablet by mouth 2 times a day. 180 Tablet 4   • losartan (COZAAR) 25 MG Tab Take 1 " Tablet by mouth every day. 90 Tablet 4   • benzonatate (TESSALON) 100 MG Cap Take 1 Cap by mouth 3 times a day as needed. 30 Cap 0   • aspirin EC (ECOTRIN) 81 MG Tablet Delayed Response Take 81 mg by mouth every day.     • OMEPRAZOLE PO Take 2 Each by mouth every day at 6 PM. (20mg)     • ferrous sulfate 325 (65 Fe) MG tablet Take 1 Tab by mouth every morning with breakfast. 30 Tab 3   • therapeutic multivitamin-minerals (THERAGRAN-M) Tab Take 1 Tab by mouth every day. 30 Tab 11   • ascorbic acid (VITAMIN C) 500 MG tablet Take 1 Tab by mouth every day. 30 Tab 3   • [DISCONTINUED] sertraline (ZOLOFT) 50 MG Tab Take 1/2 (one-half) tablet by mouth twice daily (Patient not taking: Reported on 7/27/2022) 90 Tab 0   • [DISCONTINUED] ranitidine (ZANTAC) 150 MG Tab Take 150 mg by mouth 2 times a day. (Patient not taking: Reported on 7/27/2022)       No facility-administered encounter medications on file as of 7/27/2022.     Review of Systems   Constitutional: Negative for chills, fever, malaise/fatigue and weight loss.   HENT: Negative for ear discharge, ear pain, hearing loss and nosebleeds.    Eyes: Negative for blurred vision, double vision, pain and discharge.   Respiratory: Negative for cough and shortness of breath.    Cardiovascular: Negative for chest pain, palpitations, orthopnea, claudication, leg swelling and PND.   Gastrointestinal: Negative for abdominal pain, blood in stool, melena, nausea and vomiting.   Genitourinary: Negative for dysuria and hematuria.   Musculoskeletal: Negative for falls, joint pain and myalgias.   Skin: Negative for itching and rash.   Neurological: Negative for dizziness, sensory change, speech change, loss of consciousness and headaches.   Endo/Heme/Allergies: Negative for environmental allergies. Does not bruise/bleed easily.   Psychiatric/Behavioral: Negative for depression, hallucinations and suicidal ideas.        Objective:   /68 (BP Location: Left arm, Patient Position:  "Sitting, BP Cuff Size: Adult)   Pulse 99   Resp 16   Ht 1.803 m (5' 11\")   Wt 87.8 kg (193 lb 9.6 oz)   SpO2 93%   BMI 27.00 kg/m²     Physical Exam  Vitals and nursing note reviewed.   Constitutional:       General: He is not in acute distress.     Appearance: He is not diaphoretic.   HENT:      Head: Normocephalic and atraumatic.      Right Ear: External ear normal.      Left Ear: External ear normal.      Nose: No congestion or rhinorrhea.   Eyes:      General:         Right eye: No discharge.         Left eye: No discharge.   Neck:      Thyroid: No thyromegaly.      Vascular: No JVD.   Cardiovascular:      Rate and Rhythm: Normal rate and regular rhythm.      Pulses: Normal pulses.   Pulmonary:      Effort: No respiratory distress.   Abdominal:      General: There is no distension.      Tenderness: There is no abdominal tenderness.   Musculoskeletal:         General: No swelling or tenderness.      Right lower leg: No edema.      Left lower leg: No edema.   Skin:     General: Skin is warm and dry.   Neurological:      Mental Status: He is alert and oriented to person, place, and time.      Cranial Nerves: No cranial nerve deficit.   Psychiatric:         Behavior: Behavior normal.         Assessment:     1. HTN (hypertension), malignant     2. Coronary artery disease due to calcified coronary lesion: CABG ×2 in August 2016     3. Mixed hyperlipidemia     4. Type 2 diabetes mellitus with hyperglycemia, without long-term current use of insulin (HCC)     5. High risk medication use         Medical Decision Making:  Today's Assessment / Status / Plan:   Coronary arterial disease s/p CABG x 2 in 2016:  Betablocker as Metoprolol 25 mg po 2x daily.  ASA 81 mg po daily.  Statin as Rosuvastatin 40 mg po daily.  Will continue Losartan 25 mg po daily.    Based on recent data, in patient with established coronary to disease, addition of Xarelto 2.5 mg twice a day to Aspirin 81 mg daily has been shown to further reduce " CV mortality.  We will continue patient on Xarelto 2.5 mg twice a day.    At this time, to further risk stratify, I will order a transthoracic echocardiogram to assess cardiac and valvular functions. I will also order a myocardial nuclear scan stress test to assess for coronary ischemia.    Hypertension:  Optimize control with BB, ARB as permitted above in conjunction with CAD treatment.     Hyperlipidemia:  Optimize statin as within guidelines of CAD treatment as above.    Overall, based on prior history of obstructive coronary arterial disease, patient is at at high risk for recurrent events and will require consistent ongoing guidelines directed medical therapy to reduce his cardiovascular mortality and associated complications.    I will see patient back in clinic with lab tests and studies results in 12 months.    I thank you for referring patient to our Cardiology Clinic today.      Marlee Harvey MD.   [facility].  [phone]  [place].

## 2022-12-04 ENCOUNTER — OFFICE VISIT (OUTPATIENT)
Dept: URGENT CARE | Facility: PHYSICIAN GROUP | Age: 67
End: 2022-12-04
Payer: COMMERCIAL

## 2022-12-04 VITALS
BODY MASS INDEX: 26.6 KG/M2 | RESPIRATION RATE: 16 BRPM | TEMPERATURE: 98.8 F | DIASTOLIC BLOOD PRESSURE: 62 MMHG | HEART RATE: 88 BPM | SYSTOLIC BLOOD PRESSURE: 102 MMHG | HEIGHT: 71 IN | WEIGHT: 190 LBS | OXYGEN SATURATION: 94 %

## 2022-12-04 DIAGNOSIS — J06.9 VIRAL URI WITH COUGH: ICD-10-CM

## 2022-12-04 PROCEDURE — 99213 OFFICE O/P EST LOW 20 MIN: CPT | Performed by: PHYSICIAN ASSISTANT

## 2022-12-04 NOTE — PROGRESS NOTES
"Subjective:   Talib Ortiz is a 67 y.o. male who presents for Cough (Sore throat, fever, x4 days )      HPI  The patient presents to the Urgent Care with complaints of a cough onset 4 days ago.  He states his wife wanted him to be evaluated.  Cough is productive.  Associated nasal congestion, runny nose, and chills. Denies recorded fever, chest pain, shortness of breath, vomiting, diarrhea. Vaccines up to date. Influenza vaccine up to date. COVID vaccine up to date. Nonsmoker.  History of pneumonia and he states this does not feel like pneumonia.  He did not test for COVID.        Medications:    ascorbic acid  aspirin EC Tbec  benzonatate Caps  ferrous sulfate  losartan Tabs  metoprolol tartrate Tabs  OMEPRAZOLE PO  rivaroxaban  rosuvastatin  therapeutic multivitamin-minerals Tabs    Allergies: Patient has no known allergies.    Problem List: Talib Ortiz does not have any pertinent problems on file.    Surgical History:  Past Surgical History:   Procedure Laterality Date    KNEE ARTHROPLASTY TOTAL Left 6/19/2017    Procedure: KNEE ARTHROPLASTY TOTAL;  Surgeon: Charles Castellanos M.D.;  Location: SURGERY [facility];  Service:     MULTIPLE CORONARY ARTERY BYPASS  8/16/2016    LAMINOTOMY  2012    lumbar    KNEE ARTHROSCOPY Left 1990's    OTHER SURGICAL PROCEDURE Left 1990's    \"removal of vein left leg due to blood clot\"    SHOULDER ARTHROTOMY Right 1980's       Past Social Hx: Talib Ortiz  reports that he quit smoking about 6 years ago. His smoking use included cigarettes. He has a 2.50 pack-year smoking history. He has never used smokeless tobacco. He reports that he does not drink alcohol and does not use drugs.     Past Family Hx:  Talib Ortiz family history includes Arthritis in his mother; Diabetes in his mother; Heart Attack (age of onset: 61) in his father; Heart Disease in his mother; Hyperlipidemia in his father and mother; Other in his brother and sister.     Problem list, " "medications, and allergies reviewed by myself today in Epic.     Objective:     /62 (BP Location: Right arm, Patient Position: Sitting, BP Cuff Size: Adult)   Pulse 88   Temp 37.1 °C (98.8 °F) (Temporal)   Resp 16   Ht 1.803 m (5' 11\")   Wt 86.2 kg (190 lb)   SpO2 94%   BMI 26.50 kg/m²     Physical Exam  Vitals reviewed.   Constitutional:       General: He is not in acute distress.     Appearance: Normal appearance. He is not ill-appearing or toxic-appearing.   HENT:      Head: Normocephalic.      Mouth/Throat:      Mouth: Mucous membranes are moist.      Pharynx: Oropharynx is clear. Posterior oropharyngeal erythema (trace) present.   Eyes:      Conjunctiva/sclera: Conjunctivae normal.      Pupils: Pupils are equal, round, and reactive to light.   Cardiovascular:      Rate and Rhythm: Normal rate and regular rhythm.      Heart sounds: Normal heart sounds.   Pulmonary:      Effort: Pulmonary effort is normal. No respiratory distress.      Breath sounds: Normal breath sounds. No wheezing, rhonchi or rales.   Musculoskeletal:      Cervical back: Neck supple.   Lymphadenopathy:      Cervical: No cervical adenopathy.   Skin:     General: Skin is warm and dry.   Neurological:      General: No focal deficit present.      Mental Status: He is alert and oriented to person, place, and time.   Psychiatric:         Mood and Affect: Mood normal.         Behavior: Behavior normal.       Diagnosis and associated orders:     1. Viral URI with cough     Comments/MDM:     The patient's presenting symptoms and exam findings are consistent with a upper respiratory infection most likely viral etiology. They have a normal pulse oximetry on room air, afebrile, and a normal pulmonary exam. Overall, the patient is very well appearing. I do not feel that this patient would benefit from antibiotics at this time.   Recommended symptomatic and supportive care at this time that includes plenty of fluids, rest, Tylenol/Ibuprofen for " pain/fever, warm salt water gargles for sore throat, OTC cough and decongestant medication, Flonase, nasal saline washes.      I personally reviewed prior external notes and test results pertinent to today's visit. Pathogenesis of diagnosis discussed including typical length and natural progression. Supportive care, natural history, differential diagnoses, and indications for immediate follow-up discussed. Patient expresses understanding and agrees to plan. Patient denies any other questions or concerns.     Follow-up with the primary care physician for recheck, reevaluation, and consideration of further management.    Please note that this dictation was created using voice recognition software. I have made a reasonable attempt to correct obvious errors, but I expect that there are errors of grammar and possibly content that I did not discover before finalizing the note.    This note was electronically signed by Yeison Justice PA-C

## 2023-02-06 ENCOUNTER — HOSPITAL ENCOUNTER (INPATIENT)
Facility: MEDICAL CENTER | Age: 68
LOS: 3 days | DRG: 871 | End: 2023-02-09
Attending: EMERGENCY MEDICINE | Admitting: STUDENT IN AN ORGANIZED HEALTH CARE EDUCATION/TRAINING PROGRAM
Payer: COMMERCIAL

## 2023-02-06 ENCOUNTER — OFFICE VISIT (OUTPATIENT)
Dept: MEDICAL GROUP | Facility: MEDICAL CENTER | Age: 68
End: 2023-02-06
Payer: COMMERCIAL

## 2023-02-06 ENCOUNTER — APPOINTMENT (OUTPATIENT)
Dept: RADIOLOGY | Facility: MEDICAL CENTER | Age: 68
DRG: 871 | End: 2023-02-06
Attending: STUDENT IN AN ORGANIZED HEALTH CARE EDUCATION/TRAINING PROGRAM
Payer: COMMERCIAL

## 2023-02-06 ENCOUNTER — APPOINTMENT (OUTPATIENT)
Dept: RADIOLOGY | Facility: MEDICAL CENTER | Age: 68
DRG: 871 | End: 2023-02-06
Attending: EMERGENCY MEDICINE
Payer: COMMERCIAL

## 2023-02-06 VITALS
BODY MASS INDEX: 27.87 KG/M2 | RESPIRATION RATE: 20 BRPM | HEART RATE: 118 BPM | WEIGHT: 194.67 LBS | OXYGEN SATURATION: 96 % | SYSTOLIC BLOOD PRESSURE: 86 MMHG | DIASTOLIC BLOOD PRESSURE: 66 MMHG | TEMPERATURE: 99.9 F | HEIGHT: 70 IN

## 2023-02-06 DIAGNOSIS — R50.9 FEVER, UNSPECIFIED FEVER CAUSE: ICD-10-CM

## 2023-02-06 DIAGNOSIS — I10 HYPERTENSION, UNSPECIFIED TYPE: ICD-10-CM

## 2023-02-06 DIAGNOSIS — K92.2 GASTROINTESTINAL HEMORRHAGE, UNSPECIFIED GASTROINTESTINAL HEMORRHAGE TYPE: ICD-10-CM

## 2023-02-06 DIAGNOSIS — R05.1 ACUTE COUGH: ICD-10-CM

## 2023-02-06 DIAGNOSIS — R11.2 NAUSEA AND VOMITING, UNSPECIFIED VOMITING TYPE: ICD-10-CM

## 2023-02-06 PROBLEM — E87.20 LACTIC ACIDOSIS: Status: ACTIVE | Noted: 2023-02-06

## 2023-02-06 PROBLEM — A41.9 SEPSIS (HCC): Status: ACTIVE | Noted: 2023-02-06

## 2023-02-06 PROBLEM — J18.9 PNEUMONIA: Status: ACTIVE | Noted: 2023-02-06

## 2023-02-06 PROBLEM — K92.0 HEMATEMESIS: Status: ACTIVE | Noted: 2023-02-06

## 2023-02-06 LAB
ABO GROUP BLD: NORMAL
ALBUMIN SERPL BCP-MCNC: 5 G/DL (ref 3.2–4.9)
ALBUMIN/GLOB SERPL: 1.6 G/DL
ALP SERPL-CCNC: 43 U/L (ref 30–99)
ALT SERPL-CCNC: 27 U/L (ref 2–50)
ANION GAP SERPL CALC-SCNC: 17 MMOL/L (ref 7–16)
APPEARANCE UR: CLEAR
AST SERPL-CCNC: 29 U/L (ref 12–45)
BASOPHILS # BLD AUTO: 0.9 % (ref 0–1.8)
BASOPHILS # BLD: 0.09 K/UL (ref 0–0.12)
BILIRUB SERPL-MCNC: 0.4 MG/DL (ref 0.1–1.5)
BILIRUB UR QL STRIP.AUTO: NEGATIVE
BLD GP AB SCN SERPL QL: NORMAL
BUN SERPL-MCNC: 17 MG/DL (ref 8–22)
CALCIUM ALBUM COR SERPL-MCNC: 9.2 MG/DL (ref 8.5–10.5)
CALCIUM SERPL-MCNC: 10 MG/DL (ref 8.5–10.5)
CHLORIDE SERPL-SCNC: 101 MMOL/L (ref 96–112)
CO2 SERPL-SCNC: 24 MMOL/L (ref 20–33)
COLOR UR: YELLOW
CREAT SERPL-MCNC: 1.31 MG/DL (ref 0.5–1.4)
EKG IMPRESSION: NORMAL
EOSINOPHIL # BLD AUTO: 0.14 K/UL (ref 0–0.51)
EOSINOPHIL NFR BLD: 1.5 % (ref 0–6.9)
ERYTHROCYTE [DISTWIDTH] IN BLOOD BY AUTOMATED COUNT: 44.9 FL (ref 35.9–50)
EXTERNAL QUALITY CONTROL: ABNORMAL
FERRITIN SERPL-MCNC: 72.7 NG/ML (ref 22–322)
FLUAV+FLUBV AG SPEC QL IA: NEGATIVE
GFR SERPLBLD CREATININE-BSD FMLA CKD-EPI: 60 ML/MIN/1.73 M 2
GLOBULIN SER CALC-MCNC: 3.2 G/DL (ref 1.9–3.5)
GLUCOSE SERPL-MCNC: 134 MG/DL (ref 65–99)
GLUCOSE UR STRIP.AUTO-MCNC: 100 MG/DL
HCT VFR BLD AUTO: 47.8 % (ref 42–52)
HGB BLD-MCNC: 15.2 G/DL (ref 14–18)
HGB BLD-MCNC: 16.7 G/DL (ref 14–18)
IMM GRANULOCYTES # BLD AUTO: 0.05 K/UL (ref 0–0.11)
IMM GRANULOCYTES NFR BLD AUTO: 0.5 % (ref 0–0.9)
INT CON NEG: ABNORMAL
INT CON NEG: NORMAL
INT CON POS: ABNORMAL
INT CON POS: NORMAL
IRON SATN MFR SERPL: 18 % (ref 15–55)
IRON SERPL-MCNC: 60 UG/DL (ref 50–180)
KETONES UR STRIP.AUTO-MCNC: 15 MG/DL
LACTATE SERPL-SCNC: 2.4 MMOL/L (ref 0.5–2)
LACTATE SERPL-SCNC: 2.4 MMOL/L (ref 0.5–2)
LACTATE SERPL-SCNC: 2.8 MMOL/L (ref 0.5–2)
LEUKOCYTE ESTERASE UR QL STRIP.AUTO: NEGATIVE
LYMPHOCYTES # BLD AUTO: 0.54 K/UL (ref 1–4.8)
LYMPHOCYTES NFR BLD: 5.7 % (ref 22–41)
MAGNESIUM SERPL-MCNC: 1.9 MG/DL (ref 1.5–2.5)
MCH RBC QN AUTO: 33.4 PG (ref 27–33)
MCHC RBC AUTO-ENTMCNC: 34.9 G/DL (ref 33.7–35.3)
MCV RBC AUTO: 95.6 FL (ref 81.4–97.8)
MICRO URNS: ABNORMAL
MONOCYTES # BLD AUTO: 0.71 K/UL (ref 0–0.85)
MONOCYTES NFR BLD AUTO: 7.4 % (ref 0–13.4)
NEUTROPHILS # BLD AUTO: 8.01 K/UL (ref 1.82–7.42)
NEUTROPHILS NFR BLD: 84 % (ref 44–72)
NITRITE UR QL STRIP.AUTO: NEGATIVE
NRBC # BLD AUTO: 0 K/UL
NRBC BLD-RTO: 0 /100 WBC
PH UR STRIP.AUTO: 6.5 [PH] (ref 5–8)
PLATELET # BLD AUTO: 267 K/UL (ref 164–446)
PMV BLD AUTO: 9.8 FL (ref 9–12.9)
POTASSIUM SERPL-SCNC: 4 MMOL/L (ref 3.6–5.5)
PROCALCITONIN SERPL-MCNC: 0.1 NG/ML
PROT SERPL-MCNC: 8.2 G/DL (ref 6–8.2)
PROT UR QL STRIP: NEGATIVE MG/DL
RBC # BLD AUTO: 5 M/UL (ref 4.7–6.1)
RBC UR QL AUTO: NEGATIVE
RH BLD: NORMAL
SARS-COV+SARS-COV-2 AG RESP QL IA.RAPID: POSITIVE
SODIUM SERPL-SCNC: 142 MMOL/L (ref 135–145)
SP GR UR STRIP.AUTO: 1.03
TIBC SERPL-MCNC: 340 UG/DL (ref 250–450)
UIBC SERPL-MCNC: 280 UG/DL (ref 110–370)
UROBILINOGEN UR STRIP.AUTO-MCNC: 0.2 MG/DL
WBC # BLD AUTO: 9.5 K/UL (ref 4.8–10.8)

## 2023-02-06 PROCEDURE — 87804 INFLUENZA ASSAY W/OPTIC: CPT | Performed by: FAMILY MEDICINE

## 2023-02-06 PROCEDURE — C9113 INJ PANTOPRAZOLE SODIUM, VIA: HCPCS | Performed by: EMERGENCY MEDICINE

## 2023-02-06 PROCEDURE — 83540 ASSAY OF IRON: CPT

## 2023-02-06 PROCEDURE — 99214 OFFICE O/P EST MOD 30 MIN: CPT | Performed by: FAMILY MEDICINE

## 2023-02-06 PROCEDURE — 86901 BLOOD TYPING SEROLOGIC RH(D): CPT

## 2023-02-06 PROCEDURE — 99223 1ST HOSP IP/OBS HIGH 75: CPT | Performed by: STUDENT IN AN ORGANIZED HEALTH CARE EDUCATION/TRAINING PROGRAM

## 2023-02-06 PROCEDURE — 770020 HCHG ROOM/CARE - TELE (206)

## 2023-02-06 PROCEDURE — 84145 PROCALCITONIN (PCT): CPT

## 2023-02-06 PROCEDURE — 86900 BLOOD TYPING SEROLOGIC ABO: CPT

## 2023-02-06 PROCEDURE — 85025 COMPLETE CBC W/AUTO DIFF WBC: CPT

## 2023-02-06 PROCEDURE — 700105 HCHG RX REV CODE 258: Performed by: EMERGENCY MEDICINE

## 2023-02-06 PROCEDURE — 74176 CT ABD & PELVIS W/O CONTRAST: CPT

## 2023-02-06 PROCEDURE — 83735 ASSAY OF MAGNESIUM: CPT

## 2023-02-06 PROCEDURE — 700111 HCHG RX REV CODE 636 W/ 250 OVERRIDE (IP): Performed by: STUDENT IN AN ORGANIZED HEALTH CARE EDUCATION/TRAINING PROGRAM

## 2023-02-06 PROCEDURE — 700111 HCHG RX REV CODE 636 W/ 250 OVERRIDE (IP)

## 2023-02-06 PROCEDURE — 87040 BLOOD CULTURE FOR BACTERIA: CPT

## 2023-02-06 PROCEDURE — 86850 RBC ANTIBODY SCREEN: CPT

## 2023-02-06 PROCEDURE — 85018 HEMOGLOBIN: CPT

## 2023-02-06 PROCEDURE — 96365 THER/PROPH/DIAG IV INF INIT: CPT

## 2023-02-06 PROCEDURE — 87426 SARSCOV CORONAVIRUS AG IA: CPT | Performed by: FAMILY MEDICINE

## 2023-02-06 PROCEDURE — 96375 TX/PRO/DX INJ NEW DRUG ADDON: CPT

## 2023-02-06 PROCEDURE — 93005 ELECTROCARDIOGRAM TRACING: CPT

## 2023-02-06 PROCEDURE — 85046 RETICYTE/HGB CONCENTRATE: CPT

## 2023-02-06 PROCEDURE — 700105 HCHG RX REV CODE 258: Performed by: STUDENT IN AN ORGANIZED HEALTH CARE EDUCATION/TRAINING PROGRAM

## 2023-02-06 PROCEDURE — A9270 NON-COVERED ITEM OR SERVICE: HCPCS | Performed by: STUDENT IN AN ORGANIZED HEALTH CARE EDUCATION/TRAINING PROGRAM

## 2023-02-06 PROCEDURE — 93005 ELECTROCARDIOGRAM TRACING: CPT | Performed by: EMERGENCY MEDICINE

## 2023-02-06 PROCEDURE — 36415 COLL VENOUS BLD VENIPUNCTURE: CPT

## 2023-02-06 PROCEDURE — 71045 X-RAY EXAM CHEST 1 VIEW: CPT

## 2023-02-06 PROCEDURE — 83550 IRON BINDING TEST: CPT

## 2023-02-06 PROCEDURE — 82728 ASSAY OF FERRITIN: CPT

## 2023-02-06 PROCEDURE — 83605 ASSAY OF LACTIC ACID: CPT | Mod: 91

## 2023-02-06 PROCEDURE — 99285 EMERGENCY DEPT VISIT HI MDM: CPT

## 2023-02-06 PROCEDURE — 700111 HCHG RX REV CODE 636 W/ 250 OVERRIDE (IP): Performed by: EMERGENCY MEDICINE

## 2023-02-06 PROCEDURE — 81003 URINALYSIS AUTO W/O SCOPE: CPT

## 2023-02-06 PROCEDURE — 87086 URINE CULTURE/COLONY COUNT: CPT

## 2023-02-06 PROCEDURE — 700102 HCHG RX REV CODE 250 W/ 637 OVERRIDE(OP): Performed by: STUDENT IN AN ORGANIZED HEALTH CARE EDUCATION/TRAINING PROGRAM

## 2023-02-06 PROCEDURE — 96367 TX/PROPH/DG ADDL SEQ IV INF: CPT

## 2023-02-06 PROCEDURE — 80053 COMPREHEN METABOLIC PANEL: CPT

## 2023-02-06 RX ORDER — PANTOPRAZOLE SODIUM 40 MG/10ML
40 INJECTION, POWDER, LYOPHILIZED, FOR SOLUTION INTRAVENOUS 2 TIMES DAILY
Status: DISCONTINUED | OUTPATIENT
Start: 2023-02-07 | End: 2023-02-08

## 2023-02-06 RX ORDER — ONDANSETRON 4 MG/1
4 TABLET, ORALLY DISINTEGRATING ORAL EVERY 4 HOURS PRN
Status: DISCONTINUED | OUTPATIENT
Start: 2023-02-06 | End: 2023-02-09 | Stop reason: HOSPADM

## 2023-02-06 RX ORDER — ROSUVASTATIN CALCIUM 20 MG/1
40 TABLET, COATED ORAL EVERY EVENING
Status: DISCONTINUED | OUTPATIENT
Start: 2023-02-07 | End: 2023-02-09 | Stop reason: HOSPADM

## 2023-02-06 RX ORDER — SODIUM CHLORIDE, SODIUM LACTATE, POTASSIUM CHLORIDE, AND CALCIUM CHLORIDE .6; .31; .03; .02 G/100ML; G/100ML; G/100ML; G/100ML
500 INJECTION, SOLUTION INTRAVENOUS
Status: COMPLETED | OUTPATIENT
Start: 2023-02-06 | End: 2023-02-06

## 2023-02-06 RX ORDER — AZITHROMYCIN 500 MG/5ML
500 INJECTION, POWDER, LYOPHILIZED, FOR SOLUTION INTRAVENOUS EVERY EVENING
Status: DISCONTINUED | OUTPATIENT
Start: 2023-02-07 | End: 2023-02-07

## 2023-02-06 RX ORDER — LABETALOL HYDROCHLORIDE 5 MG/ML
10 INJECTION, SOLUTION INTRAVENOUS EVERY 4 HOURS PRN
Status: DISCONTINUED | OUTPATIENT
Start: 2023-02-06 | End: 2023-02-09 | Stop reason: HOSPADM

## 2023-02-06 RX ORDER — AZITHROMYCIN 500 MG/1
500 INJECTION, POWDER, LYOPHILIZED, FOR SOLUTION INTRAVENOUS ONCE
Status: COMPLETED | OUTPATIENT
Start: 2023-02-06 | End: 2023-02-06

## 2023-02-06 RX ORDER — SODIUM CHLORIDE, SODIUM LACTATE, POTASSIUM CHLORIDE, AND CALCIUM CHLORIDE .6; .31; .03; .02 G/100ML; G/100ML; G/100ML; G/100ML
1000 INJECTION, SOLUTION INTRAVENOUS ONCE
Status: COMPLETED | OUTPATIENT
Start: 2023-02-06 | End: 2023-02-07

## 2023-02-06 RX ORDER — MORPHINE SULFATE 4 MG/ML
4 INJECTION INTRAVENOUS ONCE
Status: COMPLETED | OUTPATIENT
Start: 2023-02-06 | End: 2023-02-06

## 2023-02-06 RX ORDER — ACETAMINOPHEN 325 MG/1
650 TABLET ORAL EVERY 6 HOURS PRN
Status: DISCONTINUED | OUTPATIENT
Start: 2023-02-06 | End: 2023-02-09 | Stop reason: HOSPADM

## 2023-02-06 RX ORDER — SODIUM CHLORIDE, SODIUM LACTATE, POTASSIUM CHLORIDE, CALCIUM CHLORIDE 600; 310; 30; 20 MG/100ML; MG/100ML; MG/100ML; MG/100ML
INJECTION, SOLUTION INTRAVENOUS CONTINUOUS
Status: DISCONTINUED | OUTPATIENT
Start: 2023-02-06 | End: 2023-02-06

## 2023-02-06 RX ORDER — ONDANSETRON 2 MG/ML
4 INJECTION INTRAMUSCULAR; INTRAVENOUS EVERY 4 HOURS PRN
Status: DISCONTINUED | OUTPATIENT
Start: 2023-02-06 | End: 2023-02-09 | Stop reason: HOSPADM

## 2023-02-06 RX ORDER — LOSARTAN POTASSIUM 50 MG/1
25 TABLET ORAL DAILY
Status: DISCONTINUED | OUTPATIENT
Start: 2023-02-07 | End: 2023-02-09 | Stop reason: HOSPADM

## 2023-02-06 RX ORDER — ONDANSETRON 2 MG/ML
4 INJECTION INTRAMUSCULAR; INTRAVENOUS ONCE
Status: COMPLETED | OUTPATIENT
Start: 2023-02-06 | End: 2023-02-06

## 2023-02-06 RX ORDER — SODIUM CHLORIDE, SODIUM LACTATE, POTASSIUM CHLORIDE, CALCIUM CHLORIDE 600; 310; 30; 20 MG/100ML; MG/100ML; MG/100ML; MG/100ML
INJECTION, SOLUTION INTRAVENOUS CONTINUOUS
Status: DISCONTINUED | OUTPATIENT
Start: 2023-02-06 | End: 2023-02-08

## 2023-02-06 RX ORDER — AZITHROMYCIN 500 MG/1
500 INJECTION, POWDER, LYOPHILIZED, FOR SOLUTION INTRAVENOUS DAILY
Status: DISCONTINUED | OUTPATIENT
Start: 2023-02-07 | End: 2023-02-06

## 2023-02-06 RX ADMIN — AMPICILLIN AND SULBACTAM 3 G: 1; 2 INJECTION, POWDER, FOR SOLUTION INTRAMUSCULAR; INTRAVENOUS at 23:36

## 2023-02-06 RX ADMIN — MORPHINE SULFATE 4 MG: 4 INJECTION INTRAVENOUS at 19:36

## 2023-02-06 RX ADMIN — AZITHROMYCIN MONOHYDRATE 500 MG: 500 INJECTION, POWDER, LYOPHILIZED, FOR SOLUTION INTRAVENOUS at 20:28

## 2023-02-06 RX ADMIN — SODIUM CHLORIDE, POTASSIUM CHLORIDE, SODIUM LACTATE AND CALCIUM CHLORIDE 500 ML: 600; 310; 30; 20 INJECTION, SOLUTION INTRAVENOUS at 19:39

## 2023-02-06 RX ADMIN — ONDANSETRON 4 MG: 2 INJECTION INTRAMUSCULAR; INTRAVENOUS at 19:36

## 2023-02-06 RX ADMIN — AMPICILLIN AND SULBACTAM 3 G: 1; 2 INJECTION, POWDER, FOR SOLUTION INTRAMUSCULAR; INTRAVENOUS at 19:52

## 2023-02-06 RX ADMIN — METOPROLOL TARTRATE 25 MG: 25 TABLET, FILM COATED ORAL at 23:35

## 2023-02-06 RX ADMIN — SODIUM CHLORIDE, POTASSIUM CHLORIDE, SODIUM LACTATE AND CALCIUM CHLORIDE 1000 ML: 600; 310; 30; 20 INJECTION, SOLUTION INTRAVENOUS at 21:52

## 2023-02-06 RX ADMIN — SODIUM CHLORIDE 80 MG: 9 INJECTION, SOLUTION INTRAVENOUS at 20:21

## 2023-02-06 ASSESSMENT — ENCOUNTER SYMPTOMS
HEADACHES: 1
HEARTBURN: 1
CARDIOVASCULAR NEGATIVE: 1
RESPIRATORY NEGATIVE: 1
DIZZINESS: 1
DIARRHEA: 0
MUSCULOSKELETAL NEGATIVE: 1
VOMITING: 1
WEAKNESS: 1
CONSTIPATION: 0
EYES NEGATIVE: 1
BLOOD IN STOOL: 0
NAUSEA: 1
PSYCHIATRIC NEGATIVE: 1
ABDOMINAL PAIN: 1

## 2023-02-06 ASSESSMENT — COGNITIVE AND FUNCTIONAL STATUS - GENERAL
DAILY ACTIVITIY SCORE: 24
MOBILITY SCORE: 24
SUGGESTED CMS G CODE MODIFIER MOBILITY: CH
SUGGESTED CMS G CODE MODIFIER DAILY ACTIVITY: CH

## 2023-02-06 ASSESSMENT — LIFESTYLE VARIABLES
AVERAGE NUMBER OF DAYS PER WEEK YOU HAVE A DRINK CONTAINING ALCOHOL: 0
EVER FELT BAD OR GUILTY ABOUT YOUR DRINKING: NO
TOTAL SCORE: 0
HAVE YOU EVER FELT YOU SHOULD CUT DOWN ON YOUR DRINKING: NO
HOW MANY TIMES IN THE PAST YEAR HAVE YOU HAD 5 OR MORE DRINKS IN A DAY: 0
CONSUMPTION TOTAL: NEGATIVE
ON A TYPICAL DAY WHEN YOU DRINK ALCOHOL HOW MANY DRINKS DO YOU HAVE: 0
ALCOHOL_USE: NO
EVER HAD A DRINK FIRST THING IN THE MORNING TO STEADY YOUR NERVES TO GET RID OF A HANGOVER: NO
HAVE PEOPLE ANNOYED YOU BY CRITICIZING YOUR DRINKING: NO
TOTAL SCORE: 0
TOTAL SCORE: 0

## 2023-02-06 ASSESSMENT — FIBROSIS 4 INDEX: FIB4 SCORE: 1.4

## 2023-02-06 ASSESSMENT — PATIENT HEALTH QUESTIONNAIRE - PHQ9
5. POOR APPETITE OR OVEREATING: 0 - NOT AT ALL
1. LITTLE INTEREST OR PLEASURE IN DOING THINGS: NOT AT ALL
SUM OF ALL RESPONSES TO PHQ QUESTIONS 1-9: 4
SUM OF ALL RESPONSES TO PHQ9 QUESTIONS 1 AND 2: 0
CLINICAL INTERPRETATION OF PHQ2 SCORE: 2
2. FEELING DOWN, DEPRESSED, IRRITABLE, OR HOPELESS: NOT AT ALL

## 2023-02-06 ASSESSMENT — PAIN DESCRIPTION - PAIN TYPE: TYPE: ACUTE PAIN

## 2023-02-07 LAB
ALBUMIN SERPL BCP-MCNC: 3.7 G/DL (ref 3.2–4.9)
ALBUMIN/GLOB SERPL: 1.5 G/DL
ALP SERPL-CCNC: 32 U/L (ref 30–99)
ALT SERPL-CCNC: 19 U/L (ref 2–50)
ANION GAP SERPL CALC-SCNC: 11 MMOL/L (ref 7–16)
AST SERPL-CCNC: 21 U/L (ref 12–45)
BASOPHILS # BLD AUTO: 0.6 % (ref 0–1.8)
BASOPHILS # BLD: 0.05 K/UL (ref 0–0.12)
BILIRUB SERPL-MCNC: 0.3 MG/DL (ref 0.1–1.5)
BUN SERPL-MCNC: 18 MG/DL (ref 8–22)
CALCIUM ALBUM COR SERPL-MCNC: 8.7 MG/DL (ref 8.5–10.5)
CALCIUM SERPL-MCNC: 8.5 MG/DL (ref 8.5–10.5)
CHLORIDE SERPL-SCNC: 104 MMOL/L (ref 96–112)
CO2 SERPL-SCNC: 23 MMOL/L (ref 20–33)
CREAT SERPL-MCNC: 1.04 MG/DL (ref 0.5–1.4)
EOSINOPHIL # BLD AUTO: 0.01 K/UL (ref 0–0.51)
EOSINOPHIL NFR BLD: 0.1 % (ref 0–6.9)
ERYTHROCYTE [DISTWIDTH] IN BLOOD BY AUTOMATED COUNT: 46.5 FL (ref 35.9–50)
GFR SERPLBLD CREATININE-BSD FMLA CKD-EPI: 79 ML/MIN/1.73 M 2
GLOBULIN SER CALC-MCNC: 2.5 G/DL (ref 1.9–3.5)
GLUCOSE SERPL-MCNC: 105 MG/DL (ref 65–99)
HCT VFR BLD AUTO: 39.2 % (ref 42–52)
HGB BLD-MCNC: 13.1 G/DL (ref 14–18)
HGB BLD-MCNC: 13.2 G/DL (ref 14–18)
HGB BLD-MCNC: 13.5 G/DL (ref 14–18)
HGB BLD-MCNC: 13.5 G/DL (ref 14–18)
HGB RETIC QN AUTO: 38.4 PG/CELL (ref 29–35)
IMM GRANULOCYTES # BLD AUTO: 0.03 K/UL (ref 0–0.11)
IMM GRANULOCYTES NFR BLD AUTO: 0.4 % (ref 0–0.9)
IMM RETICS NFR: 7.6 % (ref 9.3–17.4)
LACTATE SERPL-SCNC: 1.6 MMOL/L (ref 0.5–2)
LYMPHOCYTES # BLD AUTO: 0.61 K/UL (ref 1–4.8)
LYMPHOCYTES NFR BLD: 7.9 % (ref 22–41)
MCH RBC QN AUTO: 32.4 PG (ref 27–33)
MCHC RBC AUTO-ENTMCNC: 33.4 G/DL (ref 33.7–35.3)
MCV RBC AUTO: 97 FL (ref 81.4–97.8)
MONOCYTES # BLD AUTO: 0.88 K/UL (ref 0–0.85)
MONOCYTES NFR BLD AUTO: 11.4 % (ref 0–13.4)
NEUTROPHILS # BLD AUTO: 6.16 K/UL (ref 1.82–7.42)
NEUTROPHILS NFR BLD: 79.6 % (ref 44–72)
NRBC # BLD AUTO: 0 K/UL
NRBC BLD-RTO: 0 /100 WBC
PLATELET # BLD AUTO: 188 K/UL (ref 164–446)
PMV BLD AUTO: 9.8 FL (ref 9–12.9)
POTASSIUM SERPL-SCNC: 4 MMOL/L (ref 3.6–5.5)
PROT SERPL-MCNC: 6.2 G/DL (ref 6–8.2)
RBC # BLD AUTO: 4.04 M/UL (ref 4.7–6.1)
RETICS # AUTO: 0.11 M/UL (ref 0.04–0.06)
RETICS/RBC NFR: 1.6 % (ref 0.8–2.1)
SODIUM SERPL-SCNC: 138 MMOL/L (ref 135–145)
WBC # BLD AUTO: 7.7 K/UL (ref 4.8–10.8)

## 2023-02-07 PROCEDURE — 83605 ASSAY OF LACTIC ACID: CPT

## 2023-02-07 PROCEDURE — 700105 HCHG RX REV CODE 258: Performed by: STUDENT IN AN ORGANIZED HEALTH CARE EDUCATION/TRAINING PROGRAM

## 2023-02-07 PROCEDURE — 36415 COLL VENOUS BLD VENIPUNCTURE: CPT

## 2023-02-07 PROCEDURE — 80053 COMPREHEN METABOLIC PANEL: CPT

## 2023-02-07 PROCEDURE — 99233 SBSQ HOSP IP/OBS HIGH 50: CPT | Mod: GC | Performed by: INTERNAL MEDICINE

## 2023-02-07 PROCEDURE — A9270 NON-COVERED ITEM OR SERVICE: HCPCS | Performed by: INTERNAL MEDICINE

## 2023-02-07 PROCEDURE — 85025 COMPLETE CBC W/AUTO DIFF WBC: CPT

## 2023-02-07 PROCEDURE — A9270 NON-COVERED ITEM OR SERVICE: HCPCS | Performed by: STUDENT IN AN ORGANIZED HEALTH CARE EDUCATION/TRAINING PROGRAM

## 2023-02-07 PROCEDURE — 85018 HEMOGLOBIN: CPT | Mod: 91

## 2023-02-07 PROCEDURE — 700111 HCHG RX REV CODE 636 W/ 250 OVERRIDE (IP): Performed by: STUDENT IN AN ORGANIZED HEALTH CARE EDUCATION/TRAINING PROGRAM

## 2023-02-07 PROCEDURE — 700102 HCHG RX REV CODE 250 W/ 637 OVERRIDE(OP): Performed by: STUDENT IN AN ORGANIZED HEALTH CARE EDUCATION/TRAINING PROGRAM

## 2023-02-07 PROCEDURE — C9113 INJ PANTOPRAZOLE SODIUM, VIA: HCPCS | Performed by: STUDENT IN AN ORGANIZED HEALTH CARE EDUCATION/TRAINING PROGRAM

## 2023-02-07 PROCEDURE — 99223 1ST HOSP IP/OBS HIGH 75: CPT | Performed by: INTERNAL MEDICINE

## 2023-02-07 PROCEDURE — 770020 HCHG ROOM/CARE - TELE (206)

## 2023-02-07 PROCEDURE — 700102 HCHG RX REV CODE 250 W/ 637 OVERRIDE(OP): Performed by: INTERNAL MEDICINE

## 2023-02-07 RX ORDER — SUCRALFATE ORAL 1 G/10ML
1 SUSPENSION ORAL EVERY 6 HOURS
Status: DISCONTINUED | OUTPATIENT
Start: 2023-02-07 | End: 2023-02-09 | Stop reason: HOSPADM

## 2023-02-07 RX ORDER — SODIUM CHLORIDE, SODIUM LACTATE, POTASSIUM CHLORIDE, AND CALCIUM CHLORIDE .6; .31; .03; .02 G/100ML; G/100ML; G/100ML; G/100ML
500 INJECTION, SOLUTION INTRAVENOUS ONCE
Status: COMPLETED | OUTPATIENT
Start: 2023-02-07 | End: 2023-02-07

## 2023-02-07 RX ORDER — CHOLECALCIFEROL (VITAMIN D3) 125 MCG
5 CAPSULE ORAL NIGHTLY
Status: DISCONTINUED | OUTPATIENT
Start: 2023-02-07 | End: 2023-02-09 | Stop reason: HOSPADM

## 2023-02-07 RX ADMIN — ROSUVASTATIN CALCIUM 40 MG: 20 TABLET, FILM COATED ORAL at 17:41

## 2023-02-07 RX ADMIN — PANTOPRAZOLE SODIUM 40 MG: 40 INJECTION, POWDER, LYOPHILIZED, FOR SOLUTION INTRAVENOUS at 06:00

## 2023-02-07 RX ADMIN — AMPICILLIN AND SULBACTAM 3 G: 1; 2 INJECTION, POWDER, FOR SOLUTION INTRAMUSCULAR; INTRAVENOUS at 05:25

## 2023-02-07 RX ADMIN — SODIUM CHLORIDE, POTASSIUM CHLORIDE, SODIUM LACTATE AND CALCIUM CHLORIDE 500 ML: 600; 310; 30; 20 INJECTION, SOLUTION INTRAVENOUS at 09:35

## 2023-02-07 RX ADMIN — Medication 5 MG: at 21:26

## 2023-02-07 RX ADMIN — PANTOPRAZOLE SODIUM 40 MG: 40 INJECTION, POWDER, LYOPHILIZED, FOR SOLUTION INTRAVENOUS at 17:41

## 2023-02-07 RX ADMIN — SUCRALFATE 1 G: 1 SUSPENSION ORAL at 17:41

## 2023-02-07 RX ADMIN — SODIUM CHLORIDE, POTASSIUM CHLORIDE, SODIUM LACTATE AND CALCIUM CHLORIDE: 600; 310; 30; 20 INJECTION, SOLUTION INTRAVENOUS at 00:29

## 2023-02-07 ASSESSMENT — ENCOUNTER SYMPTOMS
DIZZINESS: 1
BLOOD IN STOOL: 0
LOSS OF CONSCIOUSNESS: 0
INSOMNIA: 0
COUGH: 1
CHILLS: 1
PALPITATIONS: 0
SPUTUM PRODUCTION: 1
NAUSEA: 1
FALLS: 0
CHILLS: 0
CONSTIPATION: 0
VOMITING: 1
FOCAL WEAKNESS: 0
SEIZURES: 0
FLANK PAIN: 0
HEADACHES: 0
DIARRHEA: 0
HEADACHES: 1
WEAKNESS: 0
ABDOMINAL PAIN: 1
MYALGIAS: 0
DIZZINESS: 0
WHEEZING: 0
FEVER: 0
SHORTNESS OF BREATH: 0
ABDOMINAL PAIN: 0
NERVOUS/ANXIOUS: 0
SORE THROAT: 0

## 2023-02-07 ASSESSMENT — LIFESTYLE VARIABLES: SUBSTANCE_ABUSE: 0

## 2023-02-07 NOTE — PROGRESS NOTES
Assumed care of patient at 0715. Received bedside report from night shift RN. Bed is locked and in the lowest position, call light and personal belongings are within reach. Treaded socks in place and bed alarm armed. Patient updated on plan of care, no complaints of pain at this time. White board updated. Patient AxOx4, breathing pattern is unlabored on 2 L NC. Pt is on tele. All needs met at this time. Will continue care and monitoring as ordered.

## 2023-02-07 NOTE — CONSULTS
Date of Consultation:  2/7/2023    Patient: : Talib Ortiz  MRN: [id number]    Referring Physician:  Charlie Kim M.D. with [facility] IM team    GI:JORGE Montero     Reason for Consultation: Hematemesis    History of Present Illness:   This is a 67-year-old male with a past medical history including coronary artery disease, myocardial infarction (2016), s/p CABG (2016) on Xarelto and aspirin, iron deficiency anemia on iron supplements who was admitted to [facility] on 2/6/2023 for COVID-19 and hematemesis.  Patient reports he and his wife had developed coughs over the weekend.  On 2/5/2023, patient reports feeling bloated, and developed generalized nonradiating abdominal pain and sternal pain described as burning.  No relieving or exacerbating factors.  He states that, that evening he had a single episode of emesis that was described as phlegm with dark brown.  The following day, he and his wife had sought care at a primary care provider's office.  During which time, he developed the first of multiple episodes of dark brown hematemesis.  At that point, he primary care provider referred patient to the emergency department.  In the emergency department, patient tested positive for COVID-19.  Hemoglobin 16.7, MCV 95.6, platelets 267, CHEM panel relatively unremarkable.  CT abdomen pelvis was done showing a large hiatal hernia, scattered colonic diverticuli, right inguinal hernia.  Left lower lobe pneumonia was also seen.  Patient was treated per sepsis protocol for pneumonia secondary to COVID-19 and admitted for further evaluation.  Overnight, patient's hemoglobin has steadily decreased from 15.2-13.1.  He states that after receiving medications in the emergency department, his abdominal pain nausea, hematemesis has ceased.  He reports a history of duodenal ulcers in his 30s, and states that he had an estimated 7 ulcers at one point.       Tobacco use: Patient reports tobacco cessation after  "CABG in 2016  Alcohol use: Denies  Illicit drug use: Denies    [facility]: [facility] for [facility]ERD.  Patient had  Schatzki's ring and a hiatal hernia seen  Last colonoscopy: 2017 [facility].  3 polyps were seen.  NSAID/ASA use: Patient takes daily aspirin, last dose was the evening of 2023  Anticoagulation use: Patient reports taking Xarelto, last dose was 2023      Past Medical History:   Diagnosis Date    Heart attack (HCC) 2016    cardiology, Dr. Frias    Pneumonia 2016    Osteoarthritis 2015    Obesity (BMI 30-39.9) 2015    Blood clotting disorder ([facility])     leg    Arthritis     CAD (coronary artery disease)     Heart burn     High cholesterol     Hyperlipidemia     Kidney disease     Kidney stone          Past Surgical History:   Procedure Laterality Date    KNEE ARTHROPLASTY TOTAL Left 2017    Procedure: KNEE ARTHROPLASTY TOTAL;  Surgeon: Charles Castellanos M.D.;  Location: SURGERY [facility];  Service:     MULTIPLE CORONARY ARTERY BYPASS  2016    LAMINOTOMY      lumbar    KNEE ARTHROSCOPY Left     OTHER SURGICAL PROCEDURE Left     \"removal of vein left leg due to blood clot\"    SHOULDER ARTHROTOMY Right        Family History   Problem Relation Age of Onset    Arthritis Mother     Hyperlipidemia Mother     Heart Disease Mother         bypass x4    Diabetes Mother     Hyperlipidemia Father     Heart Attack Father 61    Other Sister         back surgery    Other Brother         joint problems       Social History     Socioeconomic History    Marital status:    Tobacco Use    Smoking status: Former     Packs/day: 0.25     Years: 10.00     Pack years: 2.50     Types: Cigarettes     Quit date: 3/19/2016     Years since quittin.8    Smokeless tobacco: Never   Vaping Use    Vaping Use: Never used   Substance and Sexual Activity    Alcohol use: No     Comment: Quit years    Drug use: No    Sexual " activity: Yes     Partners: Female       Review of systems:  Review of Systems   Constitutional:  Negative for chills, fever and malaise/fatigue.   HENT:  Negative for congestion and sore throat.    Respiratory:  Positive for cough and sputum production. Negative for shortness of breath.    Cardiovascular:  Negative for chest pain and leg swelling.   Gastrointestinal:  Positive for abdominal pain, nausea and vomiting (+ Hematemesis). Negative for blood in stool, constipation, diarrhea and melena.   Genitourinary:  Negative for dysuria and flank pain.   Musculoskeletal:  Negative for falls and myalgias.   Neurological:  Negative for dizziness, weakness and headaches.   Psychiatric/Behavioral:  Negative for substance abuse. The patient is not nervous/anxious.    All other systems reviewed and are negative.      Physical Exam:  Vitals:    02/06/23 2354 02/07/23 0353 02/07/23 0800 02/07/23 1119   BP: 92/58 92/56 106/70 95/58   Pulse:  70 72 74   Resp:  18 18 18   Temp:  36.3 °C (97.4 °F) 36.6 °C (97.8 °F) 36.5 °C (97.7 °F)   TempSrc:  Temporal Temporal Temporal   SpO2:  97% 96% 95%   Weight:       Height:           Physical Exam  Vitals and nursing note reviewed.   Constitutional:       Appearance: Normal appearance. He is overweight.   HENT:      Head: Normocephalic.      Nose: Nose normal.      Mouth/Throat:      Mouth: Mucous membranes are moist.      Pharynx: Oropharynx is clear. No oropharyngeal exudate.   Eyes:      General: No scleral icterus.     Conjunctiva/sclera: Conjunctivae normal.      Pupils: Pupils are equal, round, and reactive to light.   Cardiovascular:      Rate and Rhythm: Normal rate and regular rhythm.      Pulses: Normal pulses.      Heart sounds: Normal heart sounds. No murmur heard.  Pulmonary:      Effort: Pulmonary effort is normal. No respiratory distress.      Breath sounds: No wheezing.      Comments: Mildly diminished in left base  Chest:      Chest wall: No tenderness.   Abdominal:       General: Abdomen is flat. Bowel sounds are normal. There is no distension.      Palpations: Abdomen is soft.      Tenderness: There is no abdominal tenderness. There is no guarding.   Musculoskeletal:      Right lower leg: No edema.      Left lower leg: No edema.   Skin:     General: Skin is warm and dry.      Capillary Refill: Capillary refill takes less than 2 seconds.      Coloration: Skin is not jaundiced or pale.   Neurological:      General: No focal deficit present.      Mental Status: He is alert and oriented to person, place, and time. Mental status is at baseline.      Cranial Nerves: No cranial nerve deficit.      Motor: No weakness.   Psychiatric:         Mood and Affect: Mood normal.         Behavior: Behavior normal.         Thought Content: Thought content normal.         Judgment: Judgment normal.         Labs:  Recent Labs     02/06/23 1734 02/06/23 2030 02/07/23 0301 02/07/23  0913   WBC 9.5  --  7.7  --    RBC 5.00  --  4.04*  --    HEMOGLOBIN 16.7 15.2 13.1* 13.2*   HEMATOCRIT 47.8  --  39.2*  --    MCV 95.6  --  97.0  --    MCH 33.4*  --  32.4  --    MCHC 34.9  --  33.4*  --    RDW 44.9  --  46.5  --    PLATELETCT 267  --  188  --    MPV 9.8  --  9.8  --      Recent Labs     02/06/23 1734 02/07/23  0301   SODIUM 142 138   POTASSIUM 4.0 4.0   CHLORIDE 101 104   CO2 24 23   GLUCOSE 134* 105*   BUN 17 18           Recent Labs     02/06/23 1734 02/07/23  0301   ASTSGOT 29 21   ALTSGPT 27 19   TBILIRUBIN 0.4 0.3   ALKPHOSPHAT 43 32   GLOBULIN 3.2 2.5         Imaging:  CT-ABDOMEN-PELVIS W/O  Narrative:   2/6/2023 9:29 PM    HISTORY/REASON FOR EXAM:  GI bleed.    TECHNIQUE/EXAM DESCRIPTION:    CT scan of the abdomen and pelvis without contrast.    Noncontrast helical scanning was obtained from the diaphragmatic domes through the pubic symphysis.    Low dose optimization technique was utilized for this CT exam including automated exposure control and adjustment of the mA and/or kV according to  patient size.    COMPARISON: None.    FINDINGS:    Lower Chest: Linear densities in the bilateral lung bases are seen favoring changes of atelectasis.    Liver: Normal.    Spleen: Unremarkable.    Pancreas: Unremarkable.    Gallbladder: No calcified stones.    Biliary: Nondilated.    Adrenal glands: Normal.    Kidneys: Unremarkable without hydronephrosis.    Bowel: No obstruction or acute inflammation. Scattered colonic diverticula are seen. Large hiatal hernia is seen. The appendix appears within normal limits.    Lymph nodes: No adenopathy.    Vasculature: Atherosclerotic changes are seen including atherosclerotic coronary artery calcifications.    Peritoneum: Unremarkable without ascites.    Musculoskeletal: No acute or destructive process.    Pelvis: No adenopathy or free fluid. Fat-containing right inguinal hernia is seen.  Impression: 1.  Large hiatal hernia  2.  Diverticulosis  3.  Fat-containing right inguinal hernia  4.  Atherosclerosis and atherosclerotic coronary artery disease            Impressions:  Acute GI bleeding with Hematemesis   Sepsis secondary to pneumonia secondary to COVID-19  Hx of duodenal ulcers  Hypertension  Hx of MI s/p CABG (2016)    MDM:  This is a 67-year-old male presenting to [facility] the past medical history including duodenal ulcers, MI s/p CABG in 2016 on Xarelto and aspirin who presented with coffee-ground emesis.  In emergency department, hemoglobin 16.7 and subsequently dropped to 13.1.  Suspect that this is in part due to multiple episodes of coffee-ground emesis in addition to hemodilution after fluid resuscitation per sepsis protocol.  Can panel unremarkable.  Iron studies normal.  Patient did test positive for COVID-19.  Imaging shows left lower lobe infiltrate, he is on room air with only symptom being cough.  Discussed concern with patient for upper GI bleeding etiologies including May-Holguin tear, gastric and/or duodenal ulcerations,  esophagitis, gastritis in addition to further evaluation and possible treatment with EGD.  Patient is agreeable to proceed with EGD.    His last dose of aspirin and Xarelto were the evening of 2/5/2023.  He has been n.p.o. since that same time.    Recommendations:  Patient ok to have PO intake- diet per primary team.  NPO at midnight.   Monitor h/h- transfuse is needed for hgb >7.   Continue to hold aspirin and Xarelto  Continue PPI IV twice daily.   AM CBC, BMP, and INR ordered.  Plan for EGD tomorrow morning.    Case was discussed with patient, nursing, Dr. Kim, Dr. Hernandez      This note was generated using voice recognition software which has a small chance of producing errors of grammar and possibly content. I have made every reasonable attempt to find and correct any obvious errors, but expect that some may not be found prior to finalization of this note.

## 2023-02-07 NOTE — ED NOTES
Med Rec PARTIAL per Pt at bedside. Pt reports that Pt's PCP told him to stop Xarelto due to bleeding. Pt was taking Xarelto BID w/Asp 81mg. Pt unsure of all names of medicatons. Home Pharmacy is closed at this time.   Home Pharmacy:  [facility]

## 2023-02-07 NOTE — ASSESSMENT & PLAN NOTE
Prior hospital missions for iron deficiency anemia. Requiring multiple blood transfusions and iron infusion during 5/2019 admission, no scopes were done at that time. Last EGD and Colonoscopy was 3/2017 [facility]; EGD was done for GERD and showed schatzki's ring and hiatal hernia; colonoscopy showed 3 polyps.  3 bouts of hematemesis 1 which was witnessed at the emergency department    Differential: May-Holguin tear in the context of acute cough, nausea and vomiting secondary to COVID, Developing pathology from Schatzki's ring patient had previously.  Admit to telemetry  Clear liquid diet patient will be n.p.o. at midnight 02/07.  For EGD in the morning acute bleed  Hold home aspirin and Xarelto for acute bleed. SCDs for prophylaxis  IV LR at 100/h  IV Protonix 40 twice daily  GI consult: Appreciate recommendations.  Added Carafate with expected EGD tomorrow.

## 2023-02-07 NOTE — PROGRESS NOTES
Pt remains asymptomatic with bp trending down MAP 68 MD aware. Pt felt relief when I told him what the doctor said about the MAP being ok. Pt resting with eyes closed.

## 2023-02-07 NOTE — ASSESSMENT & PLAN NOTE
Left lower lobe infiltrates; fairly mild on imaging. Chest x-ray and SIRS positive at admission.  procalcitonin negative at 0.1.    -Discontinuing IV Unasyn and azithromycin as likely this is not a bacterial infection and this is secondary to COVID alone.  -Holding steroids due to GI work-up.  May consider later in admission if still requiring oxygen therapy

## 2023-02-07 NOTE — CARE PLAN
"The patient is Stable - Low risk of patient condition declining or worsening         Progress made toward(s) clinical / shift goals:    Problem: Knowledge Deficit - Standard  Goal: Patient and family/care givers will demonstrate understanding of plan of care, disease process/condition, diagnostic tests and medications  Outcome: Progressing: Labs and procedures have given this Pt an understanding of what is going on in his body. Hemoglobin 13.1, Lactic Acid 2.4, Glucose 100, Ketones 15.  Pt is alert and able to make needs known, he stated he is feeling much better after what they gave him in the ED 1500 mL LR. LR now running at 100 mL/hr.  IV ABX Unasyn 3 g Q 6 hrs. Covid + Airborne ,Droplet precautions. RA Pt 02 sat 85%; 2 L NC applied now he vacillates between 90 - 94 %, RT consult ordered.  Cardiac monitored SR. Skin intact 4 eyes complete. No pain reported. Pt stated, \"I am just tired\"         Patient is not progressing towards the following goals:      "

## 2023-02-07 NOTE — ASSESSMENT & PLAN NOTE
This is Sepsis Present on admission  SIRS criteria identified on my evaluation include: Tachycardia, with heart rate greater than 90 BPM and Tachypnea, with respirations greater than 20 per minute  Source is left lower lobe pneumonia  Sepsis protocol initiated  Fluid resuscitation ordered per protocol  Crystalloid Fluid Administration: Fluid resuscitation ordered per standard protocol - 30 mL/kg per current or ideal body weight  IV antibiotics as appropriate for source of sepsis  Reassessment: I have reassessed the patient's hemodynamic status    Noted for left lower lobe pneumonia and positive SIRS  COVID-positive.  Likely this is the cause

## 2023-02-07 NOTE — PROGRESS NOTES
Pt bp trending downward this shift. Metoprolol and Losartan held this morning. Pt oxygen level is 93-95% on 2 L NC.  MD alerted.

## 2023-02-07 NOTE — ED NOTES
Pt brought from waiting room to 15 [address] by wheelchair and able to ambulate a few feet to [facility].   Pt placed on cardiac monitor: sinus tach 110s, serial vitals in place. Pt c/o dyspnea at rest, worse on exertion, constant vomiting today, waves of generalized abd pain with vomiting.   PIV established.   Safety precautions in place including gurney locked in lowest position, both side rails up, call light within reach. Pt educated to use call light if requiring any assistance with getting oob.

## 2023-02-07 NOTE — H&P
[facility] Medicine History & Physical Note    Date of Service  2/6/2023    Primary Care Physician  Belen Knowles M.D.    Consultants  None    Code Status  Full Code    Chief Complaint  Chief Complaint   Patient presents with    Blood in Vomit     Sent by PCP for Covid symptoms and blood in vomit.       History of Presenting Illness  67-year-old male with a past medical history of obesity, dyslipidemia, CAD status post CABG August 2016, and iron deficiency anemia (last EGD and Colonoscopy was 3/2017 by GI consultants; EGD was done for GERD and showed schatzki's ring and hiatal hernia; colonoscopy showed 3 polyps) was referred to the emergency department by primary care provider for COVID symptoms and hematemesis.  Patient reports that he first had a bout of hematemesis yesterday night.  Patient associates hematemesis with epigastric pain which he describes as a burning sensation which caused him to be nauseous and then vomited.  He has vomited a total of 3 times since then and had a bout of hematemesis at the emergency department.  He associates symptoms with orthostatic changes, palpitations, general weakness.  He reports melena but admits to taking oral iron.  He also takes aspirin and Xarelto for history of CABG.  No loss of consciousness.  Patient also reports frequent cough.  No fever, chills, night sweats.    At the emergency department, vital signs with tachycardia up to 130s, tachypnea in the 20s.  Patient satting well room air.  No leukocytosis or anemia on CBC.  Chemistry without gross normalities.  Lactic acid at 2.4.  Ill-defined left mid and lower hemithorax opacities.  Blood samples were collected for culture.  Patient received a 500 LR bolus, IV morphine, ondansetron, Protonix, azithromycin and Unasyn.  Patient was admitted for hematemesis likely secondary to peptic ulcer disease requiring intravenous antiacid, fluid resuscitation and urgent gastroenterology evaluation and sepsis secondary to  left lower lobe pneumonia noted on chest x-ray requiring IV resuscitation and IV antibiotics.    I discussed the plan of care with patient and bedside RN.    Review of Systems  Review of Systems   Constitutional:  Positive for malaise/fatigue.   HENT: Negative.     Eyes: Negative.    Respiratory: Negative.     Cardiovascular: Negative.    Gastrointestinal:  Positive for abdominal pain, heartburn, melena, nausea and vomiting. Negative for blood in stool, constipation and diarrhea.   Genitourinary: Negative.    Musculoskeletal: Negative.    Skin: Negative.    Neurological:  Positive for dizziness, weakness and headaches.   Endo/Heme/Allergies: Negative.    Psychiatric/Behavioral: Negative.       Past Medical History   has a past medical history of Arthritis, Blood clotting disorder ([facility]) (1996), CAD (coronary artery disease), Heart attack ([facility]) (8/16/2016), Heart burn, High cholesterol, Hyperlipidemia, Kidney disease, Kidney stone, Obesity (BMI 30-39.9) (2/12/2015), Osteoarthritis (3/2/2015), Pneumonia (2016), and Psychiatric problem.    Surgical History   has a past surgical history that includes knee arthroscopy (Left, 1990's); laminotomy (2012); multiple coronary artery bypass (8/16/2016); shoulder arthrotomy (Right, 1980's); other surgical procedure (Left, 1990's); and knee arthroplasty total (Left, 6/19/2017).     Family History  family history includes Arthritis in his mother; Diabetes in his mother; Heart Attack (age of onset: 61) in his father; Heart Disease in his mother; Hyperlipidemia in his father and mother; Other in his brother and sister.     Social History   reports that he quit smoking about 6 years ago. His smoking use included cigarettes. He has a 2.50 pack-year smoking history. He has never used smokeless tobacco. He reports that he does not drink alcohol and does not use drugs.    Allergies  No Known Allergies    Medications  Prior to Admission Medications   Prescriptions Last Dose Informant  Patient Reported? Taking?   ascorbic acid (VITAMIN C) 500 MG tablet 2/6/2023  No No   Sig: Take 1 Tab by mouth every day.   aspirin EC (ECOTRIN) 81 MG Tablet Delayed Response 2/6/2023  Yes No   Sig: Take 81 mg by mouth every day.   ferrous sulfate 325 (65 Fe) MG tablet 2/6/2023  No No   Sig: Take 1 Tab by mouth every morning with breakfast.   losartan (COZAAR) 25 MG Tab 2/6/2023  No No   Sig: Take 1 Tablet by mouth every day.   metoprolol tartrate (LOPRESSOR) 25 MG Tab 2/6/2023  No No   Sig: Take 1 Tablet by mouth 2 times a day.   rivaroxaban (XARELTO) 2.5 MG tablet   No No   Sig: Take 1 Tablet by mouth 2 times a day.   rosuvastatin (CRESTOR) 40 MG tablet 2/6/2023  No No   Sig: Take 1 Tablet by mouth every evening.   therapeutic multivitamin-minerals (THERAGRAN-M) Tab 2/6/2023  No No   Sig: Take 1 Tab by mouth every day.      Facility-Administered Medications: None       Physical Exam  Temp:  [37.6 °C (99.7 °F)-37.7 °C (99.9 °F)] 37.6 °C (99.7 °F)  Pulse:  [] 97  Resp:  [18-24] 24  BP: ()/(57-81) 126/75  SpO2:  [88 %-97 %] 89 %  Blood Pressure : 109/57   Temperature: 37.6 °C (99.7 °F)   Pulse: (!) 116   Respiration: 18   Pulse Oximetry: 89 %       Physical Exam  Constitutional:       General: He is not in acute distress.     Appearance: Normal appearance. He is ill-appearing.      Comments: Sitting in bed with bag of hematemesis.   HENT:      Head: Normocephalic and atraumatic.      Nose: Nose normal. No congestion.      Mouth/Throat:      Mouth: Mucous membranes are dry.   Eyes:      Extraocular Movements: Extraocular movements intact.      Pupils: Pupils are equal, round, and reactive to light.      Comments: Pale conjunctiva   Cardiovascular:      Rate and Rhythm: Regular rhythm. Tachycardia present.      Pulses: Normal pulses.      Heart sounds: Normal heart sounds.   Pulmonary:      Effort: Pulmonary effort is normal.      Breath sounds: Normal breath sounds.   Abdominal:      General: Bowel sounds  are normal.      Palpations: Abdomen is soft.      Tenderness: There is no abdominal tenderness.   Musculoskeletal:         General: No swelling. Normal range of motion.      Cervical back: Normal range of motion and neck supple.      Right lower leg: No edema.      Left lower leg: No edema.   Skin:     General: Skin is warm.      Coloration: Skin is pale. Skin is not jaundiced.   Neurological:      General: No focal deficit present.      Mental Status: He is alert and oriented to person, place, and time. Mental status is at baseline.      Cranial Nerves: No cranial nerve deficit.   Psychiatric:         Mood and Affect: Mood normal.         Behavior: Behavior normal.         Thought Content: Thought content normal.         Judgment: Judgment normal.       Laboratory:  Recent Labs     02/06/23  1734   WBC 9.5   RBC 5.00   HEMOGLOBIN 16.7   HEMATOCRIT 47.8   MCV 95.6   MCH 33.4*   MCHC 34.9   RDW 44.9   PLATELETCT 267   MPV 9.8     Recent Labs     02/06/23  1734   SODIUM 142   POTASSIUM 4.0   CHLORIDE 101   CO2 24   GLUCOSE 134*   BUN 17   CREATININE 1.31   CALCIUM 10.0     Recent Labs     02/06/23  1734   ALTSGPT 27   ASTSGOT 29   ALKPHOSPHAT 43   TBILIRUBIN 0.4   GLUCOSE 134*         No results for input(s): NTPROBNP in the last 72 hours.      No results for input(s): TROPONINT in the last 72 hours.    Imaging:  DX-CHEST-PORTABLE (1 VIEW)   Final Result      1.  Ill-defined left mid and lower hemithorax opacities could represent areas of postinflammatory scarring, atelectasis or pneumonitis.      CT-ABDOMEN-PELVIS W/O    (Results Pending)     Assessment/Plan:  Justification for Admission Status  I anticipate this patient is appropriate for observation status at this time because of below    * Hematemesis- (present on admission)  Assessment & Plan  Prior hospital missions for iron deficiency anemia  Requiring multiple blood transfusions and iron infusion during 5/2019 admission  No diagnosis was reached but there was  a concern for   ast EGD and Colonoscopy was 3/2017 by GI consultants; EGD was done for GERD and showed schatzki's ring and hiatal hernia; colonoscopy showed 3 polyps  3 bouts of hematemesis 1 which was witnessed at the emergency department  No anemia on CBC, likely early and likely high  Patient pale and tachycardic  History of iron deficiency of unknown origins back on 2018  Admit to telemetry  Clear liquid diet for now, increase as tolerated  Hold home aspirin and Xarelto for now  IV hydration  IV PPI  Iron studies  Pending abdominal/pelvic CT  SCDs for prophylaxis    I suspect that based on patient's clinical presentation and multiple bouts of witnessed hematemesis he would likely need more than 2 midnights in the hospital.    Sepsis (HCC)- (present on admission)  Assessment & Plan  This is Sepsis Present on admission  SIRS criteria identified on my evaluation include: Tachycardia, with heart rate greater than 90 BPM and Tachypnea, with respirations greater than 20 per minute  Source is left lower lobe pneumonia  Sepsis protocol initiated  Fluid resuscitation ordered per protocol  Crystalloid Fluid Administration: Fluid resuscitation ordered per standard protocol - 30 mL/kg per current or ideal body weight  IV antibiotics as appropriate for source of sepsis  Reassessment: I have reassessed the patient's hemodynamic status    Noted for left lower lobe pneumonia and positive SIRS  Also presented with hematemesis  Continue IV Unasyn and azithromycin  Pending procalcitonin  COVID-positive  Follow blood cultures  Labs in a.m.    Lactic acidosis- (present on admission)  Assessment & Plan  As needed bolus  Trend    Gastroesophageal reflux disease without esophagitis- (present on admission)  Assessment & Plan  On IV PPI    Pneumonia- (present on admission)  Assessment & Plan  Left lower lobe infiltrates  Chest x-ray and SIRS positive  Saturating well room air  Continue IV antibiotics  Pending procalcitonin  Labs in  [place]    Dyslipidemia- (present on admission)  Assessment & Plan  Continue home statin        VTE prophylaxis: SCDs/TEDs

## 2023-02-07 NOTE — PROGRESS NOTES
4 Eyes Skin Assessment Completed by RUBY Rao and RUBY Israel.    Head WDL  Ears WDL  Nose WDL  Mouth WDL  Neck WDL  Breast/Chest WDL  Shoulder Blades WDL  Spine WDL  (R) Arm/Elbow/Hand WDL  (L) Arm/Elbow/Hand WDL  Abdomen WDL  Groin WDL  Scrotum/Coccyx/Buttocks WDL  (R) Leg WDL  (L) Leg WDL  (R) Heel/Foot/Toe WDL  (L) Heel/Foot/Toe WDL          Devices In Places Tele Box, SCD's, and Nasal Cannula      Interventions In Place Pillows    Possible Skin Injury No    Pictures Uploaded Into Epic N/A  Wound Consult Placed N/A  RN Wound Prevention Protocol Ordered No

## 2023-02-07 NOTE — PROGRESS NOTES
CC: Fever/nausea and red vomiting/cough    HPI:   Talib presents today because he has been having low-grade fever, cough, nausea and vomiting for 2 days.  His nausea vomiting is worsened, become more frequent, his vomitus has been red.  He has had an episode of vomiting here in the clinic, it looks red in color with food particles.  Reported some abdominal discomfort, and sometimes abdominal spasm.  Patient has been on Xarelto 2.5 mg twice a day prescribed by cardiology.   Patient with history of CAD/s/pCABG, currently denies any chest pain or shortness of breath.      Patient Active Problem List    Diagnosis Date Noted    Low serum vitamin B12 05/16/2018    Iron deficiency anemia 05/09/2018    Rash 05/09/2018    Primary osteoarthritis of left knee 04/06/2017    Depression 04/06/2017    Gastroesophageal reflux disease without esophagitis 04/06/2017    Coronary artery disease due to calcified coronary lesion: CABG ×2 in August 2016 01/06/2017    Dyslipidemia 03/02/2015    Osteoarthritis 03/02/2015    Obesity (BMI 30-39.9) 02/12/2015       Current Outpatient Medications   Medication Sig Dispense Refill    rosuvastatin (CRESTOR) 40 MG tablet Take 1 Tablet by mouth every evening. 90 Tablet 4    rivaroxaban (XARELTO) 2.5 MG tablet Take 1 Tablet by mouth 2 times a day. 180 Tablet 4    losartan (COZAAR) 25 MG Tab Take 1 Tablet by mouth every day. 90 Tablet 4    aspirin EC (ECOTRIN) 81 MG Tablet Delayed Response Take 81 mg by mouth every day.      ferrous sulfate 325 (65 Fe) MG tablet Take 1 Tab by mouth every morning with breakfast. 30 Tab 3    therapeutic multivitamin-minerals (THERAGRAN-M) Tab Take 1 Tab by mouth every day. 30 Tab 11    ascorbic acid (VITAMIN C) 500 MG tablet Take 1 Tab by mouth every day. 30 Tab 3    metoprolol tartrate (LOPRESSOR) 25 MG Tab Take 1 Tablet by mouth 2 times a day. 180 Tablet 4    OMEPRAZOLE PO Take 2 Each by mouth every day at 6 PM. (20mg) (Patient not taking: Reported on 2/6/2023)    "    No current facility-administered medications for this visit.         Allergies as of 02/06/2023    (No Known Allergies)        ROS: Denies any chest pain, Shortness of breath, Changes bowel or bladder, Lower extremity edema.    Physical Exam:  BP (!) 86/66 (BP Location: Right arm, Patient Position: Sitting, BP Cuff Size: Large adult long)   Pulse (!) 118   Temp 37.7 °C (99.9 °F) (Temporal)   Resp 20   Ht 1.778 m (5' 10\") Comment: per pt  Wt 88.3 kg (194 lb 10.7 oz)   SpO2 96%   BMI 27.93 kg/m²   Gen.: Well-developed, well-nourished, no apparent distress,pleasant and cooperative with the examination  Skin:  Warm and dry with good turgor. No rashes or suspicious lesions in visible areas  HEENT:Sinuses nontender with palpation, TMs clear, nares patent with pink mucosa and clear rhinorrhea,no septal deviation ,polyps or lesions. lips without lesions, oropharynx clear.  Neck: Trachea midline,no masses or adenopathy. No JVD.  Cor: Regular rate and rhythm without murmur, gallop or rub.  Lungs: Respirations unlabored.Clear to auscultation with equal breath sounds bilaterally. No wheezes, rhonchi.  Extremities: No cyanosis, clubbing or edema.  Abd: Soft, NT, ND        Assessment and Plan.   67 y.o. male     1. Fever, unspecified fever cause  2. Acute cough    - POCT SARS-COV Antigen MADDIE (Symptomatic only)  - POCT Influenza A/B    3. Nausea and vomiting, unspecified vomiting type  Vomiting food particles( red in color), patient has an episode of vomiting in the clinic today, almost projectile associated with low blood pressure and high heart rate.  Rule out upper GI bleeding.  Advised to go to ER.      4. Hypertension, unspecified type  Blood pressure is low with high heart rate, vomiting red in color.  Rule out upper GI bleeding  We will send patient to ER  Advised to stop the blood pressure medication for now.          "

## 2023-02-07 NOTE — ED TRIAGE NOTES
Chief Complaint   Patient presents with    Blood in Vomit     Sent by PCP for Covid symptoms and blood in vomit.     Pt educated upon triage process and told to inform  staff of any changes in condition so that Pt may be reassessed. No further questions at this time. Pt sitting out in lobby.

## 2023-02-07 NOTE — ED PROVIDER NOTES
"ED Provider Note    CHIEF COMPLAINT  Chief Complaint   Patient presents with    Blood in Vomit     Sent by PCP for Covid symptoms and blood in vomit.       EXTERNAL RECORDS REVIEWED  None    HPI/ROS  LIMITATION TO HISTORY   None  OUTSIDE HISTORIAN(S):  None    Talib Ortiz is a 67 y.o. male who presents here for evaluation of hematemesis.  The patient states that this started earlier today, and he has no history of the same.  Patient has no fever chills or vomiting, and has no abdominal pain.  He states he does take Xarelto, because of his \"open heart.\"  He has been taking his medications as prescribed.  He was sent in here by his primary care doctor because he was having some blood in the vomit.  He has no history of peptic ulcer disease, he does not drink alcohol, does not take any NSAIDs.    PAST MEDICAL HISTORY   has a past medical history of Arthritis, Blood clotting disorder ([facility]) (1996), CAD (coronary artery disease), Heart attack ([facility]) (8/16/2016), Heart burn, High cholesterol, Hyperlipidemia, Kidney disease, Kidney stone, Obesity (BMI 30-39.9) (2/12/2015), Osteoarthritis (3/2/2015), Pneumonia (2016), and Psychiatric problem.    SURGICAL HISTORY   has a past surgical history that includes knee arthroscopy (Left, 1990's); laminotomy (2012); multiple coronary artery bypass (8/16/2016); shoulder arthrotomy (Right, 1980's); other surgical procedure (Left, 1990's); and knee arthroplasty total (Left, 6/19/2017).    FAMILY HISTORY  Family History   Problem Relation Age of Onset    Arthritis Mother     Hyperlipidemia Mother     Heart Disease Mother         bypass x4    Diabetes Mother     Hyperlipidemia Father     Heart Attack Father 61    Other Sister         back surgery    Other Brother         joint problems       SOCIAL HISTORY  Social History     Tobacco Use    Smoking status: Former     Packs/day: 0.25     Years: 10.00     Pack years: 2.50     Types: Cigarettes     Quit date: 3/19/2016     Years since " "quittin.8    Smokeless tobacco: Never   Vaping Use    Vaping Use: Never used   Substance and Sexual Activity    Alcohol use: No     Comment: Quit years    Drug use: No    Sexual activity: Yes     Partners: Female       CURRENT MEDICATIONS  Home Medications    **Home medications have not yet been reviewed for this encounter**         ALLERGIES  No Known Allergies    PHYSICAL EXAM  VITAL SIGNS: /58   Pulse (!) 101   Temp 37.6 °C (99.7 °F) (Temporal)   Resp (!) 22   Ht 1.778 m (5' 10\")   Wt 88.4 kg (194 lb 14.2 oz)   SpO2 92%   BMI 27.96 kg/m²    Constitutional: Well developed, well nourished.  Mild acute distress.  HEENT: Normocephalic, atraumatic. Posterior pharynx clear and moist.  Eyes:  EOMI. Normal sclera.  Neck: Supple, Full range of motion, nontender.  Chest/Pulmonary: clear to ausculation. Symmetrical expansion.   Cardio: Tachy rate and rhythm with no murmur.   Abdomen: Soft, nontender. No peritoneal signs. No guarding. No palpable masses.  Musculoskeletal: No deformity, no edema, neurovascular intact.   Neuro: Clear speech, appropriate, cooperative, cranial nerves II-XII grossly intact.  Psych: Stress mood and affect      DIAGNOSTIC STUDIES / PROCEDURES  Results for orders placed or performed during the hospital encounter of 23   Lactic acid (lactate)   Result Value Ref Range    Lactic Acid 2.4 (H) 0.5 - 2.0 mmol/L   Lactic acid (lactate): Repeat if initial lactic acid result is greater than 2   Result Value Ref Range    Lactic Acid 2.8 (H) 0.5 - 2.0 mmol/L   CBC With Differential   Result Value Ref Range    WBC 9.5 4.8 - 10.8 K/uL    RBC 5.00 4.70 - 6.10 M/uL    Hemoglobin 16.7 14.0 - 18.0 g/dL    Hematocrit 47.8 42.0 - 52.0 %    MCV 95.6 81.4 - 97.8 fL    MCH 33.4 (H) 27.0 - 33.0 pg    MCHC 34.9 33.7 - 35.3 g/dL    RDW 44.9 35.9 - 50.0 fL    Platelet Count 267 164 - 446 K/uL    MPV 9.8 9.0 - 12.9 fL    Neutrophils-Polys 84.00 (H) 44.00 - 72.00 %    Lymphocytes 5.70 (L) 22.00 - 41.00 % "    Monocytes 7.40 0.00 - 13.40 %    Eosinophils 1.50 0.00 - 6.90 %    Basophils 0.90 0.00 - 1.80 %    Immature Granulocytes 0.50 0.00 - 0.90 %    Nucleated RBC 0.00 /100 WBC    Neutrophils (Absolute) 8.01 (H) 1.82 - 7.42 K/uL    Lymphs (Absolute) 0.54 (L) 1.00 - 4.80 K/uL    Monos (Absolute) 0.71 0.00 - 0.85 K/uL    Eos (Absolute) 0.14 0.00 - 0.51 K/uL    Baso (Absolute) 0.09 0.00 - 0.12 K/uL    Immature Granulocytes (abs) 0.05 0.00 - 0.11 K/uL    NRBC (Absolute) 0.00 K/uL   Comp Metabolic Panel   Result Value Ref Range    Sodium 142 135 - 145 mmol/L    Potassium 4.0 3.6 - 5.5 mmol/L    Chloride 101 96 - 112 mmol/L    Co2 24 20 - 33 mmol/L    Anion Gap 17.0 (H) 7.0 - 16.0    Glucose 134 (H) 65 - 99 mg/dL    Bun 17 8 - 22 mg/dL    Creatinine 1.31 0.50 - 1.40 mg/dL    Calcium 10.0 8.5 - 10.5 mg/dL    AST(SGOT) 29 12 - 45 U/L    ALT(SGPT) 27 2 - 50 U/L    Alkaline Phosphatase 43 30 - 99 U/L    Total Bilirubin 0.4 0.1 - 1.5 mg/dL    Albumin 5.0 (H) 3.2 - 4.9 g/dL    Total Protein 8.2 6.0 - 8.2 g/dL    Globulin 3.2 1.9 - 3.5 g/dL    A-G Ratio 1.6 g/dL   CORRECTED CALCIUM   Result Value Ref Range    Correct Calcium 9.2 8.5 - 10.5 mg/dL   ESTIMATED GFR   Result Value Ref Range    GFR (CKD-EPI) 60 >60 mL/min/1.73 m 2   COD (ADULT)   Result Value Ref Range    ABO Grouping Only A     Rh Grouping Only POS     Antibody Screen-Cod NEG    EKG (NOW)   Result Value Ref Range    Report       [facility]    Test Date:  2023  Pt Name:    BRETT PATTERSON                  Department: ER  MRN:        [id number]                      Room:  Gender:     Male                         Technician: MARIBELL  :        1955                   Requested By:ER TRIAGE PROTOCOL  Order #:    [id number]                    Reading MD:    Measurements  Intervals                                Axis  Rate:       120                          P:          57  NH:         141                          QRS:        149  QRSD:  "      117                          T:          -1  QT:         326  QTc:        461    Interpretive Statements  Sinus tachycardia  RBBB and LPFB  Inferior infarct, old  Compared to ECG 05/09/2018 17:30:36  Left posterior fascicular block now present  Sinus rhythm no longer present  Myocardial infarct finding still present       EKG; sinus tach at rate of 120.  No ST elevation, no ST depression.  QTc is 461.  Compared to EKG from 5/9/2018.    RADIOLOGY  I have independently interpreted the diagnostic imaging associated with this visit and am waiting the final reading from the radiologist.   My preliminary interpretation is a follows: See below  Radiologist interpretation:   DX-CHEST-PORTABLE (1 VIEW)   Final Result      1.  Ill-defined left mid and lower hemithorax opacities could represent areas of postinflammatory scarring, atelectasis or pneumonitis.            COURSE & MEDICAL DECISION MAKING      INITIAL ASSESSMENT, COURSE AND PLAN  Care Narrative: This is a 67-year-old male here for evaluation of upper GI bleeding.  Patient states just happened earlier today, and he has no history of the same.  He denies alcohol use, NSAID, or peptic ulcer disease.  He is states he is on Xarelto for \"open heart surgery issues.\"  Patient will be admitted to the hospitalist service secondary to his continued need for antiemetics, pain medications, and Protonix         ADDITIONAL PROBLEM LIST    DISPOSITION AND DISCUSSIONS  Patient will be admitted to the hospitalist service.    CRITICAL CARE  The very real possibility of a deterioration of this patient's condition required the highest level of my preparedness for sudden, emergent intervention.  I provided critical care services, which included medication orders, frequent reevaluations of the patient's condition and response to treatment, ordering and reviewing test results, and discussing the case with various consultants.  The critical care time associated with the care of the " patient was 45 minutes. Review chart for interventions. This time is exclusive of any other billable procedures.       FINAL DIAGNOSIS  1. Gastrointestinal hemorrhage, unspecified gastrointestinal hemorrhage type    2.      Critical care time 45 minutes        Electronically signed by: Ezekiel Farr D.O., 2/6/2023 8:24 PM

## 2023-02-08 ENCOUNTER — ANESTHESIA EVENT (OUTPATIENT)
Dept: SURGERY | Facility: MEDICAL CENTER | Age: 68
DRG: 871 | End: 2023-02-08
Payer: COMMERCIAL

## 2023-02-08 ENCOUNTER — ANESTHESIA (OUTPATIENT)
Dept: SURGERY | Facility: MEDICAL CENTER | Age: 68
DRG: 871 | End: 2023-02-08
Payer: COMMERCIAL

## 2023-02-08 LAB
ANION GAP SERPL CALC-SCNC: 9 MMOL/L (ref 7–16)
BUN SERPL-MCNC: 11 MG/DL (ref 8–22)
CALCIUM SERPL-MCNC: 8.3 MG/DL (ref 8.5–10.5)
CHLORIDE SERPL-SCNC: 106 MMOL/L (ref 96–112)
CO2 SERPL-SCNC: 22 MMOL/L (ref 20–33)
CREAT SERPL-MCNC: 0.88 MG/DL (ref 0.5–1.4)
ERYTHROCYTE [DISTWIDTH] IN BLOOD BY AUTOMATED COUNT: 47.8 FL (ref 35.9–50)
GFR SERPLBLD CREATININE-BSD FMLA CKD-EPI: 94 ML/MIN/1.73 M 2
GLUCOSE SERPL-MCNC: 105 MG/DL (ref 65–99)
HCT VFR BLD AUTO: 40.7 % (ref 42–52)
HGB BLD-MCNC: 13 G/DL (ref 14–18)
HGB BLD-MCNC: 13.4 G/DL (ref 14–18)
INR PPP: 1.16 (ref 0.87–1.13)
MAGNESIUM SERPL-MCNC: 1.8 MG/DL (ref 1.5–2.5)
MCH RBC QN AUTO: 32.8 PG (ref 27–33)
MCHC RBC AUTO-ENTMCNC: 32.9 G/DL (ref 33.7–35.3)
MCV RBC AUTO: 99.8 FL (ref 81.4–97.8)
PLATELET # BLD AUTO: 152 K/UL (ref 164–446)
PMV BLD AUTO: 10 FL (ref 9–12.9)
POTASSIUM SERPL-SCNC: 3.9 MMOL/L (ref 3.6–5.5)
PROTHROMBIN TIME: 14.7 SEC (ref 12–14.6)
RBC # BLD AUTO: 4.08 M/UL (ref 4.7–6.1)
SODIUM SERPL-SCNC: 137 MMOL/L (ref 135–145)
WBC # BLD AUTO: 5.8 K/UL (ref 4.8–10.8)

## 2023-02-08 PROCEDURE — 85018 HEMOGLOBIN: CPT

## 2023-02-08 PROCEDURE — 700111 HCHG RX REV CODE 636 W/ 250 OVERRIDE (IP): Performed by: ANESTHESIOLOGY

## 2023-02-08 PROCEDURE — 700102 HCHG RX REV CODE 250 W/ 637 OVERRIDE(OP): Performed by: INTERNAL MEDICINE

## 2023-02-08 PROCEDURE — 43255 EGD CONTROL BLEEDING ANY: CPT | Performed by: INTERNAL MEDICINE

## 2023-02-08 PROCEDURE — 99233 SBSQ HOSP IP/OBS HIGH 50: CPT | Mod: GC | Performed by: INTERNAL MEDICINE

## 2023-02-08 PROCEDURE — 00731 ANES UPR GI NDSC PX NOS: CPT | Performed by: ANESTHESIOLOGY

## 2023-02-08 PROCEDURE — 700102 HCHG RX REV CODE 250 W/ 637 OVERRIDE(OP): Performed by: STUDENT IN AN ORGANIZED HEALTH CARE EDUCATION/TRAINING PROGRAM

## 2023-02-08 PROCEDURE — 85027 COMPLETE CBC AUTOMATED: CPT

## 2023-02-08 PROCEDURE — 700101 HCHG RX REV CODE 250: Performed by: ANESTHESIOLOGY

## 2023-02-08 PROCEDURE — 85610 PROTHROMBIN TIME: CPT

## 2023-02-08 PROCEDURE — 160035 HCHG PACU - 1ST 60 MINS PHASE I: Performed by: INTERNAL MEDICINE

## 2023-02-08 PROCEDURE — A9270 NON-COVERED ITEM OR SERVICE: HCPCS | Performed by: STUDENT IN AN ORGANIZED HEALTH CARE EDUCATION/TRAINING PROGRAM

## 2023-02-08 PROCEDURE — 160009 HCHG ANES TIME/MIN: Performed by: INTERNAL MEDICINE

## 2023-02-08 PROCEDURE — 700105 HCHG RX REV CODE 258: Performed by: STUDENT IN AN ORGANIZED HEALTH CARE EDUCATION/TRAINING PROGRAM

## 2023-02-08 PROCEDURE — 80048 BASIC METABOLIC PNL TOTAL CA: CPT

## 2023-02-08 PROCEDURE — 160048 HCHG OR STATISTICAL LEVEL 1-5: Performed by: INTERNAL MEDICINE

## 2023-02-08 PROCEDURE — 160202 HCHG ENDO MINUTES - 1ST 30 MINS LEVEL 3: Performed by: INTERNAL MEDICINE

## 2023-02-08 PROCEDURE — 160207 HCHG ENDO MINUTES - EA ADDL 1 MIN LEVEL 3: Performed by: INTERNAL MEDICINE

## 2023-02-08 PROCEDURE — 770020 HCHG ROOM/CARE - TELE (206)

## 2023-02-08 PROCEDURE — 700111 HCHG RX REV CODE 636 W/ 250 OVERRIDE (IP): Performed by: STUDENT IN AN ORGANIZED HEALTH CARE EDUCATION/TRAINING PROGRAM

## 2023-02-08 PROCEDURE — C9113 INJ PANTOPRAZOLE SODIUM, VIA: HCPCS | Performed by: STUDENT IN AN ORGANIZED HEALTH CARE EDUCATION/TRAINING PROGRAM

## 2023-02-08 PROCEDURE — 0W3P8ZZ CONTROL BLEEDING IN GASTROINTESTINAL TRACT, VIA NATURAL OR ARTIFICIAL OPENING ENDOSCOPIC: ICD-10-PCS | Performed by: INTERNAL MEDICINE

## 2023-02-08 PROCEDURE — A9270 NON-COVERED ITEM OR SERVICE: HCPCS | Performed by: INTERNAL MEDICINE

## 2023-02-08 PROCEDURE — 160002 HCHG RECOVERY MINUTES (STAT): Performed by: INTERNAL MEDICINE

## 2023-02-08 PROCEDURE — 83735 ASSAY OF MAGNESIUM: CPT

## 2023-02-08 RX ORDER — SODIUM CHLORIDE, SODIUM LACTATE, POTASSIUM CHLORIDE, CALCIUM CHLORIDE 600; 310; 30; 20 MG/100ML; MG/100ML; MG/100ML; MG/100ML
INJECTION, SOLUTION INTRAVENOUS CONTINUOUS
Status: DISCONTINUED | OUTPATIENT
Start: 2023-02-08 | End: 2023-02-08 | Stop reason: HOSPADM

## 2023-02-08 RX ORDER — HALOPERIDOL 5 MG/ML
1 INJECTION INTRAMUSCULAR
Status: DISCONTINUED | OUTPATIENT
Start: 2023-02-08 | End: 2023-02-08 | Stop reason: HOSPADM

## 2023-02-08 RX ORDER — MAGNESIUM SULFATE HEPTAHYDRATE 40 MG/ML
2 INJECTION, SOLUTION INTRAVENOUS ONCE
Status: DISPENSED | OUTPATIENT
Start: 2023-02-08 | End: 2023-02-09

## 2023-02-08 RX ORDER — LIDOCAINE HYDROCHLORIDE 20 MG/ML
INJECTION, SOLUTION EPIDURAL; INFILTRATION; INTRACAUDAL; PERINEURAL PRN
Status: DISCONTINUED | OUTPATIENT
Start: 2023-02-08 | End: 2023-02-08 | Stop reason: SURG

## 2023-02-08 RX ORDER — ROCURONIUM BROMIDE 10 MG/ML
INJECTION, SOLUTION INTRAVENOUS PRN
Status: DISCONTINUED | OUTPATIENT
Start: 2023-02-08 | End: 2023-02-08 | Stop reason: SURG

## 2023-02-08 RX ORDER — OMEPRAZOLE 20 MG/1
40 CAPSULE, DELAYED RELEASE ORAL 2 TIMES DAILY
Status: DISCONTINUED | OUTPATIENT
Start: 2023-02-08 | End: 2023-02-09 | Stop reason: HOSPADM

## 2023-02-08 RX ORDER — ONDANSETRON 2 MG/ML
4 INJECTION INTRAMUSCULAR; INTRAVENOUS
Status: DISCONTINUED | OUTPATIENT
Start: 2023-02-08 | End: 2023-02-08 | Stop reason: HOSPADM

## 2023-02-08 RX ORDER — POTASSIUM CHLORIDE 20 MEQ/1
10 TABLET, EXTENDED RELEASE ORAL ONCE
Status: DISCONTINUED | OUTPATIENT
Start: 2023-02-08 | End: 2023-02-09

## 2023-02-08 RX ORDER — DIPHENHYDRAMINE HYDROCHLORIDE 50 MG/ML
12.5 INJECTION INTRAMUSCULAR; INTRAVENOUS
Status: DISCONTINUED | OUTPATIENT
Start: 2023-02-08 | End: 2023-02-08 | Stop reason: HOSPADM

## 2023-02-08 RX ADMIN — EPHEDRINE SULFATE 10 MG: 50 INJECTION INTRAMUSCULAR; INTRAVENOUS; SUBCUTANEOUS at 10:36

## 2023-02-08 RX ADMIN — SUCRALFATE 1 G: 1 SUSPENSION ORAL at 00:53

## 2023-02-08 RX ADMIN — SUCRALFATE 1 G: 1 SUSPENSION ORAL at 04:38

## 2023-02-08 RX ADMIN — SUGAMMADEX 200 MG: 100 INJECTION, SOLUTION INTRAVENOUS at 10:53

## 2023-02-08 RX ADMIN — Medication 5 MG: at 20:26

## 2023-02-08 RX ADMIN — ROSUVASTATIN CALCIUM 40 MG: 20 TABLET, FILM COATED ORAL at 18:14

## 2023-02-08 RX ADMIN — PANTOPRAZOLE SODIUM 40 MG: 40 INJECTION, POWDER, LYOPHILIZED, FOR SOLUTION INTRAVENOUS at 04:38

## 2023-02-08 RX ADMIN — SUCRALFATE 1 G: 1 SUSPENSION ORAL at 23:47

## 2023-02-08 RX ADMIN — SUCRALFATE 1 G: 1 SUSPENSION ORAL at 12:57

## 2023-02-08 RX ADMIN — SUCRALFATE 1 G: 1 SUSPENSION ORAL at 18:15

## 2023-02-08 RX ADMIN — SODIUM CHLORIDE, POTASSIUM CHLORIDE, SODIUM LACTATE AND CALCIUM CHLORIDE: 600; 310; 30; 20 INJECTION, SOLUTION INTRAVENOUS at 00:55

## 2023-02-08 RX ADMIN — PROPOFOL 150 MG: 10 INJECTION, EMULSION INTRAVENOUS at 10:33

## 2023-02-08 RX ADMIN — OMEPRAZOLE 40 MG: 20 CAPSULE, DELAYED RELEASE ORAL at 18:15

## 2023-02-08 RX ADMIN — ROCURONIUM BROMIDE 40 MG: 10 INJECTION, SOLUTION INTRAVENOUS at 10:33

## 2023-02-08 RX ADMIN — LIDOCAINE HYDROCHLORIDE 100 MG: 20 INJECTION, SOLUTION EPIDURAL; INFILTRATION; INTRACAUDAL at 10:33

## 2023-02-08 ASSESSMENT — PAIN SCALES - GENERAL: PAIN_LEVEL: 0

## 2023-02-08 ASSESSMENT — PATIENT HEALTH QUESTIONNAIRE - PHQ9
SUM OF ALL RESPONSES TO PHQ9 QUESTIONS 1 AND 2: 0
1. LITTLE INTEREST OR PLEASURE IN DOING THINGS: NOT AT ALL
2. FEELING DOWN, DEPRESSED, IRRITABLE, OR HOPELESS: NOT AT ALL

## 2023-02-08 NOTE — ANESTHESIA TIME REPORT
Anesthesia Start and Stop Event Times     Date Time Event    2/8/2023 1013 Ready for Procedure     1027 Anesthesia Start     1102 Anesthesia Stop        Responsible Staff  02/08/23    Name Role Begin End    Daryl Benson M.D. Anesth 1027 1102        Overtime Reason:  no overtime (within assigned shift)    Comments:

## 2023-02-08 NOTE — PROCEDURES
OPERATIVE REPORT    PATIENT:   Talib Ortiz   1955       PREOPERATIVE DIAGNOSES/INDICATIONS: GI bleeding.     POSTOPERATIVE DIAGNOSIS: GERD grade C. Large hiatal hernia. Change axis of stomach due to large hiatal hernia. May watson tear with active oozing s/p clip x1.     PROCEDURE:  ESOPHAGOGASTRODUODENOSCOPY    PHYSICIAN:  Hieu Arevalo MD. MPH.    ANESTHESIA:  Per anesthesiologist.    LOCATION: [facility]    CONSENT:  OBTAINED. The risks, benefits and alternatives of the procedure were discussed in details. The risks include and are not limited to bleeding, infection, perforation, missed lesions, and sedations risks (cardiopulmonary compromise and allergic reaction to medications).    DESCRIPTION: The patient presented to the procedure room.  After routine checkup was performed, patient was brought into the endoscopy suite.  Patient was placed on his left lateral decubitus position. A bite block was placed in patient's mouth. Patient was sedated by anesthesia.  Vital signs were monitored throughout the procedure.  Oxygenation support was provided throughout the procedure. Upper endoscope was inserted into patient's mouth and advanced to the second portion of the duodenum under direct visualization.      Once the site was reached and examined, the upper endoscope was withdrawn.  Retroflexion was made within the stomach.  The stomach was decompressed, scope was withdrawn and the procedure was terminated.    The patient tolerated the procedure well.  There were no immediate complications.    OPERATIVE FINDINGS:    1. Esophagus: GERD grade C. Large hiatal hernia. May watson tear with active oozing s/p clip x1.   2. Stomach: Change axis of stomach due to large hiatal hernia.   3. Duodenum: Not able to enter with regular EGD scope due to changed structure.     IMPRESSION/RECOMMENDATIONS:  GERD grade C. Large hiatal hernia. Change axis of stomach due to large hiatal hernia. May watson tear with active  oozing s/p clip x1.     PPI 40mg bid oral dose for 8 weeks then taper.    Consider outpatient GI referral to have follow up and repeat EGD, the patient should have biopsy at GE junction after inflammation is controlled.   Okay to resume diet from clear liquid to full liquid today and tmr.     GI sign off.       This note has been transcribed with digital voice recognition software and although it has been reviewed may contain grammatical or word errors

## 2023-02-08 NOTE — PROGRESS NOTES
[facility] Internal Medicine Daily Progress Note    Date of Service  2/8/2023    [facility] Team: [facility] Team   Attending: Dulce Car M.d.  Senior Resident: Dr. Jesus Kim  Intern:  Dr. Preston Madera  Contact Number: [phone]    Chief Complaint  Talib Ortiz is a 67 y.o. male admitted 2/6/2023 with Hematemesis.     Hospital Course  No notes on file  -2/8 - May watson tears noted with active oozing --> clip x1 placed.     Interval Problem Update  Pt's N/V have decreased since the clip was placed today. He denies any hematemesis on a CLD and states that his PO tolerance is improved after the procedure.     I have discussed this patient's plan of care and discharge plan at IDT rounds today with Case Management, Nursing, Nursing leadership, and other members of the IDT team.    Consultants/Specialty  GI    Code Status  Full Code    Disposition  Patient is not medically cleared for discharge.   Anticipate discharge to to home with close outpatient follow-up.  I have placed the appropriate orders for post-discharge needs.        Physical Exam  Temp:  [36.2 °C (97.1 °F)-37.2 °C (98.9 °F)] 36.2 °C (97.1 °F)  Pulse:  [71-90] 79  Resp:  [8-18] 18  BP: ()/(60-81) 128/81  SpO2:  [91 %-97 %] 97 %    -General: NAD, converses well  -Cardio: no murmurs or gallops  -Resp: lungs CTAB, symmetric expansion  -Abd: soft, no epigastric tenderness, no guarding or rebound      Fluids    Intake/Output Summary (Last 24 hours) at 2/8/2023 1518  Last data filed at 2/8/2023 1246  Gross per 24 hour   Intake 1199.68 ml   Output 2400 ml   Net -1200.32 ml         Laboratory  Recent Labs     02/06/23  1734 02/06/23  2030 02/07/23  0301 02/07/23  0913 02/07/23  2101 02/08/23  0259 02/08/23  1219   WBC 9.5  --  7.7  --   --  5.8  --    RBC 5.00  --  4.04*  --   --  4.08*  --    HEMOGLOBIN 16.7   < > 13.1*   < > 13.5* 13.4* 13.0*   HEMATOCRIT 47.8  --  39.2*  --   --  40.7*  --    MCV 95.6  --  97.0  --   --  99.8*  --    MCH 33.4*  --   32.4  --   --  32.8  --    MCHC 34.9  --  33.4*  --   --  32.9*  --    RDW 44.9  --  46.5  --   --  47.8  --    PLATELETCT 267  --  188  --   --  152*  --    MPV 9.8  --  9.8  --   --  10.0  --     < > = values in this interval not displayed.       Recent Labs     02/06/23  1734 02/07/23  0301 02/08/23  0414   SODIUM 142 138 137   POTASSIUM 4.0 4.0 3.9   CHLORIDE 101 104 106   CO2 24 23 22   GLUCOSE 134* 105* 105*   BUN 17 18 11   CREATININE 1.31 1.04 0.88   CALCIUM 10.0 8.5 8.3*       Recent Labs     02/08/23  0259   INR 1.16*               Imaging  CT-ABDOMEN-PELVIS W/O   Final Result         1.  Large hiatal hernia   2.  Diverticulosis   3.  Fat-containing right inguinal hernia   4.  Atherosclerosis and atherosclerotic coronary artery disease      DX-CHEST-PORTABLE (1 VIEW)   Final Result      1.  Ill-defined left mid and lower hemithorax opacities could represent areas of postinflammatory scarring, atelectasis or pneumonitis.             Assessment/Plan  Problem Representation:    * May Holguin Tear s/p Clip x1 on 2/8 (present on admission)  Assessment & Plan  Prior hospital missions for iron deficiency anemia. Requiring multiple blood transfusions and iron infusion during 5/2019 admission, no scopes were done at that time. Last EGD and Colonoscopy was 3/2017 by GI consultants; EGD was done for GERD and showed schatzki's ring and hiatal hernia; colonoscopy showed 3 polyps.  3 bouts of hematemesis 1 which was witnessed at the emergency department  Hold home aspirin and Xarelto for acute bleed. SCDs for prophylaxis  IV LR at 100/h  PO PPI 40 twice daily  CLD  Dispo pending diet advancement and stable Hgb  GI consult: Appreciate recommendations.  Added Carafate with expected EGD tomorrow.      Possible Pneumonia- (present on admission)  Assessment & Plan  Left lower lobe infiltrates; fairly mild on imaging. Chest x-ray and SIRS positive at admission.  procalcitonin negative at 0.1.    -Discontinuing IV Unasyn and  azithromycin as likely this is not a bacterial infection and this is secondary to COVID alone.  -Holding steroids due to GI work-up.  May consider later in admission if still requiring oxygen therapy    Sepsis (HCC)- (present on admission)  Assessment & Plan  This is Sepsis Present on admission  SIRS criteria identified on my evaluation include: Tachycardia, with heart rate greater than 90 BPM and Tachypnea, with respirations greater than 20 per minute  Source is left lower lobe pneumonia  Sepsis protocol initiated  Fluid resuscitation ordered per protocol  Crystalloid Fluid Administration: Fluid resuscitation ordered per standard protocol - 30 mL/kg per current or ideal body weight  IV antibiotics as appropriate for source of sepsis  Reassessment: I have reassessed the patient's hemodynamic status    Noted for left lower lobe pneumonia and positive SIRS  COVID-positive.  Likely this is the cause      Lactic acidosis- (present on admission)  Assessment & Plan  Lactic acid trended down to 1.6.  Likely secondary to infection      Gastroesophageal reflux disease without esophagitis- (present on admission)  Assessment & Plan  On PPI as above    Dyslipidemia- (present on admission)  Assessment & Plan  Continue home statin         VTE prophylaxis: SCDs/TEDs and pharmacologic prophylaxis contraindicated due to active bleed    I have performed a physical exam and reviewed and updated ROS and Plan today (2/8/2023). In review of yesterday's note (2/7/2023), there are no changes except as documented above.

## 2023-02-08 NOTE — PROGRESS NOTES
[facility] Internal Medicine Daily Progress Note    Date of Service  2/7/2023    R Team: R TERESITA Bonner Team   Attending: Dulce Car M.d.  Senior Resident: Dr. Jesus Kim  Intern:  Dr. Preston Madera  Contact Number: [phone]    Chief Complaint  Talib Ortiz is a 67 y.o. male admitted 2/6/2023 with Hematemesis.     Hospital Course  No notes on file    Interval Problem Update  -No acute events overnight, no vomiting, no hematemesis.  -Remains on oxygen but was able to wean while on rounds.  Holding steroids for COVID due to effects on the GI tract.    I have discussed this patient's plan of care and discharge plan at IDT rounds today with Case Management, Nursing, Nursing leadership, and other members of the IDT team.    Consultants/Specialty  GI    Code Status  Full Code    Disposition  Patient is not medically cleared for discharge.   Anticipate discharge to to home with close outpatient follow-up.  I have placed the appropriate orders for post-discharge needs.    Review of Systems  Review of Systems   Constitutional:  Positive for chills. Negative for fever.   Respiratory:  Positive for cough and sputum production. Negative for shortness of breath and wheezing.    Cardiovascular:  Negative for chest pain, palpitations and leg swelling.   Gastrointestinal:  Positive for nausea and vomiting. Negative for abdominal pain, blood in stool, constipation, diarrhea and melena.   Skin:  Negative for rash.   Neurological:  Positive for dizziness and headaches. Negative for focal weakness, seizures and loss of consciousness.   Psychiatric/Behavioral:  Negative for substance abuse. The patient does not have insomnia.       Physical Exam  Temp:  [36.3 °C (97.4 °F)-37.2 °C (98.9 °F)] 37.2 °C (98.9 °F)  Pulse:  [] 86  Resp:  [18-24] 18  BP: ()/(56-81) 105/64  SpO2:  [88 %-97 %] 92 %    Physical Exam  Vitals and nursing note reviewed.   Constitutional:       General: He is not in acute distress.     Appearance:  Normal appearance.   HENT:      Head: Normocephalic and atraumatic.      Right Ear: External ear normal.      Left Ear: External ear normal.   Eyes:      General: No scleral icterus.     Conjunctiva/sclera: Conjunctivae normal.   Cardiovascular:      Rate and Rhythm: Normal rate and regular rhythm.      Pulses: Normal pulses.      Heart sounds: Normal heart sounds.   Pulmonary:      Effort: Pulmonary effort is normal. No respiratory distress.      Breath sounds: No stridor. Rhonchi present. No wheezing.   Abdominal:      General: Abdomen is flat. There is no distension.      Palpations: There is no mass.      Tenderness: There is no abdominal tenderness. There is no guarding or rebound.   Musculoskeletal:         General: No swelling.      Right lower leg: No edema.      Left lower leg: No edema.   Skin:     General: Skin is warm.      Coloration: Skin is not jaundiced or pale.      Findings: No erythema or rash.   Neurological:      General: No focal deficit present.      Mental Status: He is alert and oriented to person, place, and time. Mental status is at baseline.   Psychiatric:         Mood and Affect: Mood normal.         Behavior: Behavior normal.         Thought Content: Thought content normal.         Judgment: Judgment normal.       Fluids    Intake/Output Summary (Last 24 hours) at 2/7/2023 1725  Last data filed at 2/7/2023 1522  Gross per 24 hour   Intake 600 ml   Output 550 ml   Net 50 ml       Laboratory  Recent Labs     02/06/23  1734 02/06/23  2030 02/07/23  0301 02/07/23  0913 02/07/23  1642   WBC 9.5  --  7.7  --   --    RBC 5.00  --  4.04*  --   --    HEMOGLOBIN 16.7   < > 13.1* 13.2* 13.5*   HEMATOCRIT 47.8  --  39.2*  --   --    MCV 95.6  --  97.0  --   --    MCH 33.4*  --  32.4  --   --    MCHC 34.9  --  33.4*  --   --    RDW 44.9  --  46.5  --   --    PLATELETCT 267  --  188  --   --    MPV 9.8  --  9.8  --   --     < > = values in this interval not displayed.     Recent Labs      02/06/23  1734 02/07/23  0301   SODIUM 142 138   POTASSIUM 4.0 4.0   CHLORIDE 101 104   CO2 24 23   GLUCOSE 134* 105*   BUN 17 18   CREATININE 1.31 1.04   CALCIUM 10.0 8.5                   Imaging  CT-ABDOMEN-PELVIS W/O   Final Result         1.  Large hiatal hernia   2.  Diverticulosis   3.  Fat-containing right inguinal hernia   4.  Atherosclerosis and atherosclerotic coronary artery disease      DX-CHEST-PORTABLE (1 VIEW)   Final Result      1.  Ill-defined left mid and lower hemithorax opacities could represent areas of postinflammatory scarring, atelectasis or pneumonitis.           Assessment/Plan  Problem Representation:    * Hematemesis- (present on admission)  Assessment & Plan  Prior hospital missions for iron deficiency anemia. Requiring multiple blood transfusions and iron infusion during 5/2019 admission, no scopes were done at that time. Last EGD and Colonoscopy was 3/2017 by [facility]; EGD was done for GERD and showed schatzki's ring and hiatal hernia; colonoscopy showed 3 polyps.  3 bouts of hematemesis 1 which was witnessed at the emergency department    Differential: May-Holguin tear in the context of acute cough, nausea and vomiting secondary to COVID, Developing pathology from Schatzki's ring patient had previously.  Admit to telemetry  Clear liquid diet patient will be n.p.o. at midnight 02/07.  For EGD in the morning acute bleed  Hold home aspirin and Xarelto for acute bleed. SCDs for prophylaxis  IV LR at 100/h  IV Protonix 40 twice daily  GI consult: Appreciate recommendations.  Added Carafate with expected EGD tomorrow.      Pneumonia- (present on admission)  Assessment & Plan  Left lower lobe infiltrates; fairly mild on imaging. Chest x-ray and SIRS positive at admission.  procalcitonin negative at 0.1.    -Discontinuing IV Unasyn and azithromycin as likely this is not a bacterial infection and this is secondary to COVID alone.  -Holding steroids due to GI work-up.  May consider  later in admission if still requiring oxygen therapy    Sepsis (HCC)- (present on admission)  Assessment & Plan  This is Sepsis Present on admission  SIRS criteria identified on my evaluation include: Tachycardia, with heart rate greater than 90 BPM and Tachypnea, with respirations greater than 20 per minute  Source is left lower lobe pneumonia  Sepsis protocol initiated  Fluid resuscitation ordered per protocol  Crystalloid Fluid Administration: Fluid resuscitation ordered per standard protocol - 30 mL/kg per current or ideal body weight  IV antibiotics as appropriate for source of sepsis  Reassessment: I have reassessed the patient's hemodynamic status    Noted for left lower lobe pneumonia and positive SIRS  COVID-positive.  Likely this is the cause      Lactic acidosis- (present on admission)  Assessment & Plan  Lactic acid trended down to 1.6.  Likely secondary to infection      Gastroesophageal reflux disease without esophagitis- (present on admission)  Assessment & Plan  IV Protonix 40 twice daily    Dyslipidemia- (present on admission)  Assessment & Plan  Continue home statin         VTE prophylaxis: SCDs/TEDs and pharmacologic prophylaxis contraindicated due to active bleed    I have performed a physical exam and reviewed and updated ROS and Plan today (2/7/2023). In review of yesterday's note (2/6/2023), there are no changes except as documented above.

## 2023-02-08 NOTE — ANESTHESIA POSTPROCEDURE EVALUATION
Patient: Talib Ortiz    Procedure Summary     Date: 02/08/23 Room / Location: [facility] ROOM 26 / SURGERY SAME DAY [facility]    Anesthesia Start: 1027 Anesthesia Stop: 1102    Procedures:       GASTROSCOPY (Esophagus)      EGD, WITH CLIP PLACEMENT (Esophagus) Diagnosis: (hiatal hernia, GERD, sunshine-watson tear bleeding x 1 clip)    Surgeons: Hieu Arevalo M.D. Responsible Provider: Daryl Benson M.D.    Anesthesia Type: general ASA Status: 3          Final Anesthesia Type: general  Last vitals  BP   Blood Pressure : 124/67    Temp   36.9 °C (98.4 °F)    Pulse   83   Resp   (!) 8    SpO2   94 %      Anesthesia Post Evaluation    Patient location during evaluation: PACU  Patient participation: complete - patient participated  Level of consciousness: awake and alert  Pain score: 0    Airway patency: patent  Anesthetic complications: no  Cardiovascular status: hemodynamically stable  Respiratory status: acceptable  Hydration status: euvolemic    PONV: none          There were no known notable events for this encounter.     Nurse Pain Score: 0 (NPRS)

## 2023-02-08 NOTE — PROGRESS NOTES
Assumed care of patient at 0715. Received bedside report from night shift RN. Bed is locked and in the lowest position, call light and personal belongings are within reach. Treaded socks in place and bed alarm armed. Patient updated on plan of care, no complaints of pain at this time, patient has been compliant with NPO status since midnight. White board updated. Patient AxOx4, breathing pattern is unlabored on 1.5 L NC. Pt is on tele. All needs met at this time. Will continue care and monitoring as ordered.

## 2023-02-08 NOTE — CARE PLAN
The patient is Stable - Low risk of patient condition declining or worsening    Shift Goals  Clinical Goals: Hemodynamic stability  Patient Goals: Getting well/going home  Family Goals: carol  NPO at midnight    Progress made toward(s) clinical / shift goals:    Problem: Respiratory  Goal: Patient will achieve/maintain optimum respiratory ventilation and gas exchange  Outcome: Progressing: Pt oxygen sat falls to 88% when on RA, but he could not tolerate the dryness from the NC; humidifier applied to oxygen Pt has 1.5 L  oxygen via NC  02 sat 94-95%.    Problem: Self Care  Goal: Patient will have the ability to perform ADLs independently or with assistance (bathe, groom, dress, toilet and feed)  Outcome: Progressing: Showered tonight once IV sites were secured Pt was able to shower himself and preform oral care.       Problem: Mobility  Goal: Patient's capacity to carry out activities will improve  Outcome: Progressing: Pt able to ambulate in room and to the bathroom; he picked up his suitcase and pulled  it open, closed it, returning it to closet when done.      Problem: Urinary Elimination  Goal: Establish and maintain regular urinary output  Outcome: Progressing: Pt voiding clear, light yellow urine in his urinal which sets at bedside.

## 2023-02-08 NOTE — CARE PLAN
The patient is Watcher - Medium risk of patient condition declining or worsening    Shift Goals  Clinical Goals: hemodynamic stability  Patient Goals: eat food  Family Goals: caorl    Progress made toward(s) clinical / shift goals:      Problem: Knowledge Deficit - Standard  Goal: Patient and family/care givers will demonstrate understanding of plan of care, disease process/condition, diagnostic tests and medications  Outcome: Progressing - patient can teach-back meaning of medications and labs     Problem: Hemodynamics  Goal: Patient's hemodynamics, fluid balance and neurologic status will be stable or improve  Outcome: Progressing - patient's blood pressure has remained stable with a MAP greater than 60. H&H being tracked with routine lab draws.    Problem: Respiratory  Goal: Patient will achieve/maintain optimum respiratory ventilation and gas exchange  Outcome: Progressing - patient saturating above 90% on room air.

## 2023-02-08 NOTE — ANESTHESIA PROCEDURE NOTES
Airway    Date/Time: 2/8/2023 10:33 AM  Performed by: Daryl Benson M.D.  Authorized by: Daryl Benson M.D.     Location:  OR  Urgency:  Elective  Indications for Airway Management:  Anesthesia      Spontaneous Ventilation: absent    Sedation Level:  Deep  Preoxygenated: Yes    Patient Position:  Sniffing  Final Airway Type:  Endotracheal airway  Final Endotracheal Airway:  ETT  Cuffed: Yes    Technique Used for Successful ETT Placement:  Direct laryngoscopy    Insertion Site:  Oral  Blade Type:  Pierre  Laryngoscope Blade/Videolaryngoscope Blade Size:  2  ETT Size (mm):  8.0  Measured from:  Teeth  ETT to Teeth (cm):  22  Placement Verified by: auscultation and capnometry    Cormack-Lehane Classification:  Grade I - full view of glottis  Number of Attempts at Approach:  1

## 2023-02-08 NOTE — ANESTHESIA PREPROCEDURE EVALUATION
Case: [id number] Date/Time: 02/08/23 0845    Procedure: GASTROSCOPY    Location: [facility] ROOM 26 / SURGERY SAME DAY [facility]    Surgeons: Hieu Arevalo M.D.          Relevant Problems   PULMONARY   (positive) Pneumonia      CARDIAC   (positive) Coronary artery disease due to calcified coronary lesion: CABG ×2 in August 2016      GI   (positive) Gastroesophageal reflux disease without esophagitis       Physical Exam    Airway   Mallampati: II  TM distance: >3 FB  Neck ROM: full       Cardiovascular - normal exam  Rhythm: regular  Rate: normal  (-) murmur     Dental - normal exam           Pulmonary - normal exam  Breath sounds clear to auscultation     Abdominal    Neurological - normal exam                 Anesthesia Plan    ASA 3   ASA physical status 3 criteria: MI or angina - history (> 3 months)    Plan - general       Airway plan will be ETT          Induction: intravenous    Postoperative Plan: Postoperative administration of opioids is intended.    Pertinent diagnostic labs and testing reviewed    Informed Consent:    Anesthetic plan and risks discussed with patient.    Use of blood products discussed with: patient whom consented to blood products.

## 2023-02-08 NOTE — CARE PLAN
The patient is Watcher - Medium risk of patient condition declining or worsening    Shift Goals  Clinical Goals: hemodynamic stability  Patient Goals: eat food  Family Goals: carol    Progress made toward(s) clinical / shift goals:       Problem: Hemodynamics  Goal: Patient's hemodynamics, fluid balance and neurologic status will be stable or improve  2/7/2023 1636 by Shanon Valle R.N.  Outcome: Progressing - patient has maintained blood pressures with MAPs >60, patient receiving IV fluids, patient's hemaglobin & hematocrit checked twice this shift.    Problem: Respiratory  Goal: Patient will achieve/maintain optimum respiratory ventilation and gas exchange  2/7/2023 1636 by [email or web address].  Outcome: Progressing - patient weaned to room air, incentive spirometer at bedside and educated on how to use it

## 2023-02-09 ENCOUNTER — PHARMACY VISIT (OUTPATIENT)
Dept: PHARMACY | Facility: MEDICAL CENTER | Age: 68
End: 2023-02-09
Payer: COMMERCIAL

## 2023-02-09 VITALS
HEART RATE: 71 BPM | DIASTOLIC BLOOD PRESSURE: 69 MMHG | HEIGHT: 70 IN | RESPIRATION RATE: 18 BRPM | TEMPERATURE: 97.3 F | WEIGHT: 183 LBS | BODY MASS INDEX: 26.2 KG/M2 | OXYGEN SATURATION: 93 % | SYSTOLIC BLOOD PRESSURE: 120 MMHG

## 2023-02-09 PROBLEM — A41.9 SEPSIS (HCC): Status: RESOLVED | Noted: 2023-02-06 | Resolved: 2023-02-09

## 2023-02-09 LAB
ANION GAP SERPL CALC-SCNC: 12 MMOL/L (ref 7–16)
BACTERIA UR CULT: NORMAL
BASOPHILS # BLD AUTO: 1.2 % (ref 0–1.8)
BASOPHILS # BLD: 0.05 K/UL (ref 0–0.12)
BUN SERPL-MCNC: 9 MG/DL (ref 8–22)
CALCIUM SERPL-MCNC: 8.6 MG/DL (ref 8.5–10.5)
CHLORIDE SERPL-SCNC: 106 MMOL/L (ref 96–112)
CO2 SERPL-SCNC: 21 MMOL/L (ref 20–33)
CREAT SERPL-MCNC: 0.81 MG/DL (ref 0.5–1.4)
EOSINOPHIL # BLD AUTO: 0.03 K/UL (ref 0–0.51)
EOSINOPHIL NFR BLD: 0.7 % (ref 0–6.9)
ERYTHROCYTE [DISTWIDTH] IN BLOOD BY AUTOMATED COUNT: 45.9 FL (ref 35.9–50)
GFR SERPLBLD CREATININE-BSD FMLA CKD-EPI: 96 ML/MIN/1.73 M 2
GLUCOSE SERPL-MCNC: 102 MG/DL (ref 65–99)
HCT VFR BLD AUTO: 42 % (ref 42–52)
HGB BLD-MCNC: 14 G/DL (ref 14–18)
IMM GRANULOCYTES # BLD AUTO: 0.01 K/UL (ref 0–0.11)
IMM GRANULOCYTES NFR BLD AUTO: 0.2 % (ref 0–0.9)
LYMPHOCYTES # BLD AUTO: 0.96 K/UL (ref 1–4.8)
LYMPHOCYTES NFR BLD: 23.2 % (ref 22–41)
MCH RBC QN AUTO: 32.3 PG (ref 27–33)
MCHC RBC AUTO-ENTMCNC: 33.3 G/DL (ref 33.7–35.3)
MCV RBC AUTO: 97 FL (ref 81.4–97.8)
MONOCYTES # BLD AUTO: 0.76 K/UL (ref 0–0.85)
MONOCYTES NFR BLD AUTO: 18.4 % (ref 0–13.4)
NEUTROPHILS # BLD AUTO: 2.33 K/UL (ref 1.82–7.42)
NEUTROPHILS NFR BLD: 56.3 % (ref 44–72)
NRBC # BLD AUTO: 0 K/UL
NRBC BLD-RTO: 0 /100 WBC
PLATELET # BLD AUTO: 152 K/UL (ref 164–446)
PMV BLD AUTO: 9.8 FL (ref 9–12.9)
POTASSIUM SERPL-SCNC: 3.7 MMOL/L (ref 3.6–5.5)
RBC # BLD AUTO: 4.33 M/UL (ref 4.7–6.1)
SIGNIFICANT IND 70042: NORMAL
SITE SITE: NORMAL
SODIUM SERPL-SCNC: 139 MMOL/L (ref 135–145)
SOURCE SOURCE: NORMAL
WBC # BLD AUTO: 4.1 K/UL (ref 4.8–10.8)

## 2023-02-09 PROCEDURE — A9270 NON-COVERED ITEM OR SERVICE: HCPCS | Performed by: STUDENT IN AN ORGANIZED HEALTH CARE EDUCATION/TRAINING PROGRAM

## 2023-02-09 PROCEDURE — 80048 BASIC METABOLIC PNL TOTAL CA: CPT

## 2023-02-09 PROCEDURE — 700102 HCHG RX REV CODE 250 W/ 637 OVERRIDE(OP)

## 2023-02-09 PROCEDURE — 700102 HCHG RX REV CODE 250 W/ 637 OVERRIDE(OP): Performed by: STUDENT IN AN ORGANIZED HEALTH CARE EDUCATION/TRAINING PROGRAM

## 2023-02-09 PROCEDURE — A9270 NON-COVERED ITEM OR SERVICE: HCPCS | Performed by: INTERNAL MEDICINE

## 2023-02-09 PROCEDURE — RXMED WILLOW AMBULATORY MEDICATION CHARGE

## 2023-02-09 PROCEDURE — 700102 HCHG RX REV CODE 250 W/ 637 OVERRIDE(OP): Performed by: INTERNAL MEDICINE

## 2023-02-09 PROCEDURE — A9270 NON-COVERED ITEM OR SERVICE: HCPCS

## 2023-02-09 PROCEDURE — 85025 COMPLETE CBC W/AUTO DIFF WBC: CPT

## 2023-02-09 PROCEDURE — 99238 HOSP IP/OBS DSCHRG MGMT 30/<: CPT | Mod: GC | Performed by: HOSPITALIST

## 2023-02-09 RX ORDER — OMEPRAZOLE 20 MG/1
CAPSULE, DELAYED RELEASE ORAL
Qty: 241 CAPSULE | Refills: 0 | OUTPATIENT
Start: 2023-02-09 | End: 2023-04-19

## 2023-02-09 RX ORDER — OMEPRAZOLE 20 MG/1
CAPSULE, DELAYED RELEASE ORAL
Qty: 241 CAPSULE | Refills: 0 | Status: SHIPPED | OUTPATIENT
Start: 2023-02-09 | End: 2023-02-09 | Stop reason: SDUPTHER

## 2023-02-09 RX ORDER — OMEPRAZOLE 40 MG/1
40 CAPSULE, DELAYED RELEASE ORAL 2 TIMES DAILY
Qty: 110 CAPSULE | Refills: 0 | Status: CANCELLED | OUTPATIENT
Start: 2023-02-09 | End: 2023-04-05

## 2023-02-09 RX ORDER — POTASSIUM CHLORIDE 20 MEQ/1
40 TABLET, EXTENDED RELEASE ORAL ONCE
Status: COMPLETED | OUTPATIENT
Start: 2023-02-09 | End: 2023-02-09

## 2023-02-09 RX ORDER — OMEPRAZOLE 20 MG/1
CAPSULE, DELAYED RELEASE ORAL
Qty: 241 CAPSULE | Refills: 0 | Status: SHIPPED | OUTPATIENT
Start: 2023-02-09 | End: 2023-04-19

## 2023-02-09 RX ADMIN — POTASSIUM CHLORIDE 40 MEQ: 1500 TABLET, EXTENDED RELEASE ORAL at 08:15

## 2023-02-09 RX ADMIN — SUCRALFATE 1 G: 1 SUSPENSION ORAL at 05:42

## 2023-02-09 RX ADMIN — ACETAMINOPHEN 650 MG: 325 TABLET, FILM COATED ORAL at 02:07

## 2023-02-09 RX ADMIN — SUCRALFATE 1 G: 1 SUSPENSION ORAL at 12:52

## 2023-02-09 RX ADMIN — OMEPRAZOLE 40 MG: 20 CAPSULE, DELAYED RELEASE ORAL at 05:42

## 2023-02-09 ASSESSMENT — PAIN DESCRIPTION - PAIN TYPE
TYPE: ACUTE PAIN

## 2023-02-09 NOTE — CARE PLAN
The patient is Stable - Low risk of patient condition declining or worsening    Shift Goals  Clinical Goals: monitor BP  Patient Goals: stay RA  Family Goals: carol    Progress made toward(s) clinical / shift goals:  Vitals have been stable, patient has remained on RA    Patient is not progressing towards the following goals:

## 2023-02-09 NOTE — PROGRESS NOTES
Telemetry Shift Summary     Rhythm Sinus Tach - Normal Sinus  HR Range   Ectopy -  Measurements .15 / .07 / .41        Normal Values  Rhythm SR  HR Range    Measurements 0.12-0.20 / 0.06-0.10  / 0.30-0.52

## 2023-02-09 NOTE — DISCHARGE SUMMARY
Discharge Summary    CHIEF COMPLAINT ON ADMISSION  Chief Complaint   Patient presents with    Blood in Vomit     Sent by PCP for Covid symptoms and blood in vomit.       Reason for Admission  Hematemesis    Admission Date  2/6/2023    CODE STATUS  Full Code    HPI & HOSPITAL COURSE  Hematemesis  h/o CAD (s/p CABG x2 in 2016), GERD, NILDA (last EGD/colon in 2017 showing Schatzki's Ring and Hiatal Hernia), h/o ?unspecified bleed (2020), LLE DVT (2012), B12 deficiency, OA (s/p severeal arthrotomies), Depression, GERD, HTN/HLD, ?blood clot, Nephrolithiasis, Laminotomy (2012), h/o smoking      Hospital Course:  -2/6 - pt was started an IV PPI and CLD  -2/8 - EGD was performed due to persistent hematemesis and intolerance of PO intake. EGD did show active oozing from a May Holguin tear so a clip x1 was placed  -2/9 - pt's Hgb remained stable and his diet was successfully advanced to a regular diet so he was simply discharged.     #May Holguin tears (dx by EGD) c/b mild Lactic Acidosis  -see hospital course above   -s/p EGD with clip x1  -biopsy from hiatal hernia is pending, for which pt should follow up with GI as an outpt  -pt discharged with PO PPI BID x8 weeks with plans to taper  -Lactate = 2.4 --> 2.8 --> 2.4 --> 1.6  -Hgb remained stable at 13-14  -c/w home Fe supplements     #Asx COVID  -there was no need for ART or steroids     #HTN  -c/w home Losartan    #CAD  -c/w home Metoprolol Tartrate (ok given lack of concomitant HF)  -c/w home statin  -ASA was held on d/c due to recent GI bleed  -Rivaroxaban was held on d/c due to recent GI bleed          Physical Exam:  -General: NAD, converses well  -Cardio: no murmurs or gallops  -Resp: lungs CTAB, symmetric expansion  -Abd: soft and nontender, no guarding or rebound          Therefore, he is discharged in fair and stable condition to home with close outpatient follow-up.    The patient met 2-midnight criteria for an inpatient stay at the time of  discharge.    Discharge Date  02/09/23      FOLLOW UP ITEMS POST DISCHARGE  Follow up with primary care doctor to wean off of PPI. Follow up with GI about EGD biopsy results.    DISCHARGE DIAGNOSES  Principal Problem:    Hematemesis POA: Yes  Active Problems:    Dyslipidemia POA: Yes      Overview: Has been below 300 in the past      Has been on meds in the past                        HDL 41          Gastroesophageal reflux disease without esophagitis POA: Yes    Lactic acidosis POA: Yes    Pneumonia POA: Yes  Resolved Problems:    Sepsis (HCC) POA: Yes      FOLLOW UP  Future Appointments   Date Time Provider Department Center   2/21/2023  2:00 PM JORGE Ding [facility] None     No follow-up provider specified.    MEDICATIONS ON DISCHARGE     Medication List        START taking these medications        Instructions   omeprazole 20 MG delayed-release capsule  Start taking on: February 9, 2023  Commonly known as: PRILOSEC   Take 2 Capsules by mouth 2 times a day for 55 days, THEN 2 Capsules every day for 7 days, THEN 1 Capsule every day for 7 days.            CHANGE how you take these medications        Instructions   ascorbic acid 500 MG tablet  What changed: when to take this  Commonly known as: Vitamin C   Take 1 Tab by mouth every day.  Dose: 500 mg     ferrous sulfate 325 (65 Fe) MG tablet  What changed: when to take this   Take 1 Tab by mouth every morning with breakfast.  Dose: 325 mg     therapeutic multivitamin-minerals Tabs  What changed: when to take this   Take 1 Tab by mouth every day.  Dose: 1 Tablet            CONTINUE taking these medications        Instructions   losartan 25 MG Tabs  Commonly known as: COZAAR   Take 1 Tablet by mouth every day.  Dose: 25 mg     metoprolol tartrate 25 MG Tabs  Commonly known as: LOPRESSOR   Take 1 Tablet by mouth 2 times a day.  Dose: 25 mg     rosuvastatin 40 MG tablet  Commonly known as: CRESTOR   Take 1 Tablet by mouth every evening.  Dose:  40 mg            STOP taking these medications      aspirin EC 81 MG Tbec  Commonly known as: ECOTRIN     rivaroxaban 2.5 MG tablet  Commonly known as: Xarelto              Allergies  No Known Allergies    DIET  Orders Placed This Encounter   Procedures    Diet Order Diet: Regular (low fat)     Standing Status:   Standing     Number of Occurrences:   1     Order Specific Question:   Diet:     Answer:   Regular [1]     Comments:   low fat       ACTIVITY  As tolerated.  Weight bearing as tolerated    CONSULTATIONS  GI    PROCEDURES  EGD with biopsy    LABORATORY  Lab Results   Component Value Date    SODIUM 139 02/09/2023    POTASSIUM 3.7 02/09/2023    CHLORIDE 106 02/09/2023    CO2 21 02/09/2023    GLUCOSE 102 (H) 02/09/2023    BUN 9 02/09/2023    CREATININE 0.81 02/09/2023        Lab Results   Component Value Date    WBC 4.1 (L) 02/09/2023    HEMOGLOBIN 14.0 02/09/2023    HEMATOCRIT 42.0 02/09/2023    PLATELETCT 152 (L) 02/09/2023        Total time of the discharge process was 70 minutes.

## 2023-02-09 NOTE — PROGRESS NOTES
Discharge instructions provided to pt.  Pt states understanding.  Pt states all questions have been answered.  Copy of discharge provided to pt.  Signed copy in chart. IV removed, meds to be given to pt.

## 2023-02-09 NOTE — CARE PLAN
The patient is Stable - Low risk of patient condition declining or worsening    Shift Goals  Clinical Goals: monitor blood pressure  Patient Goals: wean oxygen to room air  Family Goals: carol    Progress made toward(s) clinical / shift goals:    Problem: Respiratory  Goal: Patient will achieve/maintain optimum respiratory ventilation and gas exchange  Outcome: Progressing - patient currently maintaining oxygen saturation great than 90% on room air    Problem: Hemodynamics  Goal: Patient's hemodynamics, fluid balance and neurologic status will be stable or improve  Outcome: Progressing - patient's blood pressures remaining stable with MAP greater than 60, no evidence of active bleeding

## 2023-02-09 NOTE — DISCHARGE PLANNING
Case Management Discharge Planning    Admission Date: 2/6/2023  GMLOS: 5  ALOS: 3    6-Clicks ADL Score: 24  6-Clicks Mobility Score: 24      Anticipated Discharge Dispo:  home w/ family    DME Needed: No    Action(s) Taken: Updated Provider/Nurse on Discharge Plan    Escalations Completed: None    Medically Clear: Yes pending diet to    Next Steps:     Barriers to Discharge: None    Is the patient up for discharge tomorrow: No likely today if sultana diet advanced    MD and CM speaks w/ pt who is in agreement w/ DC later today pending diet sultana.  Pt reports no CM needs.  DC Plan home w/ spouse once diet is sultana. -likely later today

## 2023-02-09 NOTE — PROGRESS NOTES
Telemetry Report:    Rhythm:    NS   Heart Rate:    60-94  Ectopy:          LA:        .15   QRS:      .1   QT:  .54     Per Telestrip from Monitor Room

## 2023-02-11 LAB
BACTERIA BLD CULT: NORMAL
BACTERIA BLD CULT: NORMAL
SIGNIFICANT IND 70042: NORMAL
SIGNIFICANT IND 70042: NORMAL
SITE SITE: NORMAL
SITE SITE: NORMAL
SOURCE SOURCE: NORMAL
SOURCE SOURCE: NORMAL

## 2023-02-17 NOTE — DOCUMENTATION QUERY
[facility]                                                                       Query Response Note      PATIENT:               BRETT PATTERSON  ACCT #:                  [id number]  MRN:                     [id number]  :                      1955  ADMIT DATE:       2023 6:07 PM  DISCH DATE:        2023 3:50 PM  RESPONDING  PROVIDER #:        [id number]           QUERY TEXT:    Sepsis without an associated organ dysfunction is noted in the Medical Record.    After further study, can the diagnosis of sepsis be clarified?    The patient's clinical indicators include:  H&P and Progress Notes:  Sepsis POA, SIRS include: tachycardia and tachypnea.  Source is LLL pneumonia   wbc 9.5, procal 0.10, lactic acid 2.8  DC Summary: resolved problems: Sepsis POA yes   Admit vitals:  101/72, 130, 22, 99.7F, 97%  Risk Factors: COVID +, pna, May Holguin tears   Treatment: Unasyn, Zithromax, IVF    Thank you,  Zain Frausto RN, BSN  Clinical   Connect via Chain  Options provided:   -- Sepsis without associated organ dysfunction ruled in (non severe sepsis)   -- Sepsis ruled out, non infectious SIRS ruled in   -- Other explanation, please specify   -- Unable to determine      Query created by: Zain Frausto on 2023 1:28 PM    RESPONSE TEXT:    Sepsis without associated organ dysfunction ruled in (non severe sepsis)          Electronically signed by:  MARLENY CERDA MD 2023 9:22 AM

## 2023-02-21 ENCOUNTER — HOSPITAL ENCOUNTER (OUTPATIENT)
Dept: LAB | Facility: MEDICAL CENTER | Age: 68
End: 2023-02-21
Attending: FAMILY MEDICINE
Payer: COMMERCIAL

## 2023-02-21 ENCOUNTER — OFFICE VISIT (OUTPATIENT)
Dept: CARDIOLOGY | Facility: MEDICAL CENTER | Age: 68
End: 2023-02-21
Payer: COMMERCIAL

## 2023-02-21 ENCOUNTER — HOSPITAL ENCOUNTER (OUTPATIENT)
Dept: LAB | Facility: MEDICAL CENTER | Age: 68
End: 2023-02-21
Attending: INTERNAL MEDICINE
Payer: COMMERCIAL

## 2023-02-21 VITALS
HEIGHT: 70 IN | BODY MASS INDEX: 27.77 KG/M2 | DIASTOLIC BLOOD PRESSURE: 74 MMHG | OXYGEN SATURATION: 96 % | WEIGHT: 194 LBS | SYSTOLIC BLOOD PRESSURE: 114 MMHG | HEART RATE: 70 BPM | RESPIRATION RATE: 16 BRPM

## 2023-02-21 DIAGNOSIS — I10 HYPERTENSION, UNSPECIFIED TYPE: ICD-10-CM

## 2023-02-21 DIAGNOSIS — I25.84 CORONARY ARTERY DISEASE DUE TO CALCIFIED CORONARY LESION: ICD-10-CM

## 2023-02-21 DIAGNOSIS — I10 ESSENTIAL HYPERTENSION, BENIGN: ICD-10-CM

## 2023-02-21 DIAGNOSIS — I25.10 CORONARY ARTERY DISEASE DUE TO CALCIFIED CORONARY LESION: ICD-10-CM

## 2023-02-21 DIAGNOSIS — E78.5 DYSLIPIDEMIA: ICD-10-CM

## 2023-02-21 LAB
ALBUMIN SERPL BCP-MCNC: 4.5 G/DL (ref 3.2–4.9)
ALBUMIN/GLOB SERPL: 1.6 G/DL
ALP SERPL-CCNC: 38 U/L (ref 30–99)
ALT SERPL-CCNC: 31 U/L (ref 2–50)
ANION GAP SERPL CALC-SCNC: 12 MMOL/L (ref 7–16)
AST SERPL-CCNC: 28 U/L (ref 12–45)
BASOPHILS # BLD AUTO: 1.6 % (ref 0–1.8)
BASOPHILS # BLD: 0.09 K/UL (ref 0–0.12)
BILIRUB SERPL-MCNC: 0.3 MG/DL (ref 0.1–1.5)
BUN SERPL-MCNC: 13 MG/DL (ref 8–22)
CALCIUM ALBUM COR SERPL-MCNC: 9.2 MG/DL (ref 8.5–10.5)
CALCIUM SERPL-MCNC: 9.6 MG/DL (ref 8.5–10.5)
CHLORIDE SERPL-SCNC: 105 MMOL/L (ref 96–112)
CHOLEST SERPL-MCNC: 168 MG/DL (ref 100–199)
CO2 SERPL-SCNC: 25 MMOL/L (ref 20–33)
CREAT SERPL-MCNC: 1.1 MG/DL (ref 0.5–1.4)
EOSINOPHIL # BLD AUTO: 0.16 K/UL (ref 0–0.51)
EOSINOPHIL NFR BLD: 2.9 % (ref 0–6.9)
ERYTHROCYTE [DISTWIDTH] IN BLOOD BY AUTOMATED COUNT: 48.4 FL (ref 35.9–50)
GFR SERPLBLD CREATININE-BSD FMLA CKD-EPI: 73 ML/MIN/1.73 M 2
GLOBULIN SER CALC-MCNC: 2.9 G/DL (ref 1.9–3.5)
GLUCOSE SERPL-MCNC: 115 MG/DL (ref 65–99)
HCT VFR BLD AUTO: 46.7 % (ref 42–52)
HDLC SERPL-MCNC: 43 MG/DL
HGB BLD-MCNC: 15.2 G/DL (ref 14–18)
IMM GRANULOCYTES # BLD AUTO: 0.02 K/UL (ref 0–0.11)
IMM GRANULOCYTES NFR BLD AUTO: 0.4 % (ref 0–0.9)
LDLC SERPL CALC-MCNC: 77 MG/DL
LYMPHOCYTES # BLD AUTO: 1.14 K/UL (ref 1–4.8)
LYMPHOCYTES NFR BLD: 20.8 % (ref 22–41)
MCH RBC QN AUTO: 33 PG (ref 27–33)
MCHC RBC AUTO-ENTMCNC: 32.5 G/DL (ref 33.7–35.3)
MCV RBC AUTO: 101.5 FL (ref 81.4–97.8)
MONOCYTES # BLD AUTO: 0.48 K/UL (ref 0–0.85)
MONOCYTES NFR BLD AUTO: 8.8 % (ref 0–13.4)
NEUTROPHILS # BLD AUTO: 3.58 K/UL (ref 1.82–7.42)
NEUTROPHILS NFR BLD: 65.5 % (ref 44–72)
NRBC # BLD AUTO: 0 K/UL
NRBC BLD-RTO: 0 /100 WBC
PLATELET # BLD AUTO: 285 K/UL (ref 164–446)
PMV BLD AUTO: 10.6 FL (ref 9–12.9)
POTASSIUM SERPL-SCNC: 4.4 MMOL/L (ref 3.6–5.5)
PROT SERPL-MCNC: 7.4 G/DL (ref 6–8.2)
RBC # BLD AUTO: 4.6 M/UL (ref 4.7–6.1)
SODIUM SERPL-SCNC: 142 MMOL/L (ref 135–145)
TRIGL SERPL-MCNC: 238 MG/DL (ref 0–149)
WBC # BLD AUTO: 5.5 K/UL (ref 4.8–10.8)

## 2023-02-21 PROCEDURE — 85025 COMPLETE CBC W/AUTO DIFF WBC: CPT

## 2023-02-21 PROCEDURE — 80061 LIPID PANEL: CPT

## 2023-02-21 PROCEDURE — 84443 ASSAY THYROID STIM HORMONE: CPT

## 2023-02-21 PROCEDURE — 36415 COLL VENOUS BLD VENIPUNCTURE: CPT

## 2023-02-21 PROCEDURE — 80053 COMPREHEN METABOLIC PANEL: CPT

## 2023-02-21 PROCEDURE — 99214 OFFICE O/P EST MOD 30 MIN: CPT | Performed by: NURSE PRACTITIONER

## 2023-02-21 ASSESSMENT — FIBROSIS 4 INDEX: FIB4 SCORE: 2.12

## 2023-02-21 NOTE — PROGRESS NOTES
[facility] Cardiology Follow-up Note    Date of Service:    2/21/2023      Name:   Talib Ortiz   YOB: 1955  Age:   67 y.o.  male   MRN:   [id number]      Reason for cardiology consult: ***    Attending Provider: ***    Primary Cardiologist: ***    HPI:  ***    Interim Events:  - Personal Telemetry interpretation: ***  - Overnight events: ***  - Vitals: ***   - Labs reviewed: ***  - I/O's: ***  - Weight: ***    ROS  Constitutional:  *** fatigue.  Weight ***.  Respiratory:  Denies shortness of breath, no cough.  Cardiovascular:  ***chest pain.  *** lower extremity edema.  Denies orthopnea or PND.  : *** polyuria, no dysuria.  GI:  Denies nausea/vomiting.  No abdominal distention.  Neuro:  Denies dizziness, syncope.  Hem/lymph: Denies easy bleeding/bruising.      All other review of systems reviewed and negative.    Past medical, surgical, social, and family history reviewed and unchanged from admission except as noted in HPI.    Medications: Reviewed in MAR  No current facility-administered medications for this visit.     Last reviewed on 2/8/2023  8:06 AM by Earline Khoury R.N.   No Known Allergies    Physical Exam  There is no height or weight on file to calculate BMI. There were no vitals taken for this visit. There were no vitals filed for this visit. Oxygen Therapy:       General: no acute distress, *** appearing  Neck: *** JVD, no bruits  Lungs: CTAB, normal effort. no wheezing, rales, or rhonchi  Heart: RRR, normal S1 /S2 *** murmur, no rub  EXT: *** lower extremity edema, 2+ radial pulses. 2+ pedal pulses.   Abdomen: soft, non tender, non distended  Neurological: No focal deficits, no facial asymmetry.  Normal speech  Psychiatric: Appropriate affect, alert and oriented x 3  Skin: Warm and dry extremities, no rashes    Labs (personally reviewed):     Lab Results   Component Value Date/Time    SODIUM 139 02/09/2023 05:00 AM    POTASSIUM 3.7 02/09/2023 05:00 AM    CHLORIDE 106  02/09/2023 05:00 AM    CO2 21 02/09/2023 05:00 AM    GLUCOSE 102 (H) 02/09/2023 05:00 AM    BUN 9 02/09/2023 05:00 AM    CREATININE 0.81 02/09/2023 05:00 AM    BUNCREATRAT 13 02/16/2015 08:55 AM     Lab Results   Component Value Date/Time    ALKPHOSPHAT 32 02/07/2023 03:01 AM    ASTSGOT 21 02/07/2023 03:01 AM    ALTSGPT 19 02/07/2023 03:01 AM    TBILIRUBIN 0.3 02/07/2023 03:01 AM      Lab Results   Component Value Date/Time    CHOLSTRLTOT 165 04/05/2022 10:53 AM    LDL 85 04/05/2022 10:53 AM    HDL 57 04/05/2022 10:53 AM    TRIGLYCERIDE 116 04/05/2022 10:53 AM       Cardiac Imaging and Procedures Review:      EKG ***: My Personal interpretation reveals ***      Echo ***:  ***    Stress Test ***:  ***    C ***:  ***    Assessment and Medical Decision Making:    ***    Thank you for allowing me to participate in this patients care.  Please contact me with any questions or concerns.    Please see  *** attestation for additions and further recommendations.    PLEASE NOTE: Some of this dictation was created using voice recognition software. I have made every reasonable attempt to correct obvious errors, but I expect that there are errors of grammar and possibly content that I did not discover before finalizing the note.     VERITO Ding.   [facility]  [phone]

## 2023-02-21 NOTE — PROGRESS NOTES
Cardiology Clinic Follow-up Note    Date of note:    2/21/2023  Primary Care Provider: Belen Knowles M.D.    Name:             Talib Ortiz  YOB: 1955  MRN:               [id number]    CC: 6 month F/U for CAD     Primary Cardiologist: Dr Harvey    Patient HPI:   Talib Ortiz is a 67 y.o. male with current medical problems including dyslipidemia, CAD, history of CABG x2 in 2016    Interim History:  Mr. Ortiz was last seen in this cardiology office by Dr Harvey in 7/2022.  At that time NM stress test with echocardiogram ordered.  Recently he was admitted to the hospital for upper GI bleed with a May-Holguin tear which was clipped by GI recommended to stop aspirin and Xarelto patient had been on low-dose Xarelto 2.5mg for secondary prevention given his CAD.  He denies palpitations, chest pain, shortness of breath, dyspnea on exertion, dizziness or syncopal episodes, orthopnea, PND, lower extremity swelling, and recent weight gain.  Is having dark stools however is on iron.  Denies any bloody or coffee-ground emesis.    Patient endorses medication compliance.    Review of systems:  All others systems reviewed and negative except for what is outlined in the above HPI    Past Medical History:   Diagnosis Date    Arthritis     Blood clotting disorder (HCC) 1996    leg    CAD (coronary artery disease)     Heart attack (HCC) 08/16/2016    cardiology, Dr. Frias    Heart burn     High cholesterol     Hyperlipidemia     Kidney disease     Kidney stone     Obesity (BMI 30-39.9) 02/12/2015    Osteoarthritis 03/02/2015    Pneumonia 2016     Past Surgical History:   Procedure Laterality Date    OH UPPER GI ENDOSCOPY,DIAGNOSIS N/A 2/8/2023    Procedure: GASTROSCOPY;  Surgeon: Hieu Arevalo M.D.;  Location: SURGERY SAME DAY [facility];  Service: Gastroenterology    OH UPPER GI ENDOSCOPY,CTRL BLEED N/A 2/8/2023    Procedure: EGD, WITH CLIP PLACEMENT;  Surgeon: Hieu Arevalo M.D.;  Location: SURGERY SAME  "DAY [facility];  Service: Gastroenterology    KNEE ARTHROPLASTY TOTAL Left 2017    Procedure: KNEE ARTHROPLASTY TOTAL;  Surgeon: Charles Castellanos M.D.;  Location: SURGERY [facility];  Service:     MULTIPLE CORONARY ARTERY BYPASS  2016    LAMINOTOMY  2012    lumbar    KNEE ARTHROSCOPY Left     OTHER SURGICAL PROCEDURE Left     \"removal of vein left leg due to blood clot\"    SHOULDER ARTHROTOMY Right      Family History   Problem Relation Age of Onset    Arthritis Mother     Hyperlipidemia Mother     Heart Disease Mother         bypass x4    Diabetes Mother     Hyperlipidemia Father     Heart Attack Father 61    Other Sister         back surgery    Other Brother         joint problems     Social History     Socioeconomic History    Marital status:      Spouse name: Not on file    Number of children: Not on file    Years of education: Not on file    Highest education level: Not on file   Occupational History    Not on file   Tobacco Use    Smoking status: Former     Packs/day: 0.25     Years: 10.00     Pack years: 2.50     Types: Cigarettes     Quit date: 3/19/2016     Years since quittin.9    Smokeless tobacco: Never   Vaping Use    Vaping Use: Never used   Substance and Sexual Activity    Alcohol use: No     Comment: Quit years    Drug use: No    Sexual activity: Yes     Partners: Female   Other Topics Concern    Not on file   Social History Narrative    Not on file     Social Determinants of Health     Financial Resource Strain: Not on file   Food Insecurity: Not on file   Transportation Needs: Not on file   Physical Activity: Not on file   Stress: Not on file   Social Connections: Not on file   Intimate Partner Violence: Not on file   Housing Stability: Not on file     No Known Allergies  Current Outpatient Medications   Medication Sig Dispense Refill    omeprazole (PRILOSEC) 20 MG delayed-release capsule Take 2 Capsules by mouth 2 times a day for 55 days, THEN 2 Capsules " "every day for 7 days, THEN 1 Capsule every day for 7 days. 241 Capsule 0    metoprolol tartrate (LOPRESSOR) 25 MG Tab Take 1 Tablet by mouth 2 times a day. 180 Tablet 4    rosuvastatin (CRESTOR) 40 MG tablet Take 1 Tablet by mouth every evening. 90 Tablet 4    losartan (COZAAR) 25 MG Tab Take 1 Tablet by mouth every day. 90 Tablet 4    ferrous sulfate 325 (65 Fe) MG tablet Take 1 Tab by mouth every morning with breakfast. 30 Tab 3    therapeutic multivitamin-minerals (THERAGRAN-M) Tab Take 1 Tab by mouth every day. 30 Tab 11    ascorbic acid (VITAMIN C) 500 MG tablet Take 1 Tab by mouth every day. 30 Tab 3     No current facility-administered medications for this visit.       Physical Exam:  Ambulatory Vitals  /74 (BP Location: Left arm, Patient Position: Sitting)   Pulse 70   Resp 16   Ht 1.778 m (5' 10\")   Wt 88 kg (194 lb)   SpO2 96%    BP Readings from Last 4 Encounters:   02/21/23 114/74   02/09/23 120/69   02/06/23 (!) 86/66   12/04/22 102/62       Weight/BMI: Body mass index is 27.84 kg/m².  Wt Readings from Last 4 Encounters:   02/21/23 88 kg (194 lb)   02/06/23 83 kg (183 lb)   02/06/23 88.3 kg (194 lb 10.7 oz)   12/04/22 86.2 kg (190 lb)       General: No apparent distress. Well nourished.   Neck: No JVD. No caroid bruits, trachea midline  Lungs: CTAB. Normal effort, without crackles/rhonchi, no wheezing  Heart: RRR. Normal S1/S2, no murmur, no rub. no lower extremity edema. 2+ radial pulses, 2+ DT pulses  Ext: No clubbing or cyanosis.  Abdomen: soft, non tender, non distended, no toño hepatomegaly.  Neurological: No focal deficits, no facial asymmetry.  Normal speech.  Psychiatric: Appropriate affect, alert and oriented x 4.   Skin: Warm and dry, no rash.    Lab Data Review:  Lab Results   Component Value Date/Time    CHOLSTRLTOT 165 04/05/2022 10:53 AM    LDL 85 04/05/2022 10:53 AM    HDL 57 04/05/2022 10:53 AM    TRIGLYCERIDE 116 04/05/2022 10:53 AM       Lab Results   Component Value " Date/Time    SODIUM 139 2023 05:00 AM    POTASSIUM 3.7 2023 05:00 AM    CHLORIDE 106 2023 05:00 AM    CO2 21 2023 05:00 AM    GLUCOSE 102 (H) 2023 05:00 AM    BUN 9 2023 05:00 AM    CREATININE 0.81 2023 05:00 AM    BUNCREATRAT 13 2015 08:55 AM     Lab Results   Component Value Date/Time    ALKPHOSPHAT 32 2023 03:01 AM    ASTSGOT 21 2023 03:01 AM    ALTSGPT 19 2023 03:01 AM    TBILIRUBIN 0.3 2023 03:01 AM      Lab Results   Component Value Date/Time    WBC 4.1 (L) 2023 05:00 AM       Cardiac Imaging and Procedures Review:      EKG 23: My Personal interpretation reveals , RBBB     Echo 2017:  Normal left ventricular systolic function.  Left ventricular ejection fraction is visually estimated to be 55%.  Mild concentric left ventricular hypertrophy.  Unable to estimate pulmonary artery pressure due to an inadequate   tricuspid regurgitant jet.  Hypokinesis of the distal septum and portion of the apex.     Assessment and Clinical Decision Makin. Coronary artery disease due to calcified coronary lesion: CABG ×2 in 2016  NM-CARDIAC STRESS TEST        The following treatment plan was discussed    CAD  Hx of CABG  -Remain off Xarelto given recent history of GI bleed with MW tear  -OK to restart aspirin 81 mg daily  -Repeat NM cardiac stress test and echocardiogram for further risk stratification per Dr. Harvey's last visit    Dyslipidemia  Continue rosuvastatin 40 mg daily  -LDL goal < 70  -Recent LDL from last year 85, obtained lipid panel this morning, still in process  -If LDL not <70 initiate Zetia or consider [id number]    HTN  -Well-controlled today in office  -Continue losartan 25 mg daily  -Continue metoprolol tartrate 25 mg bid    Plan reviewed in detail with the patient, verbalizes understanding and is in agreement.  Pt is to follow up with myself or Dr Harvey in 6 months  Encouraged Pt to follow up with us over the phone or  electronically using my MyChart as cardiac issues/concerns arise.      PLEASE NOTE: This dictation was created using voice recognition software. I have made every reasonable attempt to correct obvious errors, but I expect that there are errors of grammar and possibly content that I did not discover before finalizing the note.       VERITO Ding.   [facility]  [phone]

## 2023-02-21 NOTE — PATIENT INSTRUCTIONS
Call radiology to schedule cardiac stress test and echo.   Call our office to schedule [phone],  @ 6 months  Ok to restart aspirin.

## 2023-02-22 LAB — TSH SERPL DL<=0.005 MIU/L-ACNC: 2.47 UIU/ML (ref 0.38–5.33)

## 2023-03-09 ENCOUNTER — OFFICE VISIT (OUTPATIENT)
Dept: MEDICAL GROUP | Facility: MEDICAL CENTER | Age: 68
End: 2023-03-09
Payer: COMMERCIAL

## 2023-03-09 VITALS
HEIGHT: 70 IN | SYSTOLIC BLOOD PRESSURE: 100 MMHG | RESPIRATION RATE: 16 BRPM | WEIGHT: 194 LBS | OXYGEN SATURATION: 95 % | TEMPERATURE: 98.5 F | BODY MASS INDEX: 27.77 KG/M2 | HEART RATE: 80 BPM | DIASTOLIC BLOOD PRESSURE: 60 MMHG

## 2023-03-09 DIAGNOSIS — S11.21XD ESOPHAGEAL TEAR, SUBSEQUENT ENCOUNTER: ICD-10-CM

## 2023-03-09 DIAGNOSIS — I10 ESSENTIAL HYPERTENSION, BENIGN: ICD-10-CM

## 2023-03-09 DIAGNOSIS — I25.84 CORONARY ARTERY DISEASE DUE TO CALCIFIED CORONARY LESION: ICD-10-CM

## 2023-03-09 DIAGNOSIS — E78.5 DYSLIPIDEMIA: ICD-10-CM

## 2023-03-09 DIAGNOSIS — K44.9 HIATAL HERNIA WITH GERD: ICD-10-CM

## 2023-03-09 DIAGNOSIS — K21.9 HIATAL HERNIA WITH GERD: ICD-10-CM

## 2023-03-09 DIAGNOSIS — I25.10 CORONARY ARTERY DISEASE DUE TO CALCIFIED CORONARY LESION: ICD-10-CM

## 2023-03-09 PROCEDURE — 99214 OFFICE O/P EST MOD 30 MIN: CPT | Performed by: FAMILY MEDICINE

## 2023-03-09 ASSESSMENT — FIBROSIS 4 INDEX: FIB4 SCORE: 1.18

## 2023-04-21 ENCOUNTER — APPOINTMENT (OUTPATIENT)
Dept: ADMISSIONS | Facility: MEDICAL CENTER | Age: 68
End: 2023-04-21
Attending: SURGERY
Payer: COMMERCIAL

## 2023-04-28 ENCOUNTER — PRE-ADMISSION TESTING (OUTPATIENT)
Dept: ADMISSIONS | Facility: MEDICAL CENTER | Age: 68
End: 2023-04-28
Attending: SURGERY
Payer: COMMERCIAL

## 2023-04-28 VITALS — BODY MASS INDEX: 26.05 KG/M2 | WEIGHT: 182 LBS | HEIGHT: 70 IN

## 2023-04-28 RX ORDER — OMEPRAZOLE 20 MG/1
CAPSULE, DELAYED RELEASE ORAL
Status: ON HOLD | COMMUNITY
End: 2023-05-01

## 2023-04-28 ASSESSMENT — FIBROSIS 4 INDEX: FIB4 SCORE: 1.18

## 2023-05-01 ENCOUNTER — ANESTHESIA EVENT (OUTPATIENT)
Dept: SURGERY | Facility: MEDICAL CENTER | Age: 68
End: 2023-05-01
Payer: COMMERCIAL

## 2023-05-01 ENCOUNTER — HOSPITAL ENCOUNTER (OUTPATIENT)
Facility: MEDICAL CENTER | Age: 68
End: 2023-05-01
Attending: SURGERY | Admitting: SURGERY
Payer: COMMERCIAL

## 2023-05-01 ENCOUNTER — ANESTHESIA (OUTPATIENT)
Dept: SURGERY | Facility: MEDICAL CENTER | Age: 68
End: 2023-05-01
Payer: COMMERCIAL

## 2023-05-01 ENCOUNTER — PHARMACY VISIT (OUTPATIENT)
Dept: PHARMACY | Facility: MEDICAL CENTER | Age: 68
End: 2023-05-01
Payer: COMMERCIAL

## 2023-05-01 VITALS
RESPIRATION RATE: 20 BRPM | OXYGEN SATURATION: 94 % | SYSTOLIC BLOOD PRESSURE: 108 MMHG | HEART RATE: 62 BPM | WEIGHT: 187.39 LBS | TEMPERATURE: 97.1 F | BODY MASS INDEX: 26.23 KG/M2 | HEIGHT: 71 IN | DIASTOLIC BLOOD PRESSURE: 62 MMHG

## 2023-05-01 DIAGNOSIS — G89.18 POSTOPERATIVE PAIN: ICD-10-CM

## 2023-05-01 LAB
ANION GAP SERPL CALC-SCNC: 11 MMOL/L (ref 7–16)
BUN SERPL-MCNC: 13 MG/DL (ref 8–22)
CALCIUM SERPL-MCNC: 9.1 MG/DL (ref 8.5–10.5)
CHLORIDE SERPL-SCNC: 109 MMOL/L (ref 96–112)
CO2 SERPL-SCNC: 22 MMOL/L (ref 20–33)
CREAT SERPL-MCNC: 1.03 MG/DL (ref 0.5–1.4)
ERYTHROCYTE [DISTWIDTH] IN BLOOD BY AUTOMATED COUNT: 46.9 FL (ref 35.9–50)
GFR SERPLBLD CREATININE-BSD FMLA CKD-EPI: 79 ML/MIN/1.73 M 2
GLUCOSE SERPL-MCNC: 118 MG/DL (ref 65–99)
HCT VFR BLD AUTO: 46 % (ref 42–52)
HGB BLD-MCNC: 15.1 G/DL (ref 14–18)
MCH RBC QN AUTO: 32.1 PG (ref 27–33)
MCHC RBC AUTO-ENTMCNC: 32.8 G/DL (ref 33.7–35.3)
MCV RBC AUTO: 97.7 FL (ref 81.4–97.8)
PLATELET # BLD AUTO: 247 K/UL (ref 164–446)
PMV BLD AUTO: 10 FL (ref 9–12.9)
POTASSIUM SERPL-SCNC: 4.3 MMOL/L (ref 3.6–5.5)
RBC # BLD AUTO: 4.71 M/UL (ref 4.7–6.1)
SODIUM SERPL-SCNC: 142 MMOL/L (ref 135–145)
WBC # BLD AUTO: 6 K/UL (ref 4.8–10.8)

## 2023-05-01 PROCEDURE — 160002 HCHG RECOVERY MINUTES (STAT): Performed by: SURGERY

## 2023-05-01 PROCEDURE — 700105 HCHG RX REV CODE 258: Performed by: SURGERY

## 2023-05-01 PROCEDURE — 700111 HCHG RX REV CODE 636 W/ 250 OVERRIDE (IP): Performed by: ANESTHESIOLOGY

## 2023-05-01 PROCEDURE — 160042 HCHG SURGERY MINUTES - EA ADDL 1 MIN LEVEL 5: Performed by: SURGERY

## 2023-05-01 PROCEDURE — 85027 COMPLETE CBC AUTOMATED: CPT

## 2023-05-01 PROCEDURE — 00790 ANES IPER UPR ABD NOS: CPT | Performed by: ANESTHESIOLOGY

## 2023-05-01 PROCEDURE — 80048 BASIC METABOLIC PNL TOTAL CA: CPT

## 2023-05-01 PROCEDURE — 160048 HCHG OR STATISTICAL LEVEL 1-5: Performed by: SURGERY

## 2023-05-01 PROCEDURE — 700102 HCHG RX REV CODE 250 W/ 637 OVERRIDE(OP): Performed by: ANESTHESIOLOGY

## 2023-05-01 PROCEDURE — 700101 HCHG RX REV CODE 250: Performed by: ANESTHESIOLOGY

## 2023-05-01 PROCEDURE — 160031 HCHG SURGERY MINUTES - 1ST 30 MINS LEVEL 5: Performed by: SURGERY

## 2023-05-01 PROCEDURE — 160025 RECOVERY II MINUTES (STATS): Performed by: SURGERY

## 2023-05-01 PROCEDURE — 160047 HCHG PACU  - EA ADDL 30 MINS PHASE II: Performed by: SURGERY

## 2023-05-01 PROCEDURE — RXMED WILLOW AMBULATORY MEDICATION CHARGE: Performed by: SURGERY

## 2023-05-01 PROCEDURE — 36415 COLL VENOUS BLD VENIPUNCTURE: CPT

## 2023-05-01 PROCEDURE — 160035 HCHG PACU - 1ST 60 MINS PHASE I: Performed by: SURGERY

## 2023-05-01 PROCEDURE — 160036 HCHG PACU - EA ADDL 30 MINS PHASE I: Performed by: SURGERY

## 2023-05-01 PROCEDURE — 160046 HCHG PACU - 1ST 60 MINS PHASE II: Performed by: SURGERY

## 2023-05-01 PROCEDURE — 502714 HCHG ROBOTIC SURGERY SERVICES: Performed by: SURGERY

## 2023-05-01 PROCEDURE — 160009 HCHG ANES TIME/MIN: Performed by: SURGERY

## 2023-05-01 PROCEDURE — A9270 NON-COVERED ITEM OR SERVICE: HCPCS | Performed by: ANESTHESIOLOGY

## 2023-05-01 PROCEDURE — 700101 HCHG RX REV CODE 250: Performed by: SURGERY

## 2023-05-01 RX ORDER — ACETAMINOPHEN 500 MG
1000 TABLET ORAL ONCE
Status: DISCONTINUED | OUTPATIENT
Start: 2023-05-01 | End: 2023-05-01 | Stop reason: HOSPADM

## 2023-05-01 RX ORDER — ENALAPRILAT 1.25 MG/ML
2.5 INJECTION INTRAVENOUS EVERY 6 HOURS PRN
Status: DISCONTINUED | OUTPATIENT
Start: 2023-05-01 | End: 2023-05-01 | Stop reason: HOSPADM

## 2023-05-01 RX ORDER — OXYCODONE HCL 5 MG/5 ML
5 SOLUTION, ORAL ORAL EVERY 4 HOURS PRN
Qty: 100 ML | Refills: 0 | Status: SHIPPED | OUTPATIENT
Start: 2023-05-01 | End: 2023-05-05

## 2023-05-01 RX ORDER — DIPHENHYDRAMINE HYDROCHLORIDE 50 MG/ML
12.5 INJECTION INTRAMUSCULAR; INTRAVENOUS EVERY 6 HOURS PRN
Status: DISCONTINUED | OUTPATIENT
Start: 2023-05-01 | End: 2023-05-01 | Stop reason: HOSPADM

## 2023-05-01 RX ORDER — OXYCODONE HCL 5 MG/5 ML
5 SOLUTION, ORAL ORAL
Status: DISCONTINUED | OUTPATIENT
Start: 2023-05-01 | End: 2023-05-01 | Stop reason: HOSPADM

## 2023-05-01 RX ORDER — HYDROMORPHONE HYDROCHLORIDE 1 MG/ML
0.2 INJECTION, SOLUTION INTRAMUSCULAR; INTRAVENOUS; SUBCUTANEOUS
Status: DISCONTINUED | OUTPATIENT
Start: 2023-05-01 | End: 2023-05-01 | Stop reason: HOSPADM

## 2023-05-01 RX ORDER — HALOPERIDOL 5 MG/ML
1 INJECTION INTRAMUSCULAR
Status: DISCONTINUED | OUTPATIENT
Start: 2023-05-01 | End: 2023-05-01 | Stop reason: HOSPADM

## 2023-05-01 RX ORDER — ONDANSETRON 2 MG/ML
4 INJECTION INTRAMUSCULAR; INTRAVENOUS
Status: DISCONTINUED | OUTPATIENT
Start: 2023-05-01 | End: 2023-05-01 | Stop reason: HOSPADM

## 2023-05-01 RX ORDER — ENOXAPARIN SODIUM 100 MG/ML
30 INJECTION SUBCUTANEOUS EVERY 12 HOURS
Status: DISCONTINUED | OUTPATIENT
Start: 2023-05-01 | End: 2023-05-01 | Stop reason: HOSPADM

## 2023-05-01 RX ORDER — ACETAMINOPHEN 500 MG
1000 TABLET ORAL EVERY 6 HOURS
Status: DISCONTINUED | OUTPATIENT
Start: 2023-05-02 | End: 2023-05-01 | Stop reason: HOSPADM

## 2023-05-01 RX ORDER — OXYCODONE HCL 5 MG/5 ML
10 SOLUTION, ORAL ORAL
Status: DISCONTINUED | OUTPATIENT
Start: 2023-05-01 | End: 2023-05-01 | Stop reason: HOSPADM

## 2023-05-01 RX ORDER — LIDOCAINE HYDROCHLORIDE 40 MG/ML
SOLUTION TOPICAL PRN
Status: DISCONTINUED | OUTPATIENT
Start: 2023-05-01 | End: 2023-05-01 | Stop reason: SURG

## 2023-05-01 RX ORDER — ONDANSETRON 4 MG/1
4 TABLET, ORALLY DISINTEGRATING ORAL EVERY 6 HOURS PRN
Qty: 18 TABLET | Refills: 0 | Status: SHIPPED | OUTPATIENT
Start: 2023-05-01 | End: 2023-08-15

## 2023-05-01 RX ORDER — EPHEDRINE SULFATE 50 MG/ML
INJECTION, SOLUTION INTRAVENOUS PRN
Status: DISCONTINUED | OUTPATIENT
Start: 2023-05-01 | End: 2023-05-01 | Stop reason: SURG

## 2023-05-01 RX ORDER — HYDROMORPHONE HYDROCHLORIDE 2 MG/ML
INJECTION, SOLUTION INTRAMUSCULAR; INTRAVENOUS; SUBCUTANEOUS PRN
Status: DISCONTINUED | OUTPATIENT
Start: 2023-05-01 | End: 2023-05-01 | Stop reason: SURG

## 2023-05-01 RX ORDER — OXYCODONE HCL 5 MG/5 ML
10 SOLUTION, ORAL ORAL
Status: COMPLETED | OUTPATIENT
Start: 2023-05-01 | End: 2023-05-01

## 2023-05-01 RX ORDER — HYDROMORPHONE HYDROCHLORIDE 1 MG/ML
0.1 INJECTION, SOLUTION INTRAMUSCULAR; INTRAVENOUS; SUBCUTANEOUS
Status: DISCONTINUED | OUTPATIENT
Start: 2023-05-01 | End: 2023-05-01 | Stop reason: HOSPADM

## 2023-05-01 RX ORDER — BUPIVACAINE HYDROCHLORIDE AND EPINEPHRINE 5; 5 MG/ML; UG/ML
INJECTION, SOLUTION EPIDURAL; INTRACAUDAL; PERINEURAL
Status: DISCONTINUED | OUTPATIENT
Start: 2023-05-01 | End: 2023-05-01 | Stop reason: HOSPADM

## 2023-05-01 RX ORDER — IBUPROFEN 200 MG
800 TABLET ORAL 3 TIMES DAILY PRN
Status: DISCONTINUED | OUTPATIENT
Start: 2023-05-04 | End: 2023-05-01 | Stop reason: HOSPADM

## 2023-05-01 RX ORDER — LABETALOL HYDROCHLORIDE 5 MG/ML
5 INJECTION, SOLUTION INTRAVENOUS
Status: DISCONTINUED | OUTPATIENT
Start: 2023-05-01 | End: 2023-05-01 | Stop reason: HOSPADM

## 2023-05-01 RX ORDER — ACETAMINOPHEN 500 MG
1000 TABLET ORAL EVERY 6 HOURS PRN
Status: ON HOLD | COMMUNITY
End: 2023-05-01

## 2023-05-01 RX ORDER — CEFAZOLIN SODIUM 1 G/3ML
INJECTION, POWDER, FOR SOLUTION INTRAMUSCULAR; INTRAVENOUS PRN
Status: DISCONTINUED | OUTPATIENT
Start: 2023-05-01 | End: 2023-05-01 | Stop reason: SURG

## 2023-05-01 RX ORDER — SODIUM CHLORIDE, SODIUM LACTATE, POTASSIUM CHLORIDE, CALCIUM CHLORIDE 600; 310; 30; 20 MG/100ML; MG/100ML; MG/100ML; MG/100ML
INJECTION, SOLUTION INTRAVENOUS CONTINUOUS
Status: ACTIVE | OUTPATIENT
Start: 2023-05-01 | End: 2023-05-01

## 2023-05-01 RX ORDER — POLYETHYLENE GLYCOL 3350 17 G/17G
17 POWDER, FOR SOLUTION ORAL DAILY
Qty: 20 EACH | Refills: 0 | Status: SHIPPED | OUTPATIENT
Start: 2023-05-01 | End: 2023-08-15

## 2023-05-01 RX ORDER — ACETAMINOPHEN 500 MG
1000 TABLET ORAL EVERY 6 HOURS
Status: DISCONTINUED | OUTPATIENT
Start: 2023-05-06 | End: 2023-05-01 | Stop reason: HOSPADM

## 2023-05-01 RX ORDER — ACETAMINOPHEN 325 MG/1
650 TABLET ORAL EVERY 6 HOURS
Qty: 60 TABLET | Refills: 0 | Status: SHIPPED | OUTPATIENT
Start: 2023-05-01

## 2023-05-01 RX ORDER — SODIUM CHLORIDE, SODIUM LACTATE, POTASSIUM CHLORIDE, CALCIUM CHLORIDE 600; 310; 30; 20 MG/100ML; MG/100ML; MG/100ML; MG/100ML
INJECTION, SOLUTION INTRAVENOUS CONTINUOUS
Status: DISCONTINUED | OUTPATIENT
Start: 2023-05-01 | End: 2023-05-01 | Stop reason: HOSPADM

## 2023-05-01 RX ORDER — ONDANSETRON 2 MG/ML
4 INJECTION INTRAMUSCULAR; INTRAVENOUS EVERY 4 HOURS PRN
Status: DISCONTINUED | OUTPATIENT
Start: 2023-05-01 | End: 2023-05-01 | Stop reason: HOSPADM

## 2023-05-01 RX ORDER — KETOROLAC TROMETHAMINE 30 MG/ML
15 INJECTION, SOLUTION INTRAMUSCULAR; INTRAVENOUS EVERY 6 HOURS
Status: DISCONTINUED | OUTPATIENT
Start: 2023-05-01 | End: 2023-05-01 | Stop reason: HOSPADM

## 2023-05-01 RX ORDER — DIPHENHYDRAMINE HYDROCHLORIDE 50 MG/ML
25 INJECTION INTRAMUSCULAR; INTRAVENOUS EVERY 6 HOURS PRN
Status: DISCONTINUED | OUTPATIENT
Start: 2023-05-01 | End: 2023-05-01 | Stop reason: HOSPADM

## 2023-05-01 RX ORDER — ONDANSETRON 2 MG/ML
INJECTION INTRAMUSCULAR; INTRAVENOUS PRN
Status: DISCONTINUED | OUTPATIENT
Start: 2023-05-01 | End: 2023-05-01 | Stop reason: SURG

## 2023-05-01 RX ORDER — DEXAMETHASONE SODIUM PHOSPHATE 4 MG/ML
INJECTION, SOLUTION INTRA-ARTICULAR; INTRALESIONAL; INTRAMUSCULAR; INTRAVENOUS; SOFT TISSUE PRN
Status: DISCONTINUED | OUTPATIENT
Start: 2023-05-01 | End: 2023-05-01 | Stop reason: SURG

## 2023-05-01 RX ORDER — HYDRALAZINE HYDROCHLORIDE 20 MG/ML
5 INJECTION INTRAMUSCULAR; INTRAVENOUS
Status: DISCONTINUED | OUTPATIENT
Start: 2023-05-01 | End: 2023-05-01 | Stop reason: HOSPADM

## 2023-05-01 RX ORDER — DIPHENHYDRAMINE HCL 25 MG
25 TABLET ORAL EVERY 6 HOURS PRN
Status: DISCONTINUED | OUTPATIENT
Start: 2023-05-01 | End: 2023-05-01 | Stop reason: HOSPADM

## 2023-05-01 RX ORDER — HYDROMORPHONE HYDROCHLORIDE 1 MG/ML
0.4 INJECTION, SOLUTION INTRAMUSCULAR; INTRAVENOUS; SUBCUTANEOUS
Status: DISCONTINUED | OUTPATIENT
Start: 2023-05-01 | End: 2023-05-01 | Stop reason: HOSPADM

## 2023-05-01 RX ORDER — HYDROMORPHONE HYDROCHLORIDE 1 MG/ML
0.5 INJECTION, SOLUTION INTRAMUSCULAR; INTRAVENOUS; SUBCUTANEOUS
Status: DISCONTINUED | OUTPATIENT
Start: 2023-05-01 | End: 2023-05-01 | Stop reason: HOSPADM

## 2023-05-01 RX ORDER — ACETAMINOPHEN 10 MG/ML
1000 INJECTION, SOLUTION INTRAVENOUS EVERY 6 HOURS
Status: DISCONTINUED | OUTPATIENT
Start: 2023-05-01 | End: 2023-05-01 | Stop reason: HOSPADM

## 2023-05-01 RX ORDER — PROMETHAZINE HYDROCHLORIDE 25 MG/1
25 SUPPOSITORY RECTAL EVERY 4 HOURS PRN
Status: DISCONTINUED | OUTPATIENT
Start: 2023-05-01 | End: 2023-05-01 | Stop reason: HOSPADM

## 2023-05-01 RX ORDER — HALOPERIDOL 5 MG/ML
1 INJECTION INTRAMUSCULAR EVERY 6 HOURS PRN
Status: DISCONTINUED | OUTPATIENT
Start: 2023-05-01 | End: 2023-05-01 | Stop reason: HOSPADM

## 2023-05-01 RX ORDER — DEXTROSE MONOHYDRATE, SODIUM CHLORIDE, AND POTASSIUM CHLORIDE 50; 1.49; 4.5 G/1000ML; G/1000ML; G/1000ML
INJECTION, SOLUTION INTRAVENOUS CONTINUOUS
Status: DISCONTINUED | OUTPATIENT
Start: 2023-05-01 | End: 2023-05-01 | Stop reason: HOSPADM

## 2023-05-01 RX ORDER — LIDOCAINE HYDROCHLORIDE 20 MG/ML
INJECTION, SOLUTION EPIDURAL; INFILTRATION; INTRACAUDAL; PERINEURAL PRN
Status: DISCONTINUED | OUTPATIENT
Start: 2023-05-01 | End: 2023-05-01 | Stop reason: SURG

## 2023-05-01 RX ORDER — OXYCODONE HCL 5 MG/5 ML
5 SOLUTION, ORAL ORAL
Status: COMPLETED | OUTPATIENT
Start: 2023-05-01 | End: 2023-05-01

## 2023-05-01 RX ORDER — CELECOXIB 100 MG/1
100 CAPSULE ORAL 2 TIMES DAILY
Qty: 20 CAPSULE | Refills: 0 | Status: SHIPPED | OUTPATIENT
Start: 2023-05-01 | End: 2023-08-15

## 2023-05-01 RX ORDER — MEPERIDINE HYDROCHLORIDE 25 MG/ML
12.5 INJECTION INTRAMUSCULAR; INTRAVENOUS; SUBCUTANEOUS
Status: DISCONTINUED | OUTPATIENT
Start: 2023-05-01 | End: 2023-05-01 | Stop reason: HOSPADM

## 2023-05-01 RX ORDER — SODIUM CHLORIDE, SODIUM LACTATE, POTASSIUM CHLORIDE, AND CALCIUM CHLORIDE .6; .31; .03; .02 G/100ML; G/100ML; G/100ML; G/100ML
500 INJECTION, SOLUTION INTRAVENOUS
Status: DISCONTINUED | OUTPATIENT
Start: 2023-05-01 | End: 2023-05-01 | Stop reason: HOSPADM

## 2023-05-01 RX ADMIN — HYDROMORPHONE HYDROCHLORIDE 0.4 MG: 1 INJECTION, SOLUTION INTRAMUSCULAR; INTRAVENOUS; SUBCUTANEOUS at 12:17

## 2023-05-01 RX ADMIN — OXYCODONE HYDROCHLORIDE 10 MG: 5 SOLUTION ORAL at 11:59

## 2023-05-01 RX ADMIN — FENTANYL CITRATE 25 MCG: 50 INJECTION, SOLUTION INTRAMUSCULAR; INTRAVENOUS at 12:10

## 2023-05-01 RX ADMIN — LIDOCAINE HYDROCHLORIDE 80 MG: 20 INJECTION, SOLUTION EPIDURAL; INFILTRATION; INTRACAUDAL at 09:22

## 2023-05-01 RX ADMIN — ROCURONIUM BROMIDE 10 MG: 10 INJECTION, SOLUTION INTRAVENOUS at 10:21

## 2023-05-01 RX ADMIN — SODIUM CHLORIDE, POTASSIUM CHLORIDE, SODIUM LACTATE AND CALCIUM CHLORIDE: 600; 310; 30; 20 INJECTION, SOLUTION INTRAVENOUS at 11:00

## 2023-05-01 RX ADMIN — DEXAMETHASONE SODIUM PHOSPHATE 8 MG: 4 INJECTION, SOLUTION INTRA-ARTICULAR; INTRALESIONAL; INTRAMUSCULAR; INTRAVENOUS; SOFT TISSUE at 09:31

## 2023-05-01 RX ADMIN — FENTANYL CITRATE 150 MCG: 50 INJECTION, SOLUTION INTRAMUSCULAR; INTRAVENOUS at 09:22

## 2023-05-01 RX ADMIN — ROCURONIUM BROMIDE 10 MG: 10 INJECTION, SOLUTION INTRAVENOUS at 10:38

## 2023-05-01 RX ADMIN — SUGAMMADEX 200 MG: 100 INJECTION, SOLUTION INTRAVENOUS at 11:36

## 2023-05-01 RX ADMIN — EPHEDRINE SULFATE 5 MG: 50 INJECTION, SOLUTION INTRAVENOUS at 09:31

## 2023-05-01 RX ADMIN — HYDROMORPHONE HYDROCHLORIDE 0.5 MG: 2 INJECTION INTRAMUSCULAR; INTRAVENOUS; SUBCUTANEOUS at 10:25

## 2023-05-01 RX ADMIN — HYDROMORPHONE HYDROCHLORIDE 0.4 MG: 1 INJECTION, SOLUTION INTRAMUSCULAR; INTRAVENOUS; SUBCUTANEOUS at 12:37

## 2023-05-01 RX ADMIN — FENTANYL CITRATE 50 MCG: 50 INJECTION, SOLUTION INTRAMUSCULAR; INTRAVENOUS at 11:59

## 2023-05-01 RX ADMIN — MIDAZOLAM 2 MG: 1 INJECTION INTRAMUSCULAR; INTRAVENOUS at 09:18

## 2023-05-01 RX ADMIN — LIDOCAINE HYDROCHLORIDE 4 ML: 40 SOLUTION TOPICAL at 09:25

## 2023-05-01 RX ADMIN — FENTANYL CITRATE 50 MCG: 50 INJECTION, SOLUTION INTRAMUSCULAR; INTRAVENOUS at 10:00

## 2023-05-01 RX ADMIN — FENTANYL CITRATE 50 MCG: 50 INJECTION, SOLUTION INTRAMUSCULAR; INTRAVENOUS at 09:51

## 2023-05-01 RX ADMIN — PROPOFOL 140 MG: 10 INJECTION, EMULSION INTRAVENOUS at 09:22

## 2023-05-01 RX ADMIN — EPHEDRINE SULFATE 5 MG: 50 INJECTION, SOLUTION INTRAVENOUS at 10:37

## 2023-05-01 RX ADMIN — EPHEDRINE SULFATE 10 MG: 50 INJECTION, SOLUTION INTRAVENOUS at 09:27

## 2023-05-01 RX ADMIN — ONDANSETRON 4 MG: 2 INJECTION INTRAMUSCULAR; INTRAVENOUS at 11:35

## 2023-05-01 RX ADMIN — HYDROMORPHONE HYDROCHLORIDE 0.2 MG: 1 INJECTION, SOLUTION INTRAMUSCULAR; INTRAVENOUS; SUBCUTANEOUS at 12:30

## 2023-05-01 RX ADMIN — FENTANYL CITRATE 25 MCG: 50 INJECTION, SOLUTION INTRAMUSCULAR; INTRAVENOUS at 12:06

## 2023-05-01 RX ADMIN — HYDROMORPHONE HYDROCHLORIDE 0.4 MG: 1 INJECTION, SOLUTION INTRAMUSCULAR; INTRAVENOUS; SUBCUTANEOUS at 12:25

## 2023-05-01 RX ADMIN — SODIUM CHLORIDE, POTASSIUM CHLORIDE, SODIUM LACTATE AND CALCIUM CHLORIDE: 600; 310; 30; 20 INJECTION, SOLUTION INTRAVENOUS at 08:49

## 2023-05-01 RX ADMIN — ROCURONIUM BROMIDE 50 MG: 10 INJECTION, SOLUTION INTRAVENOUS at 09:22

## 2023-05-01 RX ADMIN — CEFAZOLIN 2 G: 330 INJECTION, POWDER, FOR SOLUTION INTRAMUSCULAR; INTRAVENOUS at 09:22

## 2023-05-01 ASSESSMENT — PAIN DESCRIPTION - PAIN TYPE
TYPE: SURGICAL PAIN

## 2023-05-01 ASSESSMENT — FIBROSIS 4 INDEX: FIB4 SCORE: 1.18

## 2023-05-01 ASSESSMENT — PAIN SCALES - GENERAL: PAIN_LEVEL: 6

## 2023-05-01 NOTE — ANESTHESIA TIME REPORT
Anesthesia Start and Stop Event Times     Date Time Event    5/1/2023 0853 Ready for Procedure     0918 Anesthesia Start     1151 Anesthesia Stop        Responsible Staff  05/01/23    Name Role Begin End    Tanya Roque M.D. Anesth 0918 1151        Overtime Reason:  no overtime (within assigned shift)    Comments:

## 2023-05-01 NOTE — OP REPORT
Operative Report    Date: 5/1/2023    Surgeon: Campbell Berg M.D.     Assistant: YOSSI Courtney    Pre-operative Diagnosis: Paraesophageal hernia, GERD    Post-operative Diagnosis: same     Procedure: Laparoscopic repair of hiatal hernia, partial fundoplication, robot-assisted, intraoperative esophagogastroduodenoscopy    ASA Classification: III.    Indications: This is a 67 y.o. male who presented with symptoms of paraesophageal hernia here for repair    The indications for a surgical assistant in this surgery were indicated due to complexity of the procedure. Their role included aiding in incision, retraction, holding devices including camera for laparoscopic procedure, and closure of the wound.      Findings: well constructed wrap on endoscopy    Wound Classification: Class I, I, Clean..    Procedure in detail: The patient was seen and examined in the preoperative holding area.  The risks benefits and alternatives of the procedure were discussed with the patient who wished to proceed with the procedure as described.  The patient was transferred to the operating room placed in supine position and all pressure points were properly padded.  General endotracheal anesthesia was induced and preoperative antibiotics were given per SCIP protocol.  Patient's abdomen was prepped with ChloraPrep and draped in the normal sterile fashion.  A timeout was performed confirming correct patient, correct procedure, and that all necessary equipment was in the room.      We began the procedure by performing an Optiview access to the left upper quadrant of the abdomen.  The skin was sharply incised and the 5 mm Optiview port was used to gain entry into the abdomen.  Pneumoperitoneum was achieved and maintained at 15 mmHg carbon dioxide throughout the entirety of the case.  Under direct visualization a 8 mm left upper quadrant and two 8 mm right upper quadrant robotic working ports were placed.  The 5 mm port was switched  robotic port..  The Dandre retractor was then placed through a subxiphoid incision and secured to the bed.  We then docked the robot to the patient.  We then carefully inspected the hiatal hernia and reduced as much of these abdominal contents as we could out of the hernia space.  We then opened the gastrohepatic ligament and carried this dissection to the diaphragm. We then dissected the hernia sac free from the diaphragm and continued our dissection around the superior portion of the diaphragmatic hernia and down the left dorene.  We continued this dissection freeing the hernia sac from the left and right dorene alternating where the tension was most appropriate.  We will continue this until we were able to identify the confluence of the dorene and freed this to allow for adequate exposure for our hiatal closure stitches.  We then continue to apply pressure to the hernia sac dissecting this free from the mediastinal attachments care being taken to preserve the vagus nerves as they are identified.  Once the hernia sac was free from the mediastinal attachments we transected this free from the stomach by releasing the short gastric vessels along the greater curve and resecting the hernia sac.  Regarding resection we identified the left gastric vessel multiple times ensuring these were well protected.  We then turned our attention back to the mediastinal dissection and continued this carefully dissecting the mediastinal attachments until greater than 3 cm of the esophagus was within the abdomen without tension.    We then proceeded with closure of the hiatus using an 0 Prolene strata fix suture.  This was continued until the esophagus passed to the hiatus with appropriate closure allowing for the esophagus passed through freely.  The tips of the force bipolar robotic instrument were able to pass in the hiatus with minimal tension to the esophagus confirming appropriate closure.    Given the patient's presenting  symptomatology decision was made to perform a toupee fundoplication.  The greater curve the stomach was grasped and brought around the posterior aspect of the esophagus and 3 stitches were used to construct the right lateral aspect of the toupee fundoplication.  The remainder of fundoplication was completed by taking the left lateral edge of the stomach to esophagus creating a 270 degree wrap with the fundus. This resulted in a fundoplication which was over 2.5 centimeters in total length. The posterior aspect of this wrap was secured to the hiatal repair with a separate stitch.    We then performed an on table esophagogastroduodenoscopy and advanced the scope under direct visualization through the esophagus. We then able to observe both the Z-line of the GE junction as well as a diaphragmatic pinch. The GE junction was below the diaphragmatic pinch. Retroflexion in the stomach found the repair to be well constructed and the scope slid along the mucosal appropriately. And then advanced in the duodenum and found this to be without diagnostic abnormality. We then remove the scope after releasing insufflation.    The Dandre retractor was then removed under direct visualization and the remainder of the ports were removed.  The skin was closed with 4-0 Monocryl subcuticularfashion and Dermabond was placed over the wounds.    The patient was awakened from general anesthetic, and was taken to the recovery room in stable condition.    Sponge and needle counts were correct at the end of the case.     Specimen: none    EBL: 15 mL    Dispo: stable, extubated, to PACU    Campbell Berg M.D.  [facility]  [phone]

## 2023-05-01 NOTE — OR NURSING
Assume care for pt in pre-op. Patient allergies and NPO status verified. Belongings secured. Patient verbalizes understanding of pain scale, expected course of stay and plan of care. Surgical site verified with patient. IV access established. Blood samples sent to lab for cbc and bmp. Call light within reach. No further needs at this time. Hourly rounding in place.

## 2023-05-01 NOTE — OR NURSING
Pt is  aaox4, resting comfortably in [facility] on 2lpm nasal cannula. His respirations are equal and unlabored, he is in no acute distress. He is treated for pain per MAR with good response. He takes sips of water without difficulty or complaints of n/v. Surgical sites are clean, dry and intact. Will continue to monitor.

## 2023-05-01 NOTE — ANESTHESIA PREPROCEDURE EVALUATION
Case: [id number] Date/Time: 05/01/23 0845    Procedures:       ROBOTIC HIATAL HERNIA REPAIR WITH TOUPET FUNDOPLICATION (Abdomen)      ESOPHAGOGASTRODUODENOSCOPY (Throat)    Anesthesia type: General    Pre-op diagnosis: GASTROESOPHAGEAL REFLUX DISEASE WITH HIATAL HERNIA    Location: [facility] OR 11 / SURGERY [facility]    Surgeons: Campbell Berg M.D.        68 yo w/GERD and hiatal hernia    Relevant Problems   ANESTHESIA (within normal limits)      CARDIAC   (positive) Coronary artery disease due to calcified coronary lesion: CABG ×2 in August 2016   (positive) Essential hypertension, benign      GI   (positive) Gastroesophageal reflux disease without esophagitis   (positive) Hiatal hernia with GERD      Other   (positive) Dyslipidemia   (positive) Osteoarthritis     Denies CP/SOB 2FOS  Denies DM, CVA, LUNG/LIVER/KIDNEY DZ, URI    Physical Exam    Airway   Mallampati: II  TM distance: >3 FB  Neck ROM: full       Cardiovascular   Rhythm: regular  Rate: normal  (-) murmur     Dental   (+) upper dentures           Pulmonary   Breath sounds clear to auscultation     Abdominal    Neurological - normal exam                 Anesthesia Plan    ASA 3   ASA physical status 3 criteria: CAD/stents (> 3 months)    Plan - general       Airway plan will be ETT          Induction: intravenous    Postoperative Plan: Postoperative administration of opioids is intended.    Pertinent diagnostic labs and testing reviewed    Informed Consent:    Anesthetic plan and risks discussed with patient.    Use of blood products discussed with: patient whom consented to blood products.          [FreeTextEntry1] : Patient presents back having gone for knee surgery but when she went in her blood pressure was very low.  She was kept overnight and her beta-blocker and ACE inhibitor were stopped.  She is not really have any symptoms of hypotension.  Since being home she reports some fatigue and does note that she gets a little dizzy at times.  She has not been closely checking her blood pressure at home.  She is going to follow-up with orthopedist next week to look into rescheduling her surgery.  Other than the pains in her knee she reports no other symptoms.  Patient denies chest pain, shortness of breath, palpitations, orthopnea, presyncope, syncope.

## 2023-05-01 NOTE — DISCHARGE INSTRUCTIONS
Discharge instructions:    DIET: Upon discharge from the hospital begin post-operative diet as discussed in your nissen handout    ACTIVITIES: After discharge from the hospital, you may resume full routine activities. However, there should be no heavy lifting (greater than 15 pounds) and no strenuous activities until after your follow-up visit. Otherwise, routine activities of daily living are acceptable.    DRIVING: If you drive, you may drive whenever you are off pain medications and are able to perform the activities needed to drive, i.e. turning, bending, twisting, etc.    BATHING: You may get the wound wet at any time after leaving the hospital. You may shower, but do not submerge in a bath for at least a week. Dressings may come off after 48 hours.    PAIN MEDICATION: You will be given a prescription for pain medication at discharge. Please take these as directed. It is important to remember not to take medications on an empty stomach as this may cause nausea.    CALL IF YOU HAVE: (1) Fevers to more than 1010 F, (2) Unusual chest or leg pain, (3) Drainage or fluid from incision that may be foul smelling, increased tenderness or soreness at the wound or the wound edges are no longer together, redness or swelling at the incision site. Please do not hesitate to call with any other questions.     APPOINTMENT: Contact our office at [phone] for a follow-up appointment in 1 week following your procedure.    If you have any additional questions, please do not hesitate to call the office.    Office address:  [address] [place] [address] [address] [place] [address]     HOME CARE INSTRUCTIONS    ACTIVITY: Rest and take it easy for the first 24 hours.  A responsible adult is recommended to remain with you during that time.  It is normal to feel sleepy.  We encourage you to not do anything that requires balance, judgment or coordination.    FOR 24 HOURS DO NOT:  Drive, operate machinery or run household appliances.  Drink beer or  alcoholic beverages.  Make important decisions or sign legal documents.      MEDICATIONS: Resume taking daily medication.  Take prescribed pain medication with food.  If no medication is prescribed, you may take non-aspirin pain medication if needed.  PAIN MEDICATION CAN BE VERY CONSTIPATING.  Take a stool softener or laxative such as senokot, pericolace, or milk of magnesia if needed.    Prescription given for Oxycodone, Miralax and Zofran sent to [facility].  Last pain medication given at Oxycodone 10mg at 11:59am.    A follow-up appointment should be arranged with your doctor in 1 week; call to schedule.    You should CALL YOUR PHYSICIAN if you develop:  Fever greater than 101 degrees F.  Pain not relieved by medication, or persistent nausea or vomiting.  Excessive bleeding (blood soaking through dressing) or unexpected drainage from the wound.  Extreme redness or swelling around the incision site, drainage of pus or foul smelling drainage.  Inability to urinate or empty your bladder within 8 hours.  Problems with breathing or chest pain.    You should call 911 if you develop problems with breathing or chest pain.  If you are unable to contact your doctor or surgical center, you should go to the nearest emergency room or urgent care center.      Physician's telephone #: [phone] Dr Berg    MILD FLU-LIKE SYMPTOMS ARE NORMAL.  YOU MAY EXPERIENCE GENERALIZED MUSCLE ACHES, THROAT IRRITATION, HEADACHE AND/OR SOME NAUSEA.    If any questions arise, call your doctor.  If your doctor is not available, please feel free to call the Surgical Center at [phone].  The Center is open Monday through Friday from 7AM to 7PM.      A registered nurse may call you a few days after your surgery to see how you are doing after your procedure.    You may also receive a survey in the mail within the next two weeks and we ask that you take a few moments to complete the survey and return it to us.  Our goal is to  provide you with very good care and we value your comments.     Depression / Suicide Risk    As you are discharged from this [facility] facility, it is important to learn how to keep safe from harming yourself.    Recognize the warning signs:  Abrupt changes in personality, positive or negative- including increase in energy   Giving away possessions  Change in eating patterns- significant weight changes-  positive or negative  Change in sleeping patterns- unable to sleep or sleeping all the time   Unwillingness or inability to communicate  Depression  Unusual sadness, discouragement and loneliness  Talk of wanting to die  Neglect of personal appearance   Rebelliousness- reckless behavior  Withdrawal from people/activities they love  Confusion- inability to concentrate     If you or a loved one observes any of these behaviors or has concerns about self-harm, here's what you can do:  Talk about it- your feelings and reasons for harming yourself  Remove any means that you might use to hurt yourself (examples: pills, rope, extension cords, firearm)  Get professional help from the community (Mental Health, Substance Abuse, psychological counseling)  Do not be alone:Call your Safe Contact- someone whom you trust who will be there for you.  Call your local CRISIS HOTLINE 062-0297 or 390-988-8783  Call your local Children's Mobile Crisis Response Team [place] (348) 080-0097 or www.[place]  Call the toll free National Suicide Prevention Hotlines   National Suicide Prevention Lifeline 478-833-EUCE (9978)  National Hope Line Network 800-SUICIDE (763-1043)    I acknowledge receipt and understanding of these Home Care instructions.

## 2023-05-01 NOTE — ANESTHESIA POSTPROCEDURE EVALUATION
Patient: Talib Ortiz    Procedure Summary     Date: 05/01/23 Room / Location: [facility] 11 / SURGERY [facility]    Anesthesia Start: 0918 Anesthesia Stop: 1151    Procedures:       ROBOTIC HIATAL HERNIA REPAIR WITH TOUPET FUNDOPLICATION (Abdomen)      ESOPHAGOGASTRODUODENOSCOPY (Throat) Diagnosis: (GASTROESOPHAGEAL REFLUX DISEASE WITH HIATAL HERNIA)    Surgeons: Campbell Berg M.D. Responsible Provider: Tanya Roque M.D.    Anesthesia Type: general ASA Status: 3          Final Anesthesia Type: general  Last vitals  BP   Blood Pressure : 101/59    Temp   36.3 °C (97.4 °F)    Pulse   61   Resp   20    SpO2   92 %      Anesthesia Post Evaluation    Patient location during evaluation: PACU  Patient participation: complete - patient participated  Level of consciousness: awake  Pain score: 6    Airway patency: patent  Anesthetic complications: no  Cardiovascular status: hemodynamically stable  Respiratory status: acceptable  Hydration status: acceptable    PONV: none          There were no known notable events for this encounter.     Nurse Pain Score: 8 (NPRS)

## 2023-05-01 NOTE — OR NURSING
Pt arrived to PACU II at 1414. Pt aa/ox4, VSS. Discharge criteria met. Denies need for pain meds this pm. Abdominal lap stabs with dressing is clean, dry, and intact. Pt changed into clothing with assistance. Pt drank 200 ml of apple juice via sips, tolerated well, denies nausea and vomiting. Discharge instructions given; pt and family verbalized understanding and questions answered.  IV removed, tip intact. Will follow-up with Dr. Berg in 1-2 weeks. Prescriptions with pt. Pt discharged home and escorted via w/c with RN in stable condition.

## 2023-05-01 NOTE — ANESTHESIA PROCEDURE NOTES
Airway    Date/Time: 5/1/2023 9:25 AM  Performed by: Tanya Roque M.D.  Authorized by: Tanya Roque M.D.     Location:  OR  Urgency:  Elective  Indications for Airway Management:  Anesthesia      Spontaneous Ventilation: absent    Sedation Level:  Deep  Preoxygenated: Yes    Patient Position:  Sniffing  Mask Difficulty Assessment:  2 - vent by mask + OA or adjuvant +/- NMBA  Final Airway Type:  Endotracheal airway  Final Endotracheal Airway:  ETT  Cuffed: Yes    Technique Used for Successful ETT Placement:  Direct laryngoscopy  Devices/Methods Used in Placement:  Intubating stylet    Insertion Site:  Oral  Blade Type:  Felicitas  Laryngoscope Blade/Videolaryngoscope Blade Size:  3  ETT Size (mm):  7.5  Measured from:  Teeth  ETT to Teeth (cm):  21  Placement Verified by: capnometry and palpation of cuff    Cormack-Lehane Classification:  Grade IIb - view of arytenoids or posterior of glottis only  Number of Attempts at Approach:  1

## 2023-06-09 ENCOUNTER — OFFICE VISIT (OUTPATIENT)
Dept: MEDICAL GROUP | Facility: MEDICAL CENTER | Age: 68
End: 2023-06-09
Payer: COMMERCIAL

## 2023-06-09 VITALS
TEMPERATURE: 97.7 F | DIASTOLIC BLOOD PRESSURE: 62 MMHG | SYSTOLIC BLOOD PRESSURE: 134 MMHG | HEART RATE: 73 BPM | OXYGEN SATURATION: 97 % | BODY MASS INDEX: 25.94 KG/M2 | WEIGHT: 183.4 LBS

## 2023-06-09 DIAGNOSIS — K21.9 HIATAL HERNIA WITH GERD: ICD-10-CM

## 2023-06-09 DIAGNOSIS — R73.02 IGT (IMPAIRED GLUCOSE TOLERANCE): ICD-10-CM

## 2023-06-09 DIAGNOSIS — I25.10 CORONARY ARTERY DISEASE DUE TO CALCIFIED CORONARY LESION: ICD-10-CM

## 2023-06-09 DIAGNOSIS — K44.9 HIATAL HERNIA WITH GERD: ICD-10-CM

## 2023-06-09 DIAGNOSIS — E78.5 DYSLIPIDEMIA: ICD-10-CM

## 2023-06-09 DIAGNOSIS — I25.84 CORONARY ARTERY DISEASE DUE TO CALCIFIED CORONARY LESION: ICD-10-CM

## 2023-06-09 DIAGNOSIS — I10 ESSENTIAL HYPERTENSION, BENIGN: ICD-10-CM

## 2023-06-09 PROCEDURE — 3075F SYST BP GE 130 - 139MM HG: CPT | Performed by: FAMILY MEDICINE

## 2023-06-09 PROCEDURE — 99214 OFFICE O/P EST MOD 30 MIN: CPT | Performed by: FAMILY MEDICINE

## 2023-06-09 PROCEDURE — 3078F DIAST BP <80 MM HG: CPT | Performed by: FAMILY MEDICINE

## 2023-06-09 ASSESSMENT — FIBROSIS 4 INDEX: FIB4 SCORE: 1.36

## 2023-06-09 NOTE — PROGRESS NOTES
CC: CAD/CABG, hypertension, hyperlipidemia, recent history of hiatal hernia repair was GERD, impaired glucose tolerance    HPI:   Talib presents today to discuss the following:    Coronary artery disease due to calcified coronary lesion: CABG ×2 in August 2016  Currently denies any chest pain, or shortness of breath.  He has been on rosuvastatin 40 mg daily, metoprolol 25 mg twice a day.  Currently not on aspirin or Plavix because of history of severe tear and hematemesis.  Last echocardiogram was done in 2017, ejection fraction was 55%.  Patient is scheduled for echocardiogram.  Patient has been following up with cardiology     Essential hypertension, benign  Patient's blood pressure has been adequately controlled on losartan 25 mg daily, metoprolol 25 mg twice a day.  No side effects     Dyslipidemia  He has been tolerating the statin. Denies muscle pain LFTs has been normal, he has been on rosuvastatin 40 mg daily.    Hiatal hernia with GERD, status post repair  Patient underwent laparoscopic repair of hiatal hernia in May 1, 2023, with partial fundoplication, robot-assisted, intraoperative esophagogastroduodenoscopy.  Currently asymptomatic.  Patient has been on omeprazole 20 mg daily    Impaired glucose tolerance  Blood glucose has been slightly above 100.  Denies polyuria and polydipsia.  Patient has no history of diabetes, not on medication      Patient Active Problem List    Diagnosis Date Noted    Postoperative pain 05/01/2023    Hiatal hernia with GERD 03/09/2023    Essential hypertension, benign 02/21/2023    Hematemesis 02/06/2023    Lactic acidosis 02/06/2023    Pneumonia 02/06/2023    Low serum vitamin B12 05/16/2018    Iron deficiency anemia 05/09/2018    Rash 05/09/2018    Primary osteoarthritis of left knee 04/06/2017    Depression 04/06/2017    Gastroesophageal reflux disease without esophagitis 04/06/2017    Coronary artery disease due to calcified coronary lesion: CABG ×2 in August 2016  01/06/2017    Dyslipidemia 03/02/2015    Osteoarthritis 03/02/2015       Current Outpatient Medications   Medication Sig Dispense Refill    acetaminophen (TYLENOL) 325 MG Tab Take 2 Tablets by mouth every 6 hours. take scheduled for 3 days post-op then as needed after 60 Tablet 0    celecoxib (CELEBREX) 100 MG Cap Take 1 Capsule by mouth 2 times a day. Take scheduled for 3 days then as needed for pain after 20 Capsule 0    metoprolol tartrate (LOPRESSOR) 25 MG Tab Take 1 Tablet by mouth 2 times a day. 180 Tablet 4    rosuvastatin (CRESTOR) 40 MG tablet Take 1 Tablet by mouth every evening. 90 Tablet 4    losartan (COZAAR) 25 MG Tab Take 1 Tablet by mouth every day. 90 Tablet 4    ferrous sulfate 325 (65 Fe) MG tablet Take 1 Tab by mouth every morning with breakfast. 30 Tab 3    therapeutic multivitamin-minerals (THERAGRAN-M) Tab Take 1 Tab by mouth every day. 30 Tab 11    ascorbic acid (VITAMIN C) 500 MG tablet Take 1 Tab by mouth every day. 30 Tab 3    polyethylene glycol/lytes (MIRALAX) 17 g Pack Take 1 Packet by mouth every day. While taking narcotics, hold if greater then 3 BMs a day (Patient not taking: Reported on 6/9/2023) 20 Each 0    ondansetron (ZOFRAN ODT) 4 MG TABLET DISPERSIBLE Take 1 Tablet by mouth every 6 hours as needed for Nausea/Vomiting. (Patient not taking: Reported on 6/9/2023) 18 Tablet 0     No current facility-administered medications for this visit.         Allergies as of 06/09/2023    (No Known Allergies)        ROS: Denies any chest pain, Shortness of breath, Changes bowel or bladder, Lower extremity edema.    Physical Exam:  /62 (BP Location: Left arm, Patient Position: Sitting, BP Cuff Size: Adult)   Pulse 73   Temp 36.5 °C (97.7 °F)   Wt 83.2 kg (183 lb 6.4 oz)   SpO2 97%   BMI 25.94 kg/m²   Gen.: Well-developed, well-nourished, no apparent distress,pleasant and cooperative with the examination  Skin:  Warm and dry with good turgor. No rashes or suspicious lesions in  visible areas  HEENT:Sinuses nontender with palpation, TMs clear, nares patent with pink mucosa and clear rhinorrhea,no septal deviation ,polyps or lesions. lips without lesions, oropharynx clear.  Neck: Trachea midline,no masses or adenopathy. No JVD.  Cor: Regular rate and rhythm without murmur, gallop or rub.  Lungs: Respirations unlabored.Clear to auscultation with equal breath sounds bilaterally. No wheezes, rhonchi.  Extremities: No cyanosis, clubbing or edema.        Assessment and Plan.   67 y.o. male     1. Coronary artery disease due to calcified coronary lesion: CABG ×2 in August 2016  Stable.  Asymptomatic  Continue on rosuvastatin, losartan, and metoprolol.    Continue follow-up with cardiology    2. Essential hypertension, benign  Controlled.  Continue on metoprolol 25 mg twice a day, losartan 25 mg daily    3. Dyslipidemia  He has been tolerating the statin. Denies muscle pain LFTs has been normal  Continue on rosuvastatin 40 mg daily    4. Hiatal hernia with GERD  S/P laparoscopic repair of hiatal hernia, partial fundoplication, robot-assisted, intraoperative esophagogastroduodenoscopy   Stable.  Continue on Omeprazole 20 mg daily    5.  Impaired glucose tolerance  Blood glucose has been slightly above 100.  Patient is counseled on lifestyle modification  We will continue monitor blood glucose.

## 2023-06-26 ENCOUNTER — HOSPITAL ENCOUNTER (OUTPATIENT)
Dept: CARDIOLOGY | Facility: MEDICAL CENTER | Age: 68
End: 2023-06-26
Attending: INTERNAL MEDICINE
Payer: COMMERCIAL

## 2023-06-26 DIAGNOSIS — I25.84 CORONARY ARTERY DISEASE DUE TO CALCIFIED CORONARY LESION: ICD-10-CM

## 2023-06-26 DIAGNOSIS — E11.65 TYPE 2 DIABETES MELLITUS WITH HYPERGLYCEMIA, WITHOUT LONG-TERM CURRENT USE OF INSULIN (HCC): ICD-10-CM

## 2023-06-26 DIAGNOSIS — E78.5 DYSLIPIDEMIA: ICD-10-CM

## 2023-06-26 DIAGNOSIS — I10 HTN (HYPERTENSION), MALIGNANT: ICD-10-CM

## 2023-06-26 DIAGNOSIS — E78.2 MIXED HYPERLIPIDEMIA: ICD-10-CM

## 2023-06-26 DIAGNOSIS — I25.10 CORONARY ARTERY DISEASE DUE TO CALCIFIED CORONARY LESION: ICD-10-CM

## 2023-06-26 LAB
LV EJECT FRACT  99904: 65
LV EJECT FRACT MOD 2C 99903: 57.01
LV EJECT FRACT MOD 4C 99902: 66.03
LV EJECT FRACT MOD BP 99901: 61.8

## 2023-06-26 PROCEDURE — 93306 TTE W/DOPPLER COMPLETE: CPT | Mod: 26 | Performed by: INTERNAL MEDICINE

## 2023-06-26 PROCEDURE — 93306 TTE W/DOPPLER COMPLETE: CPT

## 2023-08-10 ENCOUNTER — TELEPHONE (OUTPATIENT)
Dept: CARDIOLOGY | Facility: MEDICAL CENTER | Age: 68
End: 2023-08-10
Payer: COMMERCIAL

## 2023-08-10 DIAGNOSIS — E78.5 DYSLIPIDEMIA: ICD-10-CM

## 2023-08-10 DIAGNOSIS — I25.84 CORONARY ARTERY DISEASE DUE TO CALCIFIED CORONARY LESION: ICD-10-CM

## 2023-08-10 DIAGNOSIS — E78.2 MIXED HYPERLIPIDEMIA: ICD-10-CM

## 2023-08-10 DIAGNOSIS — I25.10 CORONARY ARTERY DISEASE DUE TO CALCIFIED CORONARY LESION: ICD-10-CM

## 2023-08-10 DIAGNOSIS — E11.65 TYPE 2 DIABETES MELLITUS WITH HYPERGLYCEMIA, WITHOUT LONG-TERM CURRENT USE OF INSULIN (HCC): ICD-10-CM

## 2023-08-10 NOTE — TELEPHONE ENCOUNTER
MR    Caller: Talib Ortiz     Topic/issue: Pt called in regards to wanting new labs placed pt wants to get them done before his upcoming appt.     Callback Number: [phone] (home)     Thank you,     Marcial UNDERWOOD

## 2023-08-11 ENCOUNTER — HOSPITAL ENCOUNTER (OUTPATIENT)
Dept: LAB | Facility: MEDICAL CENTER | Age: 68
End: 2023-08-11
Attending: INTERNAL MEDICINE
Payer: COMMERCIAL

## 2023-08-11 DIAGNOSIS — I25.84 CORONARY ARTERY DISEASE DUE TO CALCIFIED CORONARY LESION: ICD-10-CM

## 2023-08-11 DIAGNOSIS — E78.2 MIXED HYPERLIPIDEMIA: ICD-10-CM

## 2023-08-11 DIAGNOSIS — E78.5 DYSLIPIDEMIA: ICD-10-CM

## 2023-08-11 DIAGNOSIS — E11.65 TYPE 2 DIABETES MELLITUS WITH HYPERGLYCEMIA, WITHOUT LONG-TERM CURRENT USE OF INSULIN (HCC): ICD-10-CM

## 2023-08-11 DIAGNOSIS — I25.10 CORONARY ARTERY DISEASE DUE TO CALCIFIED CORONARY LESION: ICD-10-CM

## 2023-08-11 LAB
CHOLEST SERPL-MCNC: 122 MG/DL (ref 100–199)
FASTING STATUS PATIENT QL REPORTED: NORMAL
HDLC SERPL-MCNC: 49 MG/DL
LDLC SERPL CALC-MCNC: 59 MG/DL
TRIGL SERPL-MCNC: 69 MG/DL (ref 0–149)

## 2023-08-11 PROCEDURE — 36415 COLL VENOUS BLD VENIPUNCTURE: CPT

## 2023-08-11 PROCEDURE — 80061 LIPID PANEL: CPT

## 2023-08-15 ENCOUNTER — OFFICE VISIT (OUTPATIENT)
Dept: CARDIOLOGY | Facility: MEDICAL CENTER | Age: 68
End: 2023-08-15
Attending: INTERNAL MEDICINE
Payer: COMMERCIAL

## 2023-08-15 VITALS
HEART RATE: 93 BPM | DIASTOLIC BLOOD PRESSURE: 70 MMHG | BODY MASS INDEX: 27.09 KG/M2 | SYSTOLIC BLOOD PRESSURE: 110 MMHG | OXYGEN SATURATION: 95 % | RESPIRATION RATE: 16 BRPM | WEIGHT: 189.2 LBS | HEIGHT: 70 IN

## 2023-08-15 DIAGNOSIS — Z79.899 HIGH RISK MEDICATION USE: ICD-10-CM

## 2023-08-15 DIAGNOSIS — E11.65 TYPE 2 DIABETES MELLITUS WITH HYPERGLYCEMIA, WITHOUT LONG-TERM CURRENT USE OF INSULIN (HCC): ICD-10-CM

## 2023-08-15 DIAGNOSIS — I25.84 CORONARY ARTERY DISEASE DUE TO CALCIFIED CORONARY LESION: ICD-10-CM

## 2023-08-15 DIAGNOSIS — I25.10 CORONARY ARTERY DISEASE DUE TO CALCIFIED CORONARY LESION: ICD-10-CM

## 2023-08-15 DIAGNOSIS — E78.5 DYSLIPIDEMIA: ICD-10-CM

## 2023-08-15 DIAGNOSIS — I10 HTN (HYPERTENSION), MALIGNANT: ICD-10-CM

## 2023-08-15 PROCEDURE — 99212 OFFICE O/P EST SF 10 MIN: CPT | Performed by: INTERNAL MEDICINE

## 2023-08-15 PROCEDURE — 99214 OFFICE O/P EST MOD 30 MIN: CPT | Performed by: INTERNAL MEDICINE

## 2023-08-15 PROCEDURE — 3074F SYST BP LT 130 MM HG: CPT | Performed by: INTERNAL MEDICINE

## 2023-08-15 PROCEDURE — 3078F DIAST BP <80 MM HG: CPT | Performed by: INTERNAL MEDICINE

## 2023-08-15 RX ORDER — LOSARTAN POTASSIUM 25 MG/1
25 TABLET ORAL DAILY
Qty: 90 TABLET | Refills: 4 | Status: SHIPPED | OUTPATIENT
Start: 2023-08-15

## 2023-08-15 RX ORDER — ROSUVASTATIN CALCIUM 40 MG/1
40 TABLET, COATED ORAL EVERY EVENING
Qty: 90 TABLET | Refills: 4 | Status: SHIPPED | OUTPATIENT
Start: 2023-08-15

## 2023-08-15 ASSESSMENT — ENCOUNTER SYMPTOMS
FALLS: 0
PALPITATIONS: 0
BLURRED VISION: 0
NAUSEA: 0
COUGH: 0
DIZZINESS: 0
MYALGIAS: 0
PND: 0
BRUISES/BLEEDS EASILY: 0
DEPRESSION: 0
HALLUCINATIONS: 0
ABDOMINAL PAIN: 0
WEIGHT LOSS: 0
LOSS OF CONSCIOUSNESS: 0
BLOOD IN STOOL: 0
SPEECH CHANGE: 0
HEADACHES: 0
FEVER: 0
CLAUDICATION: 0
SENSORY CHANGE: 0
EYE DISCHARGE: 0
CHILLS: 0
DOUBLE VISION: 0
SHORTNESS OF BREATH: 0
ORTHOPNEA: 0
EYE PAIN: 0
VOMITING: 0

## 2023-08-15 ASSESSMENT — FIBROSIS 4 INDEX: FIB4 SCORE: 1.36

## 2023-08-15 NOTE — PROGRESS NOTES
Chief Complaint   Patient presents with    HTN (Controlled)     FV Dx: HTN (hypertension), malignant       Subjective:   Talib Ortiz is a 67  y.o. male who presents today for cardiac care and management of established coronary arterial disease status post CABG x2 in 2016, hypertension, hyperlipidemia.    Patient also had the most recent nuclear stress test done in May 2018 which showed no evidence of coronary ischemia.  His most recent transthoracic echocardiogram in January 2017 showed normal LV function with no significant valvular disease.  I personally interpreted the images of his transthoracic echocardiogram.    I have independently interpreted and reviewed blood tests results with patient in clinic which shows normal LDL level 59, triglycerides 69 much improved from 238, renal and liver function. HgbA1c of 5.8.    In the interim, patient has been doing well without having any symptoms. Patient denies having chest pain, dyspnea, palpitation, presyncope, syncope episodes. Able to climb up at least 2 flights of stairs.    NO bleeding on Xarelto 2.5 mg bid dosing.    Insurance did not approve Jardiance.    I have independently interpreted and reviewed echocardiogram's actual images with patient which showed normal left ventricular systolic function. No wall motion abnormality. No evidence of pulmonary hypertension. No significant valvular disease.      Past Medical History:   Diagnosis Date    Arthritis 04/28/2023    general body    Blood clotting disorder (HCC) 1996    leg    CAD (coronary artery disease)     Dental disorder 04/28/2023    upper partial, bridge lower front times 3, having right upper posterior tooth removed 4/28/23    Heart attack (HCC) 08/16/2016    cardiology, Dr. Frias    Heart burn 04/28/2023    medicated    High cholesterol 04/28/2023    medicated    Hyperlipidemia     Hypertension 04/28/2023    medicated    Kidney disease     Kidney stone 04/28/2023    history of    Obesity (BMI  "30-39.9) 02/12/2015    Osteoarthritis 03/02/2015    Pneumonia 2016     Past Surgical History:   Procedure Laterality Date    VT LAP,ESOPHAGOGAST FUNDOPLASTY N/A 5/1/2023    Procedure: ROBOTIC HIATAL HERNIA REPAIR WITH TOUPET FUNDOPLICATION;  Surgeon: Campbell Berg M.D.;  Location: SURGERY [facility];  Service: Gen Robotic    VT UPPER GI ENDOSCOPY,DIAGNOSIS N/A 5/1/2023    Procedure: ESOPHAGOGASTRODUODENOSCOPY;  Surgeon: Campbell Berg M.D.;  Location: SURGERY [facility];  Service: Gen Robotic    VT UPPER GI ENDOSCOPY,DIAGNOSIS N/A 2/8/2023    Procedure: GASTROSCOPY;  Surgeon: Hieu Arevalo M.D.;  Location: SURGERY SAME DAY [facility];  Service: Gastroenterology    VT UPPER GI ENDOSCOPY,CTRL BLEED N/A 2/8/2023    Procedure: EGD, WITH CLIP PLACEMENT;  Surgeon: Hieu Arevalo M.D.;  Location: SURGERY SAME DAY [facility];  Service: Gastroenterology    KNEE ARTHROPLASTY TOTAL Left 6/19/2017    Procedure: KNEE ARTHROPLASTY TOTAL;  Surgeon: Charles Castellanos M.D.;  Location: SURGERY [facility];  Service:     MULTIPLE CORONARY ARTERY BYPASS  8/16/2016    LAMINOTOMY  2012    lumbar    KNEE ARTHROSCOPY Left 1990's    OTHER SURGICAL PROCEDURE Left 1990's    \"removal of vein left leg due to blood clot\"    SHOULDER ARTHROTOMY Right 1980's     Family History   Problem Relation Age of Onset    Arthritis Mother     Hyperlipidemia Mother     Heart Disease Mother         bypass x4    Diabetes Mother     Hyperlipidemia Father     Heart Attack Father 61    Other Sister         back surgery    Other Brother         joint problems     Social History     Socioeconomic History    Marital status:      Spouse name: Not on file    Number of children: Not on file    Years of education: Not on file    Highest education level: Not on file   Occupational History    Not on file   Tobacco Use    Smoking status: Former     Packs/day: 0.25     Years: 10.00     Pack years: 2.50     Types: Cigarettes     Quit date: 3/19/2016     Years since " quittin.4    Smokeless tobacco: Never   Vaping Use    Vaping Use: Never used   Substance and Sexual Activity    Alcohol use: No     Comment: Quit years    Drug use: No    Sexual activity: Yes     Partners: Female   Other Topics Concern    Not on file   Social History Narrative    Not on file     Social Determinants of Health     Financial Resource Strain: Not on file   Food Insecurity: Not on file   Transportation Needs: Not on file   Physical Activity: Not on file   Stress: Not on file   Social Connections: Not on file   Intimate Partner Violence: Not on file   Housing Stability: Not on file     No Known Allergies  Outpatient Encounter Medications as of 8/15/2023   Medication Sig Dispense Refill    metoprolol tartrate (LOPRESSOR) 25 MG Tab Take 1 Tablet by mouth 2 times a day. 180 Tablet 4    rosuvastatin (CRESTOR) 40 MG tablet Take 1 Tablet by mouth every evening. 90 Tablet 4    losartan (COZAAR) 25 MG Tab Take 1 Tablet by mouth every day. 90 Tablet 4    acetaminophen (TYLENOL) 325 MG Tab Take 2 Tablets by mouth every 6 hours. take scheduled for 3 days post-op then as needed after 60 Tablet 0    ferrous sulfate 325 (65 Fe) MG tablet Take 1 Tab by mouth every morning with breakfast. 30 Tab 3    therapeutic multivitamin-minerals (THERAGRAN-M) Tab Take 1 Tab by mouth every day. 30 Tab 11    ascorbic acid (VITAMIN C) 500 MG tablet Take 1 Tab by mouth every day. 30 Tab 3    [DISCONTINUED] celecoxib (CELEBREX) 100 MG Cap Take 1 Capsule by mouth 2 times a day. Take scheduled for 3 days then as needed for pain after 20 Capsule 0    [DISCONTINUED] polyethylene glycol/lytes (MIRALAX) 17 g Pack Take 1 Packet by mouth every day. While taking narcotics, hold if greater then 3 BMs a day (Patient not taking: Reported on 2023) 20 Each 0    [DISCONTINUED] ondansetron (ZOFRAN ODT) 4 MG TABLET DISPERSIBLE Take 1 Tablet by mouth every 6 hours as needed for Nausea/Vomiting. (Patient not taking: Reported on 2023) 18 Tablet  "0    [DISCONTINUED] metoprolol tartrate (LOPRESSOR) 25 MG Tab Take 1 Tablet by mouth 2 times a day. 180 Tablet 4    [DISCONTINUED] rosuvastatin (CRESTOR) 40 MG tablet Take 1 Tablet by mouth every evening. 90 Tablet 4    [DISCONTINUED] losartan (COZAAR) 25 MG Tab Take 1 Tablet by mouth every day. 90 Tablet 4     No facility-administered encounter medications on file as of 8/15/2023.     Review of Systems   Constitutional:  Negative for chills, fever, malaise/fatigue and weight loss.   HENT:  Negative for ear discharge, ear pain, hearing loss and nosebleeds.    Eyes:  Negative for blurred vision, double vision, pain and discharge.   Respiratory:  Negative for cough and shortness of breath.    Cardiovascular:  Negative for chest pain, palpitations, orthopnea, claudication, leg swelling and PND.   Gastrointestinal:  Negative for abdominal pain, blood in stool, melena, nausea and vomiting.   Genitourinary:  Negative for dysuria and hematuria.   Musculoskeletal:  Negative for falls, joint pain and myalgias.   Skin:  Negative for itching and rash.   Neurological:  Negative for dizziness, sensory change, speech change, loss of consciousness and headaches.   Endo/Heme/Allergies:  Negative for environmental allergies. Does not bruise/bleed easily.   Psychiatric/Behavioral:  Negative for depression, hallucinations and suicidal ideas.         Objective:   /70 (BP Location: Left arm, Patient Position: Sitting, BP Cuff Size: Adult)   Pulse 93   Resp 16   Ht 1.778 m (5' 10\")   Wt 85.8 kg (189 lb 3.2 oz)   SpO2 95%   BMI 27.15 kg/m²     Physical Exam  Vitals and nursing note reviewed.   Constitutional:       General: He is not in acute distress.     Appearance: He is not diaphoretic.   HENT:      Head: Normocephalic and atraumatic.      Right Ear: External ear normal.      Left Ear: External ear normal.      Nose: No congestion or rhinorrhea.   Eyes:      General:         Right eye: No discharge.         Left eye: No " discharge.   Neck:      Thyroid: No thyromegaly.      Vascular: No JVD.   Cardiovascular:      Rate and Rhythm: Normal rate and regular rhythm.      Pulses: Normal pulses.   Pulmonary:      Effort: No respiratory distress.   Abdominal:      General: There is no distension.      Tenderness: There is no abdominal tenderness.   Musculoskeletal:         General: No swelling or tenderness.      Right lower leg: No edema.      Left lower leg: No edema.   Skin:     General: Skin is warm and dry.   Neurological:      Mental Status: He is alert and oriented to person, place, and time.      Cranial Nerves: No cranial nerve deficit.   Psychiatric:         Behavior: Behavior normal.         Assessment:     1. Coronary artery disease due to calcified coronary lesion: CABG ×2 in August 2016  metoprolol tartrate (LOPRESSOR) 25 MG Tab    rosuvastatin (CRESTOR) 40 MG tablet    losartan (COZAAR) 25 MG Tab      2. Dyslipidemia  rosuvastatin (CRESTOR) 40 MG tablet      3. HTN (hypertension), malignant  losartan (COZAAR) 25 MG Tab      4. Type 2 diabetes mellitus with hyperglycemia, without long-term current use of insulin (HCC)        5. High risk medication use            Medical Decision Making:  Today's Assessment / Status / Plan:   Coronary arterial disease s/p CABG x 2 in 2016:  Betablocker as Metoprolol 25 mg po 2x daily.  ASA 81 mg po daily.  Statin as Rosuvastatin 40 mg po daily.  Will continue Losartan 25 mg po daily.    Patient went off of Xarelto 2.5 mg bid due to presence of hiatal hernia.    Hypertension:  Optimize control with BB, ARB as permitted above in conjunction with CAD treatment.     Hyperlipidemia:  Optimize statin as within guidelines of CAD treatment as above.    Overall, based on prior history of obstructive coronary arterial disease, patient is at at high risk for recurrent events and will require consistent ongoing guidelines directed medical therapy to reduce his cardiovascular mortality and associated  complications.    I will see patient back in clinic with lab tests and studies results in 12 months.    I thank you for referring patient to our Cardiology Clinic today.      Marlee Harvey MD.   [facility].  [phone]  Yuly Kinney.

## 2023-11-09 ENCOUNTER — OFFICE VISIT (OUTPATIENT)
Dept: MEDICAL GROUP | Facility: MEDICAL CENTER | Age: 68
End: 2023-11-09
Payer: COMMERCIAL

## 2023-11-09 VITALS
BODY MASS INDEX: 26.1 KG/M2 | RESPIRATION RATE: 16 BRPM | HEIGHT: 70 IN | HEART RATE: 78 BPM | WEIGHT: 182.32 LBS | DIASTOLIC BLOOD PRESSURE: 60 MMHG | SYSTOLIC BLOOD PRESSURE: 110 MMHG | OXYGEN SATURATION: 97 % | TEMPERATURE: 97.9 F

## 2023-11-09 DIAGNOSIS — R05.1 ACUTE COUGH: ICD-10-CM

## 2023-11-09 DIAGNOSIS — R09.81 NASAL CONGESTION: ICD-10-CM

## 2023-11-09 LAB
FLUAV RNA SPEC QL NAA+PROBE: NEGATIVE
FLUBV RNA SPEC QL NAA+PROBE: NEGATIVE
RSV RNA SPEC QL NAA+PROBE: NEGATIVE
SARS-COV-2 RNA RESP QL NAA+PROBE: NEGATIVE

## 2023-11-09 PROCEDURE — 99214 OFFICE O/P EST MOD 30 MIN: CPT | Performed by: FAMILY MEDICINE

## 2023-11-09 PROCEDURE — 3078F DIAST BP <80 MM HG: CPT | Performed by: FAMILY MEDICINE

## 2023-11-09 PROCEDURE — 3074F SYST BP LT 130 MM HG: CPT | Performed by: FAMILY MEDICINE

## 2023-11-09 PROCEDURE — 0241U POCT CEPHEID COV-2, FLU A/B, RSV - PCR: CPT | Performed by: FAMILY MEDICINE

## 2023-11-09 RX ORDER — OXYCODONE HCL 5 MG/5 ML
SOLUTION, ORAL ORAL
COMMUNITY

## 2023-11-09 RX ORDER — POLYETHYLENE GLYCOL 3350
POWDER (GRAM) MISCELLANEOUS
COMMUNITY

## 2023-11-09 ASSESSMENT — FIBROSIS 4 INDEX: FIB4 SCORE: 1.38

## 2023-11-10 NOTE — PROGRESS NOTES
CC: Cough/nasal congestion    HPI:   Talib presents today because he has been having cough and nasal congestion started 5 days ago.  Sometimes associated with throat irritation but no sore throat.  Patient denies any fever, chills, nausea, vomiting.  Denies any shortness of breath.  However he stated that his symptoms is much more better than it was 5 days ago.  Patient denies any sick contact.  He did not check himself for COVID.      Patient Active Problem List    Diagnosis Date Noted    IGT (impaired glucose tolerance) 06/09/2023    Postoperative pain 05/01/2023    Hiatal hernia with GERD 03/09/2023    Essential hypertension, benign 02/21/2023    Hematemesis 02/06/2023    Lactic acidosis 02/06/2023    Pneumonia 02/06/2023    Low serum vitamin B12 05/16/2018    Iron deficiency anemia 05/09/2018    Rash 05/09/2018    Primary osteoarthritis of left knee 04/06/2017    Depression 04/06/2017    Gastroesophageal reflux disease without esophagitis 04/06/2017    Coronary artery disease due to calcified coronary lesion: CABG ×2 in August 2016 01/06/2017    Dyslipidemia 03/02/2015    Osteoarthritis 03/02/2015       Current Outpatient Medications   Medication Sig Dispense Refill    Polyethylene Glycol 3350 Powder polyethylene glycol 3350 17 gram oral powder packet      oxyCODONE (ROXICODONE) 5 MG/5ML solution       metoprolol tartrate (LOPRESSOR) 25 MG Tab Take 1 Tablet by mouth 2 times a day. 180 Tablet 4    rosuvastatin (CRESTOR) 40 MG tablet Take 1 Tablet by mouth every evening. 90 Tablet 4    losartan (COZAAR) 25 MG Tab Take 1 Tablet by mouth every day. 90 Tablet 4    acetaminophen (TYLENOL) 325 MG Tab Take 2 Tablets by mouth every 6 hours. take scheduled for 3 days post-op then as needed after 60 Tablet 0    ferrous sulfate 325 (65 Fe) MG tablet Take 1 Tab by mouth every morning with breakfast. 30 Tab 3    therapeutic multivitamin-minerals (THERAGRAN-M) Tab Take 1 Tab by mouth every day. 30 Tab 11    ascorbic acid  "(VITAMIN C) 500 MG tablet Take 1 Tab by mouth every day. 30 Tab 3     No current facility-administered medications for this visit.         Allergies as of 11/09/2023    (No Known Allergies)        ROS: Denies any chest pain, Shortness of breath, Changes bowel or bladder, Lower extremity edema.    Physical Exam:  /60 (BP Location: Right arm, Patient Position: Sitting, BP Cuff Size: Adult)   Pulse 78   Temp 36.6 °C (97.9 °F) (Temporal)   Resp 16   Ht 1.778 m (5' 10\")   Wt 82.7 kg (182 lb 5.1 oz)   SpO2 97%   BMI 26.16 kg/m²   Gen.: Well-developed, well-nourished, no apparent distress,pleasant and cooperative with the examination  Skin:  Warm and dry with good turgor. No rashes or suspicious lesions in visible areas  HEENT:Sinuses nontender with palpation, TMs clear, nares patent with pink mucosa and clear rhinorrhea,no septal deviation ,polyps or lesions. lips without lesions, oropharynx clear.  Neck: Trachea midline,no masses or adenopathy. No JVD.  Cor: Regular rate and rhythm without murmur, gallop or rub.  Lungs: Respirations unlabored.Clear to auscultation with equal breath sounds bilaterally. No wheezes, rhonchi.  Extremities: No cyanosis, clubbing or edema.      Assessment and Plan.   68 y.o. male     1. Acute cough  2. Nasal congestion  POCT COVID, flu, RSV are negative.  Possibly viral URI that has been getting better  Recommend hydration, continue symptomatic treatment with over-the-counter cough medicine/cough drops  Return to clinic if the symptoms gets worse    - POCT CoV-2, Flu A/B, RSV by PCR        "

## 2024-08-23 ENCOUNTER — DOCUMENTATION (OUTPATIENT)
Dept: HEALTH INFORMATION MANAGEMENT | Facility: OTHER | Age: 69
End: 2024-08-23
Payer: COMMERCIAL

## 2024-10-22 ENCOUNTER — HOSPITAL ENCOUNTER (OUTPATIENT)
Dept: LAB | Facility: MEDICAL CENTER | Age: 69
End: 2024-10-22
Attending: NURSE PRACTITIONER
Payer: COMMERCIAL

## 2024-10-22 LAB
ALBUMIN SERPL BCP-MCNC: 4.4 G/DL (ref 3.2–4.9)
BUN SERPL-MCNC: 16 MG/DL (ref 8–22)
CALCIUM ALBUM COR SERPL-MCNC: 9.5 MG/DL (ref 8.5–10.5)
CALCIUM SERPL-MCNC: 9.8 MG/DL (ref 8.5–10.5)
CHLORIDE SERPL-SCNC: 103 MMOL/L (ref 96–112)
CO2 SERPL-SCNC: 24 MMOL/L (ref 20–33)
CREAT SERPL-MCNC: 1 MG/DL (ref 0.5–1.4)
GFR SERPLBLD CREATININE-BSD FMLA CKD-EPI: 81 ML/MIN/1.73 M 2
GLUCOSE SERPL-MCNC: 109 MG/DL (ref 65–99)
PHOSPHATE SERPL-MCNC: 2.5 MG/DL (ref 2.5–4.5)
POTASSIUM SERPL-SCNC: 4 MMOL/L (ref 3.6–5.5)
SODIUM SERPL-SCNC: 143 MMOL/L (ref 135–145)
T3FREE SERPL-MCNC: 2.92 PG/ML (ref 2–4.4)
T4 FREE SERPL-MCNC: 0.87 NG/DL (ref 0.93–1.7)
TSH SERPL-ACNC: 1.76 UIU/ML (ref 0.35–5.5)

## 2024-10-22 PROCEDURE — 84439 ASSAY OF FREE THYROXINE: CPT

## 2024-10-22 PROCEDURE — 86364 TISS TRNSGLTMNASE EA IG CLAS: CPT | Mod: 91

## 2024-10-22 PROCEDURE — 36415 COLL VENOUS BLD VENIPUNCTURE: CPT

## 2024-10-22 PROCEDURE — 86258 DGP ANTIBODY EACH IG CLASS: CPT

## 2024-10-22 PROCEDURE — 84443 ASSAY THYROID STIM HORMONE: CPT

## 2024-10-22 PROCEDURE — 80069 RENAL FUNCTION PANEL: CPT

## 2024-10-22 PROCEDURE — 84481 FREE ASSAY (FT-3): CPT

## 2024-10-23 LAB
GLIADIN IGA SER IA-ACNC: <0.72 FLU (ref 0–4.99)
TTG IGA SER IA-ACNC: <1.02 FLU (ref 0–4.99)

## 2024-10-24 LAB
GLIADIN IGG SER IA-ACNC: <0.56 FLU (ref 0–4.99)
TTG IGG SER IA-ACNC: <0.82 FLU (ref 0–4.99)

## 2024-11-05 ENCOUNTER — HOSPITAL ENCOUNTER (OUTPATIENT)
Facility: MEDICAL CENTER | Age: 69
End: 2024-11-05
Attending: NURSE PRACTITIONER
Payer: COMMERCIAL

## 2024-11-05 LAB
C DIFF DNA SPEC QL NAA+PROBE: NEGATIVE
C DIFF TOX GENS STL QL NAA+PROBE: NEGATIVE
C PARVUM AG STL QL IA.RAPID: NEGATIVE
G LAMBLIA AG STL QL IA.RAPID: NEGATIVE
H PYLORI AG STL QL IA: NOT DETECTED
LACTOFERRIN STL QL IA: POSITIVE

## 2024-11-05 PROCEDURE — 87493 C DIFF AMPLIFIED PROBE: CPT

## 2024-11-05 PROCEDURE — 83993 ASSAY FOR CALPROTECTIN FECAL: CPT

## 2024-11-05 PROCEDURE — 83630 LACTOFERRIN FECAL (QUAL): CPT

## 2024-11-05 PROCEDURE — 87338 HPYLORI STOOL AG IA: CPT

## 2024-11-05 PROCEDURE — 87329 GIARDIA AG IA: CPT

## 2024-11-05 PROCEDURE — 87328 CRYPTOSPORIDIUM AG IA: CPT

## 2024-11-05 PROCEDURE — 82653 EL-1 FECAL QUANTITATIVE: CPT

## 2024-11-05 PROCEDURE — 87209 SMEAR COMPLEX STAIN: CPT

## 2024-11-05 PROCEDURE — 87177 OVA AND PARASITES SMEARS: CPT

## 2024-11-08 LAB
CALPROTECTIN STL-MCNT: 100 UG/G
ELASTASE PANC STL-MCNT: 501 UG/G

## 2024-11-12 LAB — OVA AND PARASITE, FECAL INTERPRETATION Q0595: NEGATIVE

## 2024-11-25 ENCOUNTER — TELEPHONE (OUTPATIENT)
Dept: CARDIOLOGY | Facility: MEDICAL CENTER | Age: 69
End: 2024-11-25
Payer: COMMERCIAL

## 2024-11-28 ENCOUNTER — HOSPITAL ENCOUNTER (OUTPATIENT)
Dept: RADIOLOGY | Facility: MEDICAL CENTER | Age: 69
End: 2024-11-28
Attending: INTERNAL MEDICINE
Payer: COMMERCIAL

## 2024-11-28 DIAGNOSIS — K57.30 DIVERTICULA, COLON: ICD-10-CM

## 2024-11-28 DIAGNOSIS — Z48.815 ENCOUNTER FOR SURGICAL AFTERCARE FOLLOWING SURGERY OF DIGESTIVE SYSTEM: ICD-10-CM

## 2024-11-28 DIAGNOSIS — K83.5: ICD-10-CM

## 2024-11-28 PROCEDURE — 700117 HCHG RX CONTRAST REV CODE 255

## 2024-11-28 PROCEDURE — 71260 CT THORAX DX C+: CPT

## 2024-11-28 RX ADMIN — IOHEXOL 100 ML: 350 INJECTION, SOLUTION INTRAVENOUS at 11:15

## 2024-11-28 RX ADMIN — IOHEXOL 25 ML: 240 INJECTION, SOLUTION INTRATHECAL; INTRAVASCULAR; INTRAVENOUS; ORAL at 11:15

## 2024-12-03 ENCOUNTER — OFFICE VISIT (OUTPATIENT)
Dept: CARDIOLOGY | Facility: MEDICAL CENTER | Age: 69
End: 2024-12-03
Attending: INTERNAL MEDICINE
Payer: COMMERCIAL

## 2024-12-03 VITALS
BODY MASS INDEX: 27.16 KG/M2 | RESPIRATION RATE: 16 BRPM | HEART RATE: 71 BPM | DIASTOLIC BLOOD PRESSURE: 72 MMHG | HEIGHT: 71 IN | OXYGEN SATURATION: 95 % | SYSTOLIC BLOOD PRESSURE: 110 MMHG | WEIGHT: 194 LBS

## 2024-12-03 DIAGNOSIS — I25.84 CORONARY ARTERY DISEASE DUE TO CALCIFIED CORONARY LESION: ICD-10-CM

## 2024-12-03 DIAGNOSIS — E78.2 MIXED HYPERLIPIDEMIA: ICD-10-CM

## 2024-12-03 DIAGNOSIS — I10 HTN (HYPERTENSION), MALIGNANT: ICD-10-CM

## 2024-12-03 DIAGNOSIS — E11.65 TYPE 2 DIABETES MELLITUS WITH HYPERGLYCEMIA, WITHOUT LONG-TERM CURRENT USE OF INSULIN (HCC): ICD-10-CM

## 2024-12-03 DIAGNOSIS — Z79.899 HIGH RISK MEDICATION USE: ICD-10-CM

## 2024-12-03 DIAGNOSIS — I25.10 CORONARY ARTERY DISEASE DUE TO CALCIFIED CORONARY LESION: ICD-10-CM

## 2024-12-03 PROCEDURE — 99214 OFFICE O/P EST MOD 30 MIN: CPT | Performed by: INTERNAL MEDICINE

## 2024-12-03 PROCEDURE — 99211 OFF/OP EST MAY X REQ PHY/QHP: CPT | Performed by: INTERNAL MEDICINE

## 2024-12-03 PROCEDURE — 3074F SYST BP LT 130 MM HG: CPT | Performed by: INTERNAL MEDICINE

## 2024-12-03 PROCEDURE — 3078F DIAST BP <80 MM HG: CPT | Performed by: INTERNAL MEDICINE

## 2024-12-03 PROCEDURE — 99212 OFFICE O/P EST SF 10 MIN: CPT | Performed by: INTERNAL MEDICINE

## 2024-12-03 RX ORDER — METOPROLOL TARTRATE 25 MG/1
25 TABLET, FILM COATED ORAL 2 TIMES DAILY
Qty: 180 TABLET | Refills: 4 | Status: SHIPPED | OUTPATIENT
Start: 2024-12-03

## 2024-12-03 RX ORDER — ROSUVASTATIN CALCIUM 40 MG/1
40 TABLET, COATED ORAL EVERY EVENING
Qty: 90 TABLET | Refills: 4 | Status: SHIPPED | OUTPATIENT
Start: 2024-12-03

## 2024-12-03 RX ORDER — LOSARTAN POTASSIUM 25 MG/1
25 TABLET ORAL DAILY
Qty: 90 TABLET | Refills: 4 | Status: SHIPPED | OUTPATIENT
Start: 2024-12-03

## 2024-12-03 ASSESSMENT — ENCOUNTER SYMPTOMS
ABDOMINAL PAIN: 0
NAUSEA: 0
DIZZINESS: 0
SPEECH CHANGE: 0
CLAUDICATION: 0
BRUISES/BLEEDS EASILY: 0
BLURRED VISION: 0
BLOOD IN STOOL: 0
FALLS: 0
MYALGIAS: 0
SENSORY CHANGE: 0
PND: 0
EYE DISCHARGE: 0
DOUBLE VISION: 0
FEVER: 0
HALLUCINATIONS: 0
VOMITING: 0
PALPITATIONS: 0
SHORTNESS OF BREATH: 0
CHILLS: 0
DEPRESSION: 0
WEIGHT LOSS: 0
LOSS OF CONSCIOUSNESS: 0
EYE PAIN: 0
HEADACHES: 0
COUGH: 0
ORTHOPNEA: 0

## 2024-12-03 ASSESSMENT — FIBROSIS 4 INDEX: FIB4 SCORE: 1.4

## 2024-12-03 NOTE — PROGRESS NOTES
Chief Complaint   Patient presents with    Coronary Artery Disease     F/v dx:  Coronary artery disease due to calcified coronary lesion: CABG ×2 in August 2016    Hypertension    Dyslipidemia       Subjective:   Talib Ortiz is a 69  y.o. male who presents today for cardiac care and management of established coronary arterial disease status post CABG x2 in 2016, hypertension, hyperlipidemia.    Patient also had the most recent nuclear stress test done in May 2018 which showed no evidence of coronary ischemia.  His most recent transthoracic echocardiogram in January 2017 showed normal LV function with no significant valvular disease.  I personally interpreted the images of his transthoracic echocardiogram.    I have independently interpreted and reviewed blood tests results with patient in clinic which shows normal LDL level 59, triglycerides 69 much improved from 238, renal and liver function. HgbA1c of 5.8.    In the interim, patient has been doing well without having any symptoms. Patient denies having chest pain, dyspnea, palpitation, presyncope, syncope episodes. Able to climb up at least 2 flights of stairs.    NO bleeding on Xarelto 2.5 mg bid dosing.    Insurance did not approve Jardiance.    I have independently interpreted and reviewed echocardiogram's actual images with patient which showed normal left ventricular systolic function. No wall motion abnormality. No evidence of pulmonary hypertension. No significant valvular disease.    Dealing with colon cancer now.      Past Medical History:   Diagnosis Date    Arthritis 04/28/2023    general body    Blood clotting disorder (HCC) 1996    leg    CAD (coronary artery disease)     Dental disorder 04/28/2023    upper partial, bridge lower front times 3, having right upper posterior tooth removed 4/28/23    Heart attack (HCC) 08/16/2016    cardiology, Dr. Frias    Heart burn 04/28/2023    medicated    High cholesterol 04/28/2023    medicated     "Hyperlipidemia     Hypertension 04/28/2023    medicated    Kidney disease     Kidney stone 04/28/2023    history of    Obesity (BMI 30-39.9) 02/12/2015    Osteoarthritis 03/02/2015    Pneumonia 2016     Past Surgical History:   Procedure Laterality Date    NC LAP ESOPHAGOGAST FUNDOPLASTY N/A 5/1/2023    Procedure: ROBOTIC HIATAL HERNIA REPAIR WITH TOUPET FUNDOPLICATION;  Surgeon: Campbell Berg M.D.;  Location: SURGERY [facility];  Service: Gen Robotic    NC UPPER GI ENDOSCOPY,DIAGNOSIS N/A 5/1/2023    Procedure: ESOPHAGOGASTRODUODENOSCOPY;  Surgeon: Campbell Berg M.D.;  Location: SURGERY [facility];  Service: Gen Robotic    NC UPPER GI ENDOSCOPY,DIAGNOSIS N/A 2/8/2023    Procedure: GASTROSCOPY;  Surgeon: Hieu Arevalo M.D.;  Location: SURGERY SAME DAY [facility];  Service: Gastroenterology    NC UPPER GI ENDOSCOPY,CTRL BLEED N/A 2/8/2023    Procedure: EGD, WITH CLIP PLACEMENT;  Surgeon: Hieu Arevalo M.D.;  Location: SURGERY SAME DAY [facility];  Service: Gastroenterology    KNEE ARTHROPLASTY TOTAL Left 6/19/2017    Procedure: KNEE ARTHROPLASTY TOTAL;  Surgeon: Charles Castellanos M.D.;  Location: SURGERY [facility];  Service:     MULTIPLE CORONARY ARTERY BYPASS  8/16/2016    LAMINOTOMY  2012    lumbar    KNEE ARTHROSCOPY Left 1990's    OTHER SURGICAL PROCEDURE Left 1990's    \"removal of vein left leg due to blood clot\"    SHOULDER ARTHROTOMY Right 1980's     Family History   Problem Relation Age of Onset    Arthritis Mother     Hyperlipidemia Mother     Heart Disease Mother         bypass x4    Diabetes Mother     Hyperlipidemia Father     Heart Attack Father 61    Other Sister         back surgery    Other Brother         joint problems     Social History     Socioeconomic History    Marital status:      Spouse name: Not on file    Number of children: Not on file    Years of education: Not on file    Highest education level: Not on file   Occupational History    Not on file   Tobacco Use    Smoking " status: Former     Current packs/day: 0.00     Average packs/day: 0.3 packs/day for 10.0 years (2.5 ttl pk-yrs)     Types: Cigarettes     Start date: 3/19/2006     Quit date: 3/19/2016     Years since quittin.7    Smokeless tobacco: Never   Vaping Use    Vaping status: Never Used   Substance and Sexual Activity    Alcohol use: No     Comment: Quit years    Drug use: No    Sexual activity: Yes     Partners: Female   Other Topics Concern    Not on file   Social History Narrative    Not on file     Social Drivers of Health     Financial Resource Strain: Not on file   Food Insecurity: Not on file   Transportation Needs: Not on file   Physical Activity: Not on file   Stress: Not on file   Social Connections: Not on file   Intimate Partner Violence: Not on file   Housing Stability: Not on file     No Known Allergies  Outpatient Encounter Medications as of 12/3/2024   Medication Sig Dispense Refill    losartan (COZAAR) 25 MG Tab Take 1 Tablet by mouth every day. 90 Tablet 4    metoprolol tartrate (LOPRESSOR) 25 MG Tab Take 1 Tablet by mouth 2 times a day. 180 Tablet 4    rosuvastatin (CRESTOR) 40 MG tablet Take 1 Tablet by mouth every evening. 90 Tablet 4    Polyethylene Glycol 3350 Powder polyethylene glycol 3350 17 gram oral powder packet      acetaminophen (TYLENOL) 325 MG Tab Take 2 Tablets by mouth every 6 hours. take scheduled for 3 days post-op then as needed after 60 Tablet 0    ferrous sulfate 325 (65 Fe) MG tablet Take 1 Tab by mouth every morning with breakfast. 30 Tab 3    therapeutic multivitamin-minerals (THERAGRAN-M) Tab Take 1 Tab by mouth every day. 30 Tab 11    ascorbic acid (VITAMIN C) 500 MG tablet Take 1 Tab by mouth every day. 30 Tab 3    [DISCONTINUED] oxyCODONE (ROXICODONE) 5 MG/5ML solution  (Patient not taking: Reported on 12/3/2024)      [DISCONTINUED] metoprolol tartrate (LOPRESSOR) 25 MG Tab Take 1 Tablet by mouth 2 times a day. 180 Tablet 4    [DISCONTINUED] rosuvastatin (CRESTOR) 40 MG  "tablet Take 1 Tablet by mouth every evening. 90 Tablet 4    [DISCONTINUED] losartan (COZAAR) 25 MG Tab Take 1 Tablet by mouth every day. 90 Tablet 4     No facility-administered encounter medications on file as of 12/3/2024.     Review of Systems   Constitutional:  Negative for chills, fever, malaise/fatigue and weight loss.   HENT:  Negative for ear discharge, ear pain, hearing loss and nosebleeds.    Eyes:  Negative for blurred vision, double vision, pain and discharge.   Respiratory:  Negative for cough and shortness of breath.    Cardiovascular:  Negative for chest pain, palpitations, orthopnea, claudication, leg swelling and PND.   Gastrointestinal:  Negative for abdominal pain, blood in stool, melena, nausea and vomiting.   Genitourinary:  Negative for dysuria and hematuria.   Musculoskeletal:  Negative for falls, joint pain and myalgias.   Skin:  Negative for itching and rash.   Neurological:  Negative for dizziness, sensory change, speech change, loss of consciousness and headaches.   Endo/Heme/Allergies:  Negative for environmental allergies. Does not bruise/bleed easily.   Psychiatric/Behavioral:  Negative for depression, hallucinations and suicidal ideas.         Objective:   /72 (BP Location: Left arm, Patient Position: Sitting, BP Cuff Size: Adult)   Pulse 71   Resp 16   Ht 1.803 m (5' 11\")   Wt 88 kg (194 lb)   SpO2 95%   BMI 27.06 kg/m²     Physical Exam  Vitals and nursing note reviewed.   Constitutional:       General: He is not in acute distress.     Appearance: He is not diaphoretic.   HENT:      Head: Normocephalic and atraumatic.      Right Ear: External ear normal.      Left Ear: External ear normal.      Nose: No congestion or rhinorrhea.   Eyes:      General:         Right eye: No discharge.         Left eye: No discharge.   Neck:      Thyroid: No thyromegaly.      Vascular: No JVD.   Cardiovascular:      Rate and Rhythm: Normal rate and regular rhythm.      Pulses: Normal pulses. "   Pulmonary:      Effort: No respiratory distress.   Abdominal:      General: There is no distension.      Tenderness: There is no abdominal tenderness.   Musculoskeletal:         General: No swelling or tenderness.      Right lower leg: No edema.      Left lower leg: No edema.   Skin:     General: Skin is warm and dry.   Neurological:      Mental Status: He is alert and oriented to person, place, and time.      Cranial Nerves: No cranial nerve deficit.   Psychiatric:         Behavior: Behavior normal.         Assessment:     1. Coronary artery disease due to calcified coronary lesion: CABG ×2 in August 2016  losartan (COZAAR) 25 MG Tab    metoprolol tartrate (LOPRESSOR) 25 MG Tab    rosuvastatin (CRESTOR) 40 MG tablet      2. HTN (hypertension), malignant  losartan (COZAAR) 25 MG Tab      3. Mixed hyperlipidemia        4. Type 2 diabetes mellitus with hyperglycemia, without long-term current use of insulin (HCC)        5. High risk medication use              Medical Decision Making:  Today's Assessment / Status / Plan:   Coronary arterial disease s/p CABG x 2 in 2016:  Betablocker as Metoprolol 25 mg po 2x daily.  ASA 81 mg po daily.  Statin as Rosuvastatin 40 mg po daily.  Will continue Losartan 25 mg po daily.    Patient went off of Xarelto 2.5 mg bid due to presence of hiatal hernia.    Hypertension:  Optimize control with BB, ARB as permitted above in conjunction with CAD treatment.     Hyperlipidemia:  Optimize statin as within guidelines of CAD treatment as above.    Overall, based on prior history of obstructive coronary arterial disease, patient is at at high risk for recurrent events and will require consistent ongoing guidelines directed medical therapy to reduce his cardiovascular mortality and associated complications.    I will see patient back in clinic with lab tests and studies results in 12 months.    I thank you for referring patient to our Cardiology Clinic today.      Marlee Harvey MD.    [facility].  [phone]  Yuly Kinney.

## 2024-12-12 ENCOUNTER — OFFICE VISIT (OUTPATIENT)
Dept: MEDICAL GROUP | Facility: MEDICAL CENTER | Age: 69
End: 2024-12-12
Payer: COMMERCIAL

## 2024-12-12 VITALS
HEART RATE: 90 BPM | SYSTOLIC BLOOD PRESSURE: 110 MMHG | BODY MASS INDEX: 27.02 KG/M2 | OXYGEN SATURATION: 96 % | TEMPERATURE: 97.6 F | HEIGHT: 71 IN | DIASTOLIC BLOOD PRESSURE: 68 MMHG | WEIGHT: 193 LBS

## 2024-12-12 DIAGNOSIS — I25.84 CORONARY ARTERY DISEASE DUE TO CALCIFIED CORONARY LESION: ICD-10-CM

## 2024-12-12 DIAGNOSIS — K63.89 MASS OF COLON: ICD-10-CM

## 2024-12-12 DIAGNOSIS — K63.89 COLONIC MASS: ICD-10-CM

## 2024-12-12 DIAGNOSIS — K56.690 OTHER PARTIAL INTESTINAL OBSTRUCTION (HCC): ICD-10-CM

## 2024-12-12 DIAGNOSIS — I25.10 CORONARY ARTERY DISEASE DUE TO CALCIFIED CORONARY LESION: ICD-10-CM

## 2024-12-12 DIAGNOSIS — K76.9 HEPATIC LESION: ICD-10-CM

## 2024-12-12 DIAGNOSIS — E78.5 DYSLIPIDEMIA: ICD-10-CM

## 2024-12-12 PROBLEM — J18.9 PNEUMONIA: Status: RESOLVED | Noted: 2023-02-06 | Resolved: 2024-12-12

## 2024-12-12 PROCEDURE — 99214 OFFICE O/P EST MOD 30 MIN: CPT | Performed by: STUDENT IN AN ORGANIZED HEALTH CARE EDUCATION/TRAINING PROGRAM

## 2024-12-12 PROCEDURE — 3074F SYST BP LT 130 MM HG: CPT | Performed by: STUDENT IN AN ORGANIZED HEALTH CARE EDUCATION/TRAINING PROGRAM

## 2024-12-12 PROCEDURE — 3078F DIAST BP <80 MM HG: CPT | Performed by: STUDENT IN AN ORGANIZED HEALTH CARE EDUCATION/TRAINING PROGRAM

## 2024-12-12 ASSESSMENT — ENCOUNTER SYMPTOMS
VOMITING: 0
NAUSEA: 0
CHILLS: 0
DIZZINESS: 0
FEVER: 0
WEIGHT LOSS: 0
SHORTNESS OF BREATH: 0
PALPITATIONS: 0
WHEEZING: 0
HEADACHES: 0

## 2024-12-12 ASSESSMENT — FIBROSIS 4 INDEX: FIB4 SCORE: 1.4

## 2024-12-12 ASSESSMENT — PATIENT HEALTH QUESTIONNAIRE - PHQ9: CLINICAL INTERPRETATION OF PHQ2 SCORE: 0

## 2024-12-12 NOTE — PROGRESS NOTES
"Subjective:     CC:     HPI:   Talib presents today with    PMH of HTN, HLD, IFG, CAD status post CABG x 2 8/2016, history of hiatal hernia repair, recent sigmoid colon cancer diagnosis   Specialists  Cardiology  GI- Colon Cancer  Onc surgeon - Enoch MABRY     Last seen cardiology 12/3/2024 continue losartan 25 mg daily metoprolol 25 mg daily, rosuvastatin 40 mg daily losartan 25 mg daily.  ????Xarelto 2.5 mg twice daily was stopped due to hiatal hernia    # Recent diagnosis of colon cancer   - Oncology surgeon   - Hepatic lesion  pending MRI     Pertinent imaging  1.  Sigmoid colon mass, consistent with known malignancy.  2.  Several small subcentimeter hepatic lesions, incompletely characterized on this study. The need to be further evaluated with contrast enhanced liver MRI.  3.  No other suspicious lesions in the chest, abdomen or pelvis.        Health Maintenance:     ROS:  Review of Systems   Constitutional:  Negative for chills, fever and weight loss.   HENT:  Negative for hearing loss.    Respiratory:  Negative for shortness of breath and wheezing.    Cardiovascular:  Negative for chest pain and palpitations.   Gastrointestinal:  Negative for nausea and vomiting.   Genitourinary:  Negative for frequency and urgency.   Skin:  Negative for rash.   Neurological:  Negative for dizziness and headaches.       Objective:     Exam:  /68 (BP Location: Left arm)   Pulse 90   Temp 36.4 °C (97.6 °F)   Ht 1.803 m (5' 11\")   Wt 87.5 kg (193 lb)   SpO2 96%   BMI 26.92 kg/m²  Body mass index is 26.92 kg/m².    Physical Exam  Constitutional:       Appearance: Normal appearance.   Cardiovascular:      Rate and Rhythm: Normal rate and regular rhythm.   Pulmonary:      Effort: Pulmonary effort is normal.      Breath sounds: Normal breath sounds.   Musculoskeletal:      Cervical back: Normal range of motion and neck supple.   Neurological:      Mental Status: He is alert.           Labs:     Assessment & Plan: "     69 y.o. male with the following -     1. Colonic mass  2. Hepatic lesion  5. Mass of colon  6.  Other partial intestinal obstruction  Chronic, stable  Reported for the past 2 months has noted diarrhea recent imaging showed colonic mass with liver lesions.  Patient report had a colonoscopy however scope was unable to advance beyond the intestinal obstruction.  He has a follow-up with oncology surgeon at  He reports some difficulty scheduling visit.  MRI abdomen/liver with and without was ordered today, appointment was scheduled 12/22 9:45 AM this sublobar  - MR-ABDOMEN-WITH & W/O; Future  - MR-ABDOMEN-WITH & W/O; Future    3. Coronary artery disease due to calcified coronary lesion: CABG ×2 in August 2016  4. Dyslipidemia  Chronic, stable on rosuvastatin 40 mg daily  Patient is following cardiology              Return in about 6 weeks (around 1/23/2025) for Lab review, Med check.    Please note that this dictation was created using voice recognition software. I have made every reasonable attempt to correct obvious errors, but I expect that there are errors of grammar and possibly content that I did not discover before finalizing the note.

## 2024-12-16 ENCOUNTER — OFFICE VISIT (OUTPATIENT)
Dept: SURGICAL ONCOLOGY | Facility: MEDICAL CENTER | Age: 69
End: 2024-12-16
Payer: COMMERCIAL

## 2024-12-16 ENCOUNTER — TELEPHONE (OUTPATIENT)
Dept: SURGICAL ONCOLOGY | Facility: MEDICAL CENTER | Age: 69
End: 2024-12-16

## 2024-12-16 VITALS
SYSTOLIC BLOOD PRESSURE: 102 MMHG | WEIGHT: 187.3 LBS | HEART RATE: 96 BPM | DIASTOLIC BLOOD PRESSURE: 70 MMHG | OXYGEN SATURATION: 95 % | BODY MASS INDEX: 26.22 KG/M2 | TEMPERATURE: 98.7 F | HEIGHT: 71 IN

## 2024-12-16 DIAGNOSIS — C18.7 MALIGNANT NEOPLASM OF SIGMOID COLON (HCC): ICD-10-CM

## 2024-12-16 DIAGNOSIS — R16.0 LIVER MASS: ICD-10-CM

## 2024-12-16 PROCEDURE — 99205 OFFICE O/P NEW HI 60 MIN: CPT | Performed by: SURGERY

## 2024-12-16 PROCEDURE — 3074F SYST BP LT 130 MM HG: CPT | Performed by: SURGERY

## 2024-12-16 PROCEDURE — 3078F DIAST BP <80 MM HG: CPT | Performed by: SURGERY

## 2024-12-16 RX ORDER — POLYETHYLENE GLYCOL 3350 17 G/17G
POWDER, FOR SOLUTION ORAL
COMMUNITY
Start: 2024-12-16

## 2024-12-16 RX ORDER — METRONIDAZOLE 500 MG/1
500 TABLET ORAL EVERY 8 HOURS
Qty: 3 TABLET | Refills: 0 | Status: SHIPPED | OUTPATIENT
Start: 2024-12-16 | End: 2024-12-17

## 2024-12-16 RX ORDER — NEOMYCIN SULFATE 500 MG/1
1000 TABLET ORAL 3 TIMES DAILY
Qty: 6 TABLET | Refills: 0 | Status: SHIPPED | OUTPATIENT
Start: 2024-12-16

## 2024-12-16 RX ORDER — BISACODYL 5 MG/1
TABLET, DELAYED RELEASE ORAL
COMMUNITY
Start: 2024-12-16

## 2024-12-16 ASSESSMENT — ENCOUNTER SYMPTOMS
VOMITING: 0
SHORTNESS OF BREATH: 0
CONFUSION: 0
LIGHT-HEADEDNESS: 0
ABDOMINAL DISTENTION: 0
CHILLS: 0
APPETITE CHANGE: 0
WOUND: 0
FEVER: 0
NERVOUS/ANXIOUS: 0
BRUISES/BLEEDS EASILY: 0

## 2024-12-16 ASSESSMENT — FIBROSIS 4 INDEX: FIB4 SCORE: 1.4

## 2024-12-17 ENCOUNTER — HOSPITAL ENCOUNTER (OUTPATIENT)
Dept: LAB | Facility: MEDICAL CENTER | Age: 69
End: 2024-12-17
Attending: SURGERY
Payer: COMMERCIAL

## 2024-12-17 DIAGNOSIS — C18.7 MALIGNANT NEOPLASM OF SIGMOID COLON (HCC): ICD-10-CM

## 2024-12-17 PROCEDURE — 82378 CARCINOEMBRYONIC ANTIGEN: CPT

## 2024-12-17 PROCEDURE — 36415 COLL VENOUS BLD VENIPUNCTURE: CPT

## 2024-12-18 ENCOUNTER — APPOINTMENT (OUTPATIENT)
Dept: ADMISSIONS | Facility: MEDICAL CENTER | Age: 69
End: 2024-12-18
Attending: SURGERY
Payer: COMMERCIAL

## 2024-12-18 LAB — CEA SERPL-MCNC: 1.2 NG/ML (ref 0–3)

## 2024-12-22 ENCOUNTER — APPOINTMENT (OUTPATIENT)
Dept: RADIOLOGY | Facility: MEDICAL CENTER | Age: 69
End: 2024-12-22
Attending: STUDENT IN AN ORGANIZED HEALTH CARE EDUCATION/TRAINING PROGRAM
Payer: COMMERCIAL

## 2024-12-23 ENCOUNTER — HOSPITAL ENCOUNTER (OUTPATIENT)
Dept: RADIOLOGY | Facility: MEDICAL CENTER | Age: 69
End: 2024-12-23
Attending: STUDENT IN AN ORGANIZED HEALTH CARE EDUCATION/TRAINING PROGRAM | Admitting: SURGERY
Payer: COMMERCIAL

## 2024-12-23 ENCOUNTER — PRE-ADMISSION TESTING (OUTPATIENT)
Dept: ADMISSIONS | Facility: MEDICAL CENTER | Age: 69
End: 2024-12-23
Attending: SURGERY
Payer: COMMERCIAL

## 2024-12-23 DIAGNOSIS — K63.89 COLONIC MASS: ICD-10-CM

## 2024-12-23 DIAGNOSIS — K76.9 HEPATIC LESION: ICD-10-CM

## 2024-12-23 DIAGNOSIS — K56.690 OTHER PARTIAL INTESTINAL OBSTRUCTION (HCC): ICD-10-CM

## 2024-12-23 PROCEDURE — 700117 HCHG RX CONTRAST REV CODE 255: Mod: JZ | Performed by: STUDENT IN AN ORGANIZED HEALTH CARE EDUCATION/TRAINING PROGRAM

## 2024-12-23 PROCEDURE — A9579 GAD-BASE MR CONTRAST NOS,1ML: HCPCS | Mod: JZ | Performed by: STUDENT IN AN ORGANIZED HEALTH CARE EDUCATION/TRAINING PROGRAM

## 2024-12-23 PROCEDURE — 74183 MRI ABD W/O CNTR FLWD CNTR: CPT

## 2024-12-23 RX ADMIN — GADOTERIDOL 18 ML: 279.3 INJECTION, SOLUTION INTRAVENOUS at 11:15

## 2024-12-23 NOTE — PREADMIT AVS NOTE
Current Medications   Medication Instructions    polyethylene glycol 3350 (MIRALAX) 17 GM/SCOOP Powder FOLLOW INSTRUCTIONS FROM SURGEON OR SPECIALIST    bisacodyl (DULCOLAX) 5 MG EC tablet Patient states not taking.     Simethicone 125 MG Tab FOLLOW INSTRUCTIONS FROM SURGEON OR SPECIALIST    neomycin (MYCIFRADIN) 500 MG Tab FOLLOW INSTRUCTIONS FROM SURGEON OR SPECIALIST    losartan (COZAAR) 25 MG Tab HOLD 24 HOURS PRIOR TO SURGERY.     metoprolol tartrate (LOPRESSOR) 25 MG Tab CONTINUE TAKING MEDICATION AS PRESCRIBED PRIOR TO SURGERY AND TAKE AM OF SURGERY.     rosuvastatin (CRESTOR) 40 MG tablet CONTINUE TAKING MEDICATION AS PRESCRIBED.     Polyethylene Glycol 3350 Powder FOLLOW INSTRUCTIONS FROM SURGEON OR SPECIALIST    acetaminophen (TYLENOL) 325 MG Tab CONTINUE TAKING MEDICATION AS PRESCRIBED.     ferrous sulfate 325 (65 Fe) MG tablet FOLLOW INSTRUCTIONS FROM SURGEON OR SPECIALIST    therapeutic multivitamin-minerals (THERAGRAN-M) Tab HOLD 7 DAYS PRIOR TO SURGERY.     ascorbic acid (VITAMIN C) 500 MG tablet HOLD 7 DAYS PRIOR TO SURGERY.

## 2024-12-27 ENCOUNTER — PRE-ADMISSION TESTING (OUTPATIENT)
Dept: ADMISSIONS | Facility: MEDICAL CENTER | Age: 69
End: 2024-12-27
Attending: SURGERY
Payer: COMMERCIAL

## 2024-12-27 DIAGNOSIS — Z01.812 PRE-OPERATIVE LABORATORY EXAMINATION: ICD-10-CM

## 2024-12-27 DIAGNOSIS — Z01.810 PRE-OPERATIVE CARDIOVASCULAR EXAMINATION: ICD-10-CM

## 2024-12-27 LAB
ANION GAP SERPL CALC-SCNC: 14 MMOL/L (ref 7–16)
BUN SERPL-MCNC: 14 MG/DL (ref 8–22)
CALCIUM SERPL-MCNC: 9.7 MG/DL (ref 8.5–10.5)
CHLORIDE SERPL-SCNC: 106 MMOL/L (ref 96–112)
CO2 SERPL-SCNC: 21 MMOL/L (ref 20–33)
CREAT SERPL-MCNC: 1.12 MG/DL (ref 0.5–1.4)
EKG IMPRESSION: NORMAL
ERYTHROCYTE [DISTWIDTH] IN BLOOD BY AUTOMATED COUNT: 44.9 FL (ref 35.9–50)
EST. AVERAGE GLUCOSE BLD GHB EST-MCNC: 128 MG/DL
GFR SERPLBLD CREATININE-BSD FMLA CKD-EPI: 71 ML/MIN/1.73 M 2
GLUCOSE SERPL-MCNC: 132 MG/DL (ref 65–99)
HBA1C MFR BLD: 6.1 % (ref 4–5.6)
HCT VFR BLD AUTO: 43.4 % (ref 42–52)
HGB BLD-MCNC: 14.9 G/DL (ref 14–18)
MCH RBC QN AUTO: 33.2 PG (ref 27–33)
MCHC RBC AUTO-ENTMCNC: 34.3 G/DL (ref 32.3–36.5)
MCV RBC AUTO: 96.7 FL (ref 81.4–97.8)
PLATELET # BLD AUTO: 238 K/UL (ref 164–446)
PMV BLD AUTO: 10.5 FL (ref 9–12.9)
POTASSIUM SERPL-SCNC: 4.4 MMOL/L (ref 3.6–5.5)
RBC # BLD AUTO: 4.49 M/UL (ref 4.7–6.1)
SODIUM SERPL-SCNC: 141 MMOL/L (ref 135–145)
WBC # BLD AUTO: 5.8 K/UL (ref 4.8–10.8)

## 2024-12-27 PROCEDURE — 36415 COLL VENOUS BLD VENIPUNCTURE: CPT

## 2024-12-27 PROCEDURE — 80048 BASIC METABOLIC PNL TOTAL CA: CPT

## 2024-12-27 PROCEDURE — 83036 HEMOGLOBIN GLYCOSYLATED A1C: CPT

## 2024-12-27 PROCEDURE — 93010 ELECTROCARDIOGRAM REPORT: CPT | Performed by: INTERNAL MEDICINE

## 2024-12-27 PROCEDURE — 85027 COMPLETE CBC AUTOMATED: CPT

## 2024-12-27 PROCEDURE — 93005 ELECTROCARDIOGRAM TRACING: CPT | Mod: TC

## 2025-01-07 ENCOUNTER — ANESTHESIA (OUTPATIENT)
Dept: SURGERY | Facility: MEDICAL CENTER | Age: 70
DRG: 331 | End: 2025-01-07
Payer: COMMERCIAL

## 2025-01-07 ENCOUNTER — ANESTHESIA EVENT (OUTPATIENT)
Dept: SURGERY | Facility: MEDICAL CENTER | Age: 70
DRG: 331 | End: 2025-01-07
Payer: COMMERCIAL

## 2025-01-07 ENCOUNTER — HOSPITAL ENCOUNTER (INPATIENT)
Facility: MEDICAL CENTER | Age: 70
LOS: 2 days | DRG: 331 | End: 2025-01-09
Attending: SURGERY | Admitting: SURGERY
Payer: COMMERCIAL

## 2025-01-07 DIAGNOSIS — G89.18 POSTOPERATIVE PAIN: ICD-10-CM

## 2025-01-07 DIAGNOSIS — C18.7 MALIGNANT NEOPLASM OF SIGMOID COLON (HCC): Primary | ICD-10-CM

## 2025-01-07 LAB
GLUCOSE BLD STRIP.AUTO-MCNC: 116 MG/DL (ref 65–99)
PATHOLOGY CONSULT NOTE: NORMAL

## 2025-01-07 PROCEDURE — 64488 TAP BLOCK BI INJECTION: CPT | Performed by: SURGERY

## 2025-01-07 PROCEDURE — 700105 HCHG RX REV CODE 258: Performed by: ANESTHESIOLOGY

## 2025-01-07 PROCEDURE — 3E0T3BZ INTRODUCTION OF ANESTHETIC AGENT INTO PERIPHERAL NERVES AND PLEXI, PERCUTANEOUS APPROACH: ICD-10-PCS | Performed by: ANESTHESIOLOGY

## 2025-01-07 PROCEDURE — 502714 HCHG ROBOTIC SURGERY SERVICES: Performed by: SURGERY

## 2025-01-07 PROCEDURE — 88304 TISSUE EXAM BY PATHOLOGIST: CPT | Mod: 26 | Performed by: PATHOLOGY

## 2025-01-07 PROCEDURE — 88309 TISSUE EXAM BY PATHOLOGIST: CPT | Mod: 26 | Performed by: PATHOLOGY

## 2025-01-07 PROCEDURE — 44207 L COLECTOMY/COLOPROCTOSTOMY: CPT | Performed by: SURGERY

## 2025-01-07 PROCEDURE — 88304 TISSUE EXAM BY PATHOLOGIST: CPT

## 2025-01-07 PROCEDURE — A9270 NON-COVERED ITEM OR SERVICE: HCPCS | Performed by: ANESTHESIOLOGY

## 2025-01-07 PROCEDURE — 160031 HCHG SURGERY MINUTES - 1ST 30 MINS LEVEL 5: Performed by: SURGERY

## 2025-01-07 PROCEDURE — 700111 HCHG RX REV CODE 636 W/ 250 OVERRIDE (IP): Performed by: ANESTHESIOLOGY

## 2025-01-07 PROCEDURE — 700101 HCHG RX REV CODE 250: Performed by: ANESTHESIOLOGY

## 2025-01-07 PROCEDURE — 0DBP4ZZ EXCISION OF RECTUM, PERCUTANEOUS ENDOSCOPIC APPROACH: ICD-10-PCS | Performed by: SURGERY

## 2025-01-07 PROCEDURE — 88309 TISSUE EXAM BY PATHOLOGIST: CPT

## 2025-01-07 PROCEDURE — 700111 HCHG RX REV CODE 636 W/ 250 OVERRIDE (IP): Mod: JZ | Performed by: ANESTHESIOLOGY

## 2025-01-07 PROCEDURE — 700105 HCHG RX REV CODE 258: Performed by: SURGERY

## 2025-01-07 PROCEDURE — 160042 HCHG SURGERY MINUTES - EA ADDL 1 MIN LEVEL 5: Performed by: SURGERY

## 2025-01-07 PROCEDURE — 160048 HCHG OR STATISTICAL LEVEL 1-5: Performed by: SURGERY

## 2025-01-07 PROCEDURE — 0DBN4ZZ EXCISION OF SIGMOID COLON, PERCUTANEOUS ENDOSCOPIC APPROACH: ICD-10-PCS | Performed by: SURGERY

## 2025-01-07 PROCEDURE — 700102 HCHG RX REV CODE 250 W/ 637 OVERRIDE(OP): Performed by: SURGERY

## 2025-01-07 PROCEDURE — 82962 GLUCOSE BLOOD TEST: CPT

## 2025-01-07 PROCEDURE — 160035 HCHG PACU - 1ST 60 MINS PHASE I: Performed by: SURGERY

## 2025-01-07 PROCEDURE — 8E0W4CZ ROBOTIC ASSISTED PROCEDURE OF TRUNK REGION, PERCUTANEOUS ENDOSCOPIC APPROACH: ICD-10-PCS | Performed by: SURGERY

## 2025-01-07 PROCEDURE — 0DJD8ZZ INSPECTION OF LOWER INTESTINAL TRACT, VIA NATURAL OR ARTIFICIAL OPENING ENDOSCOPIC: ICD-10-PCS | Performed by: SURGERY

## 2025-01-07 PROCEDURE — A9270 NON-COVERED ITEM OR SERVICE: HCPCS | Performed by: SURGERY

## 2025-01-07 PROCEDURE — 770001 HCHG ROOM/CARE - MED/SURG/GYN PRIV*

## 2025-01-07 PROCEDURE — 160009 HCHG ANES TIME/MIN: Performed by: SURGERY

## 2025-01-07 PROCEDURE — 160002 HCHG RECOVERY MINUTES (STAT): Performed by: SURGERY

## 2025-01-07 PROCEDURE — 700102 HCHG RX REV CODE 250 W/ 637 OVERRIDE(OP): Performed by: ANESTHESIOLOGY

## 2025-01-07 PROCEDURE — 45330 DIAGNOSTIC SIGMOIDOSCOPY: CPT | Mod: 51 | Performed by: SURGERY

## 2025-01-07 RX ORDER — DIPHENHYDRAMINE HYDROCHLORIDE 50 MG/ML
25 INJECTION INTRAMUSCULAR; INTRAVENOUS EVERY 6 HOURS PRN
Status: DISCONTINUED | OUTPATIENT
Start: 2025-01-07 | End: 2025-01-09 | Stop reason: HOSPADM

## 2025-01-07 RX ORDER — HYDROMORPHONE HYDROCHLORIDE 1 MG/ML
0.4 INJECTION, SOLUTION INTRAMUSCULAR; INTRAVENOUS; SUBCUTANEOUS
Status: DISCONTINUED | OUTPATIENT
Start: 2025-01-07 | End: 2025-01-07 | Stop reason: HOSPADM

## 2025-01-07 RX ORDER — SODIUM CHLORIDE, SODIUM LACTATE, POTASSIUM CHLORIDE, CALCIUM CHLORIDE 600; 310; 30; 20 MG/100ML; MG/100ML; MG/100ML; MG/100ML
INJECTION, SOLUTION INTRAVENOUS
Status: DISCONTINUED | OUTPATIENT
Start: 2025-01-07 | End: 2025-01-07 | Stop reason: SURG

## 2025-01-07 RX ORDER — METHOCARBAMOL 100 MG/ML
1000 INJECTION, SOLUTION INTRAMUSCULAR; INTRAVENOUS ONCE
Status: COMPLETED | OUTPATIENT
Start: 2025-01-07 | End: 2025-01-07

## 2025-01-07 RX ORDER — OXYCODONE HYDROCHLORIDE 5 MG/1
5 TABLET ORAL
Status: DISCONTINUED | OUTPATIENT
Start: 2025-01-07 | End: 2025-01-09 | Stop reason: HOSPADM

## 2025-01-07 RX ORDER — TRAZODONE HYDROCHLORIDE 50 MG/1
50 TABLET, FILM COATED ORAL NIGHTLY PRN
Status: DISCONTINUED | OUTPATIENT
Start: 2025-01-07 | End: 2025-01-09 | Stop reason: HOSPADM

## 2025-01-07 RX ORDER — HYDROMORPHONE HYDROCHLORIDE 1 MG/ML
0.25 INJECTION, SOLUTION INTRAMUSCULAR; INTRAVENOUS; SUBCUTANEOUS
Status: DISCONTINUED | OUTPATIENT
Start: 2025-01-07 | End: 2025-01-09 | Stop reason: HOSPADM

## 2025-01-07 RX ORDER — CELECOXIB 200 MG/1
200 CAPSULE ORAL 2 TIMES DAILY PRN
Status: DISCONTINUED | OUTPATIENT
Start: 2025-01-12 | End: 2025-01-09 | Stop reason: HOSPADM

## 2025-01-07 RX ORDER — ONDANSETRON 2 MG/ML
4 INJECTION INTRAMUSCULAR; INTRAVENOUS EVERY 4 HOURS PRN
Status: DISCONTINUED | OUTPATIENT
Start: 2025-01-07 | End: 2025-01-09 | Stop reason: HOSPADM

## 2025-01-07 RX ORDER — MEPERIDINE HYDROCHLORIDE 25 MG/ML
12.5 INJECTION INTRAMUSCULAR; INTRAVENOUS; SUBCUTANEOUS
Status: DISCONTINUED | OUTPATIENT
Start: 2025-01-07 | End: 2025-01-07 | Stop reason: HOSPADM

## 2025-01-07 RX ORDER — METRONIDAZOLE 500 MG/1
500 TABLET ORAL 3 TIMES DAILY
Status: ON HOLD | COMMUNITY
End: 2025-01-09

## 2025-01-07 RX ORDER — HYDROMORPHONE HYDROCHLORIDE 1 MG/ML
0.1 INJECTION, SOLUTION INTRAMUSCULAR; INTRAVENOUS; SUBCUTANEOUS
Status: DISCONTINUED | OUTPATIENT
Start: 2025-01-07 | End: 2025-01-07 | Stop reason: HOSPADM

## 2025-01-07 RX ORDER — DEXTROSE MONOHYDRATE, SODIUM CHLORIDE, AND POTASSIUM CHLORIDE 50; 1.49; 4.5 G/1000ML; G/1000ML; G/1000ML
INJECTION, SOLUTION INTRAVENOUS CONTINUOUS
Status: DISCONTINUED | OUTPATIENT
Start: 2025-01-07 | End: 2025-01-07

## 2025-01-07 RX ORDER — HEPARIN SODIUM 1000 [USP'U]/ML
INJECTION, SOLUTION INTRAVENOUS; SUBCUTANEOUS PRN
Status: DISCONTINUED | OUTPATIENT
Start: 2025-01-07 | End: 2025-01-07 | Stop reason: SURG

## 2025-01-07 RX ORDER — ACETAMINOPHEN 500 MG
1000 TABLET ORAL ONCE
Status: DISCONTINUED | OUTPATIENT
Start: 2025-01-07 | End: 2025-01-07

## 2025-01-07 RX ORDER — SCOPOLAMINE 1 MG/3D
1 PATCH, EXTENDED RELEASE TRANSDERMAL
Status: DISCONTINUED | OUTPATIENT
Start: 2025-01-07 | End: 2025-01-09 | Stop reason: HOSPADM

## 2025-01-07 RX ORDER — OXYCODONE HCL 5 MG/5 ML
5 SOLUTION, ORAL ORAL
Status: COMPLETED | OUTPATIENT
Start: 2025-01-07 | End: 2025-01-07

## 2025-01-07 RX ORDER — CELECOXIB 200 MG/1
200 CAPSULE ORAL 2 TIMES DAILY
Status: DISCONTINUED | OUTPATIENT
Start: 2025-01-07 | End: 2025-01-09 | Stop reason: HOSPADM

## 2025-01-07 RX ORDER — METOPROLOL TARTRATE 25 MG/1
25 TABLET, FILM COATED ORAL 2 TIMES DAILY
Status: DISCONTINUED | OUTPATIENT
Start: 2025-01-07 | End: 2025-01-09 | Stop reason: HOSPADM

## 2025-01-07 RX ORDER — HALOPERIDOL 5 MG/ML
1 INJECTION INTRAMUSCULAR
Status: DISCONTINUED | OUTPATIENT
Start: 2025-01-07 | End: 2025-01-07 | Stop reason: HOSPADM

## 2025-01-07 RX ORDER — HALOPERIDOL 5 MG/ML
1 INJECTION INTRAMUSCULAR EVERY 6 HOURS PRN
Status: DISCONTINUED | OUTPATIENT
Start: 2025-01-07 | End: 2025-01-09 | Stop reason: HOSPADM

## 2025-01-07 RX ORDER — CELECOXIB 200 MG/1
200 CAPSULE ORAL ONCE
Status: DISCONTINUED | OUTPATIENT
Start: 2025-01-07 | End: 2025-01-07 | Stop reason: HOSPADM

## 2025-01-07 RX ORDER — HALOPERIDOL 5 MG/ML
1 INJECTION INTRAMUSCULAR
Status: COMPLETED | OUTPATIENT
Start: 2025-01-07 | End: 2025-01-07

## 2025-01-07 RX ORDER — ACETAMINOPHEN 500 MG
1000 TABLET ORAL EVERY 6 HOURS
Status: DISCONTINUED | OUTPATIENT
Start: 2025-01-07 | End: 2025-01-09 | Stop reason: HOSPADM

## 2025-01-07 RX ORDER — HYDROMORPHONE HYDROCHLORIDE 1 MG/ML
0.2 INJECTION, SOLUTION INTRAMUSCULAR; INTRAVENOUS; SUBCUTANEOUS
Status: DISCONTINUED | OUTPATIENT
Start: 2025-01-07 | End: 2025-01-07 | Stop reason: HOSPADM

## 2025-01-07 RX ORDER — ENOXAPARIN SODIUM 100 MG/ML
40 INJECTION SUBCUTANEOUS DAILY
Status: DISCONTINUED | OUTPATIENT
Start: 2025-01-08 | End: 2025-01-09 | Stop reason: HOSPADM

## 2025-01-07 RX ORDER — DIPHENHYDRAMINE HCL 25 MG
25 TABLET ORAL EVERY 6 HOURS PRN
Status: DISCONTINUED | OUTPATIENT
Start: 2025-01-07 | End: 2025-01-09 | Stop reason: HOSPADM

## 2025-01-07 RX ORDER — LIDOCAINE HYDROCHLORIDE 20 MG/ML
INJECTION, SOLUTION EPIDURAL; INFILTRATION; INTRACAUDAL; PERINEURAL PRN
Status: DISCONTINUED | OUTPATIENT
Start: 2025-01-07 | End: 2025-01-07 | Stop reason: SURG

## 2025-01-07 RX ORDER — METRONIDAZOLE 500 MG/100ML
INJECTION, SOLUTION INTRAVENOUS PRN
Status: DISCONTINUED | OUTPATIENT
Start: 2025-01-07 | End: 2025-01-07 | Stop reason: SURG

## 2025-01-07 RX ORDER — SODIUM CHLORIDE, SODIUM LACTATE, POTASSIUM CHLORIDE, CALCIUM CHLORIDE 600; 310; 30; 20 MG/100ML; MG/100ML; MG/100ML; MG/100ML
INJECTION, SOLUTION INTRAVENOUS CONTINUOUS
Status: DISCONTINUED | OUTPATIENT
Start: 2025-01-07 | End: 2025-01-07 | Stop reason: HOSPADM

## 2025-01-07 RX ORDER — ONDANSETRON 2 MG/ML
INJECTION INTRAMUSCULAR; INTRAVENOUS PRN
Status: DISCONTINUED | OUTPATIENT
Start: 2025-01-07 | End: 2025-01-07 | Stop reason: SURG

## 2025-01-07 RX ORDER — SODIUM CHLORIDE 9 MG/ML
INJECTION, SOLUTION INTRAVENOUS CONTINUOUS
Status: ACTIVE | OUTPATIENT
Start: 2025-01-07 | End: 2025-01-07

## 2025-01-07 RX ORDER — BUPIVACAINE HYDROCHLORIDE 2.5 MG/ML
INJECTION, SOLUTION EPIDURAL; INFILTRATION; INTRACAUDAL PRN
Status: DISCONTINUED | OUTPATIENT
Start: 2025-01-07 | End: 2025-01-07 | Stop reason: SURG

## 2025-01-07 RX ORDER — CELECOXIB 200 MG/1
200 CAPSULE ORAL ONCE
Status: DISCONTINUED | OUTPATIENT
Start: 2025-01-07 | End: 2025-01-07

## 2025-01-07 RX ORDER — OXYCODONE HCL 5 MG/5 ML
10 SOLUTION, ORAL ORAL
Status: COMPLETED | OUTPATIENT
Start: 2025-01-07 | End: 2025-01-07

## 2025-01-07 RX ORDER — ACETAMINOPHEN 500 MG
1000 TABLET ORAL EVERY 6 HOURS PRN
Status: DISCONTINUED | OUTPATIENT
Start: 2025-01-12 | End: 2025-01-09 | Stop reason: HOSPADM

## 2025-01-07 RX ORDER — CEFAZOLIN SODIUM 1 G/3ML
INJECTION, POWDER, FOR SOLUTION INTRAMUSCULAR; INTRAVENOUS PRN
Status: DISCONTINUED | OUTPATIENT
Start: 2025-01-07 | End: 2025-01-07 | Stop reason: SURG

## 2025-01-07 RX ORDER — ROSUVASTATIN CALCIUM 20 MG/1
40 TABLET, COATED ORAL EVERY EVENING
Status: DISCONTINUED | OUTPATIENT
Start: 2025-01-07 | End: 2025-01-09 | Stop reason: HOSPADM

## 2025-01-07 RX ORDER — DEXAMETHASONE SODIUM PHOSPHATE 4 MG/ML
4 INJECTION, SOLUTION INTRA-ARTICULAR; INTRALESIONAL; INTRAMUSCULAR; INTRAVENOUS; SOFT TISSUE
Status: DISCONTINUED | OUTPATIENT
Start: 2025-01-07 | End: 2025-01-09 | Stop reason: HOSPADM

## 2025-01-07 RX ORDER — CALCIUM CARBONATE 500 MG/1
500 TABLET, CHEWABLE ORAL
Status: DISCONTINUED | OUTPATIENT
Start: 2025-01-07 | End: 2025-01-09 | Stop reason: HOSPADM

## 2025-01-07 RX ORDER — OXYCODONE HYDROCHLORIDE 5 MG/1
2.5 TABLET ORAL
Status: DISCONTINUED | OUTPATIENT
Start: 2025-01-07 | End: 2025-01-09 | Stop reason: HOSPADM

## 2025-01-07 RX ORDER — ACETAMINOPHEN 500 MG
1000 TABLET ORAL ONCE
Status: DISCONTINUED | OUTPATIENT
Start: 2025-01-07 | End: 2025-01-07 | Stop reason: HOSPADM

## 2025-01-07 RX ORDER — SODIUM CHLORIDE, SODIUM LACTATE, POTASSIUM CHLORIDE, CALCIUM CHLORIDE 600; 310; 30; 20 MG/100ML; MG/100ML; MG/100ML; MG/100ML
INJECTION, SOLUTION INTRAVENOUS CONTINUOUS
Status: ACTIVE | OUTPATIENT
Start: 2025-01-07 | End: 2025-01-07

## 2025-01-07 RX ORDER — DEXAMETHASONE SODIUM PHOSPHATE 4 MG/ML
INJECTION, SOLUTION INTRA-ARTICULAR; INTRALESIONAL; INTRAMUSCULAR; INTRAVENOUS; SOFT TISSUE PRN
Status: DISCONTINUED | OUTPATIENT
Start: 2025-01-07 | End: 2025-01-07 | Stop reason: SURG

## 2025-01-07 RX ORDER — ONDANSETRON 2 MG/ML
4 INJECTION INTRAMUSCULAR; INTRAVENOUS
Status: COMPLETED | OUTPATIENT
Start: 2025-01-07 | End: 2025-01-07

## 2025-01-07 RX ORDER — ROCURONIUM BROMIDE 10 MG/ML
INJECTION, SOLUTION INTRAVENOUS PRN
Status: DISCONTINUED | OUTPATIENT
Start: 2025-01-07 | End: 2025-01-07 | Stop reason: SURG

## 2025-01-07 RX ADMIN — METHOCARBAMOL 1000 MG: 100 INJECTION, SOLUTION INTRAMUSCULAR; INTRAVENOUS at 12:07

## 2025-01-07 RX ADMIN — HALOPERIDOL LACTATE 1 MG: 5 INJECTION, SOLUTION INTRAMUSCULAR at 12:44

## 2025-01-07 RX ADMIN — HYDROMORPHONE HYDROCHLORIDE 0.4 MG: 1 INJECTION, SOLUTION INTRAMUSCULAR; INTRAVENOUS; SUBCUTANEOUS at 11:22

## 2025-01-07 RX ADMIN — FENTANYL CITRATE 50 MCG: 50 INJECTION, SOLUTION INTRAMUSCULAR; INTRAVENOUS at 13:18

## 2025-01-07 RX ADMIN — OXYCODONE HYDROCHLORIDE 10 MG: 5 SOLUTION ORAL at 10:56

## 2025-01-07 RX ADMIN — MEPERIDINE HYDROCHLORIDE 12.5 MG: 25 INJECTION INTRAMUSCULAR; INTRAVENOUS; SUBCUTANEOUS at 10:55

## 2025-01-07 RX ADMIN — SODIUM CHLORIDE: 9 INJECTION, SOLUTION INTRAVENOUS at 14:46

## 2025-01-07 RX ADMIN — LIDOCAINE HYDROCHLORIDE 100 MG: 20 INJECTION, SOLUTION EPIDURAL; INFILTRATION; INTRACAUDAL; PERINEURAL at 08:48

## 2025-01-07 RX ADMIN — FENTANYL CITRATE 50 MCG: 50 INJECTION, SOLUTION INTRAMUSCULAR; INTRAVENOUS at 10:12

## 2025-01-07 RX ADMIN — ONDANSETRON 4 MG: 2 INJECTION INTRAMUSCULAR; INTRAVENOUS at 10:55

## 2025-01-07 RX ADMIN — HYDROMORPHONE HYDROCHLORIDE 0.4 MG: 1 INJECTION, SOLUTION INTRAMUSCULAR; INTRAVENOUS; SUBCUTANEOUS at 11:27

## 2025-01-07 RX ADMIN — BUPIVACAINE HYDROCHLORIDE 30 ML: 2.5 INJECTION, SOLUTION EPIDURAL; INFILTRATION; INTRACAUDAL at 08:55

## 2025-01-07 RX ADMIN — ROCURONIUM BROMIDE 50 MG: 50 INJECTION, SOLUTION INTRAVENOUS at 08:48

## 2025-01-07 RX ADMIN — HYDROMORPHONE HYDROCHLORIDE 0.2 MG: 1 INJECTION, SOLUTION INTRAMUSCULAR; INTRAVENOUS; SUBCUTANEOUS at 12:22

## 2025-01-07 RX ADMIN — FENTANYL CITRATE 50 MCG: 50 INJECTION, SOLUTION INTRAMUSCULAR; INTRAVENOUS at 09:06

## 2025-01-07 RX ADMIN — FENTANYL CITRATE 50 MCG: 50 INJECTION, SOLUTION INTRAMUSCULAR; INTRAVENOUS at 13:05

## 2025-01-07 RX ADMIN — ROCURONIUM BROMIDE 30 MG: 50 INJECTION, SOLUTION INTRAVENOUS at 09:41

## 2025-01-07 RX ADMIN — SUGAMMADEX 200 MG: 100 INJECTION, SOLUTION INTRAVENOUS at 10:43

## 2025-01-07 RX ADMIN — HYDROMORPHONE HYDROCHLORIDE 0.2 MG: 1 INJECTION, SOLUTION INTRAMUSCULAR; INTRAVENOUS; SUBCUTANEOUS at 11:34

## 2025-01-07 RX ADMIN — HYDROMORPHONE HYDROCHLORIDE 0.4 MG: 1 INJECTION, SOLUTION INTRAMUSCULAR; INTRAVENOUS; SUBCUTANEOUS at 11:42

## 2025-01-07 RX ADMIN — CEFAZOLIN 2 G: 1 INJECTION, POWDER, FOR SOLUTION INTRAMUSCULAR; INTRAVENOUS at 08:44

## 2025-01-07 RX ADMIN — ROCURONIUM BROMIDE 20 MG: 50 INJECTION, SOLUTION INTRAVENOUS at 10:15

## 2025-01-07 RX ADMIN — FENTANYL CITRATE 50 MCG: 50 INJECTION, SOLUTION INTRAMUSCULAR; INTRAVENOUS at 09:41

## 2025-01-07 RX ADMIN — HEPARIN SODIUM 5000 UNITS: 1000 INJECTION, SOLUTION INTRAVENOUS; SUBCUTANEOUS at 08:48

## 2025-01-07 RX ADMIN — PROPOFOL 150 MG: 10 INJECTION, EMULSION INTRAVENOUS at 08:48

## 2025-01-07 RX ADMIN — FENTANYL CITRATE 50 MCG: 50 INJECTION, SOLUTION INTRAMUSCULAR; INTRAVENOUS at 11:15

## 2025-01-07 RX ADMIN — METRONIDAZOLE 500 MG: 5 INJECTION, SOLUTION INTRAVENOUS at 08:51

## 2025-01-07 RX ADMIN — HALOPERIDOL LACTATE 1 MG: 5 INJECTION, SOLUTION INTRAMUSCULAR at 11:51

## 2025-01-07 RX ADMIN — DEXAMETHASONE SODIUM PHOSPHATE 8 MG: 4 INJECTION INTRA-ARTICULAR; INTRALESIONAL; INTRAMUSCULAR; INTRAVENOUS; SOFT TISSUE at 08:48

## 2025-01-07 RX ADMIN — HYDROMORPHONE HYDROCHLORIDE 0.4 MG: 1 INJECTION, SOLUTION INTRAMUSCULAR; INTRAVENOUS; SUBCUTANEOUS at 11:55

## 2025-01-07 RX ADMIN — MEPERIDINE HYDROCHLORIDE 12.5 MG: 25 INJECTION INTRAMUSCULAR; INTRAVENOUS; SUBCUTANEOUS at 11:01

## 2025-01-07 RX ADMIN — OXYCODONE 5 MG: 5 TABLET ORAL at 14:44

## 2025-01-07 RX ADMIN — FENTANYL CITRATE 50 MCG: 50 INJECTION, SOLUTION INTRAMUSCULAR; INTRAVENOUS at 10:58

## 2025-01-07 RX ADMIN — FENTANYL CITRATE 50 MCG: 50 INJECTION, SOLUTION INTRAMUSCULAR; INTRAVENOUS at 10:32

## 2025-01-07 RX ADMIN — ONDANSETRON 4 MG: 2 INJECTION INTRAMUSCULAR; INTRAVENOUS at 10:32

## 2025-01-07 RX ADMIN — SODIUM CHLORIDE, POTASSIUM CHLORIDE, SODIUM LACTATE AND CALCIUM CHLORIDE: 600; 310; 30; 20 INJECTION, SOLUTION INTRAVENOUS at 08:44

## 2025-01-07 SDOH — ECONOMIC STABILITY: TRANSPORTATION INSECURITY
IN THE PAST 12 MONTHS, HAS LACK OF RELIABLE TRANSPORTATION KEPT YOU FROM MEDICAL APPOINTMENTS, MEETINGS, WORK OR FROM GETTING THINGS NEEDED FOR DAILY LIVING?: NO

## 2025-01-07 SDOH — ECONOMIC STABILITY: TRANSPORTATION INSECURITY
IN THE PAST 12 MONTHS, HAS THE LACK OF TRANSPORTATION KEPT YOU FROM MEDICAL APPOINTMENTS OR FROM GETTING MEDICATIONS?: NO

## 2025-01-07 ASSESSMENT — PAIN DESCRIPTION - PAIN TYPE
TYPE: SURGICAL PAIN
TYPE: SURGICAL PAIN
TYPE: ACUTE PAIN
TYPE: SURGICAL PAIN
TYPE: ACUTE PAIN
TYPE: SURGICAL PAIN

## 2025-01-07 ASSESSMENT — COGNITIVE AND FUNCTIONAL STATUS - GENERAL
DAILY ACTIVITIY SCORE: 24
SUGGESTED CMS G CODE MODIFIER MOBILITY: CH
SUGGESTED CMS G CODE MODIFIER DAILY ACTIVITY: CH
MOBILITY SCORE: 24

## 2025-01-07 ASSESSMENT — LIFESTYLE VARIABLES
DOES PATIENT WANT TO STOP DRINKING: NO
HAVE YOU EVER FELT YOU SHOULD CUT DOWN ON YOUR DRINKING: NO
HOW MANY TIMES IN THE PAST YEAR HAVE YOU HAD 5 OR MORE DRINKS IN A DAY: 0
HAVE PEOPLE ANNOYED YOU BY CRITICIZING YOUR DRINKING: NO
TOTAL SCORE: 0
EVER FELT BAD OR GUILTY ABOUT YOUR DRINKING: NO
EVER HAD A DRINK FIRST THING IN THE MORNING TO STEADY YOUR NERVES TO GET RID OF A HANGOVER: NO
CONSUMPTION TOTAL: NEGATIVE
TOTAL SCORE: 0
AVERAGE NUMBER OF DAYS PER WEEK YOU HAVE A DRINK CONTAINING ALCOHOL: 0
ALCOHOL_USE: NO
TOTAL SCORE: 0
ON A TYPICAL DAY WHEN YOU DRINK ALCOHOL HOW MANY DRINKS DO YOU HAVE: 0

## 2025-01-07 ASSESSMENT — SOCIAL DETERMINANTS OF HEALTH (SDOH)
IN THE PAST 12 MONTHS, HAS THE ELECTRIC, GAS, OIL, OR WATER COMPANY THREATENED TO SHUT OFF SERVICE IN YOUR HOME?: NO
WITHIN THE LAST YEAR, HAVE YOU BEEN KICKED, HIT, SLAPPED, OR OTHERWISE PHYSICALLY HURT BY YOUR PARTNER OR EX-PARTNER?: NO
WITHIN THE LAST YEAR, HAVE YOU BEEN AFRAID OF YOUR PARTNER OR EX-PARTNER?: NO
WITHIN THE PAST 12 MONTHS, YOU WORRIED THAT YOUR FOOD WOULD RUN OUT BEFORE YOU GOT THE MONEY TO BUY MORE: NEVER TRUE
WITHIN THE LAST YEAR, HAVE TO BEEN RAPED OR FORCED TO HAVE ANY KIND OF SEXUAL ACTIVITY BY YOUR PARTNER OR EX-PARTNER?: NO
WITHIN THE LAST YEAR, HAVE YOU BEEN HUMILIATED OR EMOTIONALLY ABUSED IN OTHER WAYS BY YOUR PARTNER OR EX-PARTNER?: NO
WITHIN THE PAST 12 MONTHS, THE FOOD YOU BOUGHT JUST DIDN'T LAST AND YOU DIDN'T HAVE MONEY TO GET MORE: NEVER TRUE

## 2025-01-07 ASSESSMENT — PATIENT HEALTH QUESTIONNAIRE - PHQ9
SUM OF ALL RESPONSES TO PHQ9 QUESTIONS 1 AND 2: 0
2. FEELING DOWN, DEPRESSED, IRRITABLE, OR HOPELESS: NOT AT ALL
1. LITTLE INTEREST OR PLEASURE IN DOING THINGS: NOT AT ALL

## 2025-01-07 ASSESSMENT — FIBROSIS 4 INDEX: FIB4 SCORE: 1.46

## 2025-01-07 NOTE — CARE PLAN
The patient is Stable - Low risk of patient condition declining or worsening    Shift Goals  Clinical Goals: pain management, ERAS, monitor vitals  Patient Goals: sleep, pain control  Family Goals: updates    Progress made toward(s) clinical / shift goals:      Oriented patient to unit. ERAS protocol initiated. IV fluids administered per MAR. Patient's pain managed with scheduled and PRN medications per MAR. Patient slept intermittently throughout shift.      Problem: Pain - Standard  Goal: Alleviation of pain or a reduction in pain to the patient’s comfort goal  Outcome: Progressing     Problem: Knowledge Deficit - Standard  Goal: Patient and family/care givers will demonstrate understanding of plan of care, disease process/condition, diagnostic tests and medications  Outcome: Progressing     Problem: Hemodynamics  Goal: Patient's hemodynamics, fluid balance and neurologic status will be stable or improve  Outcome: Progressing

## 2025-01-07 NOTE — PROGRESS NOTES
Pt admitted to room T424 via transport in hospital bed from PACU at 1350. Report received from RUBY Hughes.      Patient reports pain at 8 on a scale of 0-10. Educated patient regarding pharmacologic and non pharmacologic modalities for pain management. Oriented to room call light and smoking policy.  Reviewed plan of care (equipment, incentive spirometer, sequential compression devices, medications, activity, diet, fall precautions, skin care, and pain) with patient and family. Welcome packet given and reviewed with patient, all questions answered. Education provided on oral hygiene program.     AA&Ox4. Denies CP/SOB.  See 2 RN skin note  Tolerating regular diet. Denies N/V.  - void post south removal - BM. Last BM 1/6 PTA  Pt ambulates x1    All needs met at this time. Call light within reach. Pt calls appropriately. Bed low and locked, non skid socks in place. Hourly rounding in place.

## 2025-01-07 NOTE — ANESTHESIA PROCEDURE NOTES
Peripheral Block    Date/Time: 1/7/2025 8:52 AM    Performed by: Tobey Gansert, M.D.  Authorized by: Tobey Gansert, M.D.    Patient Location:  OR  Start Time:  1/7/2025 8:52 AM  End Time:  1/7/2025 8:57 AM  Reason for Block: at surgeon's request and post-op pain management ONLY    patient identified, IV checked, site marked, risks and benefits discussed, surgical consent, monitors and equipment checked, pre-op evaluation and timeout performed    Patient Position:  Supine  Prep: ChloraPrep    Monitoring:  Heart rate, continuous pulse ox and cardiac monitor  Block Region:  Trunk  Trunk - Block Type:  Abdominal plane block - TAP block    Laterality:  Bilateral  Procedures: ultrasound guided  Image captured, interpreted and electronically stored.  Local Infiltration:  Lidocaine  Strength:  1 %  Dose:  3 ml  Block Type:  Single-shot  Needle Length:  100mm  Needle Gauge:  21 G  Needle Localization:  Ultrasound guidance  Ultrasound picture in chart  Injection Assessment:  Negative aspiration for heme, no paresthesia on injection, incremental injection and local visualized surrounding nerve on ultrasound  Evidence of intravascular injection: No     US Guided Transversus Abdominis Plane (TAP) Block   US probe placed at the anterior axillary line between the costal margin and iliac crest in an axial plane. External Oblique, Internal Oblique (IO) and Transversis Abdominis (TA) muscles identified.  Needle inserted anteromedial to probe in an in plane approach and advanced under direct visualization to TAP between IO and TA muscled.  After negative aspiration LA injected with ease and visualized spreading in TAP plane.

## 2025-01-07 NOTE — PROGRESS NOTES
Medication history reviewed with PT at bedside    [facility] is complete per PT reporting    Allergies reviewed.     PT was on a one day pre-op dose of Metronidazole 500mg and Neomycin 1000mg. Last dose of each was 01/06/2025 at 2300.    Patient is not taking anticoagulants.    Preferred pharmacy for this visit - [facility] on [address] ([phone])

## 2025-01-07 NOTE — ANESTHESIA TIME REPORT
Anesthesia Start and Stop Event Times       Date Time Event    1/7/2025 0829 Ready for Procedure     0844 Anesthesia Start     1051 Anesthesia Stop          Responsible Staff  01/07/25      Name Role Begin End    Tobey Gansert, M.D. Anesth 0844 1051          Overtime Reason:  no overtime (within assigned shift)    Comments:

## 2025-01-07 NOTE — ANESTHESIA PROCEDURE NOTES
Airway    Date/Time: 1/7/2025 8:47 AM    Performed by: Tobey Gansert, M.D.  Authorized by: Tobey Gansert, M.D.    Location:  OR  Urgency:  Elective  Indications for Airway Management:  Anesthesia      Spontaneous Ventilation: absent    Sedation Level:  Deep  Preoxygenated: Yes    Patient Position:  Sniffing  Final Airway Type:  Endotracheal airway  Final Endotracheal Airway:  ETT  Cuffed: Yes    Technique Used for Successful ETT Placement:  Direct laryngoscopy    Insertion Site:  Oral  Blade Type:  Felicitas  Laryngoscope Blade/Videolaryngoscope Blade Size:  3  ETT Size (mm):  7.5  Measured from:  Teeth  ETT to Teeth (cm):  24  Placement Verified by: auscultation and capnometry    Cormack-Lehane Classification:  Grade IIa - partial view of glottis  Number of Attempts at Approach:  1

## 2025-01-07 NOTE — OP REPORT
Operative Report    Date: 1/7/2025    Surgeon: Heladio Bautista M.D.    Assistant: Rolf Hernandez MD    Anesthesiologist: Toby Gansert MD    Pre-operative Diagnosis:  Sigmoid colon cancer   Post-operative Diagnosis: Sigmoid colon cancer    Procedure:   1)  ROBOTIC ASSISTED LOW ANTERIOR RESECTION  2) flexible sigmoidoscopy    Clinical preamble:    This is a 69 y.o. male who presented with sigmoid colon cancer.       An extensive PARQ conference was held with the patient and his family, in regard to treatment and staging of colon cancer. The patient was made aware of the alternatives, including operative and non-operative: Expectant management. The risks of bleeding, infection, damage to surrounding structures, need for reoperation, leak, hernia, fistula, stroke, MI, and death were discussed with the patient. The patient was given a chance to ask questions, and all his questions were answered. The patient demonstrates adequate understanding, seems pleased with the plan, and wishes to proceed.    Procedure in detail: After informed consent was obtained, the patient was taken to the operating room, placed in supine position on a pink pad. The patient underwent general endotracheal anesthesia without incident.  The patient's arms were tucked and the chest and shoulders were padded and secured to the operating room table.   The patient was then moved to lithotomy in yellowfin stirrups with all skin and joint surfaces padded and protected appropriately.The rectum was washed out with Betadine and the abdomen was prepped and draped in a sterile fashion. A timeout was performed verifying the correct patient, procedure, site, positioning in availability of equipment prior to the start of surgery.    Operation was begun by placing a 5 mm periumbilical incision through which a 5 mm trocar was introduced into the abdomen using the Optiview technique. After pneumoperitoneum was achieved, additional trocars were placed, 15 mm in  the right lower quadrant and 8 mm in the left upper quadrant. An 8 and a 5 mm assistant port were placed and the camera port was upsized to an 8 mm trocar as well. All trocars were placed with TAP Block performed by anesthesia with ultrasound guidance.    The patient was positioned in steep Trendelenburg and right lateral decubitus and before the da Tru robotic system was docked the patient was noted to be securely  positioned on the table.  We took down the left lateral attachments of the sigmoid colon, identifying the left ureter which was preserved throughout the remainder of the procedure.  We then opened the retroperitoneal space in the right side and dissected in   the bloodless plane, isolated the SARAH pedicle away from the pelvic nerves and ureters. The SARAH was then divided near its origin with a robotic stapler with a vascular load. We then used electrocautery to mobilize the left colon up dissecting free from the spleen and also splenic flexure attachments including the gastrocolic ligament, freeing the entire splenic flexure, taking down omental attachments and adhesions and producing adequate length to reach the pelvis for anastomosis.    Electrocautery was now used in the presacral space in the bloodless plane. Dissection was carried down and left and right laterally, freeing up the rectum. We chose a spot of healthy rectum, divided the mesorectum with a vessel sealer at this level and then divided the rectum itself with a green load robotic staple fire.    Indocyanine green immunofluorescent dye was given and the Firefly system used to confirm excellent blood flow to the planned anastomotic site in the rectum and descending colon.     We made an extraction incision in the left lower quadrant, carried down with electrocautery to the level of the fascia. The anterior fascia was opened, the muscles retracted medially and the posterior fascia opened as well. Wound retractor was secured into position and  the specimen delivered out onto the operative field. We divided the mesentery with 0 Vicryl ties between Sanches clamps and then divided the colon itself with a Furness clamp. A 29 EEA anvil was loaded, tied down and washed with Betadine. This was reduced back in the abdominal cavity. A peon clamp was then used  To make the wound retractor airtight and with pneumoperitoneum re-achieved, we inserted a 29 EEA stapler into the patient's rectum. Irvin was deployed and an end-to-end anastomosis carried out with hemostasis. A generously lubricated flexible sigmoidoscope was inserted into the patient's anus and advanced into the patient's rectum.  The anastomosis and proximal colon were visualized and no bleeding or stricture was identified.Insufflation test was performed and noted to have no demonstrated air leak.  We observed hemostasis throughout the operative field, closed the 12 mm trocar site with an Endoclose device. Pneumoperitoneum was reduced and all trocars   were removed.    At this point numbers of the operating team changed into clean gown and gloves and all of the instruments used in the previous portions of the procedure were moved away from the operating field.  Instruments which had not been previously detached during  The case were now used  For the remainder of the procedure.  The extraction site was closed in 2 layers with running 0 PDS sutures in the posterior and anterior rectus sheaths.   Skin incision was closed with 4-0 Monocryl. Dermabond was placed and the patient returned to the PACU in stable condition. All instruments counts were correct at the end of the procedureThe patient was awakened from general anesthetic, and was taken to the recovery room in stable condition.    Specimen: Sigmoid colon and rectum  Anastomotic donuts  EBL: 100mL    UOP: See anesthetic record    Drains: None    Dispo: PACU    Colon Resection  Operation performed with curative intent Yes   Tumor Location Sigmoid colon    Extent of colon and vascular resection Sigmoid resection - inferior mesenteric      Heladio Bautista MD PhD  [facility]  Colon and Rectal Surgeon  [phone]

## 2025-01-07 NOTE — ANESTHESIA POSTPROCEDURE EVALUATION
Patient: Talib Ortiz    Procedure Summary       Date: 01/07/25 Room / Location: [place] [address] / SURGERY [facility]    Anesthesia Start: 0844 Anesthesia Stop: 1051    Procedures:       ROBOTIC LOW ANTERIOR RESECTION WITH FLEXIBLE SIGMOIDOSCOPY (Abdomen)      SIGMOIDOSCOPY, FLEXIBLE (Anus) Diagnosis: (sigmoid colon cancer)    Surgeons: Heladio Bautista M.D. Responsible Provider: Tobey Gansert, M.D.    Anesthesia Type: general, peripheral nerve block ASA Status: 3            Final Anesthesia Type: general, peripheral nerve block  Last vitals  BP   Blood Pressure : 121/68    Temp   36.3 °C (97.4 °F)    Pulse   81   Resp   16    SpO2   98 %      Anesthesia Post Evaluation    Patient location during evaluation: PACU  Patient participation: complete - patient participated  Level of consciousness: awake and alert    Airway patency: patent  Anesthetic complications: no  Cardiovascular status: hemodynamically stable  Respiratory status: acceptable  Hydration status: euvolemic    PONV: none          No notable events documented.     Nurse Pain Score: 7 (NPRS)

## 2025-01-07 NOTE — OR NURSING
Patient arrived in PACU, VSS. Abdominal incisions, 3 lap sites and one longer incision, closed with derma bond, CDI. Patient medicated for pain and nausea per MAR.   Removed south catheter per Dr. Bautista verbal order.   Updated wife, Johana, of POC. Called report to  RUBY Sánchez. Transported to T424-2 on 2LO2 with belongings. Belongings include bag of shoes, blue suitcase, and glasses.

## 2025-01-07 NOTE — PROGRESS NOTES
4 Eyes Skin Assessment Completed by Princess RN and Bessy RN.    Head WDL  Ears WDL  Nose WDL  Mouth WDL  Neck WDL  Breast/Chest WDL  Shoulder Blades WDL  Spine WDL  (R) Arm/Elbow/Hand WDL  (L) Arm/Elbow/Hand Scab  Abdomen x4 lap sites, x1 transverse incision, dermabond ZOE  Groin WDL  Scrotum/Coccyx/Buttocks WDL  (R) Leg WDL  (L) Leg WDL  (R) Heel/Foot/Toe callus  (L) Heel/Foot/Toe callus          Devices In Places Blood Pressure Cuff, Pulse Ox, SCD's, and Oxy Mask      Interventions In Place Pillows and Heels Loaded W/Pillows    Possible Skin Injury No    Pictures Uploaded Into Epic N/A  Wound Consult Placed N/A  RN Wound Prevention Protocol Ordered No

## 2025-01-07 NOTE — ANESTHESIA PREPROCEDURE EVALUATION
Case: [id number] Date/Time: 01/07/25 0845    Procedures:       ROBOTIC LOW ANTERIOR RESECTION WITH FLEXIBLE SIGMOIDOSCOPY      SIGMOIDOSCOPY, FLEXIBLE    Pre-op diagnosis: MALIGNANT NEOPLASM OF SIGMOID COLON    Location: [facility] OR 15 / SURGERY [facility]    Surgeons: Heladio Bautista M.D.            Relevant Problems   CARDIAC   (positive) Coronary artery disease due to calcified coronary lesion: CABG ×2 in August 2016   (positive) Essential hypertension, benign      GI   (positive) Gastroesophageal reflux disease without esophagitis   (positive) Hiatal hernia with GERD      Other   (positive) Mass of colon       Physical Exam    Airway   Mallampati: II  TM distance: >3 FB  Neck ROM: full       Cardiovascular - normal exam  Rhythm: regular  Rate: normal  (-) murmur     Dental - normal exam           Pulmonary - normal exam  Breath sounds clear to auscultation     Abdominal    Neurological - normal exam                   Anesthesia Plan    ASA 3   ASA physical status 3 criteria: CAD/stents (> 3 months)    Plan - general and peripheral nerve block     Peripheral nerve block will be post-op pain control  Airway plan will be ETT          Induction: intravenous    Postoperative Plan: Postoperative administration of opioids is intended.    Pertinent diagnostic labs and testing reviewed    Informed Consent:    Anesthetic plan and risks discussed with patient.    Use of blood products discussed with: patient whom consented to blood products.

## 2025-01-08 ENCOUNTER — PATIENT OUTREACH (OUTPATIENT)
Dept: ONCOLOGY | Facility: MEDICAL CENTER | Age: 70
End: 2025-01-08
Payer: COMMERCIAL

## 2025-01-08 LAB
ANION GAP SERPL CALC-SCNC: 11 MMOL/L (ref 7–16)
BUN SERPL-MCNC: 16 MG/DL (ref 8–22)
CALCIUM SERPL-MCNC: 9.3 MG/DL (ref 8.5–10.5)
CHLORIDE SERPL-SCNC: 104 MMOL/L (ref 96–112)
CO2 SERPL-SCNC: 22 MMOL/L (ref 20–33)
CREAT SERPL-MCNC: 1.11 MG/DL (ref 0.5–1.4)
ERYTHROCYTE [DISTWIDTH] IN BLOOD BY AUTOMATED COUNT: 45.2 FL (ref 35.9–50)
GFR SERPLBLD CREATININE-BSD FMLA CKD-EPI: 72 ML/MIN/1.73 M 2
GLUCOSE SERPL-MCNC: 130 MG/DL (ref 65–99)
HCT VFR BLD AUTO: 40.7 % (ref 42–52)
HGB BLD-MCNC: 13.5 G/DL (ref 14–18)
MCH RBC QN AUTO: 32.2 PG (ref 27–33)
MCHC RBC AUTO-ENTMCNC: 33.2 G/DL (ref 32.3–36.5)
MCV RBC AUTO: 97.1 FL (ref 81.4–97.8)
PLATELET # BLD AUTO: 231 K/UL (ref 164–446)
PMV BLD AUTO: 10.3 FL (ref 9–12.9)
POTASSIUM SERPL-SCNC: 4.2 MMOL/L (ref 3.6–5.5)
RBC # BLD AUTO: 4.19 M/UL (ref 4.7–6.1)
SODIUM SERPL-SCNC: 137 MMOL/L (ref 135–145)
WBC # BLD AUTO: 12.3 K/UL (ref 4.8–10.8)

## 2025-01-08 PROCEDURE — 80048 BASIC METABOLIC PNL TOTAL CA: CPT

## 2025-01-08 PROCEDURE — 85027 COMPLETE CBC AUTOMATED: CPT

## 2025-01-08 PROCEDURE — 700111 HCHG RX REV CODE 636 W/ 250 OVERRIDE (IP): Mod: JZ | Performed by: SURGERY

## 2025-01-08 PROCEDURE — 99024 POSTOP FOLLOW-UP VISIT: CPT | Performed by: SURGERY

## 2025-01-08 PROCEDURE — 700102 HCHG RX REV CODE 250 W/ 637 OVERRIDE(OP): Performed by: SURGERY

## 2025-01-08 PROCEDURE — 770001 HCHG ROOM/CARE - MED/SURG/GYN PRIV*

## 2025-01-08 PROCEDURE — A9270 NON-COVERED ITEM OR SERVICE: HCPCS | Performed by: SURGERY

## 2025-01-08 RX ADMIN — ROSUVASTATIN CALCIUM 40 MG: 20 TABLET, FILM COATED ORAL at 16:56

## 2025-01-08 RX ADMIN — CELECOXIB 200 MG: 200 CAPSULE ORAL at 05:03

## 2025-01-08 RX ADMIN — ACETAMINOPHEN 1000 MG: 500 TABLET ORAL at 05:03

## 2025-01-08 RX ADMIN — CELECOXIB 200 MG: 200 CAPSULE ORAL at 16:55

## 2025-01-08 RX ADMIN — OXYCODONE 5 MG: 5 TABLET ORAL at 05:11

## 2025-01-08 RX ADMIN — ENOXAPARIN SODIUM 40 MG: 100 INJECTION SUBCUTANEOUS at 17:00

## 2025-01-08 RX ADMIN — METOPROLOL TARTRATE 25 MG: 25 TABLET, FILM COATED ORAL at 05:02

## 2025-01-08 RX ADMIN — ACETAMINOPHEN 1000 MG: 500 TABLET ORAL at 00:36

## 2025-01-08 RX ADMIN — METOPROLOL TARTRATE 25 MG: 25 TABLET, FILM COATED ORAL at 16:55

## 2025-01-08 RX ADMIN — ACETAMINOPHEN 1000 MG: 500 TABLET ORAL at 23:38

## 2025-01-08 RX ADMIN — ACETAMINOPHEN 1000 MG: 500 TABLET ORAL at 11:37

## 2025-01-08 RX ADMIN — OXYCODONE 5 MG: 5 TABLET ORAL at 23:40

## 2025-01-08 RX ADMIN — ACETAMINOPHEN 1000 MG: 500 TABLET ORAL at 16:55

## 2025-01-08 ASSESSMENT — PAIN DESCRIPTION - PAIN TYPE
TYPE: ACUTE PAIN;SURGICAL PAIN
TYPE: ACUTE PAIN
TYPE: ACUTE PAIN
TYPE: ACUTE PAIN;SURGICAL PAIN
TYPE: ACUTE PAIN
TYPE: ACUTE PAIN
TYPE: ACUTE PAIN;SURGICAL PAIN

## 2025-01-08 NOTE — PROGRESS NOTES
Bedside report received from [facility] RN; assumed care. Assessment complete. A&O x 4. VSS. 95% on RA.. Patient denying SOB, numbness, tingling, nausea, vomiting, dizziness, pain medication needs.. Abdomen mildly distended, round. Hypoactive BS x 4 noted. Lap stabs approximated with dermabond, ZOE. + void; +eructation. + flatus. LBM PTA. Patient tolerating regular diet. Patient up SBA with steady gait noted. Patient assisted up to chair. Discussed plan of care with patient. All questions answered.  Low fall risk.. Bed in locked/lowest position.  Call light/personal belongings within reach.  All needs met, patient watching television at present time.

## 2025-01-08 NOTE — PROGRESS NOTES
Bedside report received.  Assessment complete.  A&O x 4. Patient calls appropriately.  Patient ambulates with stand by assist.Low fall risk  Patient has 3/10 pain. Patient declines intervention at this time  Denies N&V. Tolerating GI soft diet.  Surgical Lap sites to abdomen C/D/I  + void, - flatus, - BM.  Patient denies SOB.  SCD's on.  Review plan with of care with patient. Call light and personal belongings within reach. Hourly rounding in place. All needs met at this time.

## 2025-01-08 NOTE — PROGRESS NOTES
Oncology Nurse Navigation Assessment    Call placed to patient, introduced self and reviewed role as nurse navigator.    DX: Colon cancer     POC: Discharge and follow up with Oncology, s/p surgery follow up    FAMHX: Cancer-related family history includes Cancer in his paternal grandfather.      Patient's goals of care:       Limit delays to care     BARRIERS ASSESSMENT:    TRANSPORTATION: NO barriers   EMPLOYMENT:  retired   FINANCIAL:  NO barriers Identified   INSURANCE:  [employer]   SUPPORT SYSTEM:  Wife and daughter, supportive   PSYCHOLOGICAL: No barriers Identified   COMMUNICATION: NO barriers     FAMILY CARE:  NO barriers   SELF CARE:  Independent, no barriers          INTERVENTION:    Introduction of ONN services, follow up Letter sent via [employer].

## 2025-01-08 NOTE — CARE PLAN
The patient is Stable - Low risk of patient condition declining or worsening    Shift Goals  Clinical Goals: Pain/nausea control, diet tolerance, mobility, BM, POC  Patient Goals: Pain control, POC  Family Goals: updates    Progress made toward(s) clinical / shift goals:  Mild pain reported, Tylenol effective. Patient tolerating diet, small amounts being ingested. Patient ambulating around unit multiple laps and within room. Patient sitting up in chair. +flatus reported.     Patient is not progressing towards the following goals: NA.

## 2025-01-08 NOTE — CARE PLAN
Problem: Pain - Standard  Goal: Alleviation of pain or a reduction in pain to the patient’s comfort goal  Outcome: Progressing     Problem: Knowledge Deficit - Standard  Goal: Patient and family/care givers will demonstrate understanding of plan of care, disease process/condition, diagnostic tests and medications  Outcome: Progressing     Problem: Hemodynamics  Goal: Patient's hemodynamics, fluid balance and neurologic status will be stable or improve  Outcome: Progressing   The patient is Stable - Low risk of patient condition declining or worsening    Shift Goals  Clinical Goals: Pain management, tolerate diet  Patient Goals: comfort, ambulation  Family Goals: updates    Progress made toward(s) clinical / shift goals:  Patient pain 3/10 overnight. Tolerating diet, no nausea or vomiting. Patient is voiding in the urinal independently. No BM overnight.     Patient is not progressing towards the following goals:

## 2025-01-08 NOTE — LETTER
[facility] OVALLES [facility]    [facility]  [place] [address]  Phone: [phone] - Fax: [phone]                    01/08/25      Dear Talib,     Thank you for speaking with me today. I am a Cancer Nurse Navigator, a certified oncology nurse. My role is to assess any needs you may have with education, guidance and support. I am available to you and your family through your treatment at [facility].  I will be assisting you in coordinating your transitions in our healthcare system.      I am available to address your needs during your journey with the following services:     Care Coordination  I can assist you in facilitating communication between your cancer care treatment team to ensure timely treatment and follow-up.  I can also assist with transition of care back to your primary care provider, or other specialist, as needed.  My goal is to bridge gaps for you throughout the course of your active treatment.       Education Services  Understanding the recommended treatment course by your physician is key. I can provide educational resources personalized to your cancer diagnosis to help you understand your diagnosis and treatment. Please let me know if you would like to receive information about your diagnosis and treatment plan.  I am here to help.     Support Services/Resource Information  [facility] we offer a full scope of support services.  I can assist you with referral information to:  Cancer Clinical Trials & Research  Nutrition counseling  Support groups  Complementary Therapies such as Mind-Body Techniques Meditation  Patient Financial Advocates    [facility], an [employer] affiliate office, our volunteers can assist you with accessing our PlayWith[employer] library, support services information, head coverings and comfort items  Community and national resources, included eligibility based jay assistance and pharmaceutical access  programs, if you are in need of additional information.     [facility] offers services that include:  Behavioral Health  Genetic counseling & testing  Acupuncture  Lymphedema prevention/treatment program  Palliative care services.        I hope you have an excellent patient experience.  Please feel free to share with me your comments regarding the care you have received- we value your feedback.    Sincerely,     Betsy RILEY RN OCN   Cancer Nurse Navigator    Office Mainline: [phone]  Desk voice mail: [phone]  Email: [email or web address]

## 2025-01-09 VITALS
WEIGHT: 181 LBS | SYSTOLIC BLOOD PRESSURE: 95 MMHG | OXYGEN SATURATION: 93 % | BODY MASS INDEX: 25.91 KG/M2 | HEIGHT: 70 IN | HEART RATE: 64 BPM | RESPIRATION RATE: 17 BRPM | TEMPERATURE: 98.1 F | DIASTOLIC BLOOD PRESSURE: 64 MMHG

## 2025-01-09 PROBLEM — C18.7 CANCER OF SIGMOID COLON (HCC): Status: ACTIVE | Noted: 2025-01-09

## 2025-01-09 PROBLEM — K63.89 MASS OF COLON: Status: RESOLVED | Noted: 2024-12-12 | Resolved: 2025-01-09

## 2025-01-09 PROCEDURE — 700102 HCHG RX REV CODE 250 W/ 637 OVERRIDE(OP): Performed by: SURGERY

## 2025-01-09 PROCEDURE — A9270 NON-COVERED ITEM OR SERVICE: HCPCS | Performed by: SURGERY

## 2025-01-09 RX ORDER — OXYCODONE AND ACETAMINOPHEN 5; 325 MG/1; MG/1
1 TABLET ORAL EVERY 6 HOURS PRN
Qty: 20 TABLET | Refills: 0 | Status: SHIPPED | OUTPATIENT
Start: 2025-01-09 | End: 2025-01-14

## 2025-01-09 RX ORDER — ENOXAPARIN SODIUM 100 MG/ML
40 INJECTION SUBCUTANEOUS DAILY
Qty: 26 EACH | Refills: 0 | Status: ACTIVE | OUTPATIENT
Start: 2025-01-09 | End: 2025-02-04

## 2025-01-09 RX ADMIN — CELECOXIB 200 MG: 200 CAPSULE ORAL at 04:39

## 2025-01-09 RX ADMIN — ACETAMINOPHEN 1000 MG: 500 TABLET ORAL at 04:39

## 2025-01-09 RX ADMIN — METOPROLOL TARTRATE 25 MG: 25 TABLET, FILM COATED ORAL at 04:39

## 2025-01-09 ASSESSMENT — PAIN DESCRIPTION - PAIN TYPE
TYPE: ACUTE PAIN
TYPE: ACUTE PAIN

## 2025-01-09 NOTE — PROGRESS NOTES
"S:  69 y.o.male s/p low anterior resection POD# 1.  Patient did well overnight.  Tolerating a diet without nausea or vomiting.  Ambulating without assistance.  Reports flatus without stool.    O:  BP 95/64   Pulse 64   Temp 36.7 °C (98.1 °F) (Temporal)   Resp 17   Ht 1.778 m (5' 10\")   Wt 82.1 kg (181 lb)   SpO2 93%   I/O last 3 completed shifts:  In: 240 [P.O.:240]  Out: 1150 [Urine:1150]  Recent Labs     01/08/25  0433   SODIUM 137   POTASSIUM 4.2   CHLORIDE 104   CO2 22   GLUCOSE 130*   BUN 16   CREATININE 1.11   CALCIUM 9.3     Recent Labs     01/08/25  0433   WBC 12.3*   RBC 4.19*   HEMOGLOBIN 13.5*   HEMATOCRIT 40.7*   MCV 97.1   MCH 32.2   MCHC 33.2   RDW 45.2   PLATELETCT 231   MPV 10.3       Alert and Oriented x3, No Acute Distress  Normal Respiratory Effort  Abdomen soft, appropriately tender  Incisions/Bandages clean/dry/intact  Extremities warm and well perfused      A/P: 69-year-old male status post 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  Patient educated on appropriate judicious use of narcotics.  Hep-Lock IV.  Encourage by mouth intake.  Marquez catheter removed and patient voiding appropriately.  Advance to low residue diet.  Patient encouraged to ambulate ad moises.  Heladio Bautista M.D. PhD  [facility]  Colon and Rectal Surgery  (Office) [phone]  (Cell)  [phone]  "

## 2025-01-09 NOTE — PROGRESS NOTES
Virtual Nurse rounding complete.    Round Needs: No needs at this time. Eager to Discharge this morning.

## 2025-01-09 NOTE — DISCHARGE INSTRUCTIONS
Colectomy D/C instructions:    1. DIET: A low fiber diet is recommended.    The following foods are generally allowed on a low-fiber diet:     Enriched white bread or rolls without seeds   White rice, plain white pasta, noodles and macaroni   Crackers   Refined cereals such as Cream of Wheat   Pancakes or waffles made from white refined flour   Most canned or cooked fruits without skins, seeds or membranes   Fruit and vegetable juice with little or no pulp, fruit-flavored drinks and flavored ruiz   Canned or well-cooked vegetables without seeds, hulls or skins, such as carrots, potatoes and tomatoes   Tender meat, poultry and fish   Eggs   Tofu   Creamy peanut butter -- up to 2 tablespoons a day   Milk and foods made from milk, such as yogurt, pudding, ice cream, cheeses and sour cream -- up to 2 cups a day, including any used in cooking   Butter, margarine, oils and salad dressings without seeds   Desserts with no whole grains, seeds, nuts, raisins or coconut     You should avoid the following foods:     Whole-wheat or whole-grain breads, cereals and pasta   Brown or wild rice and other whole grains such as oats, kasha, barley, quinoa   Dried fruits and prune juice   Raw fruit, including those with seeds, skin or membranes, such as berries   Raw or undercooked vegetables, including corn   Dried beans, peas and lentils   Seeds and nuts, and foods containing them   Coconut   Popcorn     If you're eating a low-fiber diet, a typical menu might look like this:     Breakfast:   1 glass milk   1 egg   1 slice of white toast with smooth jelly   1/2 cup canned peaches   Snack:   1 cup yogurt   Lunch:   1 to 2 cups of chicken noodle soup   Soda crackers   [place] of drained tuna with mayonnaise or salad dressing on white bread   Canned applesauce   Flavored water or iced tea     Snack:   White toast, bread or crackers   2 tablespoons creamy peanut butter   Flavored water     Dinner:   3 ounces lean meat, poultry or fish    1/2 cup white rice   1/2 cup cooked vegetables, such as carrots or green beans   1 enriched white dinner roll with butter   Hot tea     Prepare all foods so that they're tender. Good cooking methods include simmering, poaching, stewing, steaming and braising. Baking or microwaving in a covered dish is another option. Try to avoid roasting, broiling and grilling -- methods that tend to make foods dry and tough. You may also want to avoid fried foods and go easy on spices.   Keep in mind that you may have fewer bowel movements and smaller stools while you're following a low-fiber diet. To avoid constipation, you may need to drink extra fluids. Drink plenty of water unless your doctor tells you otherwise, and use juices and milk as noted.    2. ACTIVITIES: After discharge from the hospital, you may resume full routine activities as you feel up to them.  You have a 10 pound weight restriction for two weeks after surgery. This means no lifting anything heavier than a gallon of milk. Routine activities such as bathing, walking, going up and down stairs, and driving* (see below) are safe.     3. DRIVING: You may drive whenever you are off pain medications and are able to perform the activities needed to drive, i.e. turning, bending, twisting, etc.    4. BATHING: no restrictions, unless you have a drain in place, in which case, showering is okay, but no baths or pools until after your first postoperative appointment.    5. PAIN MEDICATION: You will be given a prescription for pain medication at discharge. Please take these as directed. It is important to remember not to take medications on an empty stomach as this may cause nausea.    6. BOWEL FUNCTION: A few patients, after this operation, will develop either frequent or loose stools after meals. This usually corrects itself after a few days, to a few months.    7. APPOINTMENT: Contact our office at [phone] for a follow-up appointment in 1-2 weeks following your  procedure.    8. CALL IF YOU HAVE: (1) Fevers to more than 101F, (2) Unusual chest or leg pain, (3) Drainage or fluid from incision that may be foul smelling, increased tenderness or soreness at the wound or the wound edges are no longer together, redness or swelling at the incision site. Please do not hesitate to call with any other questions.       If you have any additional questions, please do not hesitate to call the office and speak to either myself or the physician on call.    Heladio Bautista MD PhD  [facility]  Colon and Rectal Surgeon  [phone]

## 2025-01-09 NOTE — DISCHARGE SUMMARY
Discharge Summary    CHIEF COMPLAINT ON ADMISSION  Presents for definitive surgical diagnosis and treatment of sigmoid colon mass  Reason for Admission  MALIGNANT NEOPLASM OF SIGMOID COLON     Admission Date  1/7/2025    CODE STATUS  Full Code    HPI & HOSPITAL COURSE  This is a 69 y.o. male here with sigmoid colon cancer.  Patient underwent low anterior resection and was transferred to the floor he recovered quickly.  At time of discharge he is ambulating without assistance with good pain control and bowel function. Prior to prescribing any narcotic medication, I have performed a patient risk assessment. I have reviewed this patient's NARxCheck and found this patient to be low risk for controlled substance abuse. A copy of this has been placed in the patient's medical record. We discussed alternative treatments to the opioid, such as NSAID medication, ice, heating packs, and how to incorporate these into the patient's treatment plan to decrease the need for and wean from the narcotic medications. I have completed an informed consent with the patient for narcotic usage. the patient understands the risks and benefits. The consent was signed by the patient and placed in the medical record.      Therefore, he is discharged in good and stable condition to home with close outpatient follow-up.    The patient met 2-midnight criteria for an inpatient stay at the time of discharge.    Discharge Date  1/9/2025    FOLLOW UP ITEMS POST DISCHARGE  Referral to oncology    DISCHARGE DIAGNOSES  Active Problems:    Cancer of sigmoid colon (HCC) (POA: Unknown)  Resolved Problems:    * No resolved hospital problems. *      FOLLOW UP  Future Appointments   Date Time Provider Department Center   1/23/2025 10:15 AM Heladio Bautista M.D. [facility] None     Heladio Bautista M.D.  [address] [place] [address]  [facility] [id number]  [phone]    Schedule an appointment as soon as possible for a visit in 2 week(s)  For wound re-check, Routine  follow-up      MEDICATIONS ON DISCHARGE     Medication List        START taking these medications        Instructions   enoxaparin 40 MG/0.4ML Sosy inj  Commonly known as: Lovenox   Inject 40 mg under the skin every day at 6 PM for 26 days.  Dose: 40 mg     oxyCODONE-acetaminophen 5-325 MG Tabs  Commonly known as: Percocet   Take 1 Tablet by mouth every 6 hours as needed for Moderate Pain or Severe Pain for up to 5 days.  Dose: 1 Tablet            CONTINUE taking these medications        Instructions   ascorbic acid 500 MG tablet  Commonly known as: Vitamin C   Take 1 Tab by mouth every day.  Dose: 500 mg     ferrous sulfate 325 (65 Fe) MG tablet   Take 1 Tab by mouth every morning with breakfast.  Dose: 325 mg     losartan 25 MG Tabs  Commonly known as: Cozaar   Take 1 Tablet by mouth every day.  Dose: 25 mg     metoprolol tartrate 25 MG Tabs  Commonly known as: Lopressor   Take 1 Tablet by mouth 2 times a day.  Dose: 25 mg     rosuvastatin 40 MG tablet  Commonly known as: Crestor   Take 1 Tablet by mouth every evening.  Dose: 40 mg     therapeutic multivitamin-minerals Tabs   Take 1 Tab by mouth every day.  Dose: 1 Tablet            STOP taking these medications      bisacodyl 5 MG EC tablet  Commonly known as: Dulcolax     metroNIDAZOLE 500 MG Tabs  Commonly known as: Flagyl     neomycin 500 MG Tabs  Commonly known as: Mycifradin     polyethylene glycol 3350 17 GM/SCOOP Powd  Commonly known as: MiraLax              Allergies  No Known Allergies    DIET  Orders Placed This Encounter   Procedures    Diet Order Diet: Regular     Standing Status:   Standing     Number of Occurrences:   1     Order Specific Question:   ERAS     Answer:   Yes     Order Specific Question:   Diet:     Answer:   Regular [1]       ACTIVITY  As tolerated.  Exercise encouraged.  10-lb lifting restriction    CONSULTATIONS  Pathology    PROCEDURES  Robotic assisted laparoscopic low anterior resection with flexible  sigmoidoscopy    LABORATORY  Lab Results   Component Value Date    SODIUM 137 01/08/2025    POTASSIUM 4.2 01/08/2025    CHLORIDE 104 01/08/2025    CO2 22 01/08/2025    GLUCOSE 130 (H) 01/08/2025    BUN 16 01/08/2025    CREATININE 1.11 01/08/2025        Lab Results   Component Value Date    WBC 12.3 (H) 01/08/2025    HEMOGLOBIN 13.5 (L) 01/08/2025    HEMATOCRIT 40.7 (L) 01/08/2025    PLATELETCT 231 01/08/2025        Total time of the discharge process exceeds 25 minutes.

## 2025-01-09 NOTE — PROGRESS NOTES
Bedside report received.  Assessment complete.  A&O x 4. Patient calls appropriately.  Patient ambulates with stand by assist. Low Fall risk  Patient has 2/10 pain. Patient declines interventions at this time  Denies N&V. Tolerating regular diet.  Surgical sites with dermabond ZOE C/D/I  + void, + flatus, - BM.  Patient denies SOB.  SCD's on.  Review plan with of care with patient. Call light and personal belongings within reach. Hourly rounding in place. All needs met at this time.

## 2025-01-09 NOTE — CARE PLAN
Problem: Pain - Standard  Goal: Alleviation of pain or a reduction in pain to the patient’s comfort goal  Outcome: Progressing     Problem: Knowledge Deficit - Standard  Goal: Patient and family/care givers will demonstrate understanding of plan of care, disease process/condition, diagnostic tests and medications  Outcome: Progressing     Problem: Hemodynamics  Goal: Patient's hemodynamics, fluid balance and neurologic status will be stable or improve  Outcome: Progressing     Problem: Psychosocial  Goal: Patient's level of anxiety will decrease  Outcome: Progressing     Problem: Communication  Goal: The ability to communicate needs accurately and effectively will improve  Outcome: Progressing     Problem: Discharge Barriers/Planning  Goal: Patient's continuum of care needs are met  Outcome: Progressing     Problem: Nutrition  Goal: Patient's nutritional and fluid intake will be adequate or improve  Outcome: Progressing  Goal: Enteral nutrition will be maintained or improve  Outcome: Progressing  Goal: Enteral nutrition will be maintained or improve  Outcome: Progressing     Problem: Urinary Elimination  Goal: Establish and maintain regular urinary output  Outcome: Progressing     Problem: Bowel Elimination  Goal: Establish and maintain regular bowel function  Outcome: Progressing     Problem: Mobility  Goal: Patient's capacity to carry out activities will improve  Outcome: Progressing     Problem: Self Care  Goal: Patient will have the ability to perform ADLs independently or with assistance (bathe, groom, dress, toilet and feed)  Outcome: Progressing     Problem: Infection - Standard  Goal: Patient will remain free from infection  Outcome: Progressing     Problem: Wound/ / Incision Healing  Goal: Patient's wound/surgical incision will decrease in size and heals properly  Outcome: Progressing   The patient is Stable - Low risk of patient condition declining or worsening    Shift Goals  Clinical Goals: pain  management, ambulation  Patient Goals: Pain control, POC  Family Goals: updates    Progress made toward(s) clinical / shift goals:  No BM overnight. Pain managed with PO pain medication and scheduled tylenol. Patient is passing gas.     Patient is not progressing towards the following goals:

## 2025-01-09 NOTE — PROGRESS NOTES
"Assumed care of patient from night shift RN at 0700.  Patient is alert and oriented times 4, states pain of 1/10, declines intervention at this time.  VSS BP 95/64 Comment: RN Notified  Pulse 64   Temp 36.7 °C (98.1 °F) (Temporal)   Resp 17   Ht 1.778 m (5' 10\")   Wt 82.1 kg (181 lb)   SpO2 93%   BMI 25.97 kg/m²   PIV in the L hand, patent and saline locked.  On RA with saturations in the mid 90s.  Last BM this AM, urinating without difficulty.  Regular diet, tolerating well.  4 lap sites to the abdomen, well approximated with dermabond.  Low transverse incision to the LLQ, well approximated with dermabond and ZOE.  Patient is up self, demonstrates steady gait, no assistance needed.  POC discussed for the day, bed is locked and in the lowest position, call light is within reach.  All needs are met at this time, hourly rounding is in place.  "

## 2025-01-09 NOTE — PROGRESS NOTES
Talib Ortiz has been provided discharge instructions, to include follow up care, home medications, and activity/diet reviewed. Copy of discharge instructions in patient chart, signed and reviewed. Patient verbalizes the understanding of the discharge instructions. PIV removed, tip intact, pressure dressing applied. Arm band removed. Patient did not have home meds during admit. Questions and concerns addressed prior to leaving the discharge lounge. Transported via car by wife. Patient discharge to home.

## 2025-01-09 NOTE — CARE PLAN
Problem: Pain - Standard  Goal: Alleviation of pain or a reduction in pain to the patient’s comfort goal  Outcome: Progressing     Problem: Psychosocial  Goal: Patient's level of anxiety will decrease  Outcome: Progressing     Problem: Communication  Goal: The ability to communicate needs accurately and effectively will improve  Outcome: Progressing     Problem: Bowel Elimination  Goal: Establish and maintain regular bowel function  Outcome: Progressing     Problem: Mobility  Goal: Patient's capacity to carry out activities will improve  Outcome: Progressing     Problem: Self Care  Goal: Patient will have the ability to perform ADLs independently or with assistance (bathe, groom, dress, toilet and feed)  Outcome: Progressing   The patient is Stable - Low risk of patient condition declining or worsening    Shift Goals  Clinical Goals: Pain control, mobilization  Patient Goals: Go home  Family Goals: No family present at this time    Progress made toward(s) clinical / shift goals:  Mobilizing, up to chair for meals    Patient is not progressing towards the following goals:

## 2025-01-10 NOTE — PROGRESS NOTES
Subjective:      Primary care physician: Roger Gonzalez M.D.  Referring Provider: Dr.Clark Kendall    History of presenting illness:  Talib Ortiz is a pleasant 69 y.o. male who presented with obstructing sigmoid colon cancer with incomplete colonoscopy.  Patient underwent robotic assisted laparoscopic low anterior resection on January 7, 2025.    Past Medical History:   Diagnosis Date    Arthritis 04/28/2023    general body    Blood clotting disorder (HCC) 1996    leg    CAD (coronary artery disease)     Cancer (HCC) 12/23/2024    Malignant neoplasm of sigmoid colon    Dental disorder 04/28/2023    upper partial, bridge lower front times 3, having right upper posterior tooth removed 4/28/23    Heart attack (HCC) 08/16/2016    cardiology, Dr. Frias    Heart burn 04/28/2023    medicated    Hiatus hernia syndrome     High cholesterol 04/28/2023    medicated    Hyperlipidemia     Hypertension 04/28/2023    medicated    Iron deficiency anemia     Kidney disease     Kidney stone 04/28/2023    history of    Obesity (BMI 30-39.9) 02/12/2015    Osteoarthritis 03/02/2015    Pneumonia 2016     Past Surgical History:   Procedure Laterality Date    ND SIGMOIDOSCOPY,DIAGNOSTIC  1/7/2025    Procedure: SIGMOIDOSCOPY, FLEXIBLE;  Surgeon: Heladio Bautista M.D.;  Location: [facility];  Service: Gen Robotic    LOW ANTERIOR RESECTION ROBOTIC XI  1/7/2025    Procedure: ROBOTIC LOW ANTERIOR RESECTION WITH FLEXIBLE SIGMOIDOSCOPY;  Surgeon: Heladio Bautista M.D.;  Location: [facility];  Service: Gen Robotic    COLONOSCOPY  12/2024    ND LAP ESOPHAGOGAST FUNDOPLASTY N/A 05/01/2023    Procedure: ROBOTIC HIATAL HERNIA REPAIR WITH TOUPET FUNDOPLICATION;  Surgeon: Campbell Berg M.D.;  Location: [facility];  Service: Gen Robotic    ND UPPER GI ENDOSCOPY,DIAGNOSIS N/A 05/01/2023    Procedure: ESOPHAGOGASTRODUODENOSCOPY;  Surgeon: Campbell Berg M.D.;  Location:  "SURGERY [facility];  Service: Gen Robotic    CA UPPER GI ENDOSCOPY,DIAGNOSIS N/A 02/08/2023    Procedure: GASTROSCOPY;  Surgeon: Hieu Arevalo M.D.;  Location: SURGERY SAME DAY [facility];  Service: Gastroenterology    CA UPPER GI ENDOSCOPY,CTRL BLEED N/A 02/08/2023    Procedure: EGD, WITH CLIP PLACEMENT;  Surgeon: Hieu Arevalo M.D.;  Location: SURGERY SAME DAY [facility];  Service: Gastroenterology    KNEE ARTHROPLASTY TOTAL Left 06/19/2017    Procedure: KNEE ARTHROPLASTY TOTAL;  Surgeon: Charles Castellanos M.D.;  Location: SURGERY [facility];  Service:     MULTIPLE CORONARY ARTERY BYPASS  08/16/2016    LAMINOTOMY  01/01/2012    lumbar    KNEE ARTHROSCOPY Left 09/01/1990    OTHER SURGICAL PROCEDURE Left 09/01/1990    \"removal of vein left leg due to blood clot\"    SHOULDER ARTHROTOMY Right 09/01/1980     No Known Allergies  Outpatient Encounter Medications as of 1/23/2025   Medication Sig Dispense Refill    oxyCODONE-acetaminophen (PERCOCET) 5-325 MG Tab Take 1 Tablet by mouth every 6 hours as needed for Moderate Pain or Severe Pain for up to 5 days. 20 Tablet 0    enoxaparin (LOVENOX) 40 MG/0.4ML Solution Prefilled Syringe inj Inject 40 mg under the skin every day at 6 PM for 26 days. 26 Each 0    losartan (COZAAR) 25 MG Tab Take 1 Tablet by mouth every day. (Patient taking differently: Take 25 mg by mouth every evening.) 90 Tablet 4    metoprolol tartrate (LOPRESSOR) 25 MG Tab Take 1 Tablet by mouth 2 times a day. 180 Tablet 4    rosuvastatin (CRESTOR) 40 MG tablet Take 1 Tablet by mouth every evening. 90 Tablet 4    ferrous sulfate 325 (65 Fe) MG tablet Take 1 Tab by mouth every morning with breakfast. 30 Tab 3    therapeutic multivitamin-minerals (THERAGRAN-M) Tab Take 1 Tab by mouth every day. 30 Tab 11    ascorbic acid (VITAMIN C) 500 MG tablet Take 1 Tab by mouth every day. 30 Tab 3     No facility-administered encounter medications on file as of 1/23/2025.     Social History     Socioeconomic History    " Marital status:      Spouse name: Not on file    Number of children: Not on file    Years of education: Not on file    Highest education level: Not on file   Occupational History    Not on file   Tobacco Use    Smoking status: Former     Current packs/day: 0.00     Average packs/day: 0.3 packs/day for 10.0 years (2.5 ttl pk-yrs)     Types: Cigarettes     Start date: 3/19/2006     Quit date: 3/19/2016     Years since quittin.8    Smokeless tobacco: Never   Vaping Use    Vaping status: Never Used   Substance and Sexual Activity    Alcohol use: Not Currently     Comment: Quit years    Drug use: No    Sexual activity: Yes     Partners: Female   Other Topics Concern    Not on file   Social History Narrative    Not on file     Social Drivers of Health     Financial Resource Strain: Not on file   Food Insecurity: No Food Insecurity (2025)    Hunger Vital Sign     Worried About Running Out of Food in the Last Year: Never true     Ran Out of Food in the Last Year: Never true   Transportation Needs: No Transportation Needs (2025)    PRAPARE - Transportation     Lack of Transportation (Medical): No     Lack of Transportation (Non-Medical): No   Physical Activity: Not on file   Stress: Not on file   Social Connections: Not on file   Intimate Partner Violence: Not At Risk (2025)    Humiliation, Afraid, Rape, and Kick questionnaire     Fear of Current or Ex-Partner: No     Emotionally Abused: No     Physically Abused: No     Sexually Abused: No   Housing Stability: Low Risk  (2025)    Housing Stability Vital Sign     Unable to Pay for Housing in the Last Year: No     Number of Times Moved in the Last Year: 0     Homeless in the Last Year: No      Social History     Tobacco Use   Smoking Status Former    Current packs/day: 0.00    Average packs/day: 0.3 packs/day for 10.0 years (2.5 ttl pk-yrs)    Types: Cigarettes    Start date: 3/19/2006    Quit date: 3/19/2016    Years since quittin.8   Smokeless  Tobacco Never     Social History     Substance and Sexual Activity   Alcohol Use Not Currently    Comment: Quit years     Social History     Substance and Sexual Activity   Drug Use No      Family History   Problem Relation Age of Onset    Arthritis Mother     Hyperlipidemia Mother     Heart Disease Mother         bypass x4    Diabetes Mother     Hyperlipidemia Father     Heart Attack Father 61    Other Sister         back surgery    Other Brother         joint problems    Colorectal Cancer Paternal Grandfather     Cancer Paternal Grandfather        ROS     Objective:   There were no vitals taken for this visit.    Physical Exam    Labs   Latest Reference Range & Units 01/08/25 04:33   WBC 4.8 - 10.8 K/uL 12.3 (H)   RBC 4.70 - 6.10 M/uL 4.19 (L)   Hemoglobin 14.0 - 18.0 g/dL 13.5 (L)   Hematocrit 42.0 - 52.0 % 40.7 (L)   MCV 81.4 - 97.8 fL 97.1   MCH 27.0 - 33.0 pg 32.2   MCHC 32.3 - 36.5 g/dL 33.2   RDW 35.9 - 50.0 fL 45.2   Platelet Count 164 - 446 K/uL 231   MPV 9.0 - 12.9 fL 10.3   Sodium 135 - 145 mmol/L 137   Potassium 3.6 - 5.5 mmol/L 4.2   Chloride 96 - 112 mmol/L 104   Co2 20 - 33 mmol/L 22   Anion Gap 7.0 - 16.0  11.0   Glucose 65 - 99 mg/dL 130 (H)   Bun 8 - 22 mg/dL 16   Creatinine 0.50 - 1.40 mg/dL 1.11   GFR (CKD-EPI) >60 mL/min/1.73 m 2 72   Calcium 8.5 - 10.5 mg/dL 9.3      Latest Reference Range & Units 12/17/24 10:59   Carcinoembryonic Antigen 0.0 - 3.0 ng/mL 1.2       Imaging  CT-CHEST,ABDOMEN,PELVIS WITH 11/28/2024  IMPRESSION:     1.  Sigmoid colon mass, consistent with known malignancy.  2.  Several small subcentimeter hepatic lesions, incompletely characterized on this study. The need to be further evaluated with contrast enhanced liver MRI.  3.  No other suspicious lesions in the chest, abdomen or pelvis.  CT Abdomen Pelvis with Oral And IV Contrast 11/27/24  FINDINGS:   Lower chest as described in CT scan of chest report of same date.   Liver is unremarkable.   Gallbladder is unremarkable.    Biliary ducts are nondilated.   Pancreas is unremarkable.   Spleen is unremarkable.   Adrenal glands are unremarkable.   Kidneys are unremarkable.   Major abdominal and pelvic arteries and upper abdominal veins are patent.   Distal esophagus and proximal stomach exhibits moderate wall thickening which can be seen with esophagitis/gastritis or mass.   The remainder of the stomach is unremarkable.   Small intestine is unremarkable.   Large intestine exhibits MID sigmoid colon proximally 6 cm long segment of circumferential wall thickening suspicious for malignancy.  The mesentery adjacent to the sigmoid mass exhibits mild infiltration and multiple small nodules or lymph nodes measuring approximately 5 mm diameter.   The distal sigmoid colon and rectosigmoid junction exhibits asymmetric mild wall thickening.   Mild diverticulosis of the colon is present.   The proximal colon is not significantly dilated.   The appendix is unremarkable.   No abdominal, retroperitoneal or pelvic sidewall adenopathy.   No free intraperitoneal air or fluid.   Urinary bladder is unremarkable.   Prostate is unremarkable.   Bony structures exhibit advanced degenerative changes of the lumbar spine.     Pathology  Pathology Results (Last result in 90 days)                01/07/25 1011  Narrative:                                                                                                                                                                                                                                                                                               Pathology Specimen  [facility]                          DEPARTMENT OF PATHOLOGY                   [address] [place] [address]  [id number]              Phone [phone]          Fax [phone]  Elinor Bass M.D.     Miya Waller M.D.     Abigail Velásquez D.O.                            Pedro Toth D.O.  Rhea Miranda M.D.     Yves  SAROJ Johnson D.O.    Patient:    BRETT PATTERSON            Specimen    [id number]                                           #:      Copies to:             GERMAN LOPEZ MD                        SURGICAL PATHOLOGY CONSULTATION      FINAL DIAGNOSIS:    A. Sigmoid colon and rectum, low anterior resection:         Invasive moderately-differentiated adenocarcinoma (3.3 cm) (see          Tumor synoptic report).         Tumor invades into the pericolonic tissue.         Lymphovascular invasion is present.         Margins negative for tumor.         Five out of twenty lymph nodes, positive for metastatic          carcinoma (5/20).    B. Anastomotic donuts:         Benign colonic tissue, negative for dysplasia and malignancy.    COLON AND RECTUM: Resection    SPECIMEN    Procedure:  Low anterior resection  TUMOR    Tumor Site:  Sigmoid colon    Histologic Type:  Adenocarcinoma    Histologic Grade:  G2, moderately differentiated    Tumor Size:  Greatest dimension (Centimeters) - 3.3 cm    Tumor Extent:  Invades through muscularis propria into the    pericolonic or perirectal tissue    Macroscopic Tumor Perforation:  Not identified    Lymphatic and / or Vascular Invasion:  Small vessel, Large vessel    (venous), extramural    Perineural Invasion:  Present    Tumor Budding Score:  Low (0-4)    Treatment Effect:  No known presurgical therapy  MARGINS    Margin Status for Invasive Carcinoma:  All margins negative for    invasive carcinoma    Margin Status for Non-Invasive Tumor:  All margins negative for    high-grade dysplasia / intramucosal carcinoma and low-grade dysplasia  REGIONAL LYMPH NODES    Regional Lymph Node Status:  Tumor present in regional lymph node(s)      Number of Lymph Nodes with Tumor:  5      Number of Lymph Nodes Examined:  20    Tumor Deposits:  Not identified  pTNM CLASSIFICATION (AJCC 8th Edition)  Reporting of pT, pN, and (when  "applicable) pM categories is based on  information available to the pathologist at the time the report is  issued. As per the AJCC (Chapter 1, 8th Ed.) it is the managing  physician's responsibility to establish the final pathologic stage  based upon all pertinent information, including but potentially not  limited to this pathology report.    pT Category:  pT3    pN Category:  pN2a  ADDITIONAL FINDINGS    Additional Findings:  Diverticulosis  SPECIAL STUDIES    MMR IHC: MMR proficient per report in EMR    Comment: Adequate tumor is present in Block A4 for further testing if  additional studies are clinically indicated.                                      Diagnosis performed by:                                      CLIFF MIRANDA MD  ------------------------------------------------------------------------  ---------------------------------------------------------------  CODES: 2002x1  2007x1  [id number]  [id number]    SPECIMEN(S)  A. Sigmoid colon and rectum:  B. Anastomic donuts:    PREOPERATIVE DIAGNOSIS:  Malignant neoplasm of sigmoid colon    POSTOPERATIVE DIAGNOSIS:  Sigmoid colon cancer       GROSS DESCRIPTION:  A. Received in formalin labeled with the patient's name, medical record  number, and \"sigmoid colon and rectum\" is a 17 x 3 cm portion of  sigmoid colon and proximal rectum, having a stapled distal margin, and  up to 7.5 cm of attached mesenteric fat.  The specimen is resected  entirely proximal to the peritoneal reflection, without a true  mesorectum.  The serosa is predominantly smooth, with a single area of  induration/puckering in the central aspect of the specimen.  Corresponding to the area of induration/puckering, is a 3.3 x 2.4 x 0.9  cm, well-defined, centrally ulcerated mass, with raised and rolled  borders.  The mass extends beyond the bowel wall, into the attached  fat, up to the inked serosa.  Additionally, the mass comes to within  3.8 cm of the proximal margin, 10 cm of the distal margin, " "and 7 cm  from the mesenteric margin.  The remaining mucosa is pink/tan with the  normal folds, revealing no additional polyps or masses.  The wall  ranges from 0.5-1.4 cm thick, with multiple diverticula identified  along the length the specimen, several of which are impacted with fecal  material, and are not transmural.  Multiple lymph nodes are found in  the attached fat, ranging from 0.3-0.7 cm in greatest dimension. RS20  /[id number]     SLIDE KEY:  A1:  Proximal margin, en face  A2:  Distal margin, en face  A3:  Mesenteric margin, perpendicular  A4-A5: Mass to inked serosa  A6-A7: Mass extension into attached fat  A8:  Mass to normal mucosa  A9:  Additional sections of mass  A10-A11: Representative diverticula  A12-A15: 4 intact possible lymph nodes, each cassette  A16:  3 intact possible lymph nodes  A17-A20: 1 possible lymph node, each cassette, each bisected    B. Received in formalin labeled with the patient's name, medical record  number, and \"anastomotic donuts\" is a 1.2 cm in length x 1.9 cm in  diameter, disrupted, annular mucosal fragment, with indwelling blue  suture threads, having a minimal amount of attached fat.  The mucosa is  focally erythematous, and reveals no masses, hemorrhage, or necrosis.  TS 2 /[id number]     MICROSCOPIC DESCRIPTION:  Microscopic examination was performed.  Please see diagnosis.    Report electronically signed by:  CLIFF MIRANDA MD ,  Diagnosis performed at: [facility]  Pathology  Department, [address] [place] [address]  [id number]      Printed 00/00/0000 [id number] BRETT PATTERSON   Page 1 of 1 MRN#:[id number]              Procedures  Colonoscopy And EGD 11/27/24    Diagnosis:     1. Malignant neoplasm of sigmoid colon (HCC)            Medical Decision Making:  Today's Assessment / Status / Plan:      Cancer Staging   Cancer of sigmoid colon (HCC)  Staging form: Colon and Rectum, AJCC 8th Edition  - Clinical stage from 1/7/2025: Stage IIIB (cT3, cN2a, " cM0) - Signed by Heladio Bautista M.D. on 1/23/2025     Patient has done well after surgical resection for sigmoid colon cancer.  He will need a completion colonoscopy after completing chemotherapy for stage III colon cancer  Heladio Bautista MD PhD  [facility]  General Colon and Rectal Surgeon  [phone]

## 2025-01-13 DIAGNOSIS — C18.7 CANCER OF SIGMOID COLON (HCC): ICD-10-CM

## 2025-01-23 ENCOUNTER — OFFICE VISIT (OUTPATIENT)
Dept: SURGICAL ONCOLOGY | Facility: MEDICAL CENTER | Age: 70
End: 2025-01-23
Payer: COMMERCIAL

## 2025-01-23 VITALS
WEIGHT: 187.9 LBS | HEIGHT: 70 IN | TEMPERATURE: 97.4 F | OXYGEN SATURATION: 97 % | DIASTOLIC BLOOD PRESSURE: 70 MMHG | HEART RATE: 66 BPM | SYSTOLIC BLOOD PRESSURE: 118 MMHG | BODY MASS INDEX: 26.9 KG/M2

## 2025-01-23 DIAGNOSIS — C18.7 MALIGNANT NEOPLASM OF SIGMOID COLON (HCC): ICD-10-CM

## 2025-01-23 ASSESSMENT — FIBROSIS 4 INDEX: FIB4 SCORE: 1.5

## 2025-01-24 ENCOUNTER — PATIENT OUTREACH (OUTPATIENT)
Dept: ONCOLOGY | Facility: MEDICAL CENTER | Age: 70
End: 2025-01-24
Payer: COMMERCIAL

## 2025-01-24 NOTE — LETTER
Murphy OVALLES [facility]    [address] [facility]  [place] [address]  Phone: [phone] - Fax: [phone]               01/24/25      Dear Talib,     It was my pleasure speaking with you today. I hope we discussed options that will decrease your stress. I look forward to meeting you on your visit. As your Cancer Nurse Navigator, a certified oncology nurse. My role is to assess any needs you may have with education, guidance and support. As we discussed I will follow up with Dr. Diaz on medical necessity of port placement, and update you and Dr Bautista. I am available to you and your family through your treatment at [facility].       I am available to address your needs during your journey with the following services:     Care Coordination  I can assist you in facilitating communication between your cancer care treatment team to ensure timely treatment and follow-up.  I can also assist with transition of care back to your primary care provider, or other specialist, as needed.  My goal is to bridge gaps for you throughout the course of your active treatment.       Education Services  Understanding the recommended treatment course by your physician is key. I can provide educational resources personalized to your cancer diagnosis to help you understand your diagnosis and treatment. Please let me know if you would like to receive information about your diagnosis and treatment plan.  I am here to help.     Support Services/Resource Information  [facility] we offer a full scope of support services.  I can assist you with referral information to:  Cancer Clinical Trials & Research  Nutrition counseling  Support groups  Complementary Therapies such as Mind-Body Techniques Meditation  Patient Financial Advocates    [facility], an [employer] affiliate office, our volunteers can assist you with accessing our [employer] library, support services information,  head coverings and comfort items  Community and national resources, included eligibility based jay assistance and pharmaceutical access programs, if you are in need of additional information.     [facility] offers services that include:  Behavioral Health  Genetic counseling & testing  Acupuncture  Lymphedema prevention/treatment program  Palliative care services.        I hope you have an excellent patient experience.  Please feel free to share with me your comments regarding the care you have received- we value your feedback.    Sincerely,     Betsy RILEY RN OCN   Cancer Nurse Navigator    Office Mainline: [phone]  Desk voice mail: [phone]  Email: [email or web address]

## 2025-01-24 NOTE — PROGRESS NOTES
Follow up with patient on DST tool   Patient indicated Stress about wife missing work.  Options discussed.  1. Weekend infusion.  Patient notified this is an option after fist dose. 2.  Combining appointments on same day when able. Pt was not aware of weekend infusion. Patient consults with Dr. Diaz on 1/31.  Pt concerned about port.  Communicated with Darlene BELTRAN to review with Dr. Diaz. Pt concerned about delays and feels an urgency to get started.  Ensure patient I would keep Dr. Bautista updated concerning port. For expedited placement if medically necessary.

## 2025-01-31 ENCOUNTER — HOSPITAL ENCOUNTER (OUTPATIENT)
Dept: HEMATOLOGY ONCOLOGY | Facility: MEDICAL CENTER | Age: 70
End: 2025-01-31
Attending: STUDENT IN AN ORGANIZED HEALTH CARE EDUCATION/TRAINING PROGRAM
Payer: COMMERCIAL

## 2025-01-31 VITALS
BODY MASS INDEX: 27.43 KG/M2 | DIASTOLIC BLOOD PRESSURE: 62 MMHG | HEART RATE: 87 BPM | WEIGHT: 191.6 LBS | OXYGEN SATURATION: 96 % | TEMPERATURE: 97.5 F | HEIGHT: 70 IN | SYSTOLIC BLOOD PRESSURE: 110 MMHG

## 2025-01-31 DIAGNOSIS — C18.7 CANCER OF SIGMOID COLON (HCC): ICD-10-CM

## 2025-01-31 PROCEDURE — 99205 OFFICE O/P NEW HI 60 MIN: CPT | Performed by: STUDENT IN AN ORGANIZED HEALTH CARE EDUCATION/TRAINING PROGRAM

## 2025-01-31 PROCEDURE — 99212 OFFICE O/P EST SF 10 MIN: CPT | Performed by: STUDENT IN AN ORGANIZED HEALTH CARE EDUCATION/TRAINING PROGRAM

## 2025-01-31 ASSESSMENT — PAIN SCALES - GENERAL: PAINLEVEL_OUTOF10: NO PAIN

## 2025-01-31 ASSESSMENT — FIBROSIS 4 INDEX: FIB4 SCORE: 1.5

## 2025-01-31 NOTE — PROGRESS NOTES
Consult:  Hematology/Oncology      Referring Physician: Heladio Bautista MD  Primary Care:  Roger Gonzalez M.D.    Diagnosis: Sigmoid adenocarcinoma    Chief Complaint:  Evaluation for systemic therapy    History of Presenting Illness:  Talib Ortiz is a 69 y.o.  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 He had been changes in his bowel habits for the past 5 months, non-bloody. He eventually was seen by GI and a scope was performed (he had a clear scope in 2022) and that revealed an obstructing mass. He was sent for surgical evaluation accordingly and was operated on earlier this month. He was subsequently referred for evaluation.     Interval History:  Patient is here for consultation. He feels well and has no new complaints at this time. His wife is with him today.     Past Medical History:   Diagnosis Date    Arthritis 04/28/2023    general body    Blood clotting disorder (HCC) 1996    leg    CAD (coronary artery disease)     Cancer (HCC) 12/23/2024    Malignant neoplasm of sigmoid colon    Dental disorder 04/28/2023    upper partial, bridge lower front times 3, having right upper posterior tooth removed 4/28/23    Heart attack (HCC) 08/16/2016    cardiology, Dr. Frias    Heart burn 04/28/2023    medicated    Hiatus hernia syndrome     High cholesterol 04/28/2023    medicated    Hyperlipidemia     Hypertension 04/28/2023    medicated    Iron deficiency anemia     Kidney disease     Kidney stone 04/28/2023    history of    Obesity (BMI 30-39.9) 02/12/2015    Osteoarthritis 03/02/2015    Pneumonia 2016       Past Surgical History:   Procedure Laterality Date    FL SIGMOIDOSCOPY,DIAGNOSTIC  1/7/2025    Procedure: SIGMOIDOSCOPY, FLEXIBLE;  Surgeon: Heladio Bautista M.D.;  Location: SURGERY [facility];  Service: Gen Robotic    LOW ANTERIOR  "RESECTION ROBOTIC XI  2025    Procedure: ROBOTIC LOW ANTERIOR RESECTION WITH FLEXIBLE SIGMOIDOSCOPY;  Surgeon: Heladio Bautista M.D.;  Location: SURGERY [facility];  Service: Gen Robotic    COLONOSCOPY  2024    CO LAP ESOPHAGOGAST FUNDOPLASTY N/A 2023    Procedure: ROBOTIC HIATAL HERNIA REPAIR WITH TOUPET FUNDOPLICATION;  Surgeon: Campbell Berg M.D.;  Location: SURGERY [facility];  Service: Gen Robotic    CO UPPER GI ENDOSCOPY,DIAGNOSIS N/A 2023    Procedure: ESOPHAGOGASTRODUODENOSCOPY;  Surgeon: Campbell Berg M.D.;  Location: SURGERY [facility];  Service: Gen Robotic    CO UPPER GI ENDOSCOPY,DIAGNOSIS N/A 2023    Procedure: GASTROSCOPY;  Surgeon: Hieu Arevalo M.D.;  Location: SURGERY SAME DAY [facility];  Service: Gastroenterology    CO UPPER GI ENDOSCOPY,CTRL BLEED N/A 2023    Procedure: EGD, WITH CLIP PLACEMENT;  Surgeon: Hieu Arevalo M.D.;  Location: SURGERY SAME DAY [facility];  Service: Gastroenterology    KNEE ARTHROPLASTY TOTAL Left 2017    Procedure: KNEE ARTHROPLASTY TOTAL;  Surgeon: Charles Castellanos M.D.;  Location: SURGERY [facility];  Service:     MULTIPLE CORONARY ARTERY BYPASS  2016    LAMINOTOMY  2012    lumbar    KNEE ARTHROSCOPY Left 1990    OTHER SURGICAL PROCEDURE Left 1990    \"removal of vein left leg due to blood clot\"    SHOULDER ARTHROTOMY Right 1980       Social History     Socioeconomic History    Marital status:      Spouse name: Not on file    Number of children: Not on file    Years of education: Not on file    Highest education level: Not on file   Occupational History    Not on file   Tobacco Use    Smoking status: Former     Current packs/day: 0.00     Average packs/day: 0.3 packs/day for 10.0 years (2.5 ttl pk-yrs)     Types: Cigarettes     Start date: 3/19/2006     Quit date: 3/19/2016     Years since quittin.8    Smokeless tobacco: Never   Vaping Use    Vaping status: Never Used   Substance and " Sexual Activity    Alcohol use: Not Currently     Comment: Quit years    Drug use: No    Sexual activity: Yes     Partners: Female   Other Topics Concern    Not on file   Social History Narrative    Not on file     Social Drivers of Health     Financial Resource Strain: Not on file   Food Insecurity: No Food Insecurity (1/7/2025)    Hunger Vital Sign     Worried About Running Out of Food in the Last Year: Never true     Ran Out of Food in the Last Year: Never true   Transportation Needs: No Transportation Needs (1/7/2025)    PRAPARE - Transportation     Lack of Transportation (Medical): No     Lack of Transportation (Non-Medical): No   Physical Activity: Not on file   Stress: Not on file   Social Connections: Not on file   Intimate Partner Violence: Not At Risk (1/7/2025)    Humiliation, Afraid, Rape, and Kick questionnaire     Fear of Current or Ex-Partner: No     Emotionally Abused: No     Physically Abused: No     Sexually Abused: No   Housing Stability: Low Risk  (1/7/2025)    Housing Stability Vital Sign     Unable to Pay for Housing in the Last Year: No     Number of Times Moved in the Last Year: 0     Homeless in the Last Year: No       Family History   Problem Relation Age of Onset    Arthritis Mother     Hyperlipidemia Mother     Heart Disease Mother         bypass x4    Diabetes Mother     Hyperlipidemia Father     Heart Attack Father 61    Other Sister         back surgery    Other Brother         joint problems    Colorectal Cancer Paternal Grandfather     Cancer Paternal Grandfather        OB History   No obstetric history on file.       Allergies as of 01/31/2025    (No Known Allergies)         Current Outpatient Medications:     enoxaparin (LOVENOX) 40 MG/0.4ML Solution Prefilled Syringe inj, Inject 40 mg under the skin every day at 6 PM for 26 days., Disp: 26 Each, Rfl: 0    losartan (COZAAR) 25 MG Tab, Take 1 Tablet by mouth every day. (Patient taking differently: Take 25 mg by mouth every  "evening.), Disp: 90 Tablet, Rfl: 4    metoprolol tartrate (LOPRESSOR) 25 MG Tab, Take 1 Tablet by mouth 2 times a day., Disp: 180 Tablet, Rfl: 4    rosuvastatin (CRESTOR) 40 MG tablet, Take 1 Tablet by mouth every evening., Disp: 90 Tablet, Rfl: 4    ferrous sulfate 325 (65 Fe) MG tablet, Take 1 Tab by mouth every morning with breakfast., Disp: 30 Tab, Rfl: 3    therapeutic multivitamin-minerals (THERAGRAN-M) Tab, Take 1 Tab by mouth every day., Disp: 30 Tab, Rfl: 11    ascorbic acid (VITAMIN C) 500 MG tablet, Take 1 Tab by mouth every day., Disp: 30 Tab, Rfl: 3    Review of Systems:  Review of Systems   Constitutional:  Negative for chills, diaphoresis, fever, malaise/fatigue and weight loss.   HENT:  Negative for hearing loss, nosebleeds, sinus pain and sore throat.    Eyes:  Negative for blurred vision and double vision.   Respiratory:  Negative for cough, sputum production, shortness of breath and wheezing.    Cardiovascular:  Negative for chest pain, palpitations, orthopnea and leg swelling.   Gastrointestinal:  Positive for abdominal pain. Negative for blood in stool, constipation, diarrhea, heartburn, melena, nausea and vomiting.   Genitourinary:  Negative for dysuria, frequency, hematuria and urgency.   Musculoskeletal:  Negative for back pain, joint pain and myalgias.   Skin:  Negative for rash.   Neurological:  Negative for dizziness, tingling, weakness and headaches.   Endo/Heme/Allergies:  Does not bruise/bleed easily.   Psychiatric/Behavioral:  The patient is not nervous/anxious and does not have insomnia.           Physical Exam:  Vitals:    01/31/25 1104   BP: 110/62   Pulse: 87   Temp: 36.4 °C (97.5 °F)   TempSrc: Temporal   SpO2: 96%   Weight: 86.9 kg (191 lb 9.6 oz)   Height: 1.778 m (5' 10\")       DESC; KARNOFSKY SCALE WITH ECOG EQUIVALENT: 100, Fully active, able to carry on all pre-disease performed without restriction (ECOG equivalent 0)    DISTRESS LEVEL: no apparent distress    Physical " Exam  Vitals and nursing note reviewed.   Constitutional:       General: He is awake. He is not in acute distress.     Appearance: Normal appearance. He is normal weight. He is not ill-appearing, toxic-appearing or diaphoretic.   HENT:      Head: Normocephalic and atraumatic.      Nose: Nose normal. No congestion.      Mouth/Throat:      Pharynx: Oropharynx is clear. No oropharyngeal exudate or posterior oropharyngeal erythema.   Eyes:      General: No scleral icterus.     Extraocular Movements: Extraocular movements intact.      Conjunctiva/sclera: Conjunctivae normal.      Pupils: Pupils are equal, round, and reactive to light.   Cardiovascular:      Rate and Rhythm: Normal rate and regular rhythm.      Pulses: Normal pulses.      Heart sounds: Normal heart sounds. No murmur heard.     No friction rub. No gallop.   Pulmonary:      Effort: Pulmonary effort is normal.      Breath sounds: Normal breath sounds. No decreased air movement. No wheezing, rhonchi or rales.   Abdominal:      General: Bowel sounds are normal. There is no distension.      Tenderness: There is generalized abdominal tenderness.   Musculoskeletal:         General: No deformity. Normal range of motion.      Cervical back: Normal range of motion and neck supple. No tenderness.      Right lower leg: No edema.      Left lower leg: No edema.   Lymphadenopathy:      Cervical: No cervical adenopathy.      Upper Body:      Right upper body: No axillary adenopathy.      Left upper body: No axillary adenopathy.      Lower Body: No right inguinal adenopathy. No left inguinal adenopathy.   Skin:     General: Skin is warm and dry.      Coloration: Skin is not jaundiced.      Findings: No erythema or rash.   Neurological:      General: No focal deficit present.      Mental Status: He is alert and oriented to person, place, and time.      Sensory: Sensation is intact.      Motor: Motor function is intact. No weakness.      Gait: Gait is intact.   Psychiatric:          Attention and Perception: Attention normal.         Mood and Affect: Mood normal.         Behavior: Behavior normal. Behavior is cooperative.         Thought Content: Thought content normal.         Judgment: Judgment normal.          Depression Screening    Little interest or pleasure in doing things?      Feeling down, depressed , or hopeless?     Trouble falling or staying asleep, or sleeping too much?      Feeling tired or having little energy?      Poor appetite or overeating?      Feeling bad about yourself - or that you are a failure or have let yourself or your family down?     Trouble concentrating on things, such as reading the newspaper or watching television?     Moving or speaking so slowly that other people could have noticed.  Or the opposite - being so fidgety or restless that you have been moving around a lot more than usual?      Thoughts that you would be better off dead, or of hurting yourself?      Patient Health Questionnaire Score:         If depressive symptoms identified deferred to follow up visit unless specifically addressed in assesment and plan.    Interpretation of PHQ-9 Total Score   Score Severity   1-4 No Depression   5-9 Mild Depression   10-14 Moderate Depression   15-19 Moderately Severe Depression   20-27 Severe Depression    Labs:  No visits with results within 7 Day(s) from this visit.   Latest known visit with results is:   Admission on 01/07/2025, Discharged on 01/09/2025   Component Date Value Ref Range Status    POC Glucose, Blood 01/07/2025 116 (H)  65 - 99 mg/dL Final    Pathology Request 01/07/2025 Sent to Histo   Final    WBC 01/08/2025 12.3 (H)  4.8 - 10.8 K/uL Final    RBC 01/08/2025 4.19 (L)  4.70 - 6.10 M/uL Final    Hemoglobin 01/08/2025 13.5 (L)  14.0 - 18.0 g/dL Final    Hematocrit 01/08/2025 40.7 (L)  42.0 - 52.0 % Final    MCV 01/08/2025 97.1  81.4 - 97.8 fL Final    MCH 01/08/2025 32.2  27.0 - 33.0 pg Final    MCHC 01/08/2025 33.2  32.3 - 36.5 g/dL  Final    RDW 01/08/2025 45.2  35.9 - 50.0 fL Final    Platelet Count 01/08/2025 231  164 - 446 K/uL Final    MPV 01/08/2025 10.3  9.0 - 12.9 fL Final    Sodium 01/08/2025 137  135 - 145 mmol/L Final    Potassium 01/08/2025 4.2  3.6 - 5.5 mmol/L Final    Chloride 01/08/2025 104  96 - 112 mmol/L Final    Co2 01/08/2025 22  20 - 33 mmol/L Final    Glucose 01/08/2025 130 (H)  65 - 99 mg/dL Final    Bun 01/08/2025 16  8 - 22 mg/dL Final    Creatinine 01/08/2025 1.11  0.50 - 1.40 mg/dL Final    Calcium 01/08/2025 9.3  8.5 - 10.5 mg/dL Final    Anion Gap 01/08/2025 11.0  7.0 - 16.0 Final    GFR (CKD-EPI) 01/08/2025 72  >60 mL/min/1.73 m 2 Final    Comment: Estimated Glomerular Filtration Rate is calculated using  race neutral CKD-EPI 2021 equation per NKF-ASN recommendations.         Imaging:   All listed images below have been independently reviewed by me. I agree with the findings as summarized below:    No results found.     Pathology:    FINAL DIAGNOSIS:     A. Sigmoid colon and rectum, low anterior resection:          Invasive moderately-differentiated adenocarcinoma (3.3 cm) (see           Tumor synoptic report).          Tumor invades into the pericolonic tissue.          Lymphovascular invasion is present.          Margins negative for tumor.          Five out of twenty lymph nodes, positive for metastatic           carcinoma (5/20).     B. Anastomotic donuts:          Benign colonic tissue, negative for dysplasia and malignancy.     COLON AND RECTUM: Resection     SPECIMEN    Procedure:  Low anterior resection   TUMOR    Tumor Site:  Sigmoid colon     Histologic Type:  Adenocarcinoma     Histologic Grade:  G2, moderately differentiated     Tumor Size:  Greatest dimension (Centimeters) - 3.3 cm     Tumor Extent:  Invades through muscularis propria into the     pericolonic or perirectal tissue     Macroscopic Tumor Perforation:  Not identified     Lymphatic and / or Vascular Invasion:  Small vessel, Large vessel      (venous), extramural     Perineural Invasion:  Present     Tumor Budding Score:  Low (0-4)     Treatment Effect:  No known presurgical therapy   MARGINS    Margin Status for Invasive Carcinoma:  All margins negative for     invasive carcinoma     Margin Status for Non-Invasive Tumor:  All margins negative for     high-grade dysplasia / intramucosal carcinoma and low-grade dysplasia   REGIONAL LYMPH NODES     Regional Lymph Node Status:  Tumor present in regional lymph node(s)       Number of Lymph Nodes with Tumor:  5       Number of Lymph Nodes Examined:  20     Tumor Deposits:  Not identified   pTNM CLASSIFICATION (AJCC 8th Edition)   Reporting of pT, pN, and (when applicable) pM categories is based on   information available to the pathologist at the time the report is   issued. As per the AJCC (Chapter 1, 8th Ed.) it is the managing   physician's responsibility to establish the final pathologic stage   based upon all pertinent information, including but potentially not   limited to this pathology report.     pT Category:  pT3     pN Category:  pN2a   ADDITIONAL FINDINGS     Additional Findings:  Diverticulosis   SPECIAL STUDIES     MMR IHC: MMR proficient per report in EMR     Comment: Adequate tumor is present in Block A4 for further testing if   additional studies are clinically indicated.                                       Diagnosis performed by:                                       CLIFF MIRANDA MD     Assessment & Plan:  1. Cancer of sigmoid colon (HCC)            This is a 69 year old  man with adenocarcinoma of the sigmoid colon, [id number] stage IIIB disease. He is s/p resection and presents for evaluation.     Current Diagnosis and Staging: Adenocarcinoma of the sigmoid colon, [id number] stage IIIB disease    Treatment Plan: CAPOX adjuvant x 3 months, consideration for [facility] clinical trial    Treatment Citation: NCCN    Plan of Care:    Primary Therapy: CAPOX to start in 3-4  weeks  Supportive Therapy: Antiemetics per protocol  Toxicity: Patient is getting antineoplastic therapy and needs monitoring of blood counts, hepatic function, and renal function due to potential for organ dysfunction.   Labs: CBC with diff, CMP, CEA, ct DNA monitoring  Imaging: Repeat CT scans after completion of therapy  Treatment Planning: The patient will need adjuvant therapy with CAPOX for 3 months. He would also be interested in the CIRCULATE-US trial and therefore we will screen him accordingly.   Consultations: Colorectal surgery (Dr. Bautista)  Code Status: Full  Miscellaneous: Referred to genetics and to [facility]  Return for Follow Up: For start of therapy    Any questions and concerns raised by the patient were answered to the best of my ability. Thank you for allowing me to participate in the care for this patient. Please feel free to contact me for any questions or concerns.     Total time spent on chart review, clinic encounter, and documentation: 64 minutes.

## 2025-02-01 RX ORDER — 0.9 % SODIUM CHLORIDE 0.9 %
10 VIAL (ML) INJECTION PRN
OUTPATIENT
Start: 2025-02-28

## 2025-02-01 RX ORDER — DEXTROSE MONOHYDRATE 50 MG/ML
INJECTION, SOLUTION INTRAVENOUS CONTINUOUS
OUTPATIENT
Start: 2025-02-28

## 2025-02-01 RX ORDER — 0.9 % SODIUM CHLORIDE 0.9 %
VIAL (ML) INJECTION PRN
OUTPATIENT
Start: 2025-02-28

## 2025-02-01 RX ORDER — 0.9 % SODIUM CHLORIDE 0.9 %
3 VIAL (ML) INJECTION PRN
OUTPATIENT
Start: 2025-02-27

## 2025-02-01 RX ORDER — 0.9 % SODIUM CHLORIDE 0.9 %
VIAL (ML) INJECTION PRN
OUTPATIENT
Start: 2025-02-27

## 2025-02-01 RX ORDER — 0.9 % SODIUM CHLORIDE 0.9 %
10 VIAL (ML) INJECTION PRN
OUTPATIENT
Start: 2025-02-27

## 2025-02-01 RX ORDER — ONDANSETRON 8 MG/1
8 TABLET, ORALLY DISINTEGRATING ORAL PRN
OUTPATIENT
Start: 2025-02-28

## 2025-02-01 RX ORDER — EPINEPHRINE 1 MG/ML(1)
0.5 AMPUL (ML) INJECTION PRN
OUTPATIENT
Start: 2025-02-28

## 2025-02-01 RX ORDER — ONDANSETRON 2 MG/ML
4 INJECTION INTRAMUSCULAR; INTRAVENOUS PRN
OUTPATIENT
Start: 2025-02-28

## 2025-02-01 RX ORDER — PALONOSETRON 0.05 MG/ML
0.25 INJECTION, SOLUTION INTRAVENOUS ONCE
OUTPATIENT
Start: 2025-02-28 | End: 2025-02-28

## 2025-02-01 RX ORDER — DEXAMETHASONE SODIUM PHOSPHATE 4 MG/ML
12 INJECTION, SOLUTION INTRA-ARTICULAR; INTRALESIONAL; INTRAMUSCULAR; INTRAVENOUS; SOFT TISSUE ONCE
OUTPATIENT
Start: 2025-02-28 | End: 2025-02-28

## 2025-02-01 RX ORDER — METHYLPREDNISOLONE SODIUM SUCCINATE 125 MG/2ML
125 INJECTION, POWDER, LYOPHILIZED, FOR SOLUTION INTRAMUSCULAR; INTRAVENOUS PRN
OUTPATIENT
Start: 2025-02-28

## 2025-02-01 RX ORDER — PROCHLORPERAZINE MALEATE 10 MG
10 TABLET ORAL EVERY 6 HOURS PRN
OUTPATIENT
Start: 2025-02-28

## 2025-02-01 RX ORDER — DIPHENHYDRAMINE HYDROCHLORIDE 50 MG/ML
50 INJECTION INTRAMUSCULAR; INTRAVENOUS PRN
OUTPATIENT
Start: 2025-02-28

## 2025-02-01 RX ORDER — 0.9 % SODIUM CHLORIDE 0.9 %
3 VIAL (ML) INJECTION PRN
OUTPATIENT
Start: 2025-02-28

## 2025-02-01 ASSESSMENT — ENCOUNTER SYMPTOMS
DIZZINESS: 0
NERVOUS/ANXIOUS: 0
BLOOD IN STOOL: 0
BRUISES/BLEEDS EASILY: 0
COUGH: 0
ORTHOPNEA: 0
SORE THROAT: 0
DIAPHORESIS: 0
SINUS PAIN: 0
WEAKNESS: 0
CHILLS: 0
SHORTNESS OF BREATH: 0
WEIGHT LOSS: 0
MYALGIAS: 0
ABDOMINAL PAIN: 1
FEVER: 0
HEARTBURN: 0
VOMITING: 0
DOUBLE VISION: 0
NAUSEA: 0
PALPITATIONS: 0
TINGLING: 0
DIARRHEA: 0
BLURRED VISION: 0
HEADACHES: 0
CONSTIPATION: 0
INSOMNIA: 0
SPUTUM PRODUCTION: 0
WHEEZING: 0
BACK PAIN: 0

## 2025-02-03 ENCOUNTER — RESEARCH ENCOUNTER (OUTPATIENT)
Dept: RESEARCH | Facility: MEDICAL CENTER | Age: 70
End: 2025-02-03
Payer: COMMERCIAL

## 2025-02-03 NOTE — RESEARCH NOTE
Patient has been referred by Todd Diaz D.O. The initial referral follow-up message, which includes the consent form link, has been sent to the patient.    Eligible Studies: HNP

## 2025-02-04 DIAGNOSIS — C18.7 CANCER OF SIGMOID COLON (HCC): ICD-10-CM

## 2025-02-04 DIAGNOSIS — Z00.6 CLINICAL TRIAL PARTICIPANT: ICD-10-CM

## 2025-02-06 ENCOUNTER — HOSPITAL ENCOUNTER (OUTPATIENT)
Dept: HEMATOLOGY ONCOLOGY | Facility: MEDICAL CENTER | Age: 70
End: 2025-02-06
Attending: STUDENT IN AN ORGANIZED HEALTH CARE EDUCATION/TRAINING PROGRAM
Payer: COMMERCIAL

## 2025-02-06 ENCOUNTER — TELEPHONE (OUTPATIENT)
Dept: HEMATOLOGY ONCOLOGY | Facility: MEDICAL CENTER | Age: 70
End: 2025-02-06
Payer: COMMERCIAL

## 2025-02-06 DIAGNOSIS — C18.7 CANCER OF SIGMOID COLON (HCC): ICD-10-CM

## 2025-02-06 PROCEDURE — 99214 OFFICE O/P EST MOD 30 MIN: CPT

## 2025-02-06 RX ORDER — OLANZAPINE 5 MG/1
5 TABLET ORAL NIGHTLY
Qty: 24 TABLET | Refills: 0 | Status: SHIPPED | OUTPATIENT
Start: 2025-02-06

## 2025-02-06 RX ORDER — PROCHLORPERAZINE MALEATE 10 MG
10 TABLET ORAL EVERY 6 HOURS PRN
Qty: 30 TABLET | Refills: 6 | Status: SHIPPED | OUTPATIENT
Start: 2025-02-06

## 2025-02-06 RX ORDER — ONDANSETRON 4 MG/1
4 TABLET, FILM COATED ORAL EVERY 4 HOURS PRN
Qty: 30 TABLET | Refills: 6 | Status: SHIPPED | OUTPATIENT
Start: 2025-02-06

## 2025-02-06 RX ORDER — CAPECITABINE 500 MG/1
TABLET, FILM COATED ORAL
Qty: 112 TABLET | Refills: 5 | Status: SHIPPED | OUTPATIENT
Start: 2025-02-06

## 2025-02-06 RX ORDER — LOPERAMIDE HYDROCHLORIDE 2 MG/1
2 TABLET ORAL SEE ADMIN INSTRUCTIONS
Qty: 30 TABLET | Refills: 6 | Status: SHIPPED | OUTPATIENT
Start: 2025-02-06

## 2025-02-06 NOTE — PROGRESS NOTES
Talib Otriz is a 69 y.o. male here for a non-provider visit for a lab draw on 2/6/2025 at 2:55 PM.    Procedure performed:  Venipuncture     Anatomical site:  Left Antecubital Area    Equipment used:  23 g butterfly     Labs drawn:   Signatera    Ordering provider:  Todd Diaz D.O.    Lab draw completed by:  Alea Luz R.N.      The following appointments, labs, and medications have been provided to the patient:  Line: PIV  ECHO: NA  WBC Support (g-csf): NA  Labs: CBC, CMP, CEA  Follow up appts: TBD- awaiting delivery of oral chemotherapy  Chemo/immunotherapy class: Completed 2/6/2025  Chemotherapy Regimen OP Colorectal XELOX (Capecitabine + OXALIplatin)   Nausea medication: zofran/compazine prescribed  Other treatment specific meds: Olanzapine (zyprexa) 5mg tab, Loperamide (Imodium A-D) 2mg    Pain medication: Per provider discretion  Nurse junior: Established with Betsy DOMINGUEZ RN  Dietician:  ANDRES  SW: NA  FRA: Referred on 1/14/2025      Additional info/teaching for chemotherapy patients:  Neutropenia reminder card provided to patient    Additional teaching:  Provided link to NCCN patient guidelines for treatment    Patient was provided with drug specific handouts and [facility] side effects sheet. Patient verbalized that questions and concerns regarding treatment have been addressed. Consent to proceed with treatment was reviewed and signed during this visit.    Spent 60 minutes of continuous, non-interrupted, face-to-face patient contact in which greater than 50% of the visit was spent counseling and coordinating of care.

## 2025-02-06 NOTE — PROGRESS NOTES
Orders received from Dr. Diaz for Capecitabine (Xeloda) 1000mg/m2 PO BID on days 1-14 of each 21 day cycle to be taken concurrently with IV oxaliplatin treatment.      Rx entered and pharmacy coordinator Abby CASE, updated.

## 2025-02-06 NOTE — TELEPHONE ENCOUNTER
Prior Authorization for Capecitabine 500MG tablets has been approved for a quantity of 112 , day supply 21    Prior Authorization reference number: [id number]  Insurance: [employer]  Effective dates: 02/06/2025 to 02/06/2026  Copay: $n/a     Is patient eligible to fill with [facility]? No, test claim rejected stating must fill with [facility].    Spoke with patient to let him know his prescription is being sent to [facility] and provided him with my direct line so he can call me once he schedules delivery of his medication.

## 2025-02-06 NOTE — TELEPHONE ENCOUNTER
Prior Authorization for Capecitabine 500MG tablets (Quantity: 112, Days: 21) has been submitted via Cover My Meds: Key ([id number])    Insurance: [employer]    Will follow up in 24-48 business hours.

## 2025-02-14 ENCOUNTER — HOSPITAL ENCOUNTER (OUTPATIENT)
Dept: LAB | Facility: MEDICAL CENTER | Age: 70
End: 2025-02-14
Attending: FAMILY MEDICINE
Payer: COMMERCIAL

## 2025-02-14 DIAGNOSIS — Z00.6 CLINICAL TRIAL PARTICIPANT: ICD-10-CM

## 2025-02-26 LAB
APOB+LDLR+PCSK9 GENE MUT ANL BLD/T: NOT DETECTED
BRCA1+BRCA2 DEL+DUP + FULL MUT ANL BLD/T: NOT DETECTED
MLH1+MSH2+MSH6+PMS2 GN DEL+DUP+FUL M: NOT DETECTED

## 2025-02-27 ENCOUNTER — HOSPITAL ENCOUNTER (OUTPATIENT)
Dept: LAB | Facility: MEDICAL CENTER | Age: 70
End: 2025-02-27
Attending: STUDENT IN AN ORGANIZED HEALTH CARE EDUCATION/TRAINING PROGRAM
Payer: COMMERCIAL

## 2025-02-27 DIAGNOSIS — C18.7 CANCER OF SIGMOID COLON (HCC): ICD-10-CM

## 2025-02-27 LAB
ALBUMIN SERPL BCP-MCNC: 4.5 G/DL (ref 3.2–4.9)
ALBUMIN/GLOB SERPL: 1.6 G/DL
ALP SERPL-CCNC: 34 U/L (ref 30–99)
ALT SERPL-CCNC: 43 U/L (ref 2–50)
ANION GAP SERPL CALC-SCNC: 12 MMOL/L (ref 7–16)
AST SERPL-CCNC: 29 U/L (ref 12–45)
BASOPHILS # BLD AUTO: 1.5 % (ref 0–1.8)
BASOPHILS # BLD: 0.1 K/UL (ref 0–0.12)
BILIRUB SERPL-MCNC: 0.4 MG/DL (ref 0.1–1.5)
BUN SERPL-MCNC: 16 MG/DL (ref 8–22)
CALCIUM ALBUM COR SERPL-MCNC: 9.4 MG/DL (ref 8.5–10.5)
CALCIUM SERPL-MCNC: 9.8 MG/DL (ref 8.5–10.5)
CEA SERPL-MCNC: 1 NG/ML (ref 0–3)
CHLORIDE SERPL-SCNC: 106 MMOL/L (ref 96–112)
CO2 SERPL-SCNC: 24 MMOL/L (ref 20–33)
CREAT SERPL-MCNC: 1.13 MG/DL (ref 0.5–1.4)
EOSINOPHIL # BLD AUTO: 0.3 K/UL (ref 0–0.51)
EOSINOPHIL NFR BLD: 4.4 % (ref 0–6.9)
ERYTHROCYTE [DISTWIDTH] IN BLOOD BY AUTOMATED COUNT: 49.6 FL (ref 35.9–50)
GFR SERPLBLD CREATININE-BSD FMLA CKD-EPI: 70 ML/MIN/1.73 M 2
GLOBULIN SER CALC-MCNC: 2.8 G/DL (ref 1.9–3.5)
GLUCOSE SERPL-MCNC: 140 MG/DL (ref 65–99)
HCT VFR BLD AUTO: 47 % (ref 42–52)
HGB BLD-MCNC: 15.1 G/DL (ref 14–18)
IMM GRANULOCYTES # BLD AUTO: 0.01 K/UL (ref 0–0.11)
IMM GRANULOCYTES NFR BLD AUTO: 0.1 % (ref 0–0.9)
LYMPHOCYTES # BLD AUTO: 1.14 K/UL (ref 1–4.8)
LYMPHOCYTES NFR BLD: 16.6 % (ref 22–41)
MCH RBC QN AUTO: 33 PG (ref 27–33)
MCHC RBC AUTO-ENTMCNC: 32.1 G/DL (ref 32.3–36.5)
MCV RBC AUTO: 102.6 FL (ref 81.4–97.8)
MONOCYTES # BLD AUTO: 0.44 K/UL (ref 0–0.85)
MONOCYTES NFR BLD AUTO: 6.4 % (ref 0–13.4)
NEUTROPHILS # BLD AUTO: 4.89 K/UL (ref 1.82–7.42)
NEUTROPHILS NFR BLD: 71 % (ref 44–72)
NRBC # BLD AUTO: 0 K/UL
NRBC BLD-RTO: 0 /100 WBC (ref 0–0.2)
NTRA SIGNATERA MTM READOUT: 0 MTM/ML
NTRA SIGNATERA MTM READOUT: 0 MTM/ML
NTRA SIGNATERA TEST RESULT: NEGATIVE
NTRA SIGNATERA TEST RESULT: NEGATIVE
PLATELET # BLD AUTO: 250 K/UL (ref 164–446)
PMV BLD AUTO: 10.4 FL (ref 9–12.9)
POTASSIUM SERPL-SCNC: 4.4 MMOL/L (ref 3.6–5.5)
PROT SERPL-MCNC: 7.3 G/DL (ref 6–8.2)
RBC # BLD AUTO: 4.58 M/UL (ref 4.7–6.1)
SODIUM SERPL-SCNC: 142 MMOL/L (ref 135–145)
WBC # BLD AUTO: 6.9 K/UL (ref 4.8–10.8)

## 2025-02-27 PROCEDURE — 80053 COMPREHEN METABOLIC PANEL: CPT

## 2025-02-27 PROCEDURE — 36415 COLL VENOUS BLD VENIPUNCTURE: CPT

## 2025-02-27 PROCEDURE — 82378 CARCINOEMBRYONIC ANTIGEN: CPT

## 2025-02-27 PROCEDURE — 85025 COMPLETE CBC W/AUTO DIFF WBC: CPT

## 2025-03-03 ENCOUNTER — RESULTS FOLLOW-UP (OUTPATIENT)
Dept: MEDICAL GROUP | Facility: PHYSICIAN GROUP | Age: 70
End: 2025-03-03

## 2025-03-07 ENCOUNTER — HOSPITAL ENCOUNTER (OUTPATIENT)
Dept: HEMATOLOGY ONCOLOGY | Facility: MEDICAL CENTER | Age: 70
End: 2025-03-07
Attending: NURSE PRACTITIONER
Payer: COMMERCIAL

## 2025-03-07 VITALS
TEMPERATURE: 97.5 F | OXYGEN SATURATION: 97 % | DIASTOLIC BLOOD PRESSURE: 70 MMHG | WEIGHT: 191.4 LBS | SYSTOLIC BLOOD PRESSURE: 102 MMHG | BODY MASS INDEX: 27.4 KG/M2 | HEIGHT: 70 IN | RESPIRATION RATE: 16 BRPM | HEART RATE: 71 BPM

## 2025-03-07 DIAGNOSIS — Z79.899 ENCOUNTER FOR LONG-TERM CURRENT USE OF HIGH RISK MEDICATION: ICD-10-CM

## 2025-03-07 DIAGNOSIS — C18.7 CANCER OF SIGMOID COLON (HCC): ICD-10-CM

## 2025-03-07 PROCEDURE — 99214 OFFICE O/P EST MOD 30 MIN: CPT | Performed by: NURSE PRACTITIONER

## 2025-03-07 PROCEDURE — 99212 OFFICE O/P EST SF 10 MIN: CPT | Performed by: NURSE PRACTITIONER

## 2025-03-07 RX ORDER — ASPIRIN 81 MG/1
81 TABLET ORAL DAILY
COMMUNITY

## 2025-03-07 ASSESSMENT — ENCOUNTER SYMPTOMS
DIZZINESS: 0
DIARRHEA: 1
BLOOD IN STOOL: 0
FEVER: 0
WEIGHT LOSS: 0
COUGH: 0
PALPITATIONS: 0
TINGLING: 0
VOMITING: 0
CONSTIPATION: 0
CHILLS: 0
WHEEZING: 0
SHORTNESS OF BREATH: 0
NAUSEA: 0
MYALGIAS: 0
HEADACHES: 0
INSOMNIA: 0

## 2025-03-07 ASSESSMENT — FIBROSIS 4 INDEX: FIB4 SCORE: 1.22

## 2025-03-07 NOTE — PROGRESS NOTES
Subjective     Talib Ortiz is a 69 y.o. male who presents with Colon Cancer (Emily/pre chemo)          HPI  Mr. 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 He is unaccompanied for today's visit.    Patient was in his usual state of health until fall 2024 when he noted bowel changes, without hematochezia.  He was evaluated per GI with colonoscopy completed 11/27/24 (last completed in 12/3/22 with moderate diverticulosis and benign polyp in sigmoid colon). Obstructing mass was noted in descending colon, pathology confirmed malignancy. CT completed 11/28/24 showed sigmoid colon mass, consistent with known malignancy, several small subcentimeter hepatic lesions, MRI recommended, no other suspicious lesions in chest, abdomen, or pelvis.  MRI completed 12/23/24 showed no liver mass demonstrated, no evidence of hepatic metastases; small cystic structure adjacent to right lobe liver measuring 8 mm, doubtful significance. He underwent robotic low anterior resection with flexible sigmoidoscopy per Dr. Bautista on 1/7/25, pathology consistent with known malignancy, margins negative. Signatera ctDNA completed 2/6/25 was negative. He will  initiate systemic therapy with Xelox on 3/9/25, for an anticipated 4 cycles.    Patient continues with some baseline post surgical fatigue.  Intermittent diarrhea per self-limiting.  Patient is feeling well and amenable to proceeding with treatment, he is otherwise asymptomatic and at his baseline.      Review of Systems   Constitutional:  Positive for malaise/fatigue (less than usual). Negative for chills, fever and weight loss (stable).   Respiratory:  Negative for cough, shortness of breath and wheezing.    Cardiovascular:  Negative for chest pain, palpitations and leg swelling.   Gastrointestinal:  Positive for diarrhea (intermittent, self limiting).  Negative for blood in stool, constipation, melena, nausea and vomiting.   Genitourinary:  Negative for dysuria and hematuria.   Musculoskeletal:  Positive for joint pain (bilateral knees - consistent with baseline). Negative for myalgias.   Neurological:  Negative for dizziness, tingling and headaches.   Psychiatric/Behavioral:  The patient does not have insomnia (melatonin PRN).      No Known Allergies      Current Outpatient Medications on File Prior to Encounter   Medication Sig Dispense Refill    aspirin 81 MG EC tablet Take 81 mg by mouth every day.      capecitabine (XELODA) 500 MG tablet Take 4 tablets of 500mg (total dose 2,000mg) by mouth 2 times a day on day 1-14 of each 21 day cycle.  Starting on day 1 of IV chemotherapy. 112 Tablet 5    ondansetron (ZOFRAN) 4 MG Tab tablet Take 1 Tablet by mouth every four hours as needed for Nausea/Vomiting (for nausea, vomiting). 30 Tablet 6    prochlorperazine (COMPAZINE) 10 MG Tab Take 1 Tablet by mouth every 6 hours as needed (for nausea, vomiting). 30 Tablet 6    OLANZapine (ZYPREXA) 5 MG Tab Take 1 Tablet by mouth every evening. To be taken nightly on first day of chemotherapy for a total of 4 days, or as directed by your provider. 24 Tablet 0    loperamide (IMODIUM A-D) 2 MG tablet Take 1 Tablet by mouth see administration instructions. 30 Tablet 6    losartan (COZAAR) 25 MG Tab Take 1 Tablet by mouth every day. (Patient taking differently: Take 25 mg by mouth every evening.) 90 Tablet 4    metoprolol tartrate (LOPRESSOR) 25 MG Tab Take 1 Tablet by mouth 2 times a day. 180 Tablet 4    rosuvastatin (CRESTOR) 40 MG tablet Take 1 Tablet by mouth every evening. 90 Tablet 4    therapeutic multivitamin-minerals (THERAGRAN-M) Tab Take 1 Tab by mouth every day. 30 Tab 11    ferrous sulfate 325 (65 Fe) MG tablet Take 1 Tab by mouth every morning with breakfast. (Patient not taking: Reported on 3/7/2025) 30 Tab 3    ascorbic acid (VITAMIN C) 500 MG tablet Take 1 Tab by  "mouth every day. (Patient not taking: Reported on 3/7/2025) 30 Tab 3     No current facility-administered medications on file prior to encounter.              Objective     /70 (BP Location: Left arm, Patient Position: Sitting, BP Cuff Size: Adult)   Pulse 71   Temp 36.4 °C (97.5 °F) (Temporal)   Resp 16   Ht 1.778 m (5' 10\")   Wt 86.8 kg (191 lb 6.4 oz)   SpO2 97%   BMI 27.46 kg/m²      Physical Exam  Vitals reviewed.   Constitutional:       General: He is not in acute distress.     Appearance: He is well-developed. He is not diaphoretic.   HENT:      Head: Normocephalic and atraumatic.   Eyes:      General: No scleral icterus.        Right eye: No discharge.         Left eye: No discharge.   Cardiovascular:      Rate and Rhythm: Normal rate and regular rhythm.      Heart sounds: Normal heart sounds. No murmur heard.     No friction rub. No gallop.   Pulmonary:      Effort: Pulmonary effort is normal. No respiratory distress.      Breath sounds: Normal breath sounds. No wheezing.   Abdominal:      General: There is no distension.      Palpations: Abdomen is soft.      Tenderness: There is no abdominal tenderness.   Musculoskeletal:         General: Normal range of motion.      Cervical back: Normal range of motion.   Skin:     General: Skin is warm and dry.      Coloration: Skin is not pale.      Findings: No erythema or rash.   Neurological:      Mental Status: He is alert and oriented to person, place, and time.   Psychiatric:         Behavior: Behavior normal.         Treatment labs to be completed prior to dosing      No visits with results within 1 Day(s) from this visit.   Latest known visit with results is:   Hospital Outpatient Visit on 02/27/2025   Component Date Value Ref Range Status    WBC 02/27/2025 6.9  4.8 - 10.8 K/uL Final    RBC 02/27/2025 4.58 (L)  4.70 - 6.10 M/uL Final    Hemoglobin 02/27/2025 15.1  14.0 - 18.0 g/dL Final    Hematocrit 02/27/2025 47.0  42.0 - 52.0 % Final    MCV " 02/27/2025 102.6 (H)  81.4 - 97.8 fL Final    MCH 02/27/2025 33.0  27.0 - 33.0 pg Final    MCHC 02/27/2025 32.1 (L)  32.3 - 36.5 g/dL Final    RDW 02/27/2025 49.6  35.9 - 50.0 fL Final    Platelet Count 02/27/2025 250  164 - 446 K/uL Final    MPV 02/27/2025 10.4  9.0 - 12.9 fL Final    Neutrophils-Polys 02/27/2025 71.00  44.00 - 72.00 % Final    Lymphocytes 02/27/2025 16.60 (L)  22.00 - 41.00 % Final    Monocytes 02/27/2025 6.40  0.00 - 13.40 % Final    Eosinophils 02/27/2025 4.40  0.00 - 6.90 % Final    Basophils 02/27/2025 1.50  0.00 - 1.80 % Final    Immature Granulocytes 02/27/2025 0.10  0.00 - 0.90 % Final    Nucleated RBC 02/27/2025 0.00  0.00 - 0.20 /100 WBC Final    Neutrophils (Absolute) 02/27/2025 4.89  1.82 - 7.42 K/uL Final    Includes immature neutrophils, if present.    Lymphs (Absolute) 02/27/2025 1.14  1.00 - 4.80 K/uL Final    Monos (Absolute) 02/27/2025 0.44  0.00 - 0.85 K/uL Final    Eos (Absolute) 02/27/2025 0.30  0.00 - 0.51 K/uL Final    Baso (Absolute) 02/27/2025 0.10  0.00 - 0.12 K/uL Final    Immature Granulocytes (abs) 02/27/2025 0.01  0.00 - 0.11 K/uL Final    NRBC (Absolute) 02/27/2025 0.00  K/uL Final    Sodium 02/27/2025 142  135 - 145 mmol/L Final    Potassium 02/27/2025 4.4  3.6 - 5.5 mmol/L Final    Chloride 02/27/2025 106  96 - 112 mmol/L Final    Co2 02/27/2025 24  20 - 33 mmol/L Final    Anion Gap 02/27/2025 12.0  7.0 - 16.0 Final    Glucose 02/27/2025 140 (H)  65 - 99 mg/dL Final    Bun 02/27/2025 16  8 - 22 mg/dL Final    Creatinine 02/27/2025 1.13  0.50 - 1.40 mg/dL Final    Calcium 02/27/2025 9.8  8.5 - 10.5 mg/dL Final    Correct Calcium 02/27/2025 9.4  8.5 - 10.5 mg/dL Final    AST(SGOT) 02/27/2025 29  12 - 45 U/L Final    ALT(SGPT) 02/27/2025 43  2 - 50 U/L Final    Alkaline Phosphatase 02/27/2025 34  30 - 99 U/L Final    Total Bilirubin 02/27/2025 0.4  0.1 - 1.5 mg/dL Final    Albumin 02/27/2025 4.5  3.2 - 4.9 g/dL Final    Total Protein 02/27/2025 7.3  6.0 - 8.2 g/dL  Final    Globulin 02/27/2025 2.8  1.9 - 3.5 g/dL Final    A-G Ratio 02/27/2025 1.6  g/dL Final    Carcinoembryonic Antigen 02/27/2025 1.0  0.0 - 3.0 ng/mL Final    Comment: Performed using Roche florencio e immunoassay analyzer. CEA values determined on  patient samples by different testing procedures cannot be directly compared  with one another and could be the cause of erroneous medical  interpretations. If there is a change in the CEA assay procedure used while  monitoring therapy, then new baselines may need to be established when  comparing previous results.      GFR (CKD-EPI) 02/27/2025 70  >60 mL/min/1.73 m 2 Final    Comment: Estimated Glomerular Filtration Rate is calculated using  race neutral CKD-EPI 2021 equation per NKF-ASN recommendations.           02/14/25 14:39   APOB+LDLR+LDRAP1+PCSK9 Gene Deletion+Dup+Full Analysis Not Detected   BRCA1+BRCA2 Gene Deletion+Dup and Full Mutation Analysis Not Detected   MLH1+MSH2+MSH6+PMS2+EPCAM Gene Deletion+Dup & Full Mutation Not Detected                     Assessment & Plan     1. Cancer of sigmoid colon (HCC)        2. Encounter for long-term current use of high risk medication               1.  Colon cancer: Diagnosed 11/28/24; s/p R-LAR 1/7/25; initiating Xelox 3/9/25    CBC, CMP, CEA will be evaluated prior to dosing and if within acceptable limits patient will proceed with cycle 1 of treatment and return in 3 weeks for evaluation prior to cycle 2, sooner as needed.    - CT after C4      The patient verbalized agreement and understanding of current plan. All questions and concerns were addressed at time of visit.    Please note that this dictation was created using voice recognition software. I have made every reasonable attempt to correct obvious errors, but I expect that there are errors of grammar and possibly content that I did not discover before finalizing the note.

## 2025-03-09 ENCOUNTER — OUTPATIENT INFUSION SERVICES (OUTPATIENT)
Dept: ONCOLOGY | Facility: MEDICAL CENTER | Age: 70
End: 2025-03-09
Attending: STUDENT IN AN ORGANIZED HEALTH CARE EDUCATION/TRAINING PROGRAM
Payer: COMMERCIAL

## 2025-03-09 VITALS
TEMPERATURE: 97.5 F | DIASTOLIC BLOOD PRESSURE: 62 MMHG | HEIGHT: 67 IN | SYSTOLIC BLOOD PRESSURE: 95 MMHG | BODY MASS INDEX: 29.72 KG/M2 | WEIGHT: 189.38 LBS | RESPIRATION RATE: 18 BRPM | OXYGEN SATURATION: 97 % | HEART RATE: 58 BPM

## 2025-03-09 DIAGNOSIS — C18.7 CANCER OF SIGMOID COLON (HCC): ICD-10-CM

## 2025-03-09 LAB
ALBUMIN SERPL BCP-MCNC: 4.3 G/DL (ref 3.2–4.9)
ALBUMIN/GLOB SERPL: 1.7 G/DL
ALP SERPL-CCNC: 34 U/L (ref 30–99)
ALT SERPL-CCNC: 64 U/L (ref 2–50)
ANION GAP SERPL CALC-SCNC: 12 MMOL/L (ref 7–16)
AST SERPL-CCNC: 43 U/L (ref 12–45)
BASOPHILS # BLD AUTO: 2 % (ref 0–1.8)
BASOPHILS # BLD: 0.12 K/UL (ref 0–0.12)
BILIRUB SERPL-MCNC: 0.6 MG/DL (ref 0.1–1.5)
BUN SERPL-MCNC: 16 MG/DL (ref 8–22)
CALCIUM ALBUM COR SERPL-MCNC: 9.1 MG/DL (ref 8.5–10.5)
CALCIUM SERPL-MCNC: 9.3 MG/DL (ref 8.5–10.5)
CEA SERPL-MCNC: 0.8 NG/ML (ref 0–3)
CHLORIDE SERPL-SCNC: 105 MMOL/L (ref 96–112)
CO2 SERPL-SCNC: 23 MMOL/L (ref 20–33)
CREAT SERPL-MCNC: 1.2 MG/DL (ref 0.5–1.4)
EOSINOPHIL # BLD AUTO: 0.54 K/UL (ref 0–0.51)
EOSINOPHIL NFR BLD: 9 % (ref 0–6.9)
ERYTHROCYTE [DISTWIDTH] IN BLOOD BY AUTOMATED COUNT: 45.1 FL (ref 35.9–50)
GFR SERPLBLD CREATININE-BSD FMLA CKD-EPI: 65 ML/MIN/1.73 M 2
GLOBULIN SER CALC-MCNC: 2.5 G/DL (ref 1.9–3.5)
GLUCOSE SERPL-MCNC: 110 MG/DL (ref 65–99)
HCT VFR BLD AUTO: 41.4 % (ref 42–52)
HGB BLD-MCNC: 14.2 G/DL (ref 14–18)
IMM GRANULOCYTES # BLD AUTO: 0.02 K/UL (ref 0–0.11)
IMM GRANULOCYTES NFR BLD AUTO: 0.3 % (ref 0–0.9)
LYMPHOCYTES # BLD AUTO: 1.12 K/UL (ref 1–4.8)
LYMPHOCYTES NFR BLD: 18.6 % (ref 22–41)
MCH RBC QN AUTO: 32.7 PG (ref 27–33)
MCHC RBC AUTO-ENTMCNC: 34.3 G/DL (ref 32.3–36.5)
MCV RBC AUTO: 95.4 FL (ref 81.4–97.8)
MONOCYTES # BLD AUTO: 0.65 K/UL (ref 0–0.85)
MONOCYTES NFR BLD AUTO: 10.8 % (ref 0–13.4)
NEUTROPHILS # BLD AUTO: 3.58 K/UL (ref 1.82–7.42)
NEUTROPHILS NFR BLD: 59.3 % (ref 44–72)
NRBC # BLD AUTO: 0 K/UL
NRBC BLD-RTO: 0 /100 WBC (ref 0–0.2)
PLATELET # BLD AUTO: 223 K/UL (ref 164–446)
PMV BLD AUTO: 10.1 FL (ref 9–12.9)
POTASSIUM SERPL-SCNC: 4.1 MMOL/L (ref 3.6–5.5)
PROT SERPL-MCNC: 6.8 G/DL (ref 6–8.2)
RBC # BLD AUTO: 4.34 M/UL (ref 4.7–6.1)
SODIUM SERPL-SCNC: 140 MMOL/L (ref 135–145)
WBC # BLD AUTO: 6 K/UL (ref 4.8–10.8)

## 2025-03-09 PROCEDURE — 80053 COMPREHEN METABOLIC PANEL: CPT

## 2025-03-09 PROCEDURE — 82378 CARCINOEMBRYONIC ANTIGEN: CPT

## 2025-03-09 PROCEDURE — 85025 COMPLETE CBC W/AUTO DIFF WBC: CPT

## 2025-03-09 PROCEDURE — 700111 HCHG RX REV CODE 636 W/ 250 OVERRIDE (IP): Performed by: STUDENT IN AN ORGANIZED HEALTH CARE EDUCATION/TRAINING PROGRAM

## 2025-03-09 PROCEDURE — 96367 TX/PROPH/DG ADDL SEQ IV INF: CPT

## 2025-03-09 PROCEDURE — 96375 TX/PRO/DX INJ NEW DRUG ADDON: CPT

## 2025-03-09 PROCEDURE — 96413 CHEMO IV INFUSION 1 HR: CPT

## 2025-03-09 PROCEDURE — 700105 HCHG RX REV CODE 258: Performed by: STUDENT IN AN ORGANIZED HEALTH CARE EDUCATION/TRAINING PROGRAM

## 2025-03-09 PROCEDURE — 96415 CHEMO IV INFUSION ADDL HR: CPT

## 2025-03-09 RX ORDER — PALONOSETRON 0.05 MG/ML
0.25 INJECTION, SOLUTION INTRAVENOUS ONCE
Status: COMPLETED | OUTPATIENT
Start: 2025-03-09 | End: 2025-03-09

## 2025-03-09 RX ORDER — DEXAMETHASONE SODIUM PHOSPHATE 4 MG/ML
12 INJECTION, SOLUTION INTRA-ARTICULAR; INTRALESIONAL; INTRAMUSCULAR; INTRAVENOUS; SOFT TISSUE ONCE
Status: COMPLETED | OUTPATIENT
Start: 2025-03-09 | End: 2025-03-09

## 2025-03-09 RX ADMIN — DEXAMETHASONE SODIUM PHOSPHATE 12 MG: 4 INJECTION INTRA-ARTICULAR; INTRALESIONAL; INTRAMUSCULAR; INTRAVENOUS; SOFT TISSUE at 08:42

## 2025-03-09 RX ADMIN — PALONOSETRON HYDROCHLORIDE 0.25 MG: 0.25 INJECTION INTRAVENOUS at 08:50

## 2025-03-09 RX ADMIN — OXALIPLATIN 270 MG: 5 INJECTION, SOLUTION INTRAVENOUS at 09:31

## 2025-03-09 RX ADMIN — FOSAPREPITANT 150 MG: 150 INJECTION, POWDER, LYOPHILIZED, FOR SOLUTION INTRAVENOUS at 08:55

## 2025-03-09 ASSESSMENT — FIBROSIS 4 INDEX: FIB4 SCORE: 1.22

## 2025-03-09 NOTE — PROGRESS NOTES
"Pharmacy Chemotherapy Calculations Note:    Dx: Sigmoid Adenocarcinoma, stage IIIb  Cycle:  1 Previous treatment: n/a     Protocol: CapeOx  Capecitabine 1000 mg/m2 PO twice daily on Days 1-15  - Beginning the evening of D1 and las dose the morning of D15 (28 doses total)  Oxaliplatin 130 mg/m2 IV over 2 hrs on Day 1  21-Day cycle x 3-8 cycles (adjuvant)  NCCN Guidelines for Colon Cancer. [id number].  Sergio HJ, et al. JCO. 2007;25(12):102-9.         BP 95/62   Pulse (!) 58   Temp 36.4 °C (97.5 °F) (Temporal)   Resp 18   Ht 1.7 m (5' 6.93\")   Wt 85.9 kg (189 lb 6 oz)   SpO2 97%   BMI 29.72 kg/m²  Body surface area is 2.01 meters squared.    Labs from 3/9/25 reviewed - all within treatment parameters.       Oxaliplatin 130 mg/m² x 2.01 m² = 261.3 mg   <10% difference, okay to treat with final dose = 270 mg IV      Tania Bush, PharmD, BCOP           "

## 2025-03-09 NOTE — PROGRESS NOTES
"Pharmacy Chemotherapy Calculation    Dx: Sigmoid adenocarcinoma         Protocol: CapeOX     Capecitabine 1000 mg/m2 PO twice daily on Days 1-15  Oxaliplatin 130 mg/m2 IV over 2 hours  21-day cycle for 3-8 cycles of perioperative therapy (neoadjuvant or adjuvant) or until DP/UT (advanced or metastatic)  NCCN Guidelines for Colon Cancer V.1.2024.  Sergio BRYAN et al. J Clin Oncol. 2007; 25(1):102-9.  Cathi J , et al. J Clin Oncol. 2008;26(12):2006-12.  Galilea CHATMAN , et al. J Clin Oncol. 2008;26(12):2013-9.    Allergies:  Patient has no known allergies.       BP 95/62   Pulse (!) 58   Temp 36.4 °C (97.5 °F) (Temporal)   Resp 18   Ht 1.7 m (5' 6.93\")   Wt 85.9 kg (189 lb 6 oz)   SpO2 97%   BMI 29.72 kg/m²  Body surface area is 2.01 meters squared.    Labs 3/9/25:  ANC~ 3580 Plt = 223k   Hgb = 14.2     SCr = 1.2 mg/dL CrCl ~ 70 mL/min   AST/ALT/AP = 43/64/34 TBili = 0.6      Drug Order   (Drug name, dose, route, IV Fluid & volume, frequency, number of doses) Cycle 1  Previous treatment: n/a     Medication = Oxaliplatin  Base Dose = 130 mg/m2  Calc Dose: Base Dose x 2.01 m2 = 261.3 mg  Final Dose = 270 mg  Route = IV  Fluid & Volume = D5 250 mL  Admin Duration = Over 2 hours          <10% difference, OK to treat with final dose   By my signature below, I confirm this process was performed independently with the BSA and all final chemotherapy dosing calculations congruent. I have reviewed the above chemotherapy order and that my calculation of the final dose and BSA (when applicable) corroborate those calculations of the  pharmacist. Discrepancies of 10% or greater in the written dose have been addressed and documented within the [facility] Progress notes.    Duran Ivey, PharmD  "

## 2025-03-09 NOTE — PROGRESS NOTES
Pt arrived ambulatory accompanied by wife for C1 Oxaliplatin, reviewed plan of care, verbalized understanding and wishes to proceed.  Confirmed pt has Capecitabine in possession and started meds this morning, understands dosing around Oxaliplatin infusions.  PIV started in RAC with good blood return, labs drawn. Results reviewed, WNL and meets parameters for infusion.  Premeds given as ordered followed by Oxaliplatin over 2 hours, tolerated infusion well, no reaction noted.  Pt Dc'd home without incident and will f/u as scheduled.

## 2025-03-09 NOTE — PROGRESS NOTES
Chemotherapy Verification - SECONDARY RN       Height = 1.7 m  Weight = 85.9 kg  BSA = 2.01 m2       Medication: oxaliplain (Eloxatin)  Dose: 130 mg/m2  Calculated Dose: 261.3 mg                             (In mg/m2, AUC, mg/kg)     I confirm that this process was performed independently.

## 2025-03-09 NOTE — PROGRESS NOTES
Chemotherapy Verification - PRIMARY RN      Height = 170cm  Weight = 85.9kg  BSA = 2.01       Medication: Oxaliplatin  Dose: 130mg/m2  Calculated Dose: 261.3mg                             (In mg/m2, AUC, mg/kg)     Confirmed pt is taking Capecitabine as prescribed      I confirm this process was performed independently with the BSA and all final chemotherapy dosing calculations congruent.  Any discrepancies of 10% or greater have been addressed with the chemotherapy pharmacist. The resolution of the discrepancy has been documented in the EPIC progress notes.

## 2025-03-13 ENCOUNTER — PATIENT OUTREACH (OUTPATIENT)
Dept: ONCOLOGY | Facility: MEDICAL CENTER | Age: 70
End: 2025-03-13
Payer: COMMERCIAL

## 2025-03-13 NOTE — PROGRESS NOTES
DST out reach completed. Pt has completed C1 D1 Capox, last week. Per patient treatment went better than expected.  Pt able to contact resources as needed.

## 2025-03-15 PROBLEM — E66.3 OVERWEIGHT: Status: ACTIVE | Noted: 2025-03-15

## 2025-03-21 DIAGNOSIS — I25.84 CORONARY ARTERY DISEASE DUE TO CALCIFIED CORONARY LESION: ICD-10-CM

## 2025-03-21 DIAGNOSIS — I25.10 CORONARY ARTERY DISEASE DUE TO CALCIFIED CORONARY LESION: ICD-10-CM

## 2025-03-26 ENCOUNTER — HOSPITAL ENCOUNTER (OUTPATIENT)
Dept: LAB | Facility: MEDICAL CENTER | Age: 70
End: 2025-03-26
Attending: STUDENT IN AN ORGANIZED HEALTH CARE EDUCATION/TRAINING PROGRAM
Payer: COMMERCIAL

## 2025-03-26 ENCOUNTER — HOSPITAL ENCOUNTER (OUTPATIENT)
Dept: LAB | Facility: MEDICAL CENTER | Age: 70
End: 2025-03-26
Attending: INTERNAL MEDICINE
Payer: COMMERCIAL

## 2025-03-26 ENCOUNTER — TELEPHONE (OUTPATIENT)
Dept: HEMATOLOGY ONCOLOGY | Facility: MEDICAL CENTER | Age: 70
End: 2025-03-26
Payer: COMMERCIAL

## 2025-03-26 DIAGNOSIS — C18.7 CANCER OF SIGMOID COLON (HCC): ICD-10-CM

## 2025-03-26 LAB
ALBUMIN SERPL BCP-MCNC: 4.1 G/DL (ref 3.2–4.9)
ALBUMIN/GLOB SERPL: 1.5 G/DL
ALP SERPL-CCNC: 35 U/L (ref 30–99)
ALT SERPL-CCNC: 20 U/L (ref 2–50)
ANION GAP SERPL CALC-SCNC: 15 MMOL/L (ref 7–16)
AST SERPL-CCNC: 22 U/L (ref 12–45)
BASOPHILS # BLD AUTO: 0.9 % (ref 0–1.8)
BASOPHILS # BLD: 0.06 K/UL (ref 0–0.12)
BILIRUB SERPL-MCNC: 0.6 MG/DL (ref 0.1–1.5)
BUN SERPL-MCNC: 14 MG/DL (ref 8–22)
CALCIUM ALBUM COR SERPL-MCNC: 8.7 MG/DL (ref 8.5–10.5)
CALCIUM SERPL-MCNC: 8.8 MG/DL (ref 8.5–10.5)
CEA SERPL-MCNC: 2.4 NG/ML (ref 0–3)
CHLORIDE SERPL-SCNC: 106 MMOL/L (ref 96–112)
CHOLEST SERPL-MCNC: 96 MG/DL (ref 100–199)
CO2 SERPL-SCNC: 20 MMOL/L (ref 20–33)
CREAT SERPL-MCNC: 1.1 MG/DL (ref 0.5–1.4)
EOSINOPHIL # BLD AUTO: 0.38 K/UL (ref 0–0.51)
EOSINOPHIL NFR BLD: 5.7 % (ref 0–6.9)
ERYTHROCYTE [DISTWIDTH] IN BLOOD BY AUTOMATED COUNT: 43.7 FL (ref 35.9–50)
GFR SERPLBLD CREATININE-BSD FMLA CKD-EPI: 72 ML/MIN/1.73 M 2
GLOBULIN SER CALC-MCNC: 2.8 G/DL (ref 1.9–3.5)
GLUCOSE SERPL-MCNC: 111 MG/DL (ref 65–99)
HCT VFR BLD AUTO: 41.6 % (ref 42–52)
HDLC SERPL-MCNC: 38 MG/DL
HGB BLD-MCNC: 13.9 G/DL (ref 14–18)
IMM GRANULOCYTES # BLD AUTO: 0.02 K/UL (ref 0–0.11)
IMM GRANULOCYTES NFR BLD AUTO: 0.3 % (ref 0–0.9)
LDLC SERPL CALC-MCNC: 35 MG/DL
LYMPHOCYTES # BLD AUTO: 0.93 K/UL (ref 1–4.8)
LYMPHOCYTES NFR BLD: 13.9 % (ref 22–41)
MCH RBC QN AUTO: 32.8 PG (ref 27–33)
MCHC RBC AUTO-ENTMCNC: 33.4 G/DL (ref 32.3–36.5)
MCV RBC AUTO: 98.1 FL (ref 81.4–97.8)
MONOCYTES # BLD AUTO: 0.84 K/UL (ref 0–0.85)
MONOCYTES NFR BLD AUTO: 12.6 % (ref 0–13.4)
NEUTROPHILS # BLD AUTO: 4.46 K/UL (ref 1.82–7.42)
NEUTROPHILS NFR BLD: 66.6 % (ref 44–72)
NRBC # BLD AUTO: 0 K/UL
NRBC BLD-RTO: 0 /100 WBC (ref 0–0.2)
PLATELET # BLD AUTO: 262 K/UL (ref 164–446)
PMV BLD AUTO: 9.8 FL (ref 9–12.9)
POTASSIUM SERPL-SCNC: 3.6 MMOL/L (ref 3.6–5.5)
PROT SERPL-MCNC: 6.9 G/DL (ref 6–8.2)
RBC # BLD AUTO: 4.24 M/UL (ref 4.7–6.1)
SODIUM SERPL-SCNC: 141 MMOL/L (ref 135–145)
TRIGL SERPL-MCNC: 113 MG/DL (ref 0–149)
WBC # BLD AUTO: 6.7 K/UL (ref 4.8–10.8)

## 2025-03-26 PROCEDURE — 36415 COLL VENOUS BLD VENIPUNCTURE: CPT

## 2025-03-26 PROCEDURE — 80053 COMPREHEN METABOLIC PANEL: CPT

## 2025-03-26 PROCEDURE — 85025 COMPLETE CBC W/AUTO DIFF WBC: CPT

## 2025-03-26 PROCEDURE — 80061 LIPID PANEL: CPT

## 2025-03-26 PROCEDURE — 82378 CARCINOEMBRYONIC ANTIGEN: CPT

## 2025-03-26 NOTE — TELEPHONE ENCOUNTER
Patient currently in lab, lab is contacting office in regard to lab not being within the window for lab prior to treatment. Spoke to Felix CROWE, patient able to complete prechemo labs today. Updated lab, will update patient on plan of care.

## 2025-03-28 ENCOUNTER — HOSPITAL ENCOUNTER (OUTPATIENT)
Dept: HEMATOLOGY ONCOLOGY | Facility: MEDICAL CENTER | Age: 70
End: 2025-03-28
Attending: STUDENT IN AN ORGANIZED HEALTH CARE EDUCATION/TRAINING PROGRAM
Payer: COMMERCIAL

## 2025-03-28 VITALS
OXYGEN SATURATION: 96 % | SYSTOLIC BLOOD PRESSURE: 118 MMHG | HEIGHT: 67 IN | BODY MASS INDEX: 29.07 KG/M2 | TEMPERATURE: 96.9 F | DIASTOLIC BLOOD PRESSURE: 64 MMHG | WEIGHT: 185.2 LBS | HEART RATE: 101 BPM

## 2025-03-28 DIAGNOSIS — K52.1 CHEMOTHERAPY INDUCED DIARRHEA: ICD-10-CM

## 2025-03-28 DIAGNOSIS — C18.7 CANCER OF SIGMOID COLON (HCC): ICD-10-CM

## 2025-03-28 DIAGNOSIS — T45.1X5A CHEMOTHERAPY INDUCED DIARRHEA: ICD-10-CM

## 2025-03-28 PROCEDURE — 99212 OFFICE O/P EST SF 10 MIN: CPT | Performed by: STUDENT IN AN ORGANIZED HEALTH CARE EDUCATION/TRAINING PROGRAM

## 2025-03-28 PROCEDURE — 99214 OFFICE O/P EST MOD 30 MIN: CPT | Performed by: STUDENT IN AN ORGANIZED HEALTH CARE EDUCATION/TRAINING PROGRAM

## 2025-03-28 RX ORDER — 0.9 % SODIUM CHLORIDE 0.9 %
10 VIAL (ML) INJECTION PRN
OUTPATIENT
Start: 2025-04-07

## 2025-03-28 RX ORDER — 0.9 % SODIUM CHLORIDE 0.9 %
10 VIAL (ML) INJECTION PRN
OUTPATIENT
Start: 2025-04-06

## 2025-03-28 RX ORDER — DIPHENOXYLATE HYDROCHLORIDE AND ATROPINE SULFATE 2.5; .025 MG/1; MG/1
1 TABLET ORAL 4 TIMES DAILY PRN
Qty: 30 TABLET | Refills: 0 | Status: SHIPPED | OUTPATIENT
Start: 2025-03-28 | End: 2025-04-04

## 2025-03-28 RX ORDER — 0.9 % SODIUM CHLORIDE 0.9 %
3 VIAL (ML) INJECTION PRN
OUTPATIENT
Start: 2025-04-06

## 2025-03-28 RX ORDER — PALONOSETRON 0.05 MG/ML
0.25 INJECTION, SOLUTION INTRAVENOUS ONCE
OUTPATIENT
Start: 2025-04-07 | End: 2025-03-30

## 2025-03-28 RX ORDER — DEXAMETHASONE SODIUM PHOSPHATE 4 MG/ML
12 INJECTION, SOLUTION INTRA-ARTICULAR; INTRALESIONAL; INTRAMUSCULAR; INTRAVENOUS; SOFT TISSUE ONCE
OUTPATIENT
Start: 2025-04-07 | End: 2025-03-30

## 2025-03-28 RX ORDER — DIPHENHYDRAMINE HYDROCHLORIDE 50 MG/ML
50 INJECTION, SOLUTION INTRAMUSCULAR; INTRAVENOUS PRN
OUTPATIENT
Start: 2025-04-07

## 2025-03-28 RX ORDER — DEXTROSE MONOHYDRATE 50 MG/ML
INJECTION, SOLUTION INTRAVENOUS CONTINUOUS
OUTPATIENT
Start: 2025-04-07

## 2025-03-28 RX ORDER — 0.9 % SODIUM CHLORIDE 0.9 %
3 VIAL (ML) INJECTION PRN
OUTPATIENT
Start: 2025-04-07

## 2025-03-28 RX ORDER — PROCHLORPERAZINE MALEATE 10 MG
10 TABLET ORAL EVERY 6 HOURS PRN
OUTPATIENT
Start: 2025-04-07

## 2025-03-28 RX ORDER — 0.9 % SODIUM CHLORIDE 0.9 %
VIAL (ML) INJECTION PRN
OUTPATIENT
Start: 2025-04-06

## 2025-03-28 RX ORDER — EPINEPHRINE 1 MG/ML(1)
0.5 AMPUL (ML) INJECTION PRN
OUTPATIENT
Start: 2025-04-07

## 2025-03-28 RX ORDER — 0.9 % SODIUM CHLORIDE 0.9 %
VIAL (ML) INJECTION PRN
OUTPATIENT
Start: 2025-04-07

## 2025-03-28 RX ORDER — ONDANSETRON 8 MG/1
8 TABLET, ORALLY DISINTEGRATING ORAL PRN
OUTPATIENT
Start: 2025-04-07

## 2025-03-28 RX ORDER — ONDANSETRON 2 MG/ML
4 INJECTION INTRAMUSCULAR; INTRAVENOUS PRN
OUTPATIENT
Start: 2025-04-07

## 2025-03-28 RX ORDER — METHYLPREDNISOLONE SODIUM SUCCINATE 125 MG/2ML
125 INJECTION, POWDER, LYOPHILIZED, FOR SOLUTION INTRAMUSCULAR; INTRAVENOUS PRN
OUTPATIENT
Start: 2025-04-07

## 2025-03-28 ASSESSMENT — FIBROSIS 4 INDEX: FIB4 SCORE: 1.3

## 2025-03-28 ASSESSMENT — PAIN SCALES - GENERAL: PAINLEVEL_OUTOF10: NO PAIN

## 2025-03-29 ASSESSMENT — ENCOUNTER SYMPTOMS
CONSTIPATION: 0
HEADACHES: 0
BLOOD IN STOOL: 0
WEAKNESS: 0
INSOMNIA: 0
ORTHOPNEA: 0
ABDOMINAL PAIN: 0
NAUSEA: 0
COUGH: 0
DOUBLE VISION: 0
BLURRED VISION: 0
SHORTNESS OF BREATH: 0
SINUS PAIN: 0
NERVOUS/ANXIOUS: 0
BRUISES/BLEEDS EASILY: 0
WEIGHT LOSS: 0
HEARTBURN: 0
PALPITATIONS: 0
FEVER: 0
SORE THROAT: 0
DIARRHEA: 1
DIZZINESS: 0
MYALGIAS: 0
WHEEZING: 0
VOMITING: 0
TINGLING: 0
SPUTUM PRODUCTION: 0
CHILLS: 0
DIAPHORESIS: 0
BACK PAIN: 0

## 2025-03-29 NOTE — PROGRESS NOTES
Follow Up Note:  Hematology/Oncology      Primary Care:  Roger Gonzalez M.D.    Diagnosis: Sigmoid colon adenocarcinoma    Chief Complaint: On-treatment visit    Current Treatment: CAPOX    Prior Treatment: Surgical resection    Oncology History of Presenting Illness:  Talib Ortiz is a 69 y.o.  man who presents to the clinic for evaluation for systemic therapy for a new diagnosis of sigmoid adenocarcinoma, s/p surgical resection on 01/07/25 with final pathology [id number] stage IIIB disease. He had been changes in his bowel habits for the past 5 months, non-bloody. He eventually was seen by GI and a scope was performed (he had a clear scope in 2022) and that revealed an obstructing mass. He was sent for surgical evaluation accordingly and was operated on earlier this month. He was subsequently referred for evaluation.     Treatment History:   01/07/25: Surgical resection  03/09/25: C1 CAPOX  03/30/25: C2 CAPOX deferred 1 week (toxicity) (reduce capecitabine to 1500 mg PO BID)    Interval History:  Patient is here for follow up visit. He has been having a lot of diarrhea and Imodium has not helped. He has also been feeling weak and fatigued, and has a rash on his arms.     Allergies as of 03/28/2025    (No Known Allergies)         Current Outpatient Medications:     diphenoxylate-atropine (LOMOTIL) 2.5-0.025 MG Tab, Take 1 Tablet by mouth 4 times a day as needed for Diarrhea for up to 7 days., Disp: 30 Tablet, Rfl: 0    aspirin 81 MG EC tablet, Take 81 mg by mouth every day., Disp: , Rfl:     capecitabine (XELODA) 500 MG tablet, Take 4 tablets of 500mg (total dose 2,000mg) by mouth 2 times a day on day 1-14 of each 21 day cycle.  Starting on day 1 of IV chemotherapy., Disp: 112 Tablet, Rfl: 5    ondansetron (ZOFRAN) 4 MG Tab tablet, Take 1 Tablet by mouth every four hours as needed for Nausea/Vomiting (for nausea, vomiting)., Disp: 30 Tablet, Rfl: 6    prochlorperazine (COMPAZINE) 10 MG Tab, Take 1  Tablet by mouth every 6 hours as needed (for nausea, vomiting)., Disp: 30 Tablet, Rfl: 6    OLANZapine (ZYPREXA) 5 MG Tab, Take 1 Tablet by mouth every evening. To be taken nightly on first day of chemotherapy for a total of 4 days, or as directed by your provider., Disp: 24 Tablet, Rfl: 0    loperamide (IMODIUM A-D) 2 MG tablet, Take 1 Tablet by mouth see administration instructions., Disp: 30 Tablet, Rfl: 6    losartan (COZAAR) 25 MG Tab, Take 1 Tablet by mouth every day. (Patient taking differently: Take 25 mg by mouth every evening.), Disp: 90 Tablet, Rfl: 4    metoprolol tartrate (LOPRESSOR) 25 MG Tab, Take 1 Tablet by mouth 2 times a day., Disp: 180 Tablet, Rfl: 4    rosuvastatin (CRESTOR) 40 MG tablet, Take 1 Tablet by mouth every evening., Disp: 90 Tablet, Rfl: 4    therapeutic multivitamin-minerals (THERAGRAN-M) Tab, Take 1 Tab by mouth every day., Disp: 30 Tab, Rfl: 11    ferrous sulfate 325 (65 Fe) MG tablet, Take 1 Tab by mouth every morning with breakfast. (Patient not taking: Reported on 3/7/2025), Disp: 30 Tab, Rfl: 3    ascorbic acid (VITAMIN C) 500 MG tablet, Take 1 Tab by mouth every day. (Patient not taking: Reported on 3/7/2025), Disp: 30 Tab, Rfl: 3      Review of Systems:  Review of Systems   Constitutional:  Positive for malaise/fatigue. Negative for chills, diaphoresis, fever and weight loss.   HENT:  Negative for hearing loss, nosebleeds, sinus pain and sore throat.    Eyes:  Negative for blurred vision and double vision.   Respiratory:  Negative for cough, sputum production, shortness of breath and wheezing.    Cardiovascular:  Negative for chest pain, palpitations, orthopnea and leg swelling.   Gastrointestinal:  Positive for diarrhea. Negative for abdominal pain, blood in stool, constipation, heartburn, melena, nausea and vomiting.   Genitourinary:  Negative for dysuria, frequency, hematuria and urgency.   Musculoskeletal:  Negative for back pain, joint pain and myalgias.   Skin:   "Positive for rash.   Neurological:  Negative for dizziness, tingling, weakness and headaches.   Endo/Heme/Allergies:  Does not bruise/bleed easily.   Psychiatric/Behavioral:  The patient is not nervous/anxious and does not have insomnia.          Physical Exam:  Vitals:    03/28/25 1331   BP: 118/64   Pulse: (!) 101   Temp: 36.1 °C (96.9 °F)   TempSrc: Temporal   SpO2: 96%   Weight: 84 kg (185 lb 3.2 oz)   Height: 1.7 m (5' 6.93\")       DESC; KARNOFSKY SCALE WITH ECOG EQUIVALENT: 90, Able to carry on normal activity; minor signs or symptoms of disease (ECOG equivalent 0)    DISTRESS LEVEL: no apparent distress    Physical Exam  Vitals and nursing note reviewed.   Constitutional:       General: He is awake. He is not in acute distress.     Appearance: Normal appearance. He is obese. He is not ill-appearing, toxic-appearing or diaphoretic.   HENT:      Head: Normocephalic and atraumatic.      Nose: Nose normal. No congestion.      Mouth/Throat:      Pharynx: Oropharynx is clear. No oropharyngeal exudate or posterior oropharyngeal erythema.   Eyes:      General: No scleral icterus.     Extraocular Movements: Extraocular movements intact.      Conjunctiva/sclera: Conjunctivae normal.      Pupils: Pupils are equal, round, and reactive to light.   Cardiovascular:      Rate and Rhythm: Normal rate and regular rhythm.      Pulses: Normal pulses.      Heart sounds: Normal heart sounds. No murmur heard.     No friction rub. No gallop.   Pulmonary:      Effort: Pulmonary effort is normal.      Breath sounds: Normal breath sounds. No decreased air movement. No wheezing, rhonchi or rales.   Abdominal:      General: Bowel sounds are normal. There is no distension.      Tenderness: There is no abdominal tenderness.   Musculoskeletal:         General: No deformity. Normal range of motion.      Cervical back: Normal range of motion and neck supple. No tenderness.      Right lower leg: No edema.      Left lower leg: No edema. "   Lymphadenopathy:      Cervical: No cervical adenopathy.      Upper Body:      Right upper body: No axillary adenopathy.      Left upper body: No axillary adenopathy.      Lower Body: No right inguinal adenopathy. No left inguinal adenopathy.   Skin:     General: Skin is warm and dry.      Coloration: Skin is not jaundiced.      Findings: Rash (Macular rash on arms) present. No erythema.   Neurological:      General: No focal deficit present.      Mental Status: He is alert and oriented to person, place, and time.      Sensory: Sensation is intact.      Motor: Motor function is intact. No weakness.      Gait: Gait is intact.   Psychiatric:         Attention and Perception: Attention normal.         Mood and Affect: Mood normal.         Behavior: Behavior normal. Behavior is cooperative.         Thought Content: Thought content normal.         Judgment: Judgment normal.           Labs:  Hospital Outpatient Visit on 03/26/2025   Component Date Value Ref Range Status    Carcinoembryonic Antigen 03/26/2025 2.4  0.0 - 3.0 ng/mL Final    Comment: Performed using Roche florencio e immunoassay analyzer. CEA values determined on  patient samples by different testing procedures cannot be directly compared  with one another and could be the cause of erroneous medical  interpretations. If there is a change in the CEA assay procedure used while  monitoring therapy, then new baselines may need to be established when  comparing previous results.      Sodium 03/26/2025 141  135 - 145 mmol/L Final    Potassium 03/26/2025 3.6  3.6 - 5.5 mmol/L Final    Chloride 03/26/2025 106  96 - 112 mmol/L Final    Co2 03/26/2025 20  20 - 33 mmol/L Final    Anion Gap 03/26/2025 15.0  7.0 - 16.0 Final    Glucose 03/26/2025 111 (H)  65 - 99 mg/dL Final    Bun 03/26/2025 14  8 - 22 mg/dL Final    Creatinine 03/26/2025 1.10  0.50 - 1.40 mg/dL Final    Calcium 03/26/2025 8.8  8.5 - 10.5 mg/dL Final    Correct Calcium 03/26/2025 8.7  8.5 - 10.5 mg/dL Final     AST(SGOT) 03/26/2025 22  12 - 45 U/L Final    ALT(SGPT) 03/26/2025 20  2 - 50 U/L Final    Alkaline Phosphatase 03/26/2025 35  30 - 99 U/L Final    Total Bilirubin 03/26/2025 0.6  0.1 - 1.5 mg/dL Final    Albumin 03/26/2025 4.1  3.2 - 4.9 g/dL Final    Total Protein 03/26/2025 6.9  6.0 - 8.2 g/dL Final    Globulin 03/26/2025 2.8  1.9 - 3.5 g/dL Final    A-G Ratio 03/26/2025 1.5  g/dL Final    WBC 03/26/2025 6.7  4.8 - 10.8 K/uL Final    RBC 03/26/2025 4.24 (L)  4.70 - 6.10 M/uL Final    Hemoglobin 03/26/2025 13.9 (L)  14.0 - 18.0 g/dL Final    Hematocrit 03/26/2025 41.6 (L)  42.0 - 52.0 % Final    MCV 03/26/2025 98.1 (H)  81.4 - 97.8 fL Final    MCH 03/26/2025 32.8  27.0 - 33.0 pg Final    MCHC 03/26/2025 33.4  32.3 - 36.5 g/dL Final    RDW 03/26/2025 43.7  35.9 - 50.0 fL Final    Platelet Count 03/26/2025 262  164 - 446 K/uL Final    MPV 03/26/2025 9.8  9.0 - 12.9 fL Final    Neutrophils-Polys 03/26/2025 66.60  44.00 - 72.00 % Final    Lymphocytes 03/26/2025 13.90 (L)  22.00 - 41.00 % Final    Monocytes 03/26/2025 12.60  0.00 - 13.40 % Final    Eosinophils 03/26/2025 5.70  0.00 - 6.90 % Final    Basophils 03/26/2025 0.90  0.00 - 1.80 % Final    Immature Granulocytes 03/26/2025 0.30  0.00 - 0.90 % Final    Nucleated RBC 03/26/2025 0.00  0.00 - 0.20 /100 WBC Final    Neutrophils (Absolute) 03/26/2025 4.46  1.82 - 7.42 K/uL Final    Includes immature neutrophils, if present.    Lymphs (Absolute) 03/26/2025 0.93 (L)  1.00 - 4.80 K/uL Final    Monos (Absolute) 03/26/2025 0.84  0.00 - 0.85 K/uL Final    Eos (Absolute) 03/26/2025 0.38  0.00 - 0.51 K/uL Final    Baso (Absolute) 03/26/2025 0.06  0.00 - 0.12 K/uL Final    Immature Granulocytes (abs) 03/26/2025 0.02  0.00 - 0.11 K/uL Final    NRBC (Absolute) 03/26/2025 0.00  K/uL Final    GFR (CKD-EPI) 03/26/2025 72  >60 mL/min/1.73 m 2 Final    Comment: Estimated Glomerular Filtration Rate is calculated using  race neutral CKD-EPI 2021 equation per NKF-ASN  recommendations.     Hospital Outpatient Visit on 03/26/2025   Component Date Value Ref Range Status    Cholesterol,Tot 03/26/2025 96 (L)  100 - 199 mg/dL Final    Triglycerides 03/26/2025 113  0 - 149 mg/dL Final    HDL 03/26/2025 38 (A)  >=40 mg/dL Final    LDL 03/26/2025 35  <100 mg/dL Final       Imaging:     All listed images below have been independently reviewed by me. I agree with the findings as summarized below:    No results found.    Pathology:    FINAL DIAGNOSIS:     A. Sigmoid colon and rectum, low anterior resection:          Invasive moderately-differentiated adenocarcinoma (3.3 cm) (see           Tumor synoptic report).          Tumor invades into the pericolonic tissue.          Lymphovascular invasion is present.          Margins negative for tumor.          Five out of twenty lymph nodes, positive for metastatic           carcinoma (5/20).     B. Anastomotic donuts:          Benign colonic tissue, negative for dysplasia and malignancy.     COLON AND RECTUM: Resection     SPECIMEN    Procedure:  Low anterior resection   TUMOR    Tumor Site:  Sigmoid colon     Histologic Type:  Adenocarcinoma     Histologic Grade:  G2, moderately differentiated     Tumor Size:  Greatest dimension (Centimeters) - 3.3 cm     Tumor Extent:  Invades through muscularis propria into the     pericolonic or perirectal tissue     Macroscopic Tumor Perforation:  Not identified     Lymphatic and / or Vascular Invasion:  Small vessel, Large vessel     (venous), extramural     Perineural Invasion:  Present     Tumor Budding Score:  Low (0-4)     Treatment Effect:  No known presurgical therapy   MARGINS    Margin Status for Invasive Carcinoma:  All margins negative for     invasive carcinoma     Margin Status for Non-Invasive Tumor:  All margins negative for     high-grade dysplasia / intramucosal carcinoma and low-grade dysplasia   REGIONAL LYMPH NODES     Regional Lymph Node Status:  Tumor present in regional lymph node(s)        Number of Lymph Nodes with Tumor:  5       Number of Lymph Nodes Examined:  20     Tumor Deposits:  Not identified   pTNM CLASSIFICATION (AJCC 8th Edition)   Reporting of pT, pN, and (when applicable) pM categories is based on   information available to the pathologist at the time the report is   issued. As per the AJCC (Chapter 1, 8th Ed.) it is the managing   physician's responsibility to establish the final pathologic stage   based upon all pertinent information, including but potentially not   limited to this pathology report.     pT Category:  pT3     pN Category:  pN2a   ADDITIONAL FINDINGS     Additional Findings:  Diverticulosis   SPECIAL STUDIES     MMR IHC: MMR proficient per report in EMR     Comment: Adequate tumor is present in Block A4 for further testing if   additional studies are clinically indicated.                                       Diagnosis performed by:                                       CLIFF MIRANDA MD     Assessment & Plan:  1. Cancer of sigmoid colon (HCC)        2. Chemotherapy induced diarrhea  diphenoxylate-atropine (LOMOTIL) 2.5-0.025 MG Tab          This is a 69 year old  man with adenocarcinoma of the sigmoid colon, [id number] stage IIIB disease. He is s/p resection and presents for evaluation.      Current Diagnosis and Staging: Adenocarcinoma of the sigmoid colon, [id number] stage IIIB disease    Update: Patient is having significant toxicity from CAPOX. Will reduce capecitabine by 25% and will delay the next cycle by 1 week to allow for recovery. Lomotil prescribed for refractory diarrhea. Monitor accordingly.      Treatment Plan: CAPOX adjuvant x 3 months, consideration for [facility] clinical trial     Treatment Citation: NCCN     Plan of Care:     Primary Therapy: CAPOX C2 deferred 1 week for toxicity.   Supportive Therapy: Antiemetics per protocol. Lomotil added for diarrhea.   Toxicity: Patient is getting antineoplastic therapy and needs monitoring of blood  counts, hepatic function, and renal function due to potential for organ dysfunction.   Labs: CBC with diff, CMP, CEA, ct DNA monitoring  Imaging: Repeat CT scans after completion of therapy  Treatment Planning: The patient will need adjuvant therapy with CAPOX for 3 months.   Consultations: Colorectal surgery (Dr. Bautista)  Code Status: Full  Miscellaneous: Referred to genetics and to [facility]  Return for Follow Up: 3 weeks    Any questions and concerns raised by the patient were answered to the best of my ability. Thank you for allowing me to participate in the care for this patient. Please feel free to contact me for any questions or concerns.       Total time spent on chart review, clinic encounter, and documentation: 27 minutes.

## 2025-04-04 RX ORDER — ONDANSETRON 8 MG/1
8 TABLET, ORALLY DISINTEGRATING ORAL PRN
Status: CANCELLED | OUTPATIENT
Start: 2025-04-07

## 2025-04-04 RX ORDER — ALBUTEROL SULFATE 5 MG/ML
2.5 SOLUTION RESPIRATORY (INHALATION) PRN
Status: CANCELLED | OUTPATIENT
Start: 2025-04-07

## 2025-04-04 RX ORDER — LORAZEPAM 0.5 MG/1
0.5 TABLET ORAL PRN
Status: CANCELLED | OUTPATIENT
Start: 2025-04-07

## 2025-04-04 RX ORDER — ACETAMINOPHEN 325 MG/1
650 TABLET ORAL PRN
Status: CANCELLED | OUTPATIENT
Start: 2025-04-07

## 2025-04-04 RX ORDER — ONDANSETRON 2 MG/ML
8 INJECTION INTRAMUSCULAR; INTRAVENOUS PRN
Status: CANCELLED | OUTPATIENT
Start: 2025-04-07

## 2025-04-04 RX ORDER — METHYLPREDNISOLONE SODIUM SUCCINATE 125 MG/2ML
125 INJECTION, POWDER, LYOPHILIZED, FOR SOLUTION INTRAMUSCULAR; INTRAVENOUS PRN
Status: CANCELLED | OUTPATIENT
Start: 2025-04-07

## 2025-04-04 RX ORDER — SODIUM CHLORIDE 9 MG/ML
1000 INJECTION, SOLUTION INTRAVENOUS PRN
Status: CANCELLED | OUTPATIENT
Start: 2025-04-07

## 2025-04-04 RX ORDER — LORAZEPAM 2 MG/ML
0.5 INJECTION INTRAMUSCULAR PRN
Status: CANCELLED | OUTPATIENT
Start: 2025-04-07

## 2025-04-04 RX ORDER — DIPHENHYDRAMINE HYDROCHLORIDE 50 MG/ML
25 INJECTION, SOLUTION INTRAMUSCULAR; INTRAVENOUS PRN
Status: CANCELLED | OUTPATIENT
Start: 2025-04-07

## 2025-04-04 RX ORDER — MEPERIDINE HYDROCHLORIDE 25 MG/ML
25 INJECTION INTRAMUSCULAR; INTRAVENOUS; SUBCUTANEOUS PRN
Status: CANCELLED | OUTPATIENT
Start: 2025-04-07

## 2025-04-04 RX ORDER — EPINEPHRINE 1 MG/ML(1)
0.5 AMPUL (ML) INJECTION PRN
Status: CANCELLED | OUTPATIENT
Start: 2025-04-07

## 2025-04-05 NOTE — PROGRESS NOTES
"Pharmacy Chemotherapy Calculation    Dx: Sigmoid adenocarcinoma         Protocol: CapeOX     Capecitabine 1000 mg/m2 PO twice daily on Days 1-15  Oxaliplatin 130 mg/m2 IV over 2 hours  21-day cycle for 3-8 cycles of perioperative therapy (neoadjuvant or adjuvant) or until DP/UT (advanced or metastatic)  NCCN Guidelines for Colon Cancer V.1.2024.  Sergio BRYAN et al. J Clin Oncol. 2007; 25(1):102-9.  Cathi J , et al. J Clin Oncol. 2008;26(12):2006-12.  Galilea CHATMAN , et al. J Clin Oncol. 2008;26(12):2013-9.    Allergies:  Patient has no known allergies.       /64   Pulse 89   Temp 36.7 °C (98 °F) (Temporal)   Resp 18   Ht 1.7 m (5' 6.93\")   Wt 86.2 kg (190 lb 0.6 oz)   SpO2 99%   BMI 29.83 kg/m²  Body surface area is 2.02 meters squared.    Labs 4/6/25:  ANC~ 4010 Plt = 244k   Hgb = 14.1     SCr = 1 mg/dL CrCl ~ 84mL/min      Drug Order   (Drug name, dose, route, IV Fluid & volume, frequency, number of doses) Cycle 2 (delayed due to toxicity)  Previous treatment:C1 3/9/25     Medication = Oxaliplatin  Base Dose = 130 mg/m2  Calc Dose: Base Dose x 2.02 m2 = 262.6 mg  Final Dose = 270 mg  Route = IV  Fluid & Volume = D5 250 mL  Admin Duration = Over 2 hours          <10% difference, OK to treat with final dose   By my signature below, I confirm this process was performed independently with the BSA and all final chemotherapy dosing calculations congruent. I have reviewed the above chemotherapy order and that my calculation of the final dose and BSA (when applicable) corroborate those calculations of the  pharmacist. Discrepancies of 10% or greater in the written dose have been addressed and documented within the [facility] Progress notes.    Isabell PollardD  "

## 2025-04-06 ENCOUNTER — OUTPATIENT INFUSION SERVICES (OUTPATIENT)
Dept: ONCOLOGY | Facility: MEDICAL CENTER | Age: 70
End: 2025-04-06
Attending: STUDENT IN AN ORGANIZED HEALTH CARE EDUCATION/TRAINING PROGRAM
Payer: COMMERCIAL

## 2025-04-06 VITALS
SYSTOLIC BLOOD PRESSURE: 108 MMHG | HEART RATE: 89 BPM | RESPIRATION RATE: 18 BRPM | TEMPERATURE: 98 F | OXYGEN SATURATION: 99 % | WEIGHT: 190.04 LBS | DIASTOLIC BLOOD PRESSURE: 64 MMHG | HEIGHT: 67 IN | BODY MASS INDEX: 29.83 KG/M2

## 2025-04-06 DIAGNOSIS — C18.7 CANCER OF SIGMOID COLON (HCC): ICD-10-CM

## 2025-04-06 LAB
ALBUMIN SERPL BCP-MCNC: 3.9 G/DL (ref 3.2–4.9)
ALBUMIN/GLOB SERPL: 1.5 G/DL
ALP SERPL-CCNC: 34 U/L (ref 30–99)
ALT SERPL-CCNC: 26 U/L (ref 2–50)
ANION GAP SERPL CALC-SCNC: 12 MMOL/L (ref 7–16)
AST SERPL-CCNC: 33 U/L (ref 12–45)
BASOPHILS # BLD AUTO: 1.5 % (ref 0–1.8)
BASOPHILS # BLD: 0.1 K/UL (ref 0–0.12)
BILIRUB SERPL-MCNC: 0.6 MG/DL (ref 0.1–1.5)
BUN SERPL-MCNC: 11 MG/DL (ref 8–22)
CALCIUM ALBUM COR SERPL-MCNC: 9 MG/DL (ref 8.5–10.5)
CALCIUM SERPL-MCNC: 8.9 MG/DL (ref 8.5–10.5)
CEA SERPL-MCNC: 1.7 NG/ML (ref 0–3)
CHLORIDE SERPL-SCNC: 108 MMOL/L (ref 96–112)
CO2 SERPL-SCNC: 22 MMOL/L (ref 20–33)
CREAT SERPL-MCNC: 1 MG/DL (ref 0.5–1.4)
EOSINOPHIL # BLD AUTO: 0.44 K/UL (ref 0–0.51)
EOSINOPHIL NFR BLD: 6.8 % (ref 0–6.9)
ERYTHROCYTE [DISTWIDTH] IN BLOOD BY AUTOMATED COUNT: 46.6 FL (ref 35.9–50)
GFR SERPLBLD CREATININE-BSD FMLA CKD-EPI: 81 ML/MIN/1.73 M 2
GLOBULIN SER CALC-MCNC: 2.6 G/DL (ref 1.9–3.5)
GLUCOSE SERPL-MCNC: 117 MG/DL (ref 65–99)
HCT VFR BLD AUTO: 41.1 % (ref 42–52)
HGB BLD-MCNC: 14.1 G/DL (ref 14–18)
IMM GRANULOCYTES # BLD AUTO: 0.05 K/UL (ref 0–0.11)
IMM GRANULOCYTES NFR BLD AUTO: 0.8 % (ref 0–0.9)
LYMPHOCYTES # BLD AUTO: 1.36 K/UL (ref 1–4.8)
LYMPHOCYTES NFR BLD: 21 % (ref 22–41)
MCH RBC QN AUTO: 32.7 PG (ref 27–33)
MCHC RBC AUTO-ENTMCNC: 34.3 G/DL (ref 32.3–36.5)
MCV RBC AUTO: 95.4 FL (ref 81.4–97.8)
MONOCYTES # BLD AUTO: 0.51 K/UL (ref 0–0.85)
MONOCYTES NFR BLD AUTO: 7.9 % (ref 0–13.4)
NEUTROPHILS # BLD AUTO: 4.01 K/UL (ref 1.82–7.42)
NEUTROPHILS NFR BLD: 62 % (ref 44–72)
NRBC # BLD AUTO: 0 K/UL
NRBC BLD-RTO: 0 /100 WBC (ref 0–0.2)
OUTPT INFUS CBC COMMENT OICOM: ABNORMAL
PLATELET # BLD AUTO: 244 K/UL (ref 164–446)
PMV BLD AUTO: 9.1 FL (ref 9–12.9)
POTASSIUM SERPL-SCNC: 3.9 MMOL/L (ref 3.6–5.5)
PROT SERPL-MCNC: 6.5 G/DL (ref 6–8.2)
RBC # BLD AUTO: 4.31 M/UL (ref 4.7–6.1)
SODIUM SERPL-SCNC: 142 MMOL/L (ref 135–145)
WBC # BLD AUTO: 6.5 K/UL (ref 4.8–10.8)

## 2025-04-06 PROCEDURE — 700105 HCHG RX REV CODE 258: Performed by: NURSE PRACTITIONER

## 2025-04-06 PROCEDURE — 96415 CHEMO IV INFUSION ADDL HR: CPT

## 2025-04-06 PROCEDURE — 96375 TX/PRO/DX INJ NEW DRUG ADDON: CPT

## 2025-04-06 PROCEDURE — 96413 CHEMO IV INFUSION 1 HR: CPT

## 2025-04-06 PROCEDURE — 96367 TX/PROPH/DG ADDL SEQ IV INF: CPT

## 2025-04-06 PROCEDURE — 85025 COMPLETE CBC W/AUTO DIFF WBC: CPT

## 2025-04-06 PROCEDURE — 700111 HCHG RX REV CODE 636 W/ 250 OVERRIDE (IP): Performed by: NURSE PRACTITIONER

## 2025-04-06 PROCEDURE — 80053 COMPREHEN METABOLIC PANEL: CPT

## 2025-04-06 PROCEDURE — 82378 CARCINOEMBRYONIC ANTIGEN: CPT

## 2025-04-06 RX ORDER — PALONOSETRON 0.05 MG/ML
0.25 INJECTION, SOLUTION INTRAVENOUS ONCE
Status: COMPLETED | OUTPATIENT
Start: 2025-04-06 | End: 2025-04-06

## 2025-04-06 RX ORDER — MEPERIDINE HYDROCHLORIDE 25 MG/ML
25 INJECTION INTRAMUSCULAR; INTRAVENOUS; SUBCUTANEOUS PRN
Status: DISCONTINUED | OUTPATIENT
Start: 2025-04-06 | End: 2025-04-07 | Stop reason: HOSPADM

## 2025-04-06 RX ORDER — DIPHENOXYLATE HYDROCHLORIDE AND ATROPINE SULFATE 2.5; .025 MG/1; MG/1
1 TABLET ORAL 4 TIMES DAILY PRN
COMMUNITY

## 2025-04-06 RX ORDER — DEXAMETHASONE SODIUM PHOSPHATE 4 MG/ML
12 INJECTION, SOLUTION INTRA-ARTICULAR; INTRALESIONAL; INTRAMUSCULAR; INTRAVENOUS; SOFT TISSUE ONCE
Status: COMPLETED | OUTPATIENT
Start: 2025-04-06 | End: 2025-04-06

## 2025-04-06 RX ORDER — LORAZEPAM 2 MG/ML
0.5 INJECTION INTRAMUSCULAR PRN
Status: DISCONTINUED | OUTPATIENT
Start: 2025-04-06 | End: 2025-04-07 | Stop reason: HOSPADM

## 2025-04-06 RX ORDER — DIPHENHYDRAMINE HYDROCHLORIDE 50 MG/ML
25 INJECTION, SOLUTION INTRAMUSCULAR; INTRAVENOUS PRN
Status: DISCONTINUED | OUTPATIENT
Start: 2025-04-06 | End: 2025-04-07 | Stop reason: HOSPADM

## 2025-04-06 RX ORDER — ACETAMINOPHEN 325 MG/1
650 TABLET ORAL PRN
Status: DISCONTINUED | OUTPATIENT
Start: 2025-04-06 | End: 2025-04-07 | Stop reason: HOSPADM

## 2025-04-06 RX ORDER — LORAZEPAM 1 MG/1
0.5 TABLET ORAL PRN
Status: DISCONTINUED | OUTPATIENT
Start: 2025-04-06 | End: 2025-04-07 | Stop reason: HOSPADM

## 2025-04-06 RX ORDER — ONDANSETRON 2 MG/ML
8 INJECTION INTRAMUSCULAR; INTRAVENOUS PRN
Status: DISCONTINUED | OUTPATIENT
Start: 2025-04-06 | End: 2025-04-07 | Stop reason: HOSPADM

## 2025-04-06 RX ORDER — SODIUM CHLORIDE 9 MG/ML
1000 INJECTION, SOLUTION INTRAVENOUS PRN
Status: DISCONTINUED | OUTPATIENT
Start: 2025-04-06 | End: 2025-04-07 | Stop reason: HOSPADM

## 2025-04-06 RX ORDER — ONDANSETRON 8 MG/1
8 TABLET, ORALLY DISINTEGRATING ORAL PRN
Status: DISCONTINUED | OUTPATIENT
Start: 2025-04-06 | End: 2025-04-07 | Stop reason: HOSPADM

## 2025-04-06 RX ORDER — ALBUTEROL SULFATE 5 MG/ML
2.5 SOLUTION RESPIRATORY (INHALATION) PRN
Status: DISCONTINUED | OUTPATIENT
Start: 2025-04-06 | End: 2025-04-07 | Stop reason: HOSPADM

## 2025-04-06 RX ORDER — METHYLPREDNISOLONE SODIUM SUCCINATE 125 MG/2ML
125 INJECTION, POWDER, LYOPHILIZED, FOR SOLUTION INTRAMUSCULAR; INTRAVENOUS PRN
Status: DISCONTINUED | OUTPATIENT
Start: 2025-04-06 | End: 2025-04-07 | Stop reason: HOSPADM

## 2025-04-06 RX ORDER — EPINEPHRINE 1 MG/ML(1)
0.5 AMPUL (ML) INJECTION PRN
Status: DISCONTINUED | OUTPATIENT
Start: 2025-04-06 | End: 2025-04-07 | Stop reason: HOSPADM

## 2025-04-06 RX ORDER — DEXTROSE MONOHYDRATE 50 MG/ML
INJECTION, SOLUTION INTRAVENOUS CONTINUOUS
Status: DISCONTINUED | OUTPATIENT
Start: 2025-04-06 | End: 2025-04-07 | Stop reason: HOSPADM

## 2025-04-06 RX ADMIN — DEXAMETHASONE SODIUM PHOSPHATE 12 MG: 4 INJECTION INTRA-ARTICULAR; INTRALESIONAL; INTRAMUSCULAR; INTRAVENOUS; SOFT TISSUE at 11:13

## 2025-04-06 RX ADMIN — OXALIPLATIN 270 MG: 5 INJECTION, SOLUTION INTRAVENOUS at 12:02

## 2025-04-06 RX ADMIN — FOSAPREPITANT 150 MG: 150 INJECTION, POWDER, LYOPHILIZED, FOR SOLUTION INTRAVENOUS at 11:22

## 2025-04-06 RX ADMIN — DEXTROSE MONOHYDRATE: 50 INJECTION, SOLUTION INTRAVENOUS at 11:10

## 2025-04-06 RX ADMIN — PALONOSETRON HYDROCHLORIDE 0.25 MG: 0.25 INJECTION INTRAVENOUS at 11:10

## 2025-04-06 ASSESSMENT — FIBROSIS 4 INDEX: FIB4 SCORE: 1.3

## 2025-04-06 NOTE — PROGRESS NOTES
Chemotherapy Verification - SECONDARY RN       Height = 170 cm  Weight = 86.2 kg  BSA = 2.02 m2       Medication: Oxaliplatin  Dose: 130 mg/m2  Calculated Dose: 262.6 mg                             (In mg/m2, AUC, mg/kg)     I confirm that this process was performed independently.

## 2025-04-06 NOTE — PROGRESS NOTES
Chemotherapy Verification - PRIMARY RN      Height = 170 cm  Weight = 86.2 kg  BSA = 2.02 m^2       Medication: oxaliplatin (Eloxatin)  Dose: 130 mg/m^2  Calculated Dose: 262.6 mg                            (In mg/m2, AUC, mg/kg)     I confirm this process was performed independently with the BSA and all final chemotherapy dosing calculations congruent.  Any discrepancies of 10% or greater have been addressed with the chemotherapy pharmacist. The resolution of the discrepancy has been documented in the EPIC progress notes.

## 2025-04-06 NOTE — PROGRESS NOTES
"Pharmacy Chemotherapy Calculations Note:    Dx: Sigmoid Adenocarcinoma, stage IIIb  Cycle:  2 (delayed) Previous treatment: C1 3/9/25     Protocol: CapeOx  Capecitabine 1000 mg/m2 PO twice daily on Days 1-15  - Beginning the evening of D1 and las dose the morning of D15 (28 doses total)  Oxaliplatin 130 mg/m2 IV over 2 hrs on Day 1  21-Day cycle x 3-8 cycles (adjuvant)  NCCN Guidelines for Colon Cancer. [id number].  Sergio BRYAN, et al. JCO. 2007;25(12):102-9.         /64   Pulse 89   Temp 36.7 °C (98 °F) (Temporal)   Resp 18   Ht 1.7 m (5' 6.93\")   Wt 86.2 kg (190 lb 0.6 oz)   SpO2 99%   BMI 29.83 kg/m²  Body surface area is 2.02 meters squared.    Labs 4/6/25:  ANC~ 4010 Plt = 244k   Hgb = 14.1     SCr = 1 mg/dL  CrCl ~ 84.4 mL/min          Oxaliplatin 130 mg/m² x 2.02 m² = 262.6 mg   <10% difference, okay to treat with final dose = 270 mg IV      Claudia Goldsmith, PharmD             "

## 2025-04-06 NOTE — PROGRESS NOTES
Talib presented to [facility] for Day 1/ Cycle 2 of oxaliplatin for cancer of sigmoid colon. Pt denied having any new or acute complaints today, reported tolerating past treatments well. PIV started to LAC, had positive blood return and flushed briskly. Labs drawn as ordered and reviewed. Pt meets parameters for treatment today. Premeds given as ordered. Pt given oxaliplatin as prescribed, tolerated well. Talib did c/o vascular pain at the end of his oxaliplatin infusion. PIV with positive blood return and flushed easily. Warm pack applied for comfort. PIV discontinued, bleeding controlled with gauze and Coban. Pt has future appointments. Pt discharged to home in good condition with his wife, Johana..

## 2025-04-18 ENCOUNTER — HOSPITAL ENCOUNTER (OUTPATIENT)
Dept: HEMATOLOGY ONCOLOGY | Facility: MEDICAL CENTER | Age: 70
End: 2025-04-18
Attending: STUDENT IN AN ORGANIZED HEALTH CARE EDUCATION/TRAINING PROGRAM
Payer: COMMERCIAL

## 2025-04-18 VITALS
OXYGEN SATURATION: 97 % | WEIGHT: 185.6 LBS | HEART RATE: 74 BPM | TEMPERATURE: 97.3 F | SYSTOLIC BLOOD PRESSURE: 110 MMHG | HEIGHT: 67 IN | BODY MASS INDEX: 29.13 KG/M2 | DIASTOLIC BLOOD PRESSURE: 62 MMHG

## 2025-04-18 DIAGNOSIS — C18.7 CANCER OF SIGMOID COLON (HCC): ICD-10-CM

## 2025-04-18 PROCEDURE — 99214 OFFICE O/P EST MOD 30 MIN: CPT | Performed by: STUDENT IN AN ORGANIZED HEALTH CARE EDUCATION/TRAINING PROGRAM

## 2025-04-18 PROCEDURE — 99212 OFFICE O/P EST SF 10 MIN: CPT | Performed by: STUDENT IN AN ORGANIZED HEALTH CARE EDUCATION/TRAINING PROGRAM

## 2025-04-18 RX ORDER — ONDANSETRON 8 MG/1
8 TABLET, ORALLY DISINTEGRATING ORAL PRN
Status: CANCELLED | OUTPATIENT
Start: 2025-04-28

## 2025-04-18 RX ORDER — 0.9 % SODIUM CHLORIDE 0.9 %
10 VIAL (ML) INJECTION PRN
Status: CANCELLED | OUTPATIENT
Start: 2025-04-27

## 2025-04-18 RX ORDER — DEXTROSE MONOHYDRATE 50 MG/ML
INJECTION, SOLUTION INTRAVENOUS CONTINUOUS
Status: CANCELLED | OUTPATIENT
Start: 2025-04-28

## 2025-04-18 RX ORDER — DIPHENHYDRAMINE HYDROCHLORIDE 50 MG/ML
25 INJECTION, SOLUTION INTRAMUSCULAR; INTRAVENOUS PRN
Status: CANCELLED | OUTPATIENT
Start: 2025-04-28

## 2025-04-18 RX ORDER — SODIUM CHLORIDE 9 MG/ML
1000 INJECTION, SOLUTION INTRAVENOUS PRN
Status: CANCELLED | OUTPATIENT
Start: 2025-04-28

## 2025-04-18 RX ORDER — LORAZEPAM 0.5 MG/1
0.5 TABLET ORAL PRN
Status: CANCELLED | OUTPATIENT
Start: 2025-04-28

## 2025-04-18 RX ORDER — MEPERIDINE HYDROCHLORIDE 25 MG/ML
25 INJECTION INTRAMUSCULAR; INTRAVENOUS; SUBCUTANEOUS PRN
Status: CANCELLED | OUTPATIENT
Start: 2025-04-28

## 2025-04-18 RX ORDER — 0.9 % SODIUM CHLORIDE 0.9 %
VIAL (ML) INJECTION PRN
Status: CANCELLED | OUTPATIENT
Start: 2025-04-27

## 2025-04-18 RX ORDER — PALONOSETRON 0.05 MG/ML
0.25 INJECTION, SOLUTION INTRAVENOUS ONCE
Status: CANCELLED | OUTPATIENT
Start: 2025-04-28 | End: 2025-04-28

## 2025-04-18 RX ORDER — METHYLPREDNISOLONE SODIUM SUCCINATE 125 MG/2ML
125 INJECTION, POWDER, LYOPHILIZED, FOR SOLUTION INTRAMUSCULAR; INTRAVENOUS PRN
Status: CANCELLED | OUTPATIENT
Start: 2025-04-28

## 2025-04-18 RX ORDER — ACETAMINOPHEN 325 MG/1
650 TABLET ORAL PRN
Status: CANCELLED | OUTPATIENT
Start: 2025-04-28

## 2025-04-18 RX ORDER — LORAZEPAM 2 MG/ML
0.5 INJECTION INTRAMUSCULAR PRN
Status: CANCELLED | OUTPATIENT
Start: 2025-04-28

## 2025-04-18 RX ORDER — ONDANSETRON 2 MG/ML
4 INJECTION INTRAMUSCULAR; INTRAVENOUS PRN
Status: CANCELLED | OUTPATIENT
Start: 2025-04-28

## 2025-04-18 RX ORDER — ALBUTEROL SULFATE 5 MG/ML
2.5 SOLUTION RESPIRATORY (INHALATION) PRN
Status: CANCELLED | OUTPATIENT
Start: 2025-04-28

## 2025-04-18 RX ORDER — 0.9 % SODIUM CHLORIDE 0.9 %
VIAL (ML) INJECTION PRN
Status: CANCELLED | OUTPATIENT
Start: 2025-04-28

## 2025-04-18 RX ORDER — 0.9 % SODIUM CHLORIDE 0.9 %
10 VIAL (ML) INJECTION PRN
Status: CANCELLED | OUTPATIENT
Start: 2025-04-28

## 2025-04-18 RX ORDER — EPINEPHRINE 1 MG/ML(1)
0.5 AMPUL (ML) INJECTION PRN
Status: CANCELLED | OUTPATIENT
Start: 2025-04-28

## 2025-04-18 RX ORDER — 0.9 % SODIUM CHLORIDE 0.9 %
3 VIAL (ML) INJECTION PRN
Status: CANCELLED | OUTPATIENT
Start: 2025-04-28

## 2025-04-18 RX ORDER — 0.9 % SODIUM CHLORIDE 0.9 %
3 VIAL (ML) INJECTION PRN
Status: CANCELLED | OUTPATIENT
Start: 2025-04-27

## 2025-04-18 RX ORDER — ONDANSETRON 2 MG/ML
8 INJECTION INTRAMUSCULAR; INTRAVENOUS PRN
Status: CANCELLED | OUTPATIENT
Start: 2025-04-28

## 2025-04-18 RX ORDER — DEXAMETHASONE SODIUM PHOSPHATE 4 MG/ML
12 INJECTION, SOLUTION INTRA-ARTICULAR; INTRALESIONAL; INTRAMUSCULAR; INTRAVENOUS; SOFT TISSUE ONCE
Status: CANCELLED | OUTPATIENT
Start: 2025-04-28 | End: 2025-04-28

## 2025-04-18 RX ORDER — PROCHLORPERAZINE MALEATE 10 MG
10 TABLET ORAL EVERY 6 HOURS PRN
Status: CANCELLED | OUTPATIENT
Start: 2025-04-28

## 2025-04-18 ASSESSMENT — PAIN SCALES - GENERAL: PAINLEVEL_OUTOF10: NO PAIN

## 2025-04-18 ASSESSMENT — FIBROSIS 4 INDEX: FIB4 SCORE: 1.83

## 2025-04-19 ASSESSMENT — ENCOUNTER SYMPTOMS
NAUSEA: 0
FEVER: 0
HEARTBURN: 0
WEAKNESS: 0
MYALGIAS: 0
SHORTNESS OF BREATH: 0
BRUISES/BLEEDS EASILY: 0
CONSTIPATION: 0
DIARRHEA: 0
BLURRED VISION: 0
INSOMNIA: 0
SORE THROAT: 0
TINGLING: 0
DOUBLE VISION: 0
WEIGHT LOSS: 0
CHILLS: 0
VOMITING: 0
ABDOMINAL PAIN: 0
PALPITATIONS: 0
HEADACHES: 0
DIZZINESS: 0
ORTHOPNEA: 0
COUGH: 0
WHEEZING: 0
SPUTUM PRODUCTION: 0
BACK PAIN: 0
SINUS PAIN: 0
NERVOUS/ANXIOUS: 0
DIAPHORESIS: 0
BLOOD IN STOOL: 0

## 2025-04-19 NOTE — PROGRESS NOTES
Follow Up Note:  Hematology/Oncology      Primary Care:  Roger Gonzalez M.D.    Diagnosis: Sigmoid colon adenocarcinoma    Chief Complaint: On-treatment visit    Current Treatment: CAPOX    Prior Treatment: Surgical resection    Oncology History of Presenting Illness:  Talib Ortiz is a 69 y.o.  man who presents to the clinic for evaluation for systemic therapy for a new diagnosis of sigmoid adenocarcinoma, s/p surgical resection on 01/07/25 with final pathology [id number] stage IIIB disease. He had been changes in his bowel habits for the past 5 months, non-bloody. He eventually was seen by GI and a scope was performed (he had a clear scope in 2022) and that revealed an obstructing mass. He was sent for surgical evaluation accordingly and was operated on earlier this month. He was subsequently referred for evaluation.     Treatment History:   01/07/25: Surgical resection  03/09/25: C1 CAPOX  03/30/25: C2 CAPOX deferred 1 week (toxicity) (reduce capecitabine to 1500 mg PO BID)  04/06/25: C2 CAPOX  04/27/25: C3 CAPOX scheduled    Interval History:  Patient is here for follow up visit. He has been doing a lot better since the dose-reduction of capecitabine. He has no diarrhea now and only minor skin changes. He has no other complaints.     Allergies as of 04/18/2025    (No Known Allergies)         Current Outpatient Medications:     diphenoxylate-atropine (LOMOTIL) 2.5-0.025 MG Tab, Take 1 Tablet by mouth 4 times a day as needed for Diarrhea., Disp: , Rfl:     aspirin 81 MG EC tablet, Take 81 mg by mouth every day., Disp: , Rfl:     capecitabine (XELODA) 500 MG tablet, Take 4 tablets of 500mg (total dose 2,000mg) by mouth 2 times a day on day 1-14 of each 21 day cycle.  Starting on day 1 of IV chemotherapy., Disp: 112 Tablet, Rfl: 5    ondansetron (ZOFRAN) 4 MG Tab tablet, Take 1 Tablet by mouth every four hours as needed for Nausea/Vomiting (for nausea, vomiting)., Disp: 30 Tablet, Rfl: 6     prochlorperazine (COMPAZINE) 10 MG Tab, Take 1 Tablet by mouth every 6 hours as needed (for nausea, vomiting)., Disp: 30 Tablet, Rfl: 6    OLANZapine (ZYPREXA) 5 MG Tab, Take 1 Tablet by mouth every evening. To be taken nightly on first day of chemotherapy for a total of 4 days, or as directed by your provider., Disp: 24 Tablet, Rfl: 0    loperamide (IMODIUM A-D) 2 MG tablet, Take 1 Tablet by mouth see administration instructions., Disp: 30 Tablet, Rfl: 6    losartan (COZAAR) 25 MG Tab, Take 1 Tablet by mouth every day. (Patient taking differently: Take 25 mg by mouth every evening.), Disp: 90 Tablet, Rfl: 4    metoprolol tartrate (LOPRESSOR) 25 MG Tab, Take 1 Tablet by mouth 2 times a day., Disp: 180 Tablet, Rfl: 4    rosuvastatin (CRESTOR) 40 MG tablet, Take 1 Tablet by mouth every evening., Disp: 90 Tablet, Rfl: 4    therapeutic multivitamin-minerals (THERAGRAN-M) Tab, Take 1 Tab by mouth every day., Disp: 30 Tab, Rfl: 11    ferrous sulfate 325 (65 Fe) MG tablet, Take 1 Tab by mouth every morning with breakfast. (Patient not taking: Reported on 3/7/2025), Disp: 30 Tab, Rfl: 3    ascorbic acid (VITAMIN C) 500 MG tablet, Take 1 Tab by mouth every day. (Patient not taking: Reported on 3/7/2025), Disp: 30 Tab, Rfl: 3      Review of Systems:  Review of Systems   Constitutional:  Negative for chills, diaphoresis, fever, malaise/fatigue and weight loss.   HENT:  Negative for hearing loss, nosebleeds, sinus pain and sore throat.    Eyes:  Negative for blurred vision and double vision.   Respiratory:  Negative for cough, sputum production, shortness of breath and wheezing.    Cardiovascular:  Negative for chest pain, palpitations, orthopnea and leg swelling.   Gastrointestinal:  Negative for abdominal pain, blood in stool, constipation, diarrhea, heartburn, melena, nausea and vomiting.   Genitourinary:  Negative for dysuria, frequency, hematuria and urgency.   Musculoskeletal:  Negative for back pain, joint pain and  "myalgias.   Skin:  Positive for rash (Mild in hands and arms).   Neurological:  Negative for dizziness, tingling, weakness and headaches.   Endo/Heme/Allergies:  Does not bruise/bleed easily.   Psychiatric/Behavioral:  The patient is not nervous/anxious and does not have insomnia.          Physical Exam:  Vitals:    04/18/25 1306   BP: 110/62   Pulse: 74   Temp: 36.3 °C (97.3 °F)   TempSrc: Temporal   SpO2: 97%   Weight: 84.2 kg (185 lb 9.6 oz)   Height: 1.7 m (5' 6.93\")       DESC; KARNOFSKY SCALE WITH ECOG EQUIVALENT: 100, Fully active, able to carry on all pre-disease performed without restriction (ECOG equivalent 0)    DISTRESS LEVEL: no apparent distress    Physical Exam  Vitals and nursing note reviewed.   Constitutional:       General: He is awake. He is not in acute distress.     Appearance: Normal appearance. He is obese. He is not ill-appearing, toxic-appearing or diaphoretic.   HENT:      Head: Normocephalic and atraumatic.      Nose: Nose normal. No congestion.      Mouth/Throat:      Pharynx: Oropharynx is clear. No oropharyngeal exudate or posterior oropharyngeal erythema.   Eyes:      General: No scleral icterus.     Extraocular Movements: Extraocular movements intact.      Conjunctiva/sclera: Conjunctivae normal.      Pupils: Pupils are equal, round, and reactive to light.   Cardiovascular:      Rate and Rhythm: Normal rate and regular rhythm.      Pulses: Normal pulses.      Heart sounds: Normal heart sounds. No murmur heard.     No friction rub. No gallop.   Pulmonary:      Effort: Pulmonary effort is normal.      Breath sounds: Normal breath sounds. No decreased air movement. No wheezing, rhonchi or rales.   Abdominal:      General: Bowel sounds are normal. There is no distension.      Tenderness: There is no abdominal tenderness.   Musculoskeletal:         General: No deformity. Normal range of motion.      Cervical back: Normal range of motion and neck supple. No tenderness.      Right lower " leg: No edema.      Left lower leg: No edema.   Lymphadenopathy:      Cervical: No cervical adenopathy.      Upper Body:      Right upper body: No axillary adenopathy.      Left upper body: No axillary adenopathy.      Lower Body: No right inguinal adenopathy. No left inguinal adenopathy.   Skin:     General: Skin is warm and dry.      Coloration: Skin is not jaundiced.      Findings: Rash (Mild morbiliform rash on arms) present. No erythema.   Neurological:      General: No focal deficit present.      Mental Status: He is alert and oriented to person, place, and time.      Sensory: Sensation is intact.      Motor: Motor function is intact. No weakness.      Gait: Gait is intact.   Psychiatric:         Attention and Perception: Attention normal.         Mood and Affect: Mood normal.         Behavior: Behavior normal. Behavior is cooperative.         Thought Content: Thought content normal.         Judgment: Judgment normal.           Labs:  No visits with results within 7 Day(s) from this visit.   Latest known visit with results is:   [facility] on 04/06/2025   Component Date Value Ref Range Status    Carcinoembryonic Antigen 04/06/2025 1.7  0.0 - 3.0 ng/mL Final    Comment: Performed using Roche florencio e immunoassay analyzer. CEA values determined on  patient samples by different testing procedures cannot be directly compared  with one another and could be the cause of erroneous medical  interpretations. If there is a change in the CEA assay procedure used while  monitoring therapy, then new baselines may need to be established when  comparing previous results.      WBC 04/06/2025 6.5  4.8 - 10.8 K/uL Final    RBC 04/06/2025 4.31 (L)  4.70 - 6.10 M/uL Final    Hemoglobin 04/06/2025 14.1  14.0 - 18.0 g/dL Final    Hematocrit 04/06/2025 41.1 (L)  42.0 - 52.0 % Final    MCV 04/06/2025 95.4  81.4 - 97.8 fL Final    MCH 04/06/2025 32.7  27.0 - 33.0 pg Final    MCHC 04/06/2025 34.3  32.3 - 36.5 g/dL  Final    RDW 04/06/2025 46.6  35.9 - 50.0 fL Final    Platelet Count 04/06/2025 244  164 - 446 K/uL Final    MPV 04/06/2025 9.1  9.0 - 12.9 fL Final    Neutrophils-Polys 04/06/2025 62.00  44.00 - 72.00 % Final    Lymphocytes 04/06/2025 21.00 (L)  22.00 - 41.00 % Final    Monocytes 04/06/2025 7.90  0.00 - 13.40 % Final    Eosinophils 04/06/2025 6.80  0.00 - 6.90 % Final    Basophils 04/06/2025 1.50  0.00 - 1.80 % Final    Immature Granulocytes 04/06/2025 0.80  0.00 - 0.90 % Final    Nucleated RBC 04/06/2025 0.00  0.00 - 0.20 /100 WBC Final    Neutrophils (Absolute) 04/06/2025 4.01  1.82 - 7.42 K/uL Final    Includes immature neutrophils, if present.    Lymphs (Absolute) 04/06/2025 1.36  1.00 - 4.80 K/uL Final    Monos (Absolute) 04/06/2025 0.51  0.00 - 0.85 K/uL Final    Eos (Absolute) 04/06/2025 0.44  0.00 - 0.51 K/uL Final    Baso (Absolute) 04/06/2025 0.10  0.00 - 0.12 K/uL Final    Immature Granulocytes (abs) 04/06/2025 0.05  0.00 - 0.11 K/uL Final    NRBC (Absolute) 04/06/2025 0.00  K/uL Final    Outpt Infus CBC Comment 04/06/2025 see below   Final    Comment: Per physician request, the automated differential results reported on this  patient have not been verified by a manual method.      Sodium 04/06/2025 142  135 - 145 mmol/L Final    Potassium 04/06/2025 3.9  3.6 - 5.5 mmol/L Final    Chloride 04/06/2025 108  96 - 112 mmol/L Final    Co2 04/06/2025 22  20 - 33 mmol/L Final    Anion Gap 04/06/2025 12.0  7.0 - 16.0 Final    Glucose 04/06/2025 117 (H)  65 - 99 mg/dL Final    Bun 04/06/2025 11  8 - 22 mg/dL Final    Creatinine 04/06/2025 1.00  0.50 - 1.40 mg/dL Final    Calcium 04/06/2025 8.9  8.5 - 10.5 mg/dL Final    Correct Calcium 04/06/2025 9.0  8.5 - 10.5 mg/dL Final    AST(SGOT) 04/06/2025 33  12 - 45 U/L Final    ALT(SGPT) 04/06/2025 26  2 - 50 U/L Final    Alkaline Phosphatase 04/06/2025 34  30 - 99 U/L Final    Total Bilirubin 04/06/2025 0.6  0.1 - 1.5 mg/dL Final    Albumin 04/06/2025 3.9  3.2 - 4.9  g/dL Final    Total Protein 04/06/2025 6.5  6.0 - 8.2 g/dL Final    Globulin 04/06/2025 2.6  1.9 - 3.5 g/dL Final    A-G Ratio 04/06/2025 1.5  g/dL Final    GFR (CKD-EPI) 04/06/2025 81  >60 mL/min/1.73 m 2 Final    Comment: Estimated Glomerular Filtration Rate is calculated using  race neutral CKD-EPI 2021 equation per NKF-ASN recommendations.           Imaging:     All listed images below have been independently reviewed by me. I agree with the findings as summarized below:    No results found.    Pathology:    FINAL DIAGNOSIS:     A. Sigmoid colon and rectum, low anterior resection:          Invasive moderately-differentiated adenocarcinoma (3.3 cm) (see           Tumor synoptic report).          Tumor invades into the pericolonic tissue.          Lymphovascular invasion is present.          Margins negative for tumor.          Five out of twenty lymph nodes, positive for metastatic           carcinoma (5/20).     B. Anastomotic donuts:          Benign colonic tissue, negative for dysplasia and malignancy.     COLON AND RECTUM: Resection     SPECIMEN    Procedure:  Low anterior resection   TUMOR    Tumor Site:  Sigmoid colon     Histologic Type:  Adenocarcinoma     Histologic Grade:  G2, moderately differentiated     Tumor Size:  Greatest dimension (Centimeters) - 3.3 cm     Tumor Extent:  Invades through muscularis propria into the     pericolonic or perirectal tissue     Macroscopic Tumor Perforation:  Not identified     Lymphatic and / or Vascular Invasion:  Small vessel, Large vessel     (venous), extramural     Perineural Invasion:  Present     Tumor Budding Score:  Low (0-4)     Treatment Effect:  No known presurgical therapy   MARGINS    Margin Status for Invasive Carcinoma:  All margins negative for     invasive carcinoma     Margin Status for Non-Invasive Tumor:  All margins negative for     high-grade dysplasia / intramucosal carcinoma and low-grade dysplasia   REGIONAL LYMPH NODES     Regional Lymph  Node Status:  Tumor present in regional lymph node(s)       Number of Lymph Nodes with Tumor:  5       Number of Lymph Nodes Examined:  20     Tumor Deposits:  Not identified   pTNM CLASSIFICATION (AJCC 8th Edition)   Reporting of pT, pN, and (when applicable) pM categories is based on   information available to the pathologist at the time the report is   issued. As per the AJCC (Chapter 1, 8th Ed.) it is the managing   physician's responsibility to establish the final pathologic stage   based upon all pertinent information, including but potentially not   limited to this pathology report.     pT Category:  pT3     pN Category:  pN2a   ADDITIONAL FINDINGS     Additional Findings:  Diverticulosis   SPECIAL STUDIES     MMR IHC: MMR proficient per report in EMR     Comment: Adequate tumor is present in Block A4 for further testing if   additional studies are clinically indicated.                                       Diagnosis performed by:                                       CLIFF MIRANDA MD     Assessment & Plan:  1. Cancer of sigmoid colon (HCC)            This is a 69 year old  man with adenocarcinoma of the sigmoid colon, [id number] stage IIIB disease. He is s/p resection and presents for evaluation.      Current Diagnosis and Staging: Adenocarcinoma of the sigmoid colon, [id number] stage IIIB disease    Update: Patient is doing well with CAPOX. Continue with C3 of CAPOX on 04/27/25.     Treatment Plan: CAPOX adjuvant x 3 months     Treatment Citation: NCCN     Plan of Care:     Primary Therapy: CAPOX C3 on 04/27/25  Supportive Therapy: Antiemetics per protocol. Lomotil added for diarrhea.   Toxicity: Patient is getting antineoplastic therapy for a life-threatening malignancy and needs monitoring of blood counts, hepatic function, and renal function due to potential for organ dysfunction. This treatment poses a risk of toxicity that could lead to long term disruption of bodily function or death.   Labs:  CBC with diff, CMP, CEA, ct DNA monitoring  Imaging: Repeat CT scans after completion of therapy  Treatment Planning: The patient will need adjuvant therapy with CAPOX for 3 months.   Consultations: Colorectal surgery (Dr. Bautista)  Code Status: Full  Miscellaneous: Referred to genetics and to [facility]  Return for Follow Up: 3 weeks    Any questions and concerns raised by the patient were answered to the best of my ability. Thank you for allowing me to participate in the care for this patient. Please feel free to contact me for any questions or concerns.

## 2025-04-27 ENCOUNTER — OUTPATIENT INFUSION SERVICES (OUTPATIENT)
Dept: ONCOLOGY | Facility: MEDICAL CENTER | Age: 70
End: 2025-04-27
Attending: STUDENT IN AN ORGANIZED HEALTH CARE EDUCATION/TRAINING PROGRAM
Payer: COMMERCIAL

## 2025-04-27 VITALS
SYSTOLIC BLOOD PRESSURE: 86 MMHG | BODY MASS INDEX: 28.79 KG/M2 | WEIGHT: 183.42 LBS | HEART RATE: 65 BPM | TEMPERATURE: 98.2 F | RESPIRATION RATE: 18 BRPM | HEIGHT: 67 IN | DIASTOLIC BLOOD PRESSURE: 53 MMHG | OXYGEN SATURATION: 96 %

## 2025-04-27 DIAGNOSIS — C18.7 CANCER OF SIGMOID COLON (HCC): ICD-10-CM

## 2025-04-27 LAB
ALBUMIN SERPL BCP-MCNC: 4 G/DL (ref 3.2–4.9)
ALBUMIN/GLOB SERPL: 1.5 G/DL
ALP SERPL-CCNC: 39 U/L (ref 30–99)
ALT SERPL-CCNC: 34 U/L (ref 2–50)
ANION GAP SERPL CALC-SCNC: 11 MMOL/L (ref 7–16)
AST SERPL-CCNC: 52 U/L (ref 12–45)
BASOPHILS # BLD AUTO: 1.7 % (ref 0–1.8)
BASOPHILS # BLD: 0.1 K/UL (ref 0–0.12)
BILIRUB SERPL-MCNC: 0.6 MG/DL (ref 0.1–1.5)
BUN SERPL-MCNC: 10 MG/DL (ref 8–22)
CALCIUM ALBUM COR SERPL-MCNC: 9 MG/DL (ref 8.5–10.5)
CALCIUM SERPL-MCNC: 9 MG/DL (ref 8.5–10.5)
CEA SERPL-MCNC: 2.1 NG/ML (ref 0–3)
CHLORIDE SERPL-SCNC: 107 MMOL/L (ref 96–112)
CO2 SERPL-SCNC: 22 MMOL/L (ref 20–33)
CREAT SERPL-MCNC: 1.11 MG/DL (ref 0.5–1.4)
EOSINOPHIL # BLD AUTO: 0.65 K/UL (ref 0–0.51)
EOSINOPHIL NFR BLD: 11 % (ref 0–6.9)
ERYTHROCYTE [DISTWIDTH] IN BLOOD BY AUTOMATED COUNT: 57.2 FL (ref 35.9–50)
GFR SERPLBLD CREATININE-BSD FMLA CKD-EPI: 72 ML/MIN/1.73 M 2
GLOBULIN SER CALC-MCNC: 2.7 G/DL (ref 1.9–3.5)
GLUCOSE SERPL-MCNC: 104 MG/DL (ref 65–99)
HCT VFR BLD AUTO: 39.5 % (ref 42–52)
HGB BLD-MCNC: 13.2 G/DL (ref 14–18)
IMM GRANULOCYTES # BLD AUTO: 0.02 K/UL (ref 0–0.11)
IMM GRANULOCYTES NFR BLD AUTO: 0.3 % (ref 0–0.9)
LYMPHOCYTES # BLD AUTO: 1.29 K/UL (ref 1–4.8)
LYMPHOCYTES NFR BLD: 21.8 % (ref 22–41)
MCH RBC QN AUTO: 33.4 PG (ref 27–33)
MCHC RBC AUTO-ENTMCNC: 33.4 G/DL (ref 32.3–36.5)
MCV RBC AUTO: 100 FL (ref 81.4–97.8)
MONOCYTES # BLD AUTO: 0.75 K/UL (ref 0–0.85)
MONOCYTES NFR BLD AUTO: 12.7 % (ref 0–13.4)
NEUTROPHILS # BLD AUTO: 3.1 K/UL (ref 1.82–7.42)
NEUTROPHILS NFR BLD: 52.5 % (ref 44–72)
NRBC # BLD AUTO: 0 K/UL
NRBC BLD-RTO: 0 /100 WBC (ref 0–0.2)
OUTPT INFUS CBC COMMENT OICOM: ABNORMAL
PLATELET # BLD AUTO: 225 K/UL (ref 164–446)
PMV BLD AUTO: 9.8 FL (ref 9–12.9)
POTASSIUM SERPL-SCNC: 4.5 MMOL/L (ref 3.6–5.5)
PROT SERPL-MCNC: 6.7 G/DL (ref 6–8.2)
RBC # BLD AUTO: 3.95 M/UL (ref 4.7–6.1)
SODIUM SERPL-SCNC: 140 MMOL/L (ref 135–145)
WBC # BLD AUTO: 5.9 K/UL (ref 4.8–10.8)

## 2025-04-27 PROCEDURE — 96413 CHEMO IV INFUSION 1 HR: CPT

## 2025-04-27 PROCEDURE — 96375 TX/PRO/DX INJ NEW DRUG ADDON: CPT

## 2025-04-27 PROCEDURE — 96415 CHEMO IV INFUSION ADDL HR: CPT

## 2025-04-27 PROCEDURE — 96367 TX/PROPH/DG ADDL SEQ IV INF: CPT

## 2025-04-27 PROCEDURE — 85025 COMPLETE CBC W/AUTO DIFF WBC: CPT

## 2025-04-27 PROCEDURE — 82378 CARCINOEMBRYONIC ANTIGEN: CPT

## 2025-04-27 PROCEDURE — 700105 HCHG RX REV CODE 258: Performed by: STUDENT IN AN ORGANIZED HEALTH CARE EDUCATION/TRAINING PROGRAM

## 2025-04-27 PROCEDURE — 700111 HCHG RX REV CODE 636 W/ 250 OVERRIDE (IP): Performed by: STUDENT IN AN ORGANIZED HEALTH CARE EDUCATION/TRAINING PROGRAM

## 2025-04-27 PROCEDURE — 80053 COMPREHEN METABOLIC PANEL: CPT

## 2025-04-27 RX ORDER — DEXAMETHASONE SODIUM PHOSPHATE 4 MG/ML
12 INJECTION, SOLUTION INTRA-ARTICULAR; INTRALESIONAL; INTRAMUSCULAR; INTRAVENOUS; SOFT TISSUE ONCE
Status: COMPLETED | OUTPATIENT
Start: 2025-04-27 | End: 2025-04-27

## 2025-04-27 RX ORDER — PALONOSETRON 0.05 MG/ML
0.25 INJECTION, SOLUTION INTRAVENOUS ONCE
Status: COMPLETED | OUTPATIENT
Start: 2025-04-27 | End: 2025-04-27

## 2025-04-27 RX ADMIN — FOSAPREPITANT 150 MG: 150 INJECTION, POWDER, LYOPHILIZED, FOR SOLUTION INTRAVENOUS at 11:12

## 2025-04-27 RX ADMIN — PALONOSETRON HYDROCHLORIDE 0.25 MG: 0.25 INJECTION INTRAVENOUS at 11:12

## 2025-04-27 RX ADMIN — OXALIPLATIN 270 MG: 5 INJECTION, SOLUTION INTRAVENOUS at 12:00

## 2025-04-27 RX ADMIN — DEXAMETHASONE SODIUM PHOSPHATE 12 MG: 4 INJECTION INTRA-ARTICULAR; INTRALESIONAL; INTRAMUSCULAR; INTRAVENOUS; SOFT TISSUE at 11:12

## 2025-04-27 ASSESSMENT — FIBROSIS 4 INDEX: FIB4 SCORE: 1.83

## 2025-04-27 NOTE — PROGRESS NOTES
"Pharmacy Chemotherapy Calculations:    Dx: Sigmoid adenocarcinoma [id number] stage IIIb  Cycle 3  Previous treatment: C2 4/6/25     Protocol: CapeOx  *Dosing Reference*  Capecitabine 1000 mg/m2 PO twice daily on Days 1-15  - Beginning the evening of D1 and last dose the morning of D15 (28 doses total)  Oxaliplatin 130 mg/m2 IV over 2 hrs on Day 1  21-Day cycle x 3-8 cycles (adjuvant)  NCCN Guidelines for Colon Cancer. [id number].  Sergio BRYAN, et al. JCO. 2007;25(12):102-9.         BP (!) 86/53   Pulse 65   Temp 36.8 °C (98.2 °F) (Temporal)   Resp 18   Ht 1.7 m (5' 6.93\")   Wt 83.2 kg (183 lb 6.8 oz)   SpO2 96%   BMI 28.79 kg/m²  Body surface area is 1.98 meters squared.    Labs 4/27/25:  ANC~ 3100 Plt = 225k   Hgb = 13.2     SCr = 1.11 mg/dL CrCl ~ 73.3 mL/min   AST/ALT/AP = 52/34/39 TBili = 0.6      Oxaliplatin 130 mg/m² x 1.98 m² = 257.4 .6 mg   <10% difference, okay to treat with final dose = 270 mg IV    Willa Valerio, PharmD  "

## 2025-04-27 NOTE — PROGRESS NOTES
Talib presents to infusion for cycle 3/ day 1 of oxaliplatin for colorectal cancer. Pt denies having any new or acute complaints today, reports tolerating past treatments well. PIV started with positive return and labs drawn off of IV start.    Labs reviewed by RN, within parameters for treatment today.     Pre-treatment meds given as ordered.     oxaliplatin given over 2 hours.  Patient tolerated well with no adverse effects.     PIV flushed post infusion with positive blood return, removed with tip intact. Patient left in stable condition, knows when to return for next appointment.

## 2025-04-27 NOTE — PROGRESS NOTES
"Pharmacy Chemotherapy Calculation    Dx: Sigmoid adenocarcinoma         Protocol: CapeOX     Capecitabine 1000 mg/m2 PO twice daily on Days 1-15  Oxaliplatin 130 mg/m2 IV over 2 hours  21-day cycle for 3-8 cycles of perioperative therapy (neoadjuvant or adjuvant) or until DP/UT (advanced or metastatic)  NCCN Guidelines for Colon Cancer V.1.2024.  Sergio BRYAN et al. J Clin Oncol. 2007; 25(1):102-9.  Cathi J , et al. J Clin Oncol. 2008;26(12):2006-12.  Galilea CHATMAN , et al. J Clin Oncol. 2008;26(12):2013-9.    Allergies:  Patient has no known allergies.       BP (!) 86/53   Pulse 65   Temp 36.8 °C (98.2 °F) (Temporal)   Resp 18   Ht 1.7 m (5' 6.93\")   Wt 83.2 kg (183 lb 6.8 oz)   SpO2 96%   BMI 28.79 kg/m²  Body surface area is 1.98 meters squared.    Labs 4/27/25:  ANC~ 3100 Plt = 225k   Hgb = 13.2     SCr = 1.11 mg/dL CrCl ~ 73 mL/min   AST/ALT/AP = 52/34/39 TBili = 0.6       Drug Order   (Drug name, dose, route, IV Fluid & volume, frequency, number of doses) Cycle 3  Previous treatment:C2 4/6/25     Medication = Oxaliplatin  Base Dose = 130 mg/m2  Calc Dose: Base Dose x 1.98 m2 = 257.4 mg  Final Dose = 270 mg  Route = IV  Fluid & Volume = D5 250 mL  Admin Duration = Over 2 hours          <10% difference, OK to treat with final dose   By my signature below, I confirm this process was performed independently with the BSA and all final chemotherapy dosing calculations congruent. I have reviewed the above chemotherapy order and that my calculation of the final dose and BSA (when applicable) corroborate those calculations of the  pharmacist. Discrepancies of 10% or greater in the written dose have been addressed and documented within the [facility] Progress notes.    Duran Ivey, PharmD  "

## 2025-04-27 NOTE — PROGRESS NOTES
Chemotherapy Verification - PRIMARY RN      Height = 1.7 m  Weight = 83.2 kg  BSA = 1.98 m2       Medication: oxaliplatin (Eloxatin)  Dose: 130 mg/m2  Calculated Dose: 257.4 mg                             (In mg/m2, AUC, mg/kg)     I confirm this process was performed independently with the BSA and all final chemotherapy dosing calculations congruent.  Any discrepancies of 10% or greater have been addressed with the chemotherapy pharmacist. The resolution of the discrepancy has been documented in the EPIC progress notes.

## 2025-04-27 NOTE — PROGRESS NOTES
Chemotherapy Verification - SECONDARY RN       Height = 1.7m  Weight = 83.2kg  BSA = 1.98m2       Medication: oxaliplatin  Dose: 130mg/m2  Calculated Dose: 257.4mg                             (In mg/m2, AUC, mg/kg)     I confirm that this process was performed independently.

## 2025-05-13 RX ORDER — METHYLPREDNISOLONE SODIUM SUCCINATE 125 MG/2ML
125 INJECTION, POWDER, LYOPHILIZED, FOR SOLUTION INTRAMUSCULAR; INTRAVENOUS PRN
Status: CANCELLED | OUTPATIENT
Start: 2025-05-19

## 2025-05-13 RX ORDER — ONDANSETRON 8 MG/1
8 TABLET, ORALLY DISINTEGRATING ORAL PRN
Status: CANCELLED | OUTPATIENT
Start: 2025-05-19

## 2025-05-13 RX ORDER — SODIUM CHLORIDE 9 MG/ML
1000 INJECTION, SOLUTION INTRAVENOUS PRN
Status: CANCELLED | OUTPATIENT
Start: 2025-05-19

## 2025-05-13 RX ORDER — PROCHLORPERAZINE MALEATE 10 MG
10 TABLET ORAL EVERY 6 HOURS PRN
Status: CANCELLED | OUTPATIENT
Start: 2025-05-19

## 2025-05-13 RX ORDER — 0.9 % SODIUM CHLORIDE 0.9 %
10 VIAL (ML) INJECTION PRN
Status: CANCELLED | OUTPATIENT
Start: 2025-05-19

## 2025-05-13 RX ORDER — LORAZEPAM 0.5 MG/1
0.5 TABLET ORAL PRN
Status: CANCELLED | OUTPATIENT
Start: 2025-05-19

## 2025-05-13 RX ORDER — ALBUTEROL SULFATE 5 MG/ML
2.5 SOLUTION RESPIRATORY (INHALATION) PRN
Status: CANCELLED | OUTPATIENT
Start: 2025-05-19

## 2025-05-13 RX ORDER — 0.9 % SODIUM CHLORIDE 0.9 %
10 VIAL (ML) INJECTION PRN
Status: CANCELLED | OUTPATIENT
Start: 2025-05-18

## 2025-05-13 RX ORDER — MEPERIDINE HYDROCHLORIDE 25 MG/ML
25 INJECTION INTRAMUSCULAR; INTRAVENOUS; SUBCUTANEOUS PRN
Status: CANCELLED | OUTPATIENT
Start: 2025-05-19

## 2025-05-13 RX ORDER — DEXTROSE MONOHYDRATE 50 MG/ML
INJECTION, SOLUTION INTRAVENOUS CONTINUOUS
Status: CANCELLED | OUTPATIENT
Start: 2025-05-19

## 2025-05-13 RX ORDER — PALONOSETRON 0.05 MG/ML
0.25 INJECTION, SOLUTION INTRAVENOUS ONCE
Status: CANCELLED | OUTPATIENT
Start: 2025-05-19 | End: 2025-05-19

## 2025-05-13 RX ORDER — 0.9 % SODIUM CHLORIDE 0.9 %
3 VIAL (ML) INJECTION PRN
Status: CANCELLED | OUTPATIENT
Start: 2025-05-18

## 2025-05-13 RX ORDER — EPINEPHRINE 1 MG/ML(1)
0.5 AMPUL (ML) INJECTION PRN
Status: CANCELLED | OUTPATIENT
Start: 2025-05-19

## 2025-05-13 RX ORDER — DEXAMETHASONE SODIUM PHOSPHATE 4 MG/ML
12 INJECTION, SOLUTION INTRA-ARTICULAR; INTRALESIONAL; INTRAMUSCULAR; INTRAVENOUS; SOFT TISSUE ONCE
Status: CANCELLED | OUTPATIENT
Start: 2025-05-19 | End: 2025-05-19

## 2025-05-13 RX ORDER — 0.9 % SODIUM CHLORIDE 0.9 %
3 VIAL (ML) INJECTION PRN
Status: CANCELLED | OUTPATIENT
Start: 2025-05-19

## 2025-05-13 RX ORDER — ONDANSETRON 2 MG/ML
4 INJECTION INTRAMUSCULAR; INTRAVENOUS PRN
Status: CANCELLED | OUTPATIENT
Start: 2025-05-19

## 2025-05-13 RX ORDER — 0.9 % SODIUM CHLORIDE 0.9 %
VIAL (ML) INJECTION PRN
Status: CANCELLED | OUTPATIENT
Start: 2025-05-18

## 2025-05-13 RX ORDER — 0.9 % SODIUM CHLORIDE 0.9 %
VIAL (ML) INJECTION PRN
Status: CANCELLED | OUTPATIENT
Start: 2025-05-19

## 2025-05-13 RX ORDER — LORAZEPAM 2 MG/ML
0.5 INJECTION INTRAMUSCULAR PRN
Status: CANCELLED | OUTPATIENT
Start: 2025-05-19

## 2025-05-13 RX ORDER — ONDANSETRON 2 MG/ML
8 INJECTION INTRAMUSCULAR; INTRAVENOUS PRN
Status: CANCELLED | OUTPATIENT
Start: 2025-05-19

## 2025-05-13 RX ORDER — DIPHENHYDRAMINE HYDROCHLORIDE 50 MG/ML
25 INJECTION, SOLUTION INTRAMUSCULAR; INTRAVENOUS PRN
Status: CANCELLED | OUTPATIENT
Start: 2025-05-19

## 2025-05-13 RX ORDER — ACETAMINOPHEN 325 MG/1
650 TABLET ORAL PRN
Status: CANCELLED | OUTPATIENT
Start: 2025-05-19

## 2025-05-16 ENCOUNTER — HOSPITAL ENCOUNTER (OUTPATIENT)
Dept: HEMATOLOGY ONCOLOGY | Facility: MEDICAL CENTER | Age: 70
End: 2025-05-16
Attending: NURSE PRACTITIONER
Payer: COMMERCIAL

## 2025-05-16 ENCOUNTER — HOSPITAL ENCOUNTER (OUTPATIENT)
Facility: MEDICAL CENTER | Age: 70
End: 2025-05-16
Attending: NURSE PRACTITIONER
Payer: COMMERCIAL

## 2025-05-16 VITALS
TEMPERATURE: 98.2 F | HEART RATE: 74 BPM | DIASTOLIC BLOOD PRESSURE: 61 MMHG | OXYGEN SATURATION: 96 % | SYSTOLIC BLOOD PRESSURE: 100 MMHG | RESPIRATION RATE: 18 BRPM

## 2025-05-16 VITALS
HEIGHT: 67 IN | BODY MASS INDEX: 27 KG/M2 | OXYGEN SATURATION: 96 % | HEART RATE: 110 BPM | TEMPERATURE: 98.3 F | DIASTOLIC BLOOD PRESSURE: 60 MMHG | RESPIRATION RATE: 16 BRPM | WEIGHT: 172 LBS | SYSTOLIC BLOOD PRESSURE: 102 MMHG

## 2025-05-16 DIAGNOSIS — K52.1 CHEMOTHERAPY INDUCED DIARRHEA: ICD-10-CM

## 2025-05-16 DIAGNOSIS — C18.7 CANCER OF SIGMOID COLON (HCC): Primary | ICD-10-CM

## 2025-05-16 DIAGNOSIS — Z79.899 ENCOUNTER FOR LONG-TERM (CURRENT) USE OF MEDICATIONS: ICD-10-CM

## 2025-05-16 DIAGNOSIS — E87.6 HYPOKALEMIA: ICD-10-CM

## 2025-05-16 DIAGNOSIS — C18.7 CANCER OF SIGMOID COLON (HCC): ICD-10-CM

## 2025-05-16 DIAGNOSIS — T45.1X5A CHEMOTHERAPY INDUCED DIARRHEA: ICD-10-CM

## 2025-05-16 DIAGNOSIS — E86.0 DEHYDRATION: ICD-10-CM

## 2025-05-16 LAB
ALBUMIN SERPL BCP-MCNC: 4 G/DL (ref 3.2–4.9)
ALBUMIN/GLOB SERPL: 1.4 G/DL
ALP SERPL-CCNC: 46 U/L (ref 30–99)
ALT SERPL-CCNC: 25 U/L (ref 2–50)
ANION GAP SERPL CALC-SCNC: 16 MMOL/L (ref 7–16)
AST SERPL-CCNC: 41 U/L (ref 12–45)
BASOPHILS # BLD AUTO: 1 % (ref 0–1.8)
BASOPHILS # BLD: 0.06 K/UL (ref 0–0.12)
BILIRUB SERPL-MCNC: 0.8 MG/DL (ref 0.1–1.5)
BUN SERPL-MCNC: 16 MG/DL (ref 8–22)
CALCIUM ALBUM COR SERPL-MCNC: 9.1 MG/DL (ref 8.5–10.5)
CALCIUM SERPL-MCNC: 9.1 MG/DL (ref 8.5–10.5)
CHLORIDE SERPL-SCNC: 104 MMOL/L (ref 96–112)
CO2 SERPL-SCNC: 16 MMOL/L (ref 20–33)
CREAT SERPL-MCNC: 1.08 MG/DL (ref 0.5–1.4)
EOSINOPHIL # BLD AUTO: 0.18 K/UL (ref 0–0.51)
EOSINOPHIL NFR BLD: 3 % (ref 0–6.9)
ERYTHROCYTE [DISTWIDTH] IN BLOOD BY AUTOMATED COUNT: 63.7 FL (ref 35.9–50)
GFR SERPLBLD CREATININE-BSD FMLA CKD-EPI: 74 ML/MIN/1.73 M 2
GLOBULIN SER CALC-MCNC: 2.9 G/DL (ref 1.9–3.5)
GLUCOSE SERPL-MCNC: 133 MG/DL (ref 65–99)
HCT VFR BLD AUTO: 40.7 % (ref 42–52)
HGB BLD-MCNC: 14.1 G/DL (ref 14–18)
IMM GRANULOCYTES # BLD AUTO: 0.03 K/UL (ref 0–0.11)
IMM GRANULOCYTES NFR BLD AUTO: 0.5 % (ref 0–0.9)
LYMPHOCYTES # BLD AUTO: 1.21 K/UL (ref 1–4.8)
LYMPHOCYTES NFR BLD: 20 % (ref 22–41)
MCH RBC QN AUTO: 34.1 PG (ref 27–33)
MCHC RBC AUTO-ENTMCNC: 34.6 G/DL (ref 32.3–36.5)
MCV RBC AUTO: 98.3 FL (ref 81.4–97.8)
MONOCYTES # BLD AUTO: 1.13 K/UL (ref 0–0.85)
MONOCYTES NFR BLD AUTO: 18.6 % (ref 0–13.4)
NEUTROPHILS # BLD AUTO: 3.45 K/UL (ref 1.82–7.42)
NEUTROPHILS NFR BLD: 56.9 % (ref 44–72)
NRBC # BLD AUTO: 0 K/UL
NRBC BLD-RTO: 0 /100 WBC (ref 0–0.2)
PLATELET # BLD AUTO: 292 K/UL (ref 164–446)
PMV BLD AUTO: 9.2 FL (ref 9–12.9)
POTASSIUM SERPL-SCNC: 3 MMOL/L (ref 3.6–5.5)
PROT SERPL-MCNC: 6.9 G/DL (ref 6–8.2)
RBC # BLD AUTO: 4.14 M/UL (ref 4.7–6.1)
SODIUM SERPL-SCNC: 136 MMOL/L (ref 135–145)
WBC # BLD AUTO: 6.1 K/UL (ref 4.8–10.8)

## 2025-05-16 PROCEDURE — 85025 COMPLETE CBC W/AUTO DIFF WBC: CPT

## 2025-05-16 PROCEDURE — 96360 HYDRATION IV INFUSION INIT: CPT

## 2025-05-16 PROCEDURE — 82378 CARCINOEMBRYONIC ANTIGEN: CPT

## 2025-05-16 PROCEDURE — 99212 OFFICE O/P EST SF 10 MIN: CPT | Performed by: NURSE PRACTITIONER

## 2025-05-16 PROCEDURE — 36415 COLL VENOUS BLD VENIPUNCTURE: CPT

## 2025-05-16 PROCEDURE — 99214 OFFICE O/P EST MOD 30 MIN: CPT | Performed by: NURSE PRACTITIONER

## 2025-05-16 PROCEDURE — 700105 HCHG RX REV CODE 258: Performed by: NURSE PRACTITIONER

## 2025-05-16 PROCEDURE — 80053 COMPREHEN METABOLIC PANEL: CPT

## 2025-05-16 RX ORDER — SODIUM CHLORIDE 9 MG/ML
500 INJECTION, SOLUTION INTRAVENOUS ONCE
Status: COMPLETED | OUTPATIENT
Start: 2025-05-16 | End: 2025-05-16

## 2025-05-16 RX ORDER — SODIUM CHLORIDE 9 MG/ML
1000 INJECTION, SOLUTION INTRAVENOUS ONCE
Status: CANCELLED
Start: 2025-05-19

## 2025-05-16 RX ADMIN — SODIUM CHLORIDE 1000 ML: 9 INJECTION, SOLUTION INTRAVENOUS at 14:55

## 2025-05-16 ASSESSMENT — ENCOUNTER SYMPTOMS
CONSTIPATION: 0
COUGH: 0
TINGLING: 1
VOMITING: 1
NAUSEA: 1
DIARRHEA: 1
FEVER: 0
INSOMNIA: 0
HEADACHES: 0
PALPITATIONS: 0
CHILLS: 1
WEIGHT LOSS: 0
MYALGIAS: 0
DIZZINESS: 1
SHORTNESS OF BREATH: 0
WHEEZING: 0

## 2025-05-16 ASSESSMENT — FIBROSIS 4 INDEX: FIB4 SCORE: 2.73

## 2025-05-16 ASSESSMENT — PAIN SCALES - GENERAL: PAINLEVEL_OUTOF10: NO PAIN

## 2025-05-16 NOTE — PROGRESS NOTES
Subjective     Talib Ortiz is a 69 y.o. male who presents with Colon Cancer (Emily/pre chemo)            HPI  Mr. 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 He is unaccompanied for today's visit.     Patient was in his usual state of health until fall 2024 when he noted bowel changes, without hematochezia.  He was evaluated per GI with colonoscopy completed 11/27/24 (last completed in 12/3/22 with moderate diverticulosis and benign polyp in sigmoid colon). Obstructing mass was noted in descending colon, pathology confirmed malignancy. CT completed 11/28/24 showed sigmoid colon mass, consistent with known malignancy, several small subcentimeter hepatic lesions, MRI recommended, no other suspicious lesions in chest, abdomen, or pelvis.  MRI completed 12/23/24 showed no liver mass demonstrated, no evidence of hepatic metastases; small cystic structure adjacent to right lobe liver measuring 8 mm, doubtful significance. He underwent robotic low anterior resection with flexible sigmoidoscopy per Dr. Bautista on 1/7/25, pathology consistent with known malignancy, margins negative. Signatera ctDNA completed 2/6/25 was negative. He initiated systemic therapy with XELOX on 3/9/25, for an anticipated 4 cycles.     Patient has had a rough week.  He had Signatera sample drawn at home on 5/13 that required multiple (30 per patient) sticks.  He has been experiencing persistent diarrhea for which he did not take Imodium, he thought that he should let it run it's course  Nausea was self-limiting this past Monday.  He is quite depleted and a bit dizzy today; he is amenable to an office hydration today.      Review of Systems   Constitutional:  Positive for chills and malaise/fatigue (doesn't feel good this week). Negative for fever and weight loss.   Respiratory:  Negative for cough, shortness of  breath and wheezing.    Cardiovascular:  Negative for chest pain, palpitations and leg swelling.   Gastrointestinal:  Positive for diarrhea (did not take imodium), nausea (this past monday - self limiting) and vomiting (this past monday - self limiting). Negative for constipation.        Colonoscopy 6/27   Genitourinary:  Negative for dysuria.   Musculoskeletal:  Negative for joint pain and myalgias.   Skin:  Positive for rash (bue - forearms, red, thick, no drainage, using Aquaphor).   Neurological:  Positive for dizziness (r/t dehydration) and tingling (and cold sensitivity - lasting longer). Negative for headaches.   Psychiatric/Behavioral:  The patient does not have insomnia.      No Known Allergies      Current Outpatient Medications on File Prior to Encounter   Medication Sig Dispense Refill    diphenoxylate-atropine (LOMOTIL) 2.5-0.025 MG Tab Take 1 Tablet by mouth 4 times a day as needed for Diarrhea.      aspirin 81 MG EC tablet Take 81 mg by mouth every day.      capecitabine (XELODA) 500 MG tablet Take 4 tablets of 500mg (total dose 2,000mg) by mouth 2 times a day on day 1-14 of each 21 day cycle.  Starting on day 1 of IV chemotherapy. 112 Tablet 5    ondansetron (ZOFRAN) 4 MG Tab tablet Take 1 Tablet by mouth every four hours as needed for Nausea/Vomiting (for nausea, vomiting). 30 Tablet 6    prochlorperazine (COMPAZINE) 10 MG Tab Take 1 Tablet by mouth every 6 hours as needed (for nausea, vomiting). 30 Tablet 6    OLANZapine (ZYPREXA) 5 MG Tab Take 1 Tablet by mouth every evening. To be taken nightly on first day of chemotherapy for a total of 4 days, or as directed by your provider. 24 Tablet 0    loperamide (IMODIUM A-D) 2 MG tablet Take 1 Tablet by mouth see administration instructions. 30 Tablet 6    losartan (COZAAR) 25 MG Tab Take 1 Tablet by mouth every day. (Patient taking differently: Take 25 mg by mouth every evening.) 90 Tablet 4    metoprolol tartrate (LOPRESSOR) 25 MG Tab Take 1 Tablet by  "mouth 2 times a day. 180 Tablet 4    rosuvastatin (CRESTOR) 40 MG tablet Take 1 Tablet by mouth every evening. 90 Tablet 4    ferrous sulfate 325 (65 Fe) MG tablet Take 1 Tab by mouth every morning with breakfast. 30 Tab 3    therapeutic multivitamin-minerals (THERAGRAN-M) Tab Take 1 Tab by mouth every day. 30 Tab 11    ascorbic acid (VITAMIN C) 500 MG tablet Take 1 Tab by mouth every day. 30 Tab 3     No current facility-administered medications on file prior to encounter.          Objective     /60 (BP Location: Right arm, Patient Position: Sitting, BP Cuff Size: Adult)   Pulse (!) 110   Temp 36.8 °C (98.3 °F) (Temporal)   Resp 16   Ht 1.7 m (5' 6.93\")   Wt 78 kg (172 lb)   SpO2 96%   BMI 27.00 kg/m²      Physical Exam  Vitals reviewed.   Constitutional:       General: He is not in acute distress.     Appearance: He is well-developed. He is not diaphoretic.   HENT:      Head: Normocephalic and atraumatic.   Eyes:      General: No scleral icterus.        Right eye: No discharge.         Left eye: No discharge.   Cardiovascular:      Rate and Rhythm: Normal rate and regular rhythm.      Heart sounds: Normal heart sounds. No murmur heard.     No friction rub. No gallop.   Pulmonary:      Effort: Pulmonary effort is normal. No respiratory distress.      Breath sounds: Normal breath sounds. No wheezing.   Abdominal:      General: There is no distension.      Palpations: Abdomen is soft.      Tenderness: There is no abdominal tenderness.   Musculoskeletal:         General: Normal range of motion.      Cervical back: Normal range of motion.   Skin:     General: Skin is warm and dry.      Coloration: Skin is not pale.      Findings: No erythema or rash.   Neurological:      Mental Status: He is alert and oriented to person, place, and time.   Psychiatric:         Behavior: Behavior normal.         Hospital Outpatient Visit on 05/16/2025   Component Date Value Ref Range Status    Sodium 05/16/2025 136  135 - " 145 mmol/L Final    Potassium 05/16/2025 3.0 (L)  3.6 - 5.5 mmol/L Final    Chloride 05/16/2025 104  96 - 112 mmol/L Final    Co2 05/16/2025 16 (L)  20 - 33 mmol/L Final    Anion Gap 05/16/2025 16.0  7.0 - 16.0 Final    Glucose 05/16/2025 133 (H)  65 - 99 mg/dL Final    Bun 05/16/2025 16  8 - 22 mg/dL Final    Creatinine 05/16/2025 1.08  0.50 - 1.40 mg/dL Final    Calcium 05/16/2025 9.1  8.5 - 10.5 mg/dL Final    Correct Calcium 05/16/2025 9.1  8.5 - 10.5 mg/dL Final    AST(SGOT) 05/16/2025 41  12 - 45 U/L Final    ALT(SGPT) 05/16/2025 25  2 - 50 U/L Final    Alkaline Phosphatase 05/16/2025 46  30 - 99 U/L Final    Total Bilirubin 05/16/2025 0.8  0.1 - 1.5 mg/dL Final    Albumin 05/16/2025 4.0  3.2 - 4.9 g/dL Final    Total Protein 05/16/2025 6.9  6.0 - 8.2 g/dL Final    Globulin 05/16/2025 2.9  1.9 - 3.5 g/dL Final    A-G Ratio 05/16/2025 1.4  g/dL Final    WBC 05/16/2025 6.1  4.8 - 10.8 K/uL Final    RBC 05/16/2025 4.14 (L)  4.70 - 6.10 M/uL Final    Hemoglobin 05/16/2025 14.1  14.0 - 18.0 g/dL Final    Hematocrit 05/16/2025 40.7 (L)  42.0 - 52.0 % Final    MCV 05/16/2025 98.3 (H)  81.4 - 97.8 fL Final    MCH 05/16/2025 34.1 (H)  27.0 - 33.0 pg Final    MCHC 05/16/2025 34.6  32.3 - 36.5 g/dL Final    RDW 05/16/2025 63.7 (H)  35.9 - 50.0 fL Final    Platelet Count 05/16/2025 292  164 - 446 K/uL Final    MPV 05/16/2025 9.2  9.0 - 12.9 fL Final    Neutrophils-Polys 05/16/2025 56.90  44.00 - 72.00 % Final    Lymphocytes 05/16/2025 20.00 (L)  22.00 - 41.00 % Final    Monocytes 05/16/2025 18.60 (H)  0.00 - 13.40 % Final    Eosinophils 05/16/2025 3.00  0.00 - 6.90 % Final    Basophils 05/16/2025 1.00  0.00 - 1.80 % Final    Immature Granulocytes 05/16/2025 0.50  0.00 - 0.90 % Final    Nucleated RBC 05/16/2025 0.00  0.00 - 0.20 /100 WBC Final    Neutrophils (Absolute) 05/16/2025 3.45  1.82 - 7.42 K/uL Final    Includes immature neutrophils, if present.    Lymphs (Absolute) 05/16/2025 1.21  1.00 - 4.80 K/uL Final    Monos  (Absolute) 05/16/2025 1.13 (H)  0.00 - 0.85 K/uL Final    Eos (Absolute) 05/16/2025 0.18  0.00 - 0.51 K/uL Final    Baso (Absolute) 05/16/2025 0.06  0.00 - 0.12 K/uL Final    Immature Granulocytes (abs) 05/16/2025 0.03  0.00 - 0.11 K/uL Final    NRBC (Absolute) 05/16/2025 0.00  K/uL Final    GFR (CKD-EPI) 05/16/2025 74  >60 mL/min/1.73 m 2 Final    Comment: Estimated Glomerular Filtration Rate is calculated using  race neutral CKD-EPI 2021 equation per NKF-ASN recommendations.              Assessment & Plan     1. Cancer of sigmoid colon (HCC)  CBC WITH DIFFERENTIAL    Comp Metabolic Panel    CEA      2. Encounter for long-term (current) use of medications  CBC WITH DIFFERENTIAL    Comp Metabolic Panel    CEA      3. Chemotherapy induced diarrhea        4. Dehydration        5. Hypokalemia             1.  Diarrhea/dehydration: Patient with ongoing diarrhea for over a week, he is not taking any Imodium as he thought it was going to let it run its course but is likely treatment related and needs Imodium or Lomotil.  He has Imodium on hand at home and will start and is also provided samples of Banatrol for additional support.  In office he is provided 1 L normal saline supportive hydration today with 1 L added to treatment plan for ration at time of treatment.  Team to monitor.    2.  Hypokalemia: Results of diarrhea as per item #1 above.  Patient has been provided supportive hydration, Banatrol, will try to control diarrhea, Gatorade and electrolyte replacement, anticipate if not improved by dosing day, coverage per protocol.    3.  Colon cancer: Diagnosed 11/28/24; s/p R-LAR 1/7/25; initiating Xelox 3/9/25     Signaterra: 0 on 2/6/25; collected 5/13/25    CBC, CMP, CEA have been evaluated and found to be within acceptable limits.  Patient will proceed with cycle 4 of treatment, complete CT in approximately 2-4 weeks, and return for post procedure review of imaging, sooner as needed.       - CT after C4      The  patient verbalized agreement and understanding of current plan. All questions and concerns were addressed at time of visit.    Please note that this dictation was created using voice recognition software. I have made every reasonable attempt to correct obvious errors, but I expect that there are errors of grammar and possibly content that I did not discover before finalizing the note.

## 2025-05-16 NOTE — PROGRESS NOTES
Patient presents to Medical Oncology for hydration per MAR. IV with brisk blood return and flushed with normal saline per protocol.  Transferred care to aDrlene BELTRAN.    Talib Ortiz is a 69 y.o. male here for a non-provider visit for a lab draw on 5/16/2025 at 1450 PM.    Procedure performed:  Venipuncture     Anatomical site:  Right Hand    Equipment used:  24 g P IV    Labs drawn:  Comp Metabolic Panel , CBC with differential , and CEA    Ordering provider:  Carol CROWE    Lab draw completed by:  Vianney Ocampo R.N.

## 2025-05-18 ENCOUNTER — OUTPATIENT INFUSION SERVICES (OUTPATIENT)
Dept: ONCOLOGY | Facility: MEDICAL CENTER | Age: 70
End: 2025-05-18
Attending: STUDENT IN AN ORGANIZED HEALTH CARE EDUCATION/TRAINING PROGRAM
Payer: COMMERCIAL

## 2025-05-18 VITALS
HEART RATE: 85 BPM | HEIGHT: 67 IN | SYSTOLIC BLOOD PRESSURE: 100 MMHG | DIASTOLIC BLOOD PRESSURE: 70 MMHG | OXYGEN SATURATION: 94 % | TEMPERATURE: 97.7 F | BODY MASS INDEX: 27.06 KG/M2 | RESPIRATION RATE: 18 BRPM | WEIGHT: 172.4 LBS

## 2025-05-18 DIAGNOSIS — C18.7 CANCER OF SIGMOID COLON (HCC): Primary | ICD-10-CM

## 2025-05-18 PROCEDURE — 96361 HYDRATE IV INFUSION ADD-ON: CPT

## 2025-05-18 PROCEDURE — 96375 TX/PRO/DX INJ NEW DRUG ADDON: CPT

## 2025-05-18 PROCEDURE — 700111 HCHG RX REV CODE 636 W/ 250 OVERRIDE (IP): Performed by: NURSE PRACTITIONER

## 2025-05-18 PROCEDURE — 96367 TX/PROPH/DG ADDL SEQ IV INF: CPT

## 2025-05-18 PROCEDURE — 700111 HCHG RX REV CODE 636 W/ 250 OVERRIDE (IP): Mod: JZ | Performed by: STUDENT IN AN ORGANIZED HEALTH CARE EDUCATION/TRAINING PROGRAM

## 2025-05-18 PROCEDURE — 700105 HCHG RX REV CODE 258: Performed by: STUDENT IN AN ORGANIZED HEALTH CARE EDUCATION/TRAINING PROGRAM

## 2025-05-18 PROCEDURE — 96413 CHEMO IV INFUSION 1 HR: CPT

## 2025-05-18 PROCEDURE — 96415 CHEMO IV INFUSION ADDL HR: CPT

## 2025-05-18 PROCEDURE — A9270 NON-COVERED ITEM OR SERVICE: HCPCS | Performed by: NURSE PRACTITIONER

## 2025-05-18 PROCEDURE — 700105 HCHG RX REV CODE 258: Performed by: NURSE PRACTITIONER

## 2025-05-18 PROCEDURE — 700102 HCHG RX REV CODE 250 W/ 637 OVERRIDE(OP): Performed by: NURSE PRACTITIONER

## 2025-05-18 RX ORDER — ONDANSETRON 8 MG/1
8 TABLET, ORALLY DISINTEGRATING ORAL PRN
Status: DISCONTINUED | OUTPATIENT
Start: 2025-05-18 | End: 2025-05-18 | Stop reason: HOSPADM

## 2025-05-18 RX ORDER — POTASSIUM CHLORIDE 1500 MG/1
40 TABLET, EXTENDED RELEASE ORAL ONCE
Status: COMPLETED | OUTPATIENT
Start: 2025-05-18 | End: 2025-05-18

## 2025-05-18 RX ORDER — POTASSIUM CHLORIDE 7.45 MG/ML
10 INJECTION INTRAVENOUS
Status: COMPLETED | OUTPATIENT
Start: 2025-05-18 | End: 2025-05-18

## 2025-05-18 RX ORDER — DIPHENHYDRAMINE HYDROCHLORIDE 50 MG/ML
25 INJECTION, SOLUTION INTRAMUSCULAR; INTRAVENOUS PRN
Status: DISCONTINUED | OUTPATIENT
Start: 2025-05-18 | End: 2025-05-18 | Stop reason: HOSPADM

## 2025-05-18 RX ORDER — SODIUM CHLORIDE 9 MG/ML
1000 INJECTION, SOLUTION INTRAVENOUS PRN
Status: DISCONTINUED | OUTPATIENT
Start: 2025-05-18 | End: 2025-05-18 | Stop reason: HOSPADM

## 2025-05-18 RX ORDER — ACETAMINOPHEN 325 MG/1
650 TABLET ORAL PRN
Status: DISCONTINUED | OUTPATIENT
Start: 2025-05-18 | End: 2025-05-18 | Stop reason: HOSPADM

## 2025-05-18 RX ORDER — METHYLPREDNISOLONE SODIUM SUCCINATE 125 MG/2ML
125 INJECTION, POWDER, LYOPHILIZED, FOR SOLUTION INTRAMUSCULAR; INTRAVENOUS PRN
Status: DISCONTINUED | OUTPATIENT
Start: 2025-05-18 | End: 2025-05-18 | Stop reason: HOSPADM

## 2025-05-18 RX ORDER — SODIUM CHLORIDE 9 MG/ML
1000 INJECTION, SOLUTION INTRAVENOUS ONCE
Status: COMPLETED | OUTPATIENT
Start: 2025-05-18 | End: 2025-05-18

## 2025-05-18 RX ORDER — PALONOSETRON 0.05 MG/ML
0.25 INJECTION, SOLUTION INTRAVENOUS ONCE
Status: COMPLETED | OUTPATIENT
Start: 2025-05-18 | End: 2025-05-18

## 2025-05-18 RX ORDER — ALBUTEROL SULFATE 5 MG/ML
2.5 SOLUTION RESPIRATORY (INHALATION) PRN
Status: DISCONTINUED | OUTPATIENT
Start: 2025-05-18 | End: 2025-05-18 | Stop reason: HOSPADM

## 2025-05-18 RX ORDER — ONDANSETRON 2 MG/ML
8 INJECTION INTRAMUSCULAR; INTRAVENOUS PRN
Status: DISCONTINUED | OUTPATIENT
Start: 2025-05-18 | End: 2025-05-18 | Stop reason: HOSPADM

## 2025-05-18 RX ORDER — DEXAMETHASONE SODIUM PHOSPHATE 4 MG/ML
12 INJECTION, SOLUTION INTRA-ARTICULAR; INTRALESIONAL; INTRAMUSCULAR; INTRAVENOUS; SOFT TISSUE ONCE
Status: COMPLETED | OUTPATIENT
Start: 2025-05-18 | End: 2025-05-18

## 2025-05-18 RX ADMIN — PALONOSETRON HYDROCHLORIDE 0.25 MG: 0.25 INJECTION INTRAVENOUS at 10:57

## 2025-05-18 RX ADMIN — POTASSIUM CHLORIDE 40 MEQ: 1500 TABLET, EXTENDED RELEASE ORAL at 12:16

## 2025-05-18 RX ADMIN — DEXAMETHASONE SODIUM PHOSPHATE 12 MG: 4 INJECTION INTRA-ARTICULAR; INTRALESIONAL; INTRAMUSCULAR; INTRAVENOUS; SOFT TISSUE at 11:00

## 2025-05-18 RX ADMIN — SODIUM CHLORIDE 1000 ML: 9 INJECTION, SOLUTION INTRAVENOUS at 10:30

## 2025-05-18 RX ADMIN — POTASSIUM CHLORIDE 10 MEQ: 7.46 INJECTION, SOLUTION INTRAVENOUS at 12:11

## 2025-05-18 RX ADMIN — OXALIPLATIN 250 MG: 5 INJECTION, SOLUTION INTRAVENOUS at 12:05

## 2025-05-18 RX ADMIN — FOSAPREPITANT 150 MG: 150 INJECTION, POWDER, LYOPHILIZED, FOR SOLUTION INTRAVENOUS at 11:08

## 2025-05-18 RX ADMIN — POTASSIUM CHLORIDE 10 MEQ: 7.46 INJECTION, SOLUTION INTRAVENOUS at 13:04

## 2025-05-18 ASSESSMENT — FIBROSIS 4 INDEX: FIB4 SCORE: 1.94

## 2025-05-18 NOTE — PROGRESS NOTES
Chemotherapy Verification - SECONDARY RN       Height = 1.7m  Weight = 78.2kg  BSA = 1.92m2       Medication: oxaliplatin  Dose: 130mg/m2  Calculated Dose: 249.6mg                             (In mg/m2, AUC, mg/kg)       I confirm that this process was performed independently.

## 2025-05-18 NOTE — PROGRESS NOTES
Chemotherapy Verification - PRIMARY RN      Height = 170 cm  Weight = 78.2 kg  BSA = 1.92 m2     Medication: Oxaliplatin  Dose: 130 mg/m2  Calculated Dose: 249.6 mg                             (In mg/m2, AUC, mg/kg)     I confirm this process was performed independently with the BSA and all final chemotherapy dosing calculations congruent.  Any discrepancies of 10% or greater have been addressed with the chemotherapy pharmacist. The resolution of the discrepancy has been documented in the EPIC progress notes.

## 2025-05-18 NOTE — PROGRESS NOTES
"Pharmacy Chemotherapy Calculation    Dx: Sigmoid adenocarcinoma         Protocol: CapeOX     Capecitabine 1000 mg/m2 PO twice daily on Days 1-15  Oxaliplatin 130 mg/m2 IV over 2 hours  21-day cycle for 3-8 cycles of perioperative therapy (neoadjuvant or adjuvant) or until DP/UT (advanced or metastatic)  NCCN Guidelines for Colon Cancer V.1.2024.  Sergio BRYAN et al. J Clin Oncol. 2007; 25(1):102-9.  Cathi J , et al. J Clin Oncol. 2008;26(12):2006-12.  Galilea CHATMAN , et al. J Clin Oncol. 2008;26(12):2013-9.    Allergies:  Patient has no known allergies.       /70   Pulse 85   Temp 36.5 °C (97.7 °F) (Temporal)   Resp 18   Ht 1.7 m (5' 6.93\")   Wt 78.2 kg (172 lb 6.4 oz)   SpO2 94%   BMI 27.06 kg/m²  Body surface area is 1.92 meters squared.    All lab results 5/16/25 within treatment parameters. Potassium to be replaced.       Drug Order   (Drug name, dose, route, IV Fluid & volume, frequency, number of doses) Cycle 4  Previous treatment:C3 4/27/25     Medication = Oxaliplatin  Base Dose = 130 mg/m2  Calc Dose: Base Dose x 1.92 m2 = 249.6 mg  Final Dose = 250 mg  Route = IV  Fluid & Volume = D5 250 mL  Admin Duration = Over 2 hours          <10% difference, OK to treat with final dose   By my signature below, I confirm this process was performed independently with the BSA and all final chemotherapy dosing calculations congruent. I have reviewed the above chemotherapy order and that my calculation of the final dose and BSA (when applicable) corroborate those calculations of the  pharmacist. Discrepancies of 10% or greater in the written dose have been addressed and documented within the Psychiatric Progress notes.    Cezar Dsouza PharmD  "

## 2025-05-18 NOTE — PROGRESS NOTES
"Pharmacy Chemotherapy Calculations:    Dx: Sigmoid adenocarcinoma [id number] stage IIIb  Cycle 4  Previous treatment: C3 4/27/25     Protocol: CapeOx  *Dosing Reference*  Capecitabine 1000 mg/m2 PO twice daily on Days 1-15  - Beginning the evening of D1 and last dose the morning of D15 (28 doses total)  Oxaliplatin 130 mg/m2 IV over 2 hrs on Day 1  21-Day cycle x 3-8 cycles (adjuvant)  NCCN Guidelines for Colon Cancer. [id number].832856.  Sergio BRYAN, et al. JCO. 2007;25(12):102-9.         /70   Pulse 85   Temp 36.5 °C (97.7 °F) (Temporal)   Resp 18   Ht 1.7 m (5' 6.93\")   Wt 78.2 kg (172 lb 6.4 oz)   SpO2 94%   BMI 27.06 kg/m²  Body surface area is 1.92 meters squared.    Labs 5/16/25:  ANC~ 3450 Plt = 292k   Hgb = 14.1     SCr = 1.08mg/dL CrCl ~ 70.8 mL/min   AST/ALT/AP = WNL TBili = 0.8  KCl=3 (to be replaced)        Oxaliplatin 130 mg/m² x 1.92 m² = 249.6 mg   <10% difference, okay to treat with final dose = 250 mg IV    Claudia Goldsmith, PharmD  "

## 2025-05-19 NOTE — PROGRESS NOTES
Pt presented to [facility] for D1C4 OP Colorectal Xelox. POC discussed and pt verbalized understanding, reports ongoing n/v and diarrhea, taking his home meds prn. PIV placed, brisk blood return noted. Labs reviewed from 05/16. K+ 3.0. Order received from Ailin Valentin, AMRITA, ok to administered two 10 mEq KCL IV and give 40 mEq K-DUR, per pt preference. 1 L NS bolus, Kdur, pre-meds and chemo administered per MAR. No s/s of adverse reactions or complications noted. PIV removed with tip intact, site covered w/ sterile gauze/coban. Pt confirmed understanding when to contact provider, reports he will be following up for PET scan and future order for continued POC. Pt discharged to self care, accompanied by his wife. Pt in NAD at time of discharge.

## 2025-05-21 LAB
NTRA SIGNATERA MTM READOUT: 0 MTM/ML
NTRA SIGNATERA TEST RESULT: NEGATIVE
TEST NAME 95000: NORMAL

## 2025-06-06 PROBLEM — C18.7 CANCER OF SIGMOID COLON (HCC): Chronic | Status: ACTIVE | Noted: 2025-01-09

## 2025-06-16 ENCOUNTER — HOSPITAL ENCOUNTER (OUTPATIENT)
Dept: RADIOLOGY | Facility: MEDICAL CENTER | Age: 70
End: 2025-06-16
Attending: STUDENT IN AN ORGANIZED HEALTH CARE EDUCATION/TRAINING PROGRAM
Payer: COMMERCIAL

## 2025-06-16 DIAGNOSIS — C18.7 CANCER OF SIGMOID COLON (HCC): ICD-10-CM

## 2025-06-16 PROCEDURE — 71260 CT THORAX DX C+: CPT

## 2025-06-16 PROCEDURE — 700117 HCHG RX CONTRAST REV CODE 255: Performed by: STUDENT IN AN ORGANIZED HEALTH CARE EDUCATION/TRAINING PROGRAM

## 2025-06-16 RX ADMIN — IOHEXOL 100 ML: 350 INJECTION, SOLUTION INTRAVENOUS at 08:50

## 2025-06-18 ENCOUNTER — HOSPITAL ENCOUNTER (OUTPATIENT)
Dept: HEMATOLOGY ONCOLOGY | Facility: MEDICAL CENTER | Age: 70
End: 2025-06-18
Attending: STUDENT IN AN ORGANIZED HEALTH CARE EDUCATION/TRAINING PROGRAM
Payer: COMMERCIAL

## 2025-06-18 VITALS
BODY MASS INDEX: 27.78 KG/M2 | SYSTOLIC BLOOD PRESSURE: 134 MMHG | TEMPERATURE: 97 F | HEART RATE: 95 BPM | DIASTOLIC BLOOD PRESSURE: 58 MMHG | OXYGEN SATURATION: 95 % | HEIGHT: 67 IN | WEIGHT: 177 LBS

## 2025-06-18 DIAGNOSIS — C18.7 CANCER OF SIGMOID COLON (HCC): Primary | ICD-10-CM

## 2025-06-18 PROCEDURE — 99212 OFFICE O/P EST SF 10 MIN: CPT | Performed by: STUDENT IN AN ORGANIZED HEALTH CARE EDUCATION/TRAINING PROGRAM

## 2025-06-18 PROCEDURE — 99214 OFFICE O/P EST MOD 30 MIN: CPT | Performed by: STUDENT IN AN ORGANIZED HEALTH CARE EDUCATION/TRAINING PROGRAM

## 2025-06-18 ASSESSMENT — ENCOUNTER SYMPTOMS
NERVOUS/ANXIOUS: 0
CONSTIPATION: 0
TINGLING: 1
DIZZINESS: 0
COUGH: 0
BACK PAIN: 0
ABDOMINAL PAIN: 0
BLOOD IN STOOL: 0
SPUTUM PRODUCTION: 0
BLURRED VISION: 0
DOUBLE VISION: 0
SORE THROAT: 0
PALPITATIONS: 0
NAUSEA: 0
VOMITING: 0
INSOMNIA: 0
DIAPHORESIS: 0
SHORTNESS OF BREATH: 0
DIARRHEA: 0
ORTHOPNEA: 0
FEVER: 0
HEARTBURN: 0
MYALGIAS: 0
BRUISES/BLEEDS EASILY: 0
WHEEZING: 0
WEAKNESS: 0
WEIGHT LOSS: 0
CHILLS: 0
HEADACHES: 0
SINUS PAIN: 0

## 2025-06-18 ASSESSMENT — FIBROSIS 4 INDEX: FIB4 SCORE: 1.94

## 2025-06-18 NOTE — ADDENDUM NOTE
Encounter addended by: Kenisha Hayes, Med Ass't on: 6/18/2025 3:03 PM   Actions taken: Charge Capture section accepted

## 2025-06-18 NOTE — PROGRESS NOTES
Follow Up Note:  Hematology/Oncology      Primary Care:  Roger Gonzalez M.D.    Diagnosis: Sigmoid colon adenocarcinoma    Chief Complaint: Post treatment visit    Current Treatment: NA    Prior Treatment: Surgical resection; CAPOX    Oncology History of Presenting Illness:  Talib Ortiz is a 69 y.o.  man who presents to the clinic for evaluation for systemic therapy for a new diagnosis of sigmoid adenocarcinoma, s/p surgical resection on 01/07/25 with final pathology [id number] stage IIIB disease. He had been changes in his bowel habits for the past 5 months, non-bloody. He eventually was seen by GI and a scope was performed (he had a clear scope in 2022) and that revealed an obstructing mass. He was sent for surgical evaluation accordingly and was operated on earlier this month. He was subsequently referred for evaluation.     Treatment History:   01/07/25: Surgical resection  03/09/25: C1 CAPOX  03/30/25: C2 CAPOX deferred 1 week (toxicity) (reduce capecitabine to 1500 mg PO BID)  04/06/25: C2 CAPOX  04/27/25: C3 CAPOX   05/18/25: C4 CAPOX  06/16/25: CT scans show no signs of disease.     Interval History:  Patient is here for follow up visit. He is glad to be done with chemotherapy and feels a lot better.     Allergies as of 06/18/2025    (No Known Allergies)         Current Outpatient Medications:     diphenoxylate-atropine (LOMOTIL) 2.5-0.025 MG Tab, Take 1 Tablet by mouth 4 times a day as needed for Diarrhea., Disp: , Rfl:     aspirin 81 MG EC tablet, Take 81 mg by mouth every day., Disp: , Rfl:     capecitabine (XELODA) 500 MG tablet, Take 4 tablets of 500mg (total dose 2,000mg) by mouth 2 times a day on day 1-14 of each 21 day cycle.  Starting on day 1 of IV chemotherapy., Disp: 112 Tablet, Rfl: 5    losartan (COZAAR) 25 MG Tab, Take 1 Tablet by mouth every day. (Patient taking differently: Take 25 mg by mouth every evening.), Disp: 90 Tablet, Rfl: 4    metoprolol tartrate (LOPRESSOR) 25 MG  "Tab, Take 1 Tablet by mouth 2 times a day., Disp: 180 Tablet, Rfl: 4    rosuvastatin (CRESTOR) 40 MG tablet, Take 1 Tablet by mouth every evening., Disp: 90 Tablet, Rfl: 4    ferrous sulfate 325 (65 Fe) MG tablet, Take 1 Tab by mouth every morning with breakfast., Disp: 30 Tab, Rfl: 3    therapeutic multivitamin-minerals (THERAGRAN-M) Tab, Take 1 Tab by mouth every day., Disp: 30 Tab, Rfl: 11    ascorbic acid (VITAMIN C) 500 MG tablet, Take 1 Tab by mouth every day., Disp: 30 Tab, Rfl: 3      Review of Systems:  Review of Systems   Constitutional:  Negative for chills, diaphoresis, fever, malaise/fatigue and weight loss.   HENT:  Negative for hearing loss, nosebleeds, sinus pain and sore throat.    Eyes:  Negative for blurred vision and double vision.   Respiratory:  Negative for cough, sputum production, shortness of breath and wheezing.    Cardiovascular:  Negative for chest pain, palpitations, orthopnea and leg swelling.   Gastrointestinal:  Negative for abdominal pain, blood in stool, constipation, diarrhea, heartburn, melena, nausea and vomiting.   Genitourinary:  Negative for dysuria, frequency, hematuria and urgency.   Musculoskeletal:  Negative for back pain, joint pain and myalgias.   Skin:  Positive for rash (Mild in hands and arms).   Neurological:  Positive for tingling (In hands and feet, mild). Negative for dizziness, weakness and headaches.   Endo/Heme/Allergies:  Does not bruise/bleed easily.   Psychiatric/Behavioral:  The patient is not nervous/anxious and does not have insomnia.          Physical Exam:  Vitals:    06/18/25 1419   BP: 134/58   Pulse: 95   Temp: 36.1 °C (97 °F)   TempSrc: Temporal   SpO2: 95%   Weight: 80.3 kg (177 lb)   Height: 1.7 m (5' 6.93\")       DESC; KARNOFSKY SCALE WITH ECOG EQUIVALENT: 100, Fully active, able to carry on all pre-disease performed without restriction (ECOG equivalent 0)    DISTRESS LEVEL: no apparent distress    Physical Exam  Vitals and nursing note " reviewed.   Constitutional:       General: He is awake. He is not in acute distress.     Appearance: Normal appearance. He is obese. He is not ill-appearing, toxic-appearing or diaphoretic.   HENT:      Head: Normocephalic and atraumatic.      Nose: Nose normal. No congestion.      Mouth/Throat:      Pharynx: Oropharynx is clear. No oropharyngeal exudate or posterior oropharyngeal erythema.   Eyes:      General: No scleral icterus.     Extraocular Movements: Extraocular movements intact.      Conjunctiva/sclera: Conjunctivae normal.      Pupils: Pupils are equal, round, and reactive to light.   Cardiovascular:      Rate and Rhythm: Normal rate and regular rhythm.      Pulses: Normal pulses.      Heart sounds: Normal heart sounds. No murmur heard.     No friction rub. No gallop.   Pulmonary:      Effort: Pulmonary effort is normal.      Breath sounds: Normal breath sounds. No decreased air movement. No wheezing, rhonchi or rales.   Abdominal:      General: Bowel sounds are normal. There is no distension.      Tenderness: There is no abdominal tenderness.   Musculoskeletal:         General: No deformity. Normal range of motion.      Cervical back: Normal range of motion and neck supple. No tenderness.      Right lower leg: No edema.      Left lower leg: No edema.   Lymphadenopathy:      Cervical: No cervical adenopathy.      Upper Body:      Right upper body: No axillary adenopathy.      Left upper body: No axillary adenopathy.      Lower Body: No right inguinal adenopathy. No left inguinal adenopathy.   Skin:     General: Skin is warm and dry.      Coloration: Skin is not jaundiced.      Findings: Rash (Mild morbiliform rash on arms) present. No erythema.   Neurological:      General: No focal deficit present.      Mental Status: He is alert and oriented to person, place, and time.      Sensory: Sensation is intact.      Motor: Motor function is intact. No weakness.      Gait: Gait is intact.   Psychiatric:          Attention and Perception: Attention normal.         Mood and Affect: Mood normal.         Behavior: Behavior normal. Behavior is cooperative.         Thought Content: Thought content normal.         Judgment: Judgment normal.           Labs:  No visits with results within 7 Day(s) from this visit.   Latest known visit with results is:   Hospital Outpatient Visit on 05/16/2025   Component Date Value Ref Range Status    Sodium 05/16/2025 136  135 - 145 mmol/L Final    Potassium 05/16/2025 3.0 (L)  3.6 - 5.5 mmol/L Final    Chloride 05/16/2025 104  96 - 112 mmol/L Final    Co2 05/16/2025 16 (L)  20 - 33 mmol/L Final    Anion Gap 05/16/2025 16.0  7.0 - 16.0 Final    Glucose 05/16/2025 133 (H)  65 - 99 mg/dL Final    Bun 05/16/2025 16  8 - 22 mg/dL Final    Creatinine 05/16/2025 1.08  0.50 - 1.40 mg/dL Final    Calcium 05/16/2025 9.1  8.5 - 10.5 mg/dL Final    Correct Calcium 05/16/2025 9.1  8.5 - 10.5 mg/dL Final    AST(SGOT) 05/16/2025 41  12 - 45 U/L Final    ALT(SGPT) 05/16/2025 25  2 - 50 U/L Final    Alkaline Phosphatase 05/16/2025 46  30 - 99 U/L Final    Total Bilirubin 05/16/2025 0.8  0.1 - 1.5 mg/dL Final    Albumin 05/16/2025 4.0  3.2 - 4.9 g/dL Final    Total Protein 05/16/2025 6.9  6.0 - 8.2 g/dL Final    Globulin 05/16/2025 2.9  1.9 - 3.5 g/dL Final    A-G Ratio 05/16/2025 1.4  g/dL Final    WBC 05/16/2025 6.1  4.8 - 10.8 K/uL Final    RBC 05/16/2025 4.14 (L)  4.70 - 6.10 M/uL Final    Hemoglobin 05/16/2025 14.1  14.0 - 18.0 g/dL Final    Hematocrit 05/16/2025 40.7 (L)  42.0 - 52.0 % Final    MCV 05/16/2025 98.3 (H)  81.4 - 97.8 fL Final    MCH 05/16/2025 34.1 (H)  27.0 - 33.0 pg Final    MCHC 05/16/2025 34.6  32.3 - 36.5 g/dL Final    RDW 05/16/2025 63.7 (H)  35.9 - 50.0 fL Final    Platelet Count 05/16/2025 292  164 - 446 K/uL Final    MPV 05/16/2025 9.2  9.0 - 12.9 fL Final    Neutrophils-Polys 05/16/2025 56.90  44.00 - 72.00 % Final    Lymphocytes 05/16/2025 20.00 (L)  22.00 - 41.00 % Final    Monocytes  05/16/2025 18.60 (H)  0.00 - 13.40 % Final    Eosinophils 05/16/2025 3.00  0.00 - 6.90 % Final    Basophils 05/16/2025 1.00  0.00 - 1.80 % Final    Immature Granulocytes 05/16/2025 0.50  0.00 - 0.90 % Final    Nucleated RBC 05/16/2025 0.00  0.00 - 0.20 /100 WBC Final    Neutrophils (Absolute) 05/16/2025 3.45  1.82 - 7.42 K/uL Final    Includes immature neutrophils, if present.    Lymphs (Absolute) 05/16/2025 1.21  1.00 - 4.80 K/uL Final    Monos (Absolute) 05/16/2025 1.13 (H)  0.00 - 0.85 K/uL Final    Eos (Absolute) 05/16/2025 0.18  0.00 - 0.51 K/uL Final    Baso (Absolute) 05/16/2025 0.06  0.00 - 0.12 K/uL Final    Immature Granulocytes (abs) 05/16/2025 0.03  0.00 - 0.11 K/uL Final    NRBC (Absolute) 05/16/2025 0.00  K/uL Final    GFR (CKD-EPI) 05/16/2025 74  >60 mL/min/1.73 m 2 Final    Comment: Estimated Glomerular Filtration Rate is calculated using  race neutral CKD-EPI 2021 equation per NKF-ASN recommendations.      Miscellaneous Lab Result 05/16/2025 SEE NOTE   Final    Comment: Test name                     Result Flag Units  RefIntvl  -------------------------------------------------------------  Carcinoembryonic Antigen         5.0       ng/mL  INTERPRETIVE INFORMATION: Carcinoembryonic Antigen  The Roche CEA electrochemiluminescent immunoassay was used.  Results obtained with different test methods or kits cannot be  used interchangeably. Measurement of CEA has been shown to be  clinically relevant in the management of patients with colorectal,  breast, lung, prostatic, pancreatic, and ovarian carcinomas.  Smokers may have slightly elevated levels of CEA. The CEA assay  value, regardless of level, should not be interpreted as evidence  for the presence or absence of malignant disease and is not  recommended for use as a screening procedure to detect the  presence of cancer in the general population.  Performed By: [facility]  [address] [place] [id number]  : Elijah  DIONICIO Chowdhury MD, PhD  IA Number: [id number]           Imaging:     All listed images below have been independently reviewed by me. I agree with the findings as summarized below:    CT-CHEST,ABDOMEN,PELVIS WITH  Result Date: 6/17/2025 6/16/2025 8:10 AM HISTORY/REASON FOR EXAM:  Follow-up sigmoid colon cancer. TECHNIQUE/EXAM DESCRIPTION: CT scan of the chest, abdomen and pelvis with contrast. Thin-section helical scanning was obtained with intravenous contrast from the lung apices through the pubic symphysis to include the chest, abdomen and pelvis. 100 mL of Omnipaque 350 nonionic contrast was administered intravenously without complication. Low dose optimization technique was utilized for this CT exam including automated exposure control and adjustment of the mA and/or kV according to patient size. COMPARISON: 12/23/2024, 11/28/2024 and additional FINDINGS: CT Chest: Lungs: Mild lower lung zone scarring. Calcified right lower lung granuloma. No airspace infiltrate. Mediastinum/Felisa: No adenopathy. Pleura: Small simple bilateral pleural effusions. Cardiac: Heart is normal size. Previous CABG.. There is coronary artery calcification. Vascular: Aorta is nonaneurysmal. Soft tissues: Small sliding hiatal hernia. Bones: Old left rib fractures. CT Abdomen: Liver: No focal liver lesion Spleen: Normal in size, without focal lesion Adrenal glands: Normal Pancreas: Normal Gallbladder: No radiodense stone visualized. Biliary: No biliary duct dilation visualized. Kidneys: 1 mm and 2 mm nonobstructing left renal stones. Small simple bilateral renal cysts. Vasculature: Nonaneurysmal Lymph nodes: No adenopathy Bowel: Previous partial sigmoid resection. No bowel obstruction. Moderate stool. Appendix:  Appendix appears normal Peritoneum: No ascites Pelvis:  Urinary bladder is grossly normal. No pelvic mass or adenopathy identified. Musculoskeletal: Lumbar degenerative changes. Stable 2.1 cm lytic lesion within the left posterior  aspect of the L3 vertebral body. Therefore, likely benign. No additional osseous lesion.     1.  No evidence of disease recurrence or metastatic disease. 2.  Small simple bilateral pleural effusions. 3.  Small hiatal hernia. 4.  Tiny nonobstructing left renal stones. 5.  Small simple bilateral renal cysts. 6.  Stable 2.1 cm lytic lesion within the L3 vertebral body, likely benign.      Pathology:    FINAL DIAGNOSIS:     A. Sigmoid colon and rectum, low anterior resection:          Invasive moderately-differentiated adenocarcinoma (3.3 cm) (see           Tumor synoptic report).          Tumor invades into the pericolonic tissue.          Lymphovascular invasion is present.          Margins negative for tumor.          Five out of twenty lymph nodes, positive for metastatic           carcinoma (5/20).     B. Anastomotic donuts:          Benign colonic tissue, negative for dysplasia and malignancy.     COLON AND RECTUM: Resection     SPECIMEN    Procedure:  Low anterior resection   TUMOR    Tumor Site:  Sigmoid colon     Histologic Type:  Adenocarcinoma     Histologic Grade:  G2, moderately differentiated     Tumor Size:  Greatest dimension (Centimeters) - 3.3 cm     Tumor Extent:  Invades through muscularis propria into the     pericolonic or perirectal tissue     Macroscopic Tumor Perforation:  Not identified     Lymphatic and / or Vascular Invasion:  Small vessel, Large vessel     (venous), extramural     Perineural Invasion:  Present     Tumor Budding Score:  Low (0-4)     Treatment Effect:  No known presurgical therapy   MARGINS    Margin Status for Invasive Carcinoma:  All margins negative for     invasive carcinoma     Margin Status for Non-Invasive Tumor:  All margins negative for     high-grade dysplasia / intramucosal carcinoma and low-grade dysplasia   REGIONAL LYMPH NODES     Regional Lymph Node Status:  Tumor present in regional lymph node(s)       Number of Lymph Nodes with Tumor:  5       Number of Lymph  Nodes Examined:  20     Tumor Deposits:  Not identified   pTNM CLASSIFICATION (AJCC 8th Edition)   Reporting of pT, pN, and (when applicable) pM categories is based on   information available to the pathologist at the time the report is   issued. As per the AJCC (Chapter 1, 8th Ed.) it is the managing   physician's responsibility to establish the final pathologic stage   based upon all pertinent information, including but potentially not   limited to this pathology report.     pT Category:  pT3     pN Category:  pN2a   ADDITIONAL FINDINGS     Additional Findings:  Diverticulosis   SPECIAL STUDIES     MMR IHC: MMR proficient per report in EMR     Comment: Adequate tumor is present in Block A4 for further testing if   additional studies are clinically indicated.                                       Diagnosis performed by:                                       CLIFF MIRANDA MD     Assessment & Plan:  1. Cancer of sigmoid colon (HCC)  CBC WITH DIFFERENTIAL    Comp Metabolic Panel    CEA          This is a 69 year old  man with adenocarcinoma of the sigmoid colon, [id number] stage IIIB disease. He is s/p resection and presents for evaluation.      Current Diagnosis and Staging: Adenocarcinoma of the sigmoid colon, [id number] stage IIIB disease    Update: Patient is doing well and has no signs of disease. Will go on surveillance now with labs and exam q 3 months and scans q 6 months.      Treatment Plan: Surveillance     Treatment Citation: NCCN     Plan of Care:     Primary Therapy: NA  Supportive Therapy: NA  Toxicity: NA  Labs: CBC with diff, CMP, CEA, ctDNA monitoring  Imaging: Repeat CT scans 12/2025  Treatment Planning: Patient will proceed with labs and exam q 3 months and scans q 6 months for surveillance.   Consultations: Colorectal surgery (Dr. Bautista)  Code Status: Full  Miscellaneous: Referred to genetics and to [facility]  Return for Follow Up: 3 months    Any questions and concerns raised by the  patient were answered to the best of my ability. Thank you for allowing me to participate in the care for this patient. Please feel free to contact me for any questions or concerns.

## 2025-07-01 NOTE — PROGRESS NOTES
Subjective:   7/3/2025  9:43 AM  Primary care physician:Roger Gonzalez M.D.  Medical Oncologist: Dr. Emily MABRY     Chief Complaint:   Chief Complaint   Patient presents with    Follow-Up     s/p chemotherapy May 31st- Dr. Diaz   CT 25  CEA 25  colonoscopy  @ [facility]        Diagnosis:   1. Malignant neoplasm of sigmoid colon (HCC)            History of presenting illness:  2025: Talib Ortiz is a pleasant 69 y.o. male who presented with obstructing sigmoid colon cancer with incomplete colonoscopy.  Patient underwent robotic assisted laparoscopic low anterior resection on 2025.  2025: Patient returns to the clinic for a follow up after finishing chemotherapy on May 18,2025.  Patient was seen by Dr. Kendall for his completion colonoscopy on 2025.  He is currently no evidence of disease.  Plan to repeat colonoscopy for personal risk of colorectal cancer in 3 years.      Past Medical History[1]  Past Surgical History[2]  Allergies[3]  Encounter Medications[4]  Social History     Socioeconomic History    Marital status:      Spouse name: Not on file    Number of children: Not on file    Years of education: Not on file    Highest education level: Not on file   Occupational History    Not on file   Tobacco Use    Smoking status: Former     Current packs/day: 0.00     Average packs/day: 0.3 packs/day for 10.0 years (2.5 ttl pk-yrs)     Types: Cigarettes     Start date: 3/19/2006     Quit date: 3/19/2016     Years since quittin.2    Smokeless tobacco: Never   Vaping Use    Vaping status: Never Used   Substance and Sexual Activity    Alcohol use: Not Currently     Comment: Quit years    Drug use: No    Sexual activity: Yes     Partners: Female   Other Topics Concern    Not on file   Social History Narrative    Not on file     Social Drivers of Health     Financial Resource Strain: Not on file   Food Insecurity: No Food Insecurity (2025)    Hunger Vital Sign      Worried About Running Out of Food in the Last Year: Never true     Ran Out of Food in the Last Year: Never true   Transportation Needs: No Transportation Needs (1/7/2025)    PRAPARE - Transportation     Lack of Transportation (Medical): No     Lack of Transportation (Non-Medical): No   Physical Activity: Not on file   Stress: Not on file   Social Connections: Not on file   Intimate Partner Violence: Not At Risk (1/7/2025)    Humiliation, Afraid, Rape, and Kick questionnaire     Fear of Current or Ex-Partner: No     Emotionally Abused: No     Physically Abused: No     Sexually Abused: No   Housing Stability: Low Risk  (1/7/2025)    Housing Stability Vital Sign     Unable to Pay for Housing in the Last Year: No     Number of Times Moved in the Last Year: 0     Homeless in the Last Year: No      Tobacco Use History[5]  Social History     Substance and Sexual Activity   Alcohol Use Not Currently    Comment: Quit years     Social History     Substance and Sexual Activity   Drug Use No        Family History   Problem Relation Age of Onset    Arthritis Mother     Hyperlipidemia Mother     Heart Disease Mother         bypass x4    Diabetes Mother     Hyperlipidemia Father     Heart Attack Father 61    Other Sister         back surgery    Other Brother         joint problems    Cancer Paternal Aunt         Breast    Cancer Paternal Grandfather         Colon    Colorectal Cancer Paternal Grandfather        Review of Systems   Constitutional:  Negative for chills, fever, malaise/fatigue and weight loss.   Eyes:  Negative for blurred vision.   Respiratory:  Negative for cough, hemoptysis and shortness of breath.    Cardiovascular:  Negative for chest pain and leg swelling.   Gastrointestinal:  Negative for blood in stool, constipation, diarrhea, nausea and vomiting.   Genitourinary:  Negative for dysuria, hematuria and urgency.   Musculoskeletal:  Negative for back pain, falls and joint pain.   Skin:  Negative for rash.  "  Neurological:  Negative for dizziness, weakness and headaches.   Endo/Heme/Allergies: Negative.  Does not bruise/bleed easily.   Psychiatric/Behavioral:  Negative for depression. The patient is not nervous/anxious.         Objective:   /70 (BP Location: Right arm, Patient Position: Sitting, BP Cuff Size: Adult)   Pulse 65   Temp 36.8 °C (98.2 °F) (Temporal)   Ht 1.676 m (5' 6\")   Wt 80.3 kg (177 lb 0.5 oz)   SpO2 95%   BMI 28.57 kg/m²     Physical Exam  Vitals and nursing note reviewed.   Constitutional:       Appearance: Normal appearance. He is normal weight.   HENT:      Head: Normocephalic and atraumatic.      Nose: Nose normal.      Mouth/Throat:      Mouth: Mucous membranes are moist.   Eyes:      Extraocular Movements: Extraocular movements intact.   Cardiovascular:      Rate and Rhythm: Normal rate.      Pulses: Normal pulses.   Pulmonary:      Effort: Pulmonary effort is normal.      Breath sounds: Normal breath sounds.   Abdominal:      General: Abdomen is flat.      Palpations: Abdomen is soft.   Musculoskeletal:         General: Normal range of motion.      Cervical back: Normal range of motion.   Skin:     General: Skin is warm and dry.   Neurological:      Mental Status: He is alert and oriented to person, place, and time.   Psychiatric:         Mood and Affect: Mood normal.         Behavior: Behavior normal.         Thought Content: Thought content normal.         Judgment: Judgment normal.         Labs:   Latest Reference Range & Units 04/27/25 10:07 05/16/25 14:50   WBC 4.8 - 10.8 K/uL 5.9 6.1   RBC 4.70 - 6.10 M/uL 3.95 (L) 4.14 (L)   Hemoglobin 14.0 - 18.0 g/dL 13.2 (L) 14.1   Hematocrit 42.0 - 52.0 % 39.5 (L) 40.7 (L)   MCV 81.4 - 97.8 fL 100.0 (H) 98.3 (H)   MCH 27.0 - 33.0 pg 33.4 (H) 34.1 (H)   MCHC 32.3 - 36.5 g/dL 33.4 34.6   RDW 35.9 - 50.0 fL 57.2 (H) 63.7 (H)   Platelet Count 164 - 446 K/uL 225 292   MPV 9.0 - 12.9 fL 9.8 9.2   Neutrophils-Polys 44.00 - 72.00 % 52.50 56.90 "   Neutrophils (Absolute) 1.82 - 7.42 K/uL 3.10 3.45   Lymphocytes 22.00 - 41.00 % 21.80 (L) 20.00 (L)   Lymphs (Absolute) 1.00 - 4.80 K/uL 1.29 1.21   Monocytes 0.00 - 13.40 % 12.70 18.60 (H)   Monos (Absolute) 0.00 - 0.85 K/uL 0.75 1.13 (H)   Eosinophils 0.00 - 6.90 % 11.00 (H) 3.00   Eos (Absolute) 0.00 - 0.51 K/uL 0.65 (H) 0.18   Basophils 0.00 - 1.80 % 1.70 1.00   Baso (Absolute) 0.00 - 0.12 K/uL 0.10 0.06   Immature Granulocytes 0.00 - 0.90 % 0.30 0.50   Immature Granulocytes (abs) 0.00 - 0.11 K/uL 0.02 0.03   Nucleated RBC 0.00 - 0.20 /100 WBC 0.00 0.00   NRBC (Absolute) K/uL 0.00 0.00   Outpt Infus CBC Comment  see below    Sodium 135 - 145 mmol/L 140 136   Potassium 3.6 - 5.5 mmol/L 4.5 3.0 (L)   Chloride 96 - 112 mmol/L 107 104   Co2 20 - 33 mmol/L 22 16 (L)   Anion Gap 7.0 - 16.0  11.0 16.0   Glucose 65 - 99 mg/dL 104 (H) 133 (H)   Bun 8 - 22 mg/dL 10 16   Creatinine 0.50 - 1.40 mg/dL 1.11 1.08   GFR (CKD-EPI) >60 mL/min/1.73 m 2 72 74   Calcium 8.5 - 10.5 mg/dL 9.0 9.1   Correct Calcium 8.5 - 10.5 mg/dL 9.0 9.1   AST(SGOT) 12 - 45 U/L 52 (H) 41   ALT(SGPT) 2 - 50 U/L 34 25   Alkaline Phosphatase 30 - 99 U/L 39 46   Total Bilirubin 0.1 - 1.5 mg/dL 0.6 0.8   Albumin 3.2 - 4.9 g/dL 4.0 4.0   Total Protein 6.0 - 8.2 g/dL 6.7 6.9   Globulin 1.9 - 3.5 g/dL 2.7 2.9   A-G Ratio g/dL 1.5 1.4   Miscellaneous Lab Result   SEE NOTE   Carcinoembryonic Antigen 0.0 - 3.0 ng/mL 2.1      Imaging:  CT chest/abdomen/pelvis 06/16/2025 6/16/2025 8:10 AM     HISTORY/REASON FOR EXAM:  Follow-up sigmoid colon cancer.   TECHNIQUE/EXAM DESCRIPTION:  CT scan of the chest, abdomen and pelvis with contrast.  Thin-section helical scanning was obtained with intravenous contrast from the lung apices through the pubic symphysis to include the chest, abdomen and pelvis. 100 mL of Omnipaque 350 nonionic contrast was administered intravenously without complication.  Low dose optimization technique was utilized for this CT exam including  automated exposure control and adjustment of the mA and/or kV according to patient size.     COMPARISON: 12/23/2024, 11/28/2024 and additional     FINDINGS:  CT Chest:  Lungs: Mild lower lung zone scarring. Calcified right lower lung granuloma. No airspace infiltrate.  Mediastinum/Felisa: No adenopathy.  Pleura: Small simple bilateral pleural effusions.  Cardiac: Heart is normal size. Previous CABG.. There is coronary artery calcification.  Vascular: Aorta is nonaneurysmal.  Soft tissues: Small sliding hiatal hernia.  Bones: Old left rib fractures.     CT Abdomen:  Liver: No focal liver lesion  Spleen: Normal in size, without focal lesion  Adrenal glands: Normal  Pancreas: Normal  Gallbladder: No radiodense stone visualized.  Biliary: No biliary duct dilation visualized.  Kidneys: 1 mm and 2 mm nonobstructing left renal stones. Small simple bilateral renal cysts.  Vasculature: Nonaneurysmal  Lymph nodes: No adenopathy  Bowel: Previous partial sigmoid resection. No bowel obstruction. Moderate stool.  Appendix:  Appendix appears normal  Peritoneum: No ascites  Pelvis:  Urinary bladder is grossly normal. No pelvic mass or adenopathy identified.  Musculoskeletal: Lumbar degenerative changes. Stable 2.1 cm lytic lesion within the left posterior aspect of the L3 vertebral body. Therefore, likely benign. No additional osseous lesion.     IMPRESSION:  1.  No evidence of disease recurrence or metastatic disease.  2.  Small simple bilateral pleural effusions.  3.  Small hiatal hernia.  4.  Tiny nonobstructing left renal stones.  5.  Small simple bilateral renal cysts.  6.  Stable 2.1 cm lytic lesion within the L3 vertebral body, likely benign.    MRI Abdomen 12/23/2024 10:50 AM     HISTORY/REASON FOR EXAM:  sigmoid colon mass, hepatic lesion.  TECHNIQUE/EXAM DESCRIPTION: MRI of the liver with dynamic IV gadolinium enhancement.     MR imaging of the liver was performed.  MR images of the liver were obtained with coronal and  axial single-shot fast spin-echo T2, fat-suppressed axial FRFSE T2, axial in-phase and out-of-phase FSPGR T1, axial DWI with a b-value of 600, 3D MRCP,    precontrast fat-suppressed FSPGR T1, dynamic gadolinium enhanced axial fat-suppressed T1 FSPGR in the arterial dominant, portal venous, 2-minute and 4-minute delayed phases, with delayed coronal fat-suppressed T1 FSPGR sequence.     The study was performed on a [employer] 1.5 [employer] MRI scanner.     18 mL ProHance contrast was administered intravenously.     COMPARISON: CT abdomen and pelvis 11/28/2024     FINDINGS:  Artifact from prior median sternotomy.  Liver shows no intrahepatic biliary dilation.  No enhancing mass demonstrated.  Small T2 hyperintense nonenhancing lesion adjacent the RIGHT lobe liver measuring 4 x 8 mm.  The spleen is unremarkable.  Adrenal glands are unremarkable.  The kidneys are unremarkable.  The pancreas is unremarkable.  The gallbladder is unremarkable.  No biliary dilation.     IMPRESSION:  1.  No liver mass demonstrated.  No evidence for hepatic metastasis.  2.  Small cystic structure adjacent the RIGHT lobe liver measuring 8 mm, of doubtful significance.    CT chest/abdomen/pelvis 11/28/2024 10:29 AM     HISTORY/REASON FOR EXAM:  Colon cancer, staging.  TECHNIQUE/EXAM DESCRIPTION:  CT scan of the chest, abdomen and pelvis with contrast.     Thin-section helical scanning was obtained with intravenous contrast from the lung apices through the pubic symphysis to include the chest, abdomen and pelvis. 100 mL of Omnipaque 350 nonionic contrast was administered intravenously without complication.     Low dose optimization technique was utilized for this CT exam including automated exposure control and adjustment of the mA and/or kV according to patient size.     COMPARISON: None.     FINDINGS:  CT CHEST:  Lungs: A few areas of linear atelectasis/scarring in the lower lungs. Calcified granuloma in the right lower lung. No suspicious nodules  or masses. No pleural effusions.  Mediastinum: Unremarkable thyroid. No mediastinal mass.  Nodes: No enlarged nodes by size criteria.  Heart: Not enlarged. No pericardial effusion. Coronary calcifications.  Aorta and great vessels: No aneurysm. Atherosclerosis.  Small hiatal hernia.     CT ABDOMEN/PELVIS:  Liver: Several small subcentimeter hepatic lesions, incompletely characterized on this study. No intrahepatic biliary dilatation.  Gallbladder: No mural thickening. No calcified gallstones.  Common bile duct: Nondilated.  Pancreas: Unremarkable.  Spleen: No mass.  Adrenals: No mass.  Kidneys: No suspicious mass lesions. No hydronephrosis.  Stomach, small bowel, colon: Sigmoid colon mass measures 4.1 cm. No colonic or small bowel obstruction. A few small colonic diverticula. Normal appendix.  Peritoneal cavity: No ascites.  Lymph nodes: No enlarged nodes by size criteria.  Aorta: No aneurysm. Atherosclerosis.  Pelvic organs: Unremarkable.  Fat-containing right inguinal hernia.     MUSCULOSKELETAL STRUCTURES: No acute fracture or destructive lesion. Median sternotomy. L3-L4 artificial disc.     IMPRESSION:  1.  Sigmoid colon mass, consistent with known malignancy.  2.  Several small subcentimeter hepatic lesions, incompletely characterized on this study. The need to be further evaluated with contrast enhanced liver MRI.  3.  No other suspicious lesions in the chest, abdomen or pelvis.    CT chest 11/27/2024  CLINICAL DATA: CHRONIC COLITIS  (MASS IN DECENDING COLON ),     TECHNICAL: CT scan of the chest was performed following intravenous administration of 100 cc of nonionic IV contrast.   Dose reduction techniques including automated exposure control were used.   This patient has received 0 CT scans and 0 myocardial perfusion scans during the past 12 months.     COMPARISON: None     FINDINGS:   LUNGS:   Right middle lobe linear band of density consistent with atelectasis.   Right middle lobe 5 mm chronic calcified  granuloma.   Left upper lobe lingular and left lower lobe linear scar.   No effusions.   Trachea and bronchi are unremarkable.   Lung hyperinflation.     Mediastinum and dany:   No adenopathy.   Thoracic aorta is normal in caliber.   Pulmonary arteries exhibited no embolism.   Coronary arteries are heavily calcified.  Post sternotomy.  Heart is normal in size.     The distal esophagus and proximal stomach exhibits wall thickening.     Thoracic spine exhibits degenerative change.     IMPRESSION:   Wall thickening of the distal esophagus and proximal stomach is present of unknown etiology.   No evidence of metastatic disease to the chest        Pathology:  01/07/2025 SURGICAL PATHOLOGY CONSULTATION   FINAL DIAGNOSIS:  A. Sigmoid colon and rectum, low anterior resection:          Invasive moderately-differentiated adenocarcinoma (3.3 cm) (see           Tumor synoptic report).          Tumor invades into the pericolonic tissue.          Lymphovascular invasion is present.          Margins negative for tumor.          Five out of twenty lymph nodes, positive for metastatic           carcinoma (5/20).   B. Anastomotic donuts:          Benign colonic tissue, negative for dysplasia and malignancy.     COLON AND RECTUM: Resection     SPECIMEN    Procedure:  Low anterior resection   TUMOR    Tumor Site:  Sigmoid colon     Histologic Type:  Adenocarcinoma     Histologic Grade:  G2, moderately differentiated     Tumor Size:  Greatest dimension (Centimeters) - 3.3 cm     Tumor Extent:  Invades through muscularis propria into the     pericolonic or perirectal tissue     Macroscopic Tumor Perforation:  Not identified     Lymphatic and / or Vascular Invasion:  Small vessel, Large vessel     (venous), extramural     Perineural Invasion:  Present     Tumor Budding Score:  Low (0-4)     Treatment Effect:  No known presurgical therapy   MARGINS    Margin Status for Invasive Carcinoma:  All margins negative for     invasive carcinoma      Margin Status for Non-Invasive Tumor:  All margins negative for     high-grade dysplasia / intramucosal carcinoma and low-grade dysplasia   REGIONAL LYMPH NODES     Regional Lymph Node Status:  Tumor present in regional lymph node(s)       Number of Lymph Nodes with Tumor:  5       Number of Lymph Nodes Examined:  20     Tumor Deposits:  Not identified   pTNM CLASSIFICATION (AJCC 8th Edition)   Reporting of pT, pN, and (when applicable) pM categories is based on   information available to the pathologist at the time the report is   issued. As per the AJCC (Chapter 1, 8th Ed.) it is the managing   physician's responsibility to establish the final pathologic stage   based upon all pertinent information, including but potentially not   limited to this pathology report.     pT Category:  pT3     pN Category:  pN2a   ADDITIONAL FINDINGS     Additional Findings:  Diverticulosis   SPECIAL STUDIES     MMR IHC: MMR proficient per report in EMR     Comment: Adequate tumor is present in Block A4 for further testing if   additional studies are clinically indicated.                                       Diagnosis performed by:                                       CLIFF MIRANDA MD     11/27/2024       Procedures  06/27/25: Colonoscopy     01/07/2025: laparoscopic low anterior resection   11/27/24: Colonoscopy And EGD       Diagnosis:     1. Malignant neoplasm of sigmoid colon (HCC)                Medical Decision Making:  Today's Assessment / Status / Plan:       Patient has done well and I released him from my care.  He will follow-up with me on an as-needed basis.  Heladio Bautista MD PhD  [facility]  General Colon and Rectal Surgeon  [phone]         [1]   Past Medical History:  Diagnosis Date    Arthritis 04/28/2023    general body    Blood clotting disorder (HCC) 1996    leg    CAD (coronary artery disease)     Cancer (HCC) 12/23/2024    Malignant neoplasm of sigmoid colon    Dental disorder 04/28/2023     upper partial, bridge lower front times 3, having right upper posterior tooth removed 4/28/23    Heart attack (HCC) 08/16/2016    cardiology, Dr. Frias    Heart burn 04/28/2023    medicated    Hiatus hernia syndrome     High cholesterol 04/28/2023    medicated    Hyperlipidemia     Hypertension 04/28/2023    medicated    Iron deficiency anemia     Kidney disease     Kidney stone 04/28/2023    history of    Obesity (BMI 30-39.9) 02/12/2015    Osteoarthritis 03/02/2015    Pneumonia 2016   [2]   Past Surgical History:  Procedure Laterality Date    AR SIGMOIDOSCOPY,DIAGNOSTIC  1/7/2025    Procedure: SIGMOIDOSCOPY, FLEXIBLE;  Surgeon: Heladio Bautista M.D.;  Location: [facility];  Service: Gen Robotic    LOW ANTERIOR RESECTION ROBOTIC XI  1/7/2025    Procedure: ROBOTIC LOW ANTERIOR RESECTION WITH FLEXIBLE SIGMOIDOSCOPY;  Surgeon: Heladio Bautista M.D.;  Location: [facility];  Service: Gen Robotic    COLONOSCOPY  12/2024    AR LAP ESOPHAGOGAST FUNDOPLASTY N/A 05/01/2023    Procedure: ROBOTIC HIATAL HERNIA REPAIR WITH TOUPET FUNDOPLICATION;  Surgeon: Campbell Berg M.D.;  Location: [facility];  Service: Gen Robotic    AR UPPER GI ENDOSCOPY,DIAGNOSIS N/A 05/01/2023    Procedure: ESOPHAGOGASTRODUODENOSCOPY;  Surgeon: Campbell Berg M.D.;  Location: [facility];  Service: Gen Robotic    AR UPPER GI ENDOSCOPY,DIAGNOSIS N/A 02/08/2023    Procedure: GASTROSCOPY;  Surgeon: Hieu Arevalo M.D.;  Location: SURGERY SAME DAY [facility];  Service: Gastroenterology    AR UPPER GI ENDOSCOPY,CTRL BLEED N/A 02/08/2023    Procedure: EGD, WITH CLIP PLACEMENT;  Surgeon: Hieu Arevalo M.D.;  Location: SURGERY SAME DAY [facility];  Service: Gastroenterology    KNEE ARTHROPLASTY TOTAL Left 06/19/2017    Procedure: KNEE ARTHROPLASTY TOTAL;  Surgeon: Charles Castellanos M.D.;  Location: [facility];  Service:     MULTIPLE CORONARY ARTERY BYPASS  08/16/2016    LAMINOTOMY  01/01/2012    lumbar    KNEE  "ARTHROSCOPY Left 09/01/1990    OTHER SURGICAL PROCEDURE Left 09/01/1990    \"removal of vein left leg due to blood clot\"    SHOULDER ARTHROTOMY Right 09/01/1980   [3] No Known Allergies  [4]   Outpatient Encounter Medications as of 7/3/2025   Medication Sig Dispense Refill    aspirin 81 MG EC tablet Take 81 mg by mouth every day.      metoprolol tartrate (LOPRESSOR) 25 MG Tab Take 1 Tablet by mouth 2 times a day. 180 Tablet 4    rosuvastatin (CRESTOR) 40 MG tablet Take 1 Tablet by mouth every evening. 90 Tablet 4    ascorbic acid (VITAMIN C) 500 MG tablet Take 1 Tab by mouth every day. 30 Tab 3    diphenoxylate-atropine (LOMOTIL) 2.5-0.025 MG Tab Take 1 Tablet by mouth 4 times a day as needed for Diarrhea. (Patient not taking: Reported on 7/3/2025)      capecitabine (XELODA) 500 MG tablet Take 4 tablets of 500mg (total dose 2,000mg) by mouth 2 times a day on day 1-14 of each 21 day cycle.  Starting on day 1 of IV chemotherapy. (Patient not taking: Reported on 7/3/2025) 112 Tablet 5    [DISCONTINUED] ondansetron (ZOFRAN) 4 MG Tab tablet Take 1 Tablet by mouth every four hours as needed for Nausea/Vomiting (for nausea, vomiting). 30 Tablet 6    [DISCONTINUED] prochlorperazine (COMPAZINE) 10 MG Tab Take 1 Tablet by mouth every 6 hours as needed (for nausea, vomiting). 30 Tablet 6    [DISCONTINUED] OLANZapine (ZYPREXA) 5 MG Tab Take 1 Tablet by mouth every evening. To be taken nightly on first day of chemotherapy for a total of 4 days, or as directed by your provider. 24 Tablet 0    [DISCONTINUED] loperamide (IMODIUM A-D) 2 MG tablet Take 1 Tablet by mouth see administration instructions. 30 Tablet 6    losartan (COZAAR) 25 MG Tab Take 1 Tablet by mouth every day. (Patient taking differently: Take 25 mg by mouth every evening.) 90 Tablet 4    ferrous sulfate 325 (65 Fe) MG tablet Take 1 Tab by mouth every morning with breakfast. (Patient not taking: Reported on 7/3/2025) 30 Tab 3    therapeutic multivitamin-minerals " (THERAGRAN-M) Tab Take 1 Tab by mouth every day. (Patient not taking: Reported on 7/3/2025) 30 Tab 11     No facility-administered encounter medications on file as of 7/3/2025.   [5]   Social History  Tobacco Use   Smoking Status Former    Current packs/day: 0.00    Average packs/day: 0.3 packs/day for 10.0 years (2.5 ttl pk-yrs)    Types: Cigarettes    Start date: 3/19/2006    Quit date: 3/19/2016    Years since quittin.2   Smokeless Tobacco Never

## 2025-07-03 ENCOUNTER — OFFICE VISIT (OUTPATIENT)
Facility: MEDICAL CENTER | Age: 70
End: 2025-07-03
Payer: COMMERCIAL

## 2025-07-03 VITALS
HEIGHT: 66 IN | DIASTOLIC BLOOD PRESSURE: 70 MMHG | TEMPERATURE: 98.2 F | OXYGEN SATURATION: 95 % | WEIGHT: 177.03 LBS | SYSTOLIC BLOOD PRESSURE: 108 MMHG | BODY MASS INDEX: 28.45 KG/M2 | HEART RATE: 65 BPM

## 2025-07-03 DIAGNOSIS — C18.7 MALIGNANT NEOPLASM OF SIGMOID COLON (HCC): Primary | ICD-10-CM

## 2025-07-03 PROCEDURE — 99213 OFFICE O/P EST LOW 20 MIN: CPT | Performed by: SURGERY

## 2025-07-03 PROCEDURE — 3078F DIAST BP <80 MM HG: CPT | Performed by: SURGERY

## 2025-07-03 PROCEDURE — 3074F SYST BP LT 130 MM HG: CPT | Performed by: SURGERY

## 2025-07-03 ASSESSMENT — ENCOUNTER SYMPTOMS
VOMITING: 0
WEAKNESS: 0
BLOOD IN STOOL: 0
NERVOUS/ANXIOUS: 0
CONSTIPATION: 0
BRUISES/BLEEDS EASILY: 0
NAUSEA: 0
DIARRHEA: 0
HEMOPTYSIS: 0
FEVER: 0
WEIGHT LOSS: 0
BLURRED VISION: 0
CHILLS: 0
BACK PAIN: 0
DIZZINESS: 0
FALLS: 0
COUGH: 0
HEADACHES: 0
SHORTNESS OF BREATH: 0
DEPRESSION: 0

## 2025-07-03 ASSESSMENT — FIBROSIS 4 INDEX: FIB4 SCORE: 1.94

## 2025-07-03 NOTE — PROGRESS NOTES
Resumed care of pt from [facility].  NS infused per MAR.  VSS.  Peripheral IV removed and pt released ambulatory from clinic in no apparent distress.

## 2025-07-12 ENCOUNTER — OFFICE VISIT (OUTPATIENT)
Dept: URGENT CARE | Facility: PHYSICIAN GROUP | Age: 70
End: 2025-07-12
Payer: COMMERCIAL

## 2025-07-12 VITALS
OXYGEN SATURATION: 96 % | TEMPERATURE: 97.7 F | RESPIRATION RATE: 16 BRPM | WEIGHT: 178.1 LBS | HEART RATE: 100 BPM | SYSTOLIC BLOOD PRESSURE: 120 MMHG | BODY MASS INDEX: 25.5 KG/M2 | HEIGHT: 70 IN | DIASTOLIC BLOOD PRESSURE: 76 MMHG

## 2025-07-12 DIAGNOSIS — L02.31 ABSCESS OF RIGHT BUTTOCK: Primary | ICD-10-CM

## 2025-07-12 PROCEDURE — 99213 OFFICE O/P EST LOW 20 MIN: CPT | Performed by: PHYSICIAN ASSISTANT

## 2025-07-12 PROCEDURE — 3078F DIAST BP <80 MM HG: CPT | Performed by: PHYSICIAN ASSISTANT

## 2025-07-12 PROCEDURE — 3074F SYST BP LT 130 MM HG: CPT | Performed by: PHYSICIAN ASSISTANT

## 2025-07-12 RX ORDER — SULFAMETHOXAZOLE AND TRIMETHOPRIM 800; 160 MG/1; MG/1
1 TABLET ORAL 2 TIMES DAILY
Qty: 14 TABLET | Refills: 0 | Status: SHIPPED | OUTPATIENT
Start: 2025-07-12 | End: 2025-07-19

## 2025-07-12 ASSESSMENT — FIBROSIS 4 INDEX: FIB4 SCORE: 1.94

## 2025-07-12 ASSESSMENT — PAIN SCALES - GENERAL: PAINLEVEL_OUTOF10: 6=MODERATE PAIN

## 2025-07-12 NOTE — PROGRESS NOTES
"Subjective     Talib Ortiz is a 69 y.o. male who presents with Bump (Possible boil right buttocks area x 3 days ago )    PMH:  has a past medical history of Arthritis (04/28/2023), Blood clotting disorder ([facility]) (1996), CAD (coronary artery disease), Cancer ([facility]) (12/23/2024), Dental disorder (04/28/2023), Heart attack ([facility]) (08/16/2016), Heart burn (04/28/2023), Hiatus hernia syndrome, High cholesterol (04/28/2023), Hyperlipidemia, Hypertension (04/28/2023), Iron deficiency anemia, Kidney disease, Kidney stone (04/28/2023), Obesity (BMI 30-39.9) (02/12/2015), Osteoarthritis (03/02/2015), and Pneumonia (2016).    He has no past medical history of Psychiatric problem.  MEDS: Current Medications[1]  ALLERGIES: Allergies[2]  SURGHX: Past Surgical History[3]  SOCHX:  reports that he quit smoking about 9 years ago. His smoking use included cigarettes. He started smoking about 19 years ago. He has a 2.5 pack-year smoking history. He has never used smokeless tobacco. He reports that he does not currently use alcohol. He reports that he does not use drugs.  FH: Reviewed with patient, not pertinent to this visit.           Patient presents with:  Bump: Possible boil right buttocks area  that started 3 days ago , getting more tender, sore and now red.  PT denies drainage or discharge.  Has had these before, not sure if it needs to be drained or not, but knows he needs antibiotics. No other complaints.              Review of Systems   Skin:  Positive for rash.   All other systems reviewed and are negative.             Objective     /76 (BP Location: Left arm, Patient Position: Sitting, BP Cuff Size: Adult)   Pulse 100   Temp 36.5 °C (97.7 °F) (Temporal)   Resp 16   Ht 1.778 m (5' 10\")   Wt 80.8 kg (178 lb 1.6 oz)   SpO2 96%   BMI 25.55 kg/m²      Physical Exam  Vitals and nursing note reviewed.   Constitutional:       General: He is not in acute distress.     Appearance: Normal appearance. He is " well-developed. He is not ill-appearing or toxic-appearing.   HENT:      Head: Normocephalic and atraumatic.      Right Ear: Tympanic membrane normal.      Left Ear: Tympanic membrane normal.      Nose: Nose normal.      Mouth/Throat:      Lips: Pink.      Mouth: Mucous membranes are moist.      Pharynx: Oropharynx is clear. Uvula midline.   Eyes:      Extraocular Movements: Extraocular movements intact.      Conjunctiva/sclera: Conjunctivae normal.      Pupils: Pupils are equal, round, and reactive to light.   Cardiovascular:      Rate and Rhythm: Normal rate and regular rhythm.      Pulses: Normal pulses.      Heart sounds: Normal heart sounds.   Pulmonary:      Effort: Pulmonary effort is normal.      Breath sounds: Normal breath sounds.   Abdominal:      General: Bowel sounds are normal.      Palpations: Abdomen is soft.   Musculoskeletal:         General: Normal range of motion.      Cervical back: Normal range of motion and neck supple.   Skin:     General: Skin is warm and dry.      Capillary Refill: Capillary refill takes less than 2 seconds.          Neurological:      General: No focal deficit present.      Mental Status: He is alert and oriented to person, place, and time.      Cranial Nerves: No cranial nerve deficit.      Motor: Motor function is intact.      Coordination: Coordination is intact.      Gait: Gait normal.   Psychiatric:         Mood and Affect: Mood normal.                                  Assessment & Plan  Abscess of right buttock    Orders:    sulfamethoxazole-trimethoprim (BACTRIM DS) 800-160 MG tablet; Take 1 Tablet by mouth 2 times a day for 7 days.           Patient HPI physical exam consistent with abscess of skin of right buttock.  There is no fluctuant area to drain, no I&D indicated at this time.    I will treat with Bactrim DS twice daily x 7 days.    PT can use cool/warm compress on affected area for relief of symptoms.     Differential diagnosis, supportive care, and  indications for immediate follow-up discussed with patient.  Instructed to return to clinic or nearest emergency department for any change in condition, further concerns, or worsening of symptoms.    I personally reviewed prior external notes and test results pertinent to today's visit.  I have independently reviewed and interpreted all diagnostics ordered during this urgent care visit.    PT should follow up with PCP in 1-2 days for re-evaluation if symptoms have not improved.      Discussed red flags and reasons to return to UC or ED.      Pt and/or family verbalized understanding of diagnosis and follow up instructions and was offered informational handout on diagnosis.  PT discharged.     Please note that this dictation was created using voice recognition software. I have made every reasonable attempt to correct obvious errors, but I expect that there may be errors of grammar and possibly content that I did not discover before finalizing the note.                [1]   Current Outpatient Medications:     sulfamethoxazole-trimethoprim (BACTRIM DS) 800-160 MG tablet, Take 1 Tablet by mouth 2 times a day for 7 days., Disp: 14 Tablet, Rfl: 0    aspirin 81 MG EC tablet, Take 81 mg by mouth every day., Disp: , Rfl:     losartan (COZAAR) 25 MG Tab, Take 1 Tablet by mouth every day. (Patient taking differently: Take 25 mg by mouth every evening.), Disp: 90 Tablet, Rfl: 4    metoprolol tartrate (LOPRESSOR) 25 MG Tab, Take 1 Tablet by mouth 2 times a day., Disp: 180 Tablet, Rfl: 4    rosuvastatin (CRESTOR) 40 MG tablet, Take 1 Tablet by mouth every evening., Disp: 90 Tablet, Rfl: 4    ascorbic acid (VITAMIN C) 500 MG tablet, Take 1 Tab by mouth every day., Disp: 30 Tab, Rfl: 3    [START ON 7/19/2025] sulfamethoxazole-trimethoprim (BACTRIM DS) 800-160 MG tablet, Take 1 Tablet by mouth 2 times a day for 7 days., Disp: 14 Tablet, Rfl: 0    diphenoxylate-atropine (LOMOTIL) 2.5-0.025 MG Tab, Take 1 Tablet by mouth 4 times a day  as needed for Diarrhea. (Patient not taking: No sig reported), Disp: , Rfl:     capecitabine (XELODA) 500 MG tablet, Take 4 tablets of 500mg (total dose 2,000mg) by mouth 2 times a day on day 1-14 of each 21 day cycle.  Starting on day 1 of IV chemotherapy. (Patient not taking: No sig reported), Disp: 112 Tablet, Rfl: 5    ferrous sulfate 325 (65 Fe) MG tablet, Take 1 Tab by mouth every morning with breakfast. (Patient not taking: Reported on 7/3/2025), Disp: 30 Tab, Rfl: 3    therapeutic multivitamin-minerals (THERAGRAN-M) Tab, Take 1 Tab by mouth every day. (Patient not taking: No sig reported), Disp: 30 Tab, Rfl: 11  [2] No Known Allergies  [3]   Past Surgical History:  Procedure Laterality Date    NM SIGMOIDOSCOPY,DIAGNOSTIC  1/7/2025    Procedure: SIGMOIDOSCOPY, FLEXIBLE;  Surgeon: Heladio Bautista M.D.;  Location: [facility];  Service: Gen Robotic    LOW ANTERIOR RESECTION ROBOTIC XI  1/7/2025    Procedure: ROBOTIC LOW ANTERIOR RESECTION WITH FLEXIBLE SIGMOIDOSCOPY;  Surgeon: Heladio Bautista M.D.;  Location: [facility];  Service: Gen Robotic    COLONOSCOPY  12/2024    NM LAP ESOPHAGOGAST FUNDOPLASTY N/A 05/01/2023    Procedure: ROBOTIC HIATAL HERNIA REPAIR WITH TOUPET FUNDOPLICATION;  Surgeon: Campbell Berg M.D.;  Location: [facility];  Service: Gen Robotic    NM UPPER GI ENDOSCOPY,DIAGNOSIS N/A 05/01/2023    Procedure: ESOPHAGOGASTRODUODENOSCOPY;  Surgeon: Campbell Berg M.D.;  Location: [facility];  Service: Gen Robotic    NM UPPER GI ENDOSCOPY,DIAGNOSIS N/A 02/08/2023    Procedure: GASTROSCOPY;  Surgeon: Hieu Arevalo M.D.;  Location: SURGERY SAME DAY [facility];  Service: Gastroenterology    NM UPPER GI ENDOSCOPY,CTRL BLEED N/A 02/08/2023    Procedure: EGD, WITH CLIP PLACEMENT;  Surgeon: Hieu Arevalo M.D.;  Location: SURGERY SAME DAY [facility];  Service: Gastroenterology    KNEE ARTHROPLASTY TOTAL Left 06/19/2017    Procedure: KNEE ARTHROPLASTY TOTAL;  Surgeon: Charles  "ILYA Castellanos M.D.;  Location: SURGERY [facility];  Service:     MULTIPLE CORONARY ARTERY BYPASS  08/16/2016    LAMINOTOMY  01/01/2012    lumbar    KNEE ARTHROSCOPY Left 09/01/1990    OTHER SURGICAL PROCEDURE Left 09/01/1990    \"removal of vein left leg due to blood clot\"    SHOULDER ARTHROTOMY Right 09/01/1980     "

## 2025-07-14 ENCOUNTER — OFFICE VISIT (OUTPATIENT)
Dept: MEDICAL GROUP | Facility: MEDICAL CENTER | Age: 70
End: 2025-07-14
Payer: COMMERCIAL

## 2025-07-14 VITALS
TEMPERATURE: 98.1 F | HEART RATE: 84 BPM | SYSTOLIC BLOOD PRESSURE: 90 MMHG | BODY MASS INDEX: 24.93 KG/M2 | OXYGEN SATURATION: 96 % | HEIGHT: 70 IN | DIASTOLIC BLOOD PRESSURE: 60 MMHG | WEIGHT: 174.16 LBS | RESPIRATION RATE: 18 BRPM

## 2025-07-14 DIAGNOSIS — L02.91 ABSCESS: ICD-10-CM

## 2025-07-14 DIAGNOSIS — R73.03 PREDIABETES: Primary | ICD-10-CM

## 2025-07-14 DIAGNOSIS — I10 ESSENTIAL HYPERTENSION, BENIGN: ICD-10-CM

## 2025-07-14 LAB
HBA1C MFR BLD: 5.2 % (ref ?–5.8)
POCT INT CON NEG: NEGATIVE
POCT INT CON POS: POSITIVE

## 2025-07-14 PROCEDURE — 3078F DIAST BP <80 MM HG: CPT | Performed by: STUDENT IN AN ORGANIZED HEALTH CARE EDUCATION/TRAINING PROGRAM

## 2025-07-14 PROCEDURE — 83036 HEMOGLOBIN GLYCOSYLATED A1C: CPT | Performed by: STUDENT IN AN ORGANIZED HEALTH CARE EDUCATION/TRAINING PROGRAM

## 2025-07-14 PROCEDURE — 3074F SYST BP LT 130 MM HG: CPT | Performed by: STUDENT IN AN ORGANIZED HEALTH CARE EDUCATION/TRAINING PROGRAM

## 2025-07-14 PROCEDURE — 99214 OFFICE O/P EST MOD 30 MIN: CPT | Performed by: STUDENT IN AN ORGANIZED HEALTH CARE EDUCATION/TRAINING PROGRAM

## 2025-07-14 RX ORDER — SULFAMETHOXAZOLE AND TRIMETHOPRIM 800; 160 MG/1; MG/1
1 TABLET ORAL 2 TIMES DAILY
Qty: 14 TABLET | Refills: 0 | Status: SHIPPED | OUTPATIENT
Start: 2025-07-19 | End: 2025-07-26

## 2025-07-14 ASSESSMENT — ENCOUNTER SYMPTOMS
DIZZINESS: 0
CHILLS: 0
PALPITATIONS: 0
SHORTNESS OF BREATH: 0
WEIGHT LOSS: 0
NAUSEA: 0
HEADACHES: 0
VOMITING: 0
WHEEZING: 0
FEVER: 0

## 2025-07-14 ASSESSMENT — FIBROSIS 4 INDEX: FIB4 SCORE: 1.94

## 2025-07-14 NOTE — PROGRESS NOTES
"Subjective:     CC:     HPI:   Talib presents today with      PMH of HTN, HLD, IFG, CAD status post CABG x 2 8/2016, history of hiatal hernia repair, recent sigmoid colon cancer diagnosis   Specialists  Cardiology  [facility]- Colon Cancer  Onc surgeon - Enoch MABRY     Verbal consent was acquired by the patient to use [employer] ambient listening note generation during this visit Yes   History of Present Illness  The patient presents for an abscess in the buttocks area.    He reports that the abscess has increased in size and become more painful since urgent care visit. He was seen at urgent care on 07/11/2025 and was prescribed antibiotics bactrim. He has been applying ice to the area, but this has not resulted in any improvement. The abscess has been present for approximately 1.5 weeks, initially appearing as a small bump but has seen enlarging over the past few days. He does not feel a head on the abscess. He has been prescribed a 7-day course of Bactrim, of which he has taken three doses so far.    He is a cancer patient currently in remission. He has a history of colorectal cancer and has undergone all necessary tests, including a CAT scan and blood tests. He recently completed chemotherapy in the first week of July 2025. Last CEA  wnl.         Health Maintenance:     ROS:  Review of Systems   Constitutional:  Negative for chills, fever and weight loss.   HENT:  Negative for hearing loss.    Respiratory:  Negative for shortness of breath and wheezing.    Cardiovascular:  Negative for chest pain and palpitations.   Gastrointestinal:  Negative for nausea and vomiting.   Genitourinary:  Negative for frequency and urgency.   Skin:  Negative for rash.   Neurological:  Negative for dizziness and headaches.       Objective:     Exam:  BP 90/60 (BP Location: Left arm, Patient Position: Sitting, BP Cuff Size: Adult)   Pulse 84   Temp 36.7 °C (98.1 °F) (Temporal)   Resp 18   Ht 1.778 m (5' 10\") Comment: pt reported  Wt " 79 kg (174 lb 2.6 oz)   SpO2 96%   BMI 24.99 kg/m²  Body mass index is 24.99 kg/m².    Physical Exam  Skin:     Comments: Right buttocks, mildly erythematous area, TTP. No increase  in warmth. No evidence of cellulitis. There is induration, about 5 cm in diameter.            Labs:     Assessment & Plan:     69 y.o. male with the following -     1. Abscess  Acute stable  Buttock abscess, overall patient report size of abscess appear to have increased in the past 2-3 days.  He denies fever, chills. Discussed signs and symptoms of severe infection to warrant urgent visit/ er visit.   Plan  Continue current antibiotics, will extend antibiotics to total of 14 days, due to slow response to antibiotics.   Er precaution and return to clinic precaution given  Will sent referral to surgery, if symptoms does not improve   - sulfamethoxazole-trimethoprim (BACTRIM DS) 800-160 MG tablet; Take 1 Tablet by mouth 2 times a day for 7 days.  Dispense: 14 Tablet; Refill: 0    2. Prediabetes (Primary)  Chronic stable  Hx of ifg, prediabetes  A1c today in normal range   Denies ss hyperglycemia  - POCT A1C    3. Essential hypertension, benign  Chronic stable BP low today. Patient is on losartan 25mg daily and metoprolol 25mg bid for prior Cabg. He is on aspirin and rosuvastatin 40mg daily and follows with cardiology       Return in about 6 months (around 1/14/2026), or if symptoms worsen or fail to improve, for Lab review, Med check.    Please note that this dictation was created using voice recognition software. I have made every reasonable attempt to correct obvious errors, but I expect that there are errors of grammar and possibly content that I did not discover before finalizing the note.

## 2025-07-15 NOTE — Clinical Note
REFERRAL APPROVAL NOTICE         Sent on July 15, 2025                   Talib Ortiz  [address]  [place] [address]                   Dear Mr. Ortiz,    After a careful review of the medical information and benefit coverage, [facility] has processed your referral. See below for additional details.    If applicable, you must be actively enrolled with your insurance for coverage of the authorized service. If you have any questions regarding your coverage, please contact your insurance directly.    REFERRAL INFORMATION   Referral #:  [id number]  Referred-To Department    Referred-By Provider:  General Surgery    Roger Gonzalez M.D.   Department Of Surgery      [address] [place] [address]  [address]  [place] [address]  [phone] [address], [address]  [place] [id number] [phone]    Referral Start Date:  07/14/2025  Referral End Date:   07/14/2026             SCHEDULING  If you do not already have an appointment, please call [phone] to make an appointment.     MORE INFORMATION  If you do not already have a [facility] account, sign up at: [email or web address]  You can access your medical information, make appointments, see lab results, billing information, and more.  If you have questions regarding this referral, please contact  the [facility] Referrals department at:             [phone]. Monday - Friday 8:00AM - 5:00PM.     Sincerely,    [facility]

## 2025-07-25 NOTE — PROGRESS NOTES
Subjective:   2025 12:57 PM  Primary care physician:Roger Gonzalez M.D.  Medical Oncologist: Dr. Emily MABRY     Chief Complaint:   Chief Complaint   Patient presents with    Follow-Up     abscess of right buttock     Diagnosis:   1. Malignant neoplasm of sigmoid colon (HCC)        2. Perirectal abscess            History of presenting illness:  2025: Talib Ortiz is a pleasant 69 y.o. male who presented with obstructing sigmoid colon cancer with incomplete colonoscopy.  Patient underwent robotic assisted laparoscopic low anterior resection on 2025.  2025: Patient returns to the clinic for a follow up after finishing chemotherapy on May 18,2025.  Patient was seen by Dr. Kendall for his completion colonoscopy on 2025.  He is currently no evidence of disease.  Plan to repeat colonoscopy for personal risk of colorectal cancer in 3 years.  2025: Patient returns to the clinic with a buttock abscess and would like to have this checked.       Past Medical History[1]  Past Surgical History[2]  Allergies[3]  Encounter Medications[4]  Social History     Socioeconomic History    Marital status:      Spouse name: Not on file    Number of children: Not on file    Years of education: Not on file    Highest education level: Not on file   Occupational History    Not on file   Tobacco Use    Smoking status: Former     Current packs/day: 0.00     Average packs/day: 0.3 packs/day for 10.0 years (2.5 ttl pk-yrs)     Types: Cigarettes     Start date: 3/19/2006     Quit date: 3/19/2016     Years since quittin.3    Smokeless tobacco: Never   Vaping Use    Vaping status: Never Used   Substance and Sexual Activity    Alcohol use: Not Currently     Comment: Quit years    Drug use: No    Sexual activity: Yes     Partners: Female   Other Topics Concern    Not on file   Social History Narrative    Not on file     Social Drivers of Health     Financial Resource Strain: Not on file   Food  Insecurity: No Food Insecurity (1/7/2025)    Hunger Vital Sign     Worried About Running Out of Food in the Last Year: Never true     Ran Out of Food in the Last Year: Never true   Transportation Needs: No Transportation Needs (1/7/2025)    PRAPARE - Transportation     Lack of Transportation (Medical): No     Lack of Transportation (Non-Medical): No   Physical Activity: Not on file   Stress: Not on file   Social Connections: Not on file   Intimate Partner Violence: Not At Risk (1/7/2025)    Humiliation, Afraid, Rape, and Kick questionnaire     Fear of Current or Ex-Partner: No     Emotionally Abused: No     Physically Abused: No     Sexually Abused: No   Housing Stability: Low Risk  (1/7/2025)    Housing Stability Vital Sign     Unable to Pay for Housing in the Last Year: No     Number of Times Moved in the Last Year: 0     Homeless in the Last Year: No      Tobacco Use History[5]  Social History     Substance and Sexual Activity   Alcohol Use Not Currently    Comment: Quit years     Social History     Substance and Sexual Activity   Drug Use No        Family History   Problem Relation Age of Onset    Arthritis Mother     Hyperlipidemia Mother     Heart Disease Mother         bypass x4    Diabetes Mother     Hyperlipidemia Father     Heart Attack Father 61    Other Sister         back surgery    Other Brother         joint problems    Cancer Paternal Aunt         Breast    Cancer Paternal Grandfather         Colon    Colorectal Cancer Paternal Grandfather        Review of Systems   Constitutional:  Negative for chills, fever, malaise/fatigue and weight loss.   Eyes:  Negative for blurred vision.   Respiratory:  Negative for cough, hemoptysis and shortness of breath.    Cardiovascular:  Negative for chest pain and leg swelling.   Gastrointestinal:  Negative for blood in stool, constipation, diarrhea, nausea and vomiting.   Genitourinary:  Negative for dysuria, hematuria and urgency.   Musculoskeletal:  Negative for  "back pain, falls and joint pain.   Skin:  Negative for rash.   Neurological:  Negative for dizziness, weakness and headaches.   Endo/Heme/Allergies: Negative.  Does not bruise/bleed easily.   Psychiatric/Behavioral:  Negative for depression. The patient is not nervous/anxious.         Objective:   /56 (BP Location: Left arm, Patient Position: Sitting, BP Cuff Size: Adult)   Pulse 81   Temp 36.9 °C (98.5 °F) (Temporal)   Ht 1.803 m (5' 11\")   Wt 78 kg (171 lb 15.3 oz)   SpO2 96%   BMI 23.98 kg/m²     Physical Exam  Vitals and nursing note reviewed.   Constitutional:       Appearance: Normal appearance. He is normal weight.   HENT:      Head: Normocephalic and atraumatic.      Nose: Nose normal.      Mouth/Throat:      Mouth: Mucous membranes are moist.   Eyes:      Extraocular Movements: Extraocular movements intact.   Cardiovascular:      Rate and Rhythm: Normal rate.      Pulses: Normal pulses.   Pulmonary:      Effort: Pulmonary effort is normal.      Breath sounds: Normal breath sounds.   Abdominal:      General: Abdomen is flat.      Palpations: Abdomen is soft.   Musculoskeletal:         General: Normal range of motion.      Cervical back: Normal range of motion.   Skin:     General: Skin is warm and dry.      Comments: Patient has a perirectal abscess without evidence of fistula with small amount of fluctuance and erythema without evidence of drainage   Neurological:      Mental Status: He is alert and oriented to person, place, and time.   Psychiatric:         Mood and Affect: Mood normal.         Behavior: Behavior normal.         Thought Content: Thought content normal.         Judgment: Judgment normal.         Labs:   Latest Reference Range & Units 05/16/25 14:50   WBC 4.8 - 10.8 K/uL 6.1   RBC 4.70 - 6.10 M/uL 4.14 (L)   Hemoglobin 14.0 - 18.0 g/dL 14.1   Hematocrit 42.0 - 52.0 % 40.7 (L)   MCV 81.4 - 97.8 fL 98.3 (H)   MCH 27.0 - 33.0 pg 34.1 (H)   MCHC 32.3 - 36.5 g/dL 34.6   RDW 35.9 - " 50.0 fL 63.7 (H)   Platelet Count 164 - 446 K/uL 292   MPV 9.0 - 12.9 fL 9.2   Neutrophils-Polys 44.00 - 72.00 % 56.90   Neutrophils (Absolute) 1.82 - 7.42 K/uL 3.45   Lymphocytes 22.00 - 41.00 % 20.00 (L)   Lymphs (Absolute) 1.00 - 4.80 K/uL 1.21   Monocytes 0.00 - 13.40 % 18.60 (H)   Monos (Absolute) 0.00 - 0.85 K/uL 1.13 (H)   Eosinophils 0.00 - 6.90 % 3.00   Eos (Absolute) 0.00 - 0.51 K/uL 0.18   Basophils 0.00 - 1.80 % 1.00   Baso (Absolute) 0.00 - 0.12 K/uL 0.06   Immature Granulocytes 0.00 - 0.90 % 0.50   Immature Granulocytes (abs) 0.00 - 0.11 K/uL 0.03   Nucleated RBC 0.00 - 0.20 /100 WBC 0.00   NRBC (Absolute) K/uL 0.00   Sodium 135 - 145 mmol/L 136   Potassium 3.6 - 5.5 mmol/L 3.0 (L)   Chloride 96 - 112 mmol/L 104   Co2 20 - 33 mmol/L 16 (L)   Anion Gap 7.0 - 16.0  16.0   Glucose 65 - 99 mg/dL 133 (H)   Bun 8 - 22 mg/dL 16   Creatinine 0.50 - 1.40 mg/dL 1.08   GFR (CKD-EPI) >60 mL/min/1.73 m 2 74   Calcium 8.5 - 10.5 mg/dL 9.1   Correct Calcium 8.5 - 10.5 mg/dL 9.1   AST(SGOT) 12 - 45 U/L 41   ALT(SGPT) 2 - 50 U/L 25   Alkaline Phosphatase 30 - 99 U/L 46   Total Bilirubin 0.1 - 1.5 mg/dL 0.8   Albumin 3.2 - 4.9 g/dL 4.0   Total Protein 6.0 - 8.2 g/dL 6.9   Globulin 1.9 - 3.5 g/dL 2.9   A-G Ratio g/dL 1.4       Imaging:  CT chest/abdomen/pelvis 06/16/2025 6/16/2025 8:10 AM     HISTORY/REASON FOR EXAM:  Follow-up sigmoid colon cancer.   TECHNIQUE/EXAM DESCRIPTION:  CT scan of the chest, abdomen and pelvis with contrast.  Thin-section helical scanning was obtained with intravenous contrast from the lung apices through the pubic symphysis to include the chest, abdomen and pelvis. 100 mL of Omnipaque 350 nonionic contrast was administered intravenously without complication.  Low dose optimization technique was utilized for this CT exam including automated exposure control and adjustment of the mA and/or kV according to patient size.     COMPARISON: 12/23/2024, 11/28/2024 and additional     FINDINGS:  CT  Chest:  Lungs: Mild lower lung zone scarring. Calcified right lower lung granuloma. No airspace infiltrate.  Mediastinum/Felisa: No adenopathy.  Pleura: Small simple bilateral pleural effusions.  Cardiac: Heart is normal size. Previous CABG.. There is coronary artery calcification.  Vascular: Aorta is nonaneurysmal.  Soft tissues: Small sliding hiatal hernia.  Bones: Old left rib fractures.     CT Abdomen:  Liver: No focal liver lesion  Spleen: Normal in size, without focal lesion  Adrenal glands: Normal  Pancreas: Normal  Gallbladder: No radiodense stone visualized.  Biliary: No biliary duct dilation visualized.  Kidneys: 1 mm and 2 mm nonobstructing left renal stones. Small simple bilateral renal cysts.  Vasculature: Nonaneurysmal  Lymph nodes: No adenopathy  Bowel: Previous partial sigmoid resection. No bowel obstruction. Moderate stool.  Appendix:  Appendix appears normal  Peritoneum: No ascites  Pelvis:  Urinary bladder is grossly normal. No pelvic mass or adenopathy identified.  Musculoskeletal: Lumbar degenerative changes. Stable 2.1 cm lytic lesion within the left posterior aspect of the L3 vertebral body. Therefore, likely benign. No additional osseous lesion.     IMPRESSION:  1.  No evidence of disease recurrence or metastatic disease.  2.  Small simple bilateral pleural effusions.  3.  Small hiatal hernia.  4.  Tiny nonobstructing left renal stones.  5.  Small simple bilateral renal cysts.  6.  Stable 2.1 cm lytic lesion within the L3 vertebral body, likely benign.     MRI Abdomen 12/23/2024 10:50 AM     HISTORY/REASON FOR EXAM:  sigmoid colon mass, hepatic lesion.  TECHNIQUE/EXAM DESCRIPTION: MRI of the liver with dynamic IV gadolinium enhancement.     MR imaging of the liver was performed.  MR images of the liver were obtained with coronal and axial single-shot fast spin-echo T2, fat-suppressed axial FRFSE T2, axial in-phase and out-of-phase FSPGR T1, axial DWI with a b-value of 600, 3D MRCP,     precontrast fat-suppressed FSPGR T1, dynamic gadolinium enhanced axial fat-suppressed T1 FSPGR in the arterial dominant, portal venous, 2-minute and 4-minute delayed phases, with delayed coronal fat-suppressed T1 FSPGR sequence.     The study was performed on a adsquarea 1.5 [employer] MRI scanner.     18 mL ProHance contrast was administered intravenously.     COMPARISON: CT abdomen and pelvis 11/28/2024     FINDINGS:  Artifact from prior median sternotomy.  Liver shows no intrahepatic biliary dilation.  No enhancing mass demonstrated.  Small T2 hyperintense nonenhancing lesion adjacent the RIGHT lobe liver measuring 4 x 8 mm.  The spleen is unremarkable.  Adrenal glands are unremarkable.  The kidneys are unremarkable.  The pancreas is unremarkable.  The gallbladder is unremarkable.  No biliary dilation.     IMPRESSION:  1.  No liver mass demonstrated.  No evidence for hepatic metastasis.  2.  Small cystic structure adjacent the RIGHT lobe liver measuring 8 mm, of doubtful significance.     CT chest/abdomen/pelvis 11/28/2024 10:29 AM     HISTORY/REASON FOR EXAM:  Colon cancer, staging.  TECHNIQUE/EXAM DESCRIPTION:  CT scan of the chest, abdomen and pelvis with contrast.     Thin-section helical scanning was obtained with intravenous contrast from the lung apices through the pubic symphysis to include the chest, abdomen and pelvis. 100 mL of Omnipaque 350 nonionic contrast was administered intravenously without complication.     Low dose optimization technique was utilized for this CT exam including automated exposure control and adjustment of the mA and/or kV according to patient size.     COMPARISON: None.     FINDINGS:  CT CHEST:  Lungs: A few areas of linear atelectasis/scarring in the lower lungs. Calcified granuloma in the right lower lung. No suspicious nodules or masses. No pleural effusions.  Mediastinum: Unremarkable thyroid. No mediastinal mass.  Nodes: No enlarged nodes by size criteria.  Heart: Not  enlarged. No pericardial effusion. Coronary calcifications.  Aorta and great vessels: No aneurysm. Atherosclerosis.  Small hiatal hernia.     CT ABDOMEN/PELVIS:  Liver: Several small subcentimeter hepatic lesions, incompletely characterized on this study. No intrahepatic biliary dilatation.  Gallbladder: No mural thickening. No calcified gallstones.  Common bile duct: Nondilated.  Pancreas: Unremarkable.  Spleen: No mass.  Adrenals: No mass.  Kidneys: No suspicious mass lesions. No hydronephrosis.  Stomach, small bowel, colon: Sigmoid colon mass measures 4.1 cm. No colonic or small bowel obstruction. A few small colonic diverticula. Normal appendix.  Peritoneal cavity: No ascites.  Lymph nodes: No enlarged nodes by size criteria.  Aorta: No aneurysm. Atherosclerosis.  Pelvic organs: Unremarkable.  Fat-containing right inguinal hernia.     MUSCULOSKELETAL STRUCTURES: No acute fracture or destructive lesion. Median sternotomy. L3-L4 artificial disc.     IMPRESSION:  1.  Sigmoid colon mass, consistent with known malignancy.  2.  Several small subcentimeter hepatic lesions, incompletely characterized on this study. The need to be further evaluated with contrast enhanced liver MRI.  3.  No other suspicious lesions in the chest, abdomen or pelvis.     CT chest 11/27/2024  CLINICAL DATA: CHRONIC COLITIS  (MASS IN DECENDING COLON ),     TECHNICAL: CT scan of the chest was performed following intravenous administration of 100 cc of nonionic IV contrast.   Dose reduction techniques including automated exposure control were used.   This patient has received 0 CT scans and 0 myocardial perfusion scans during the past 12 months.     COMPARISON: None     FINDINGS:   LUNGS:   Right middle lobe linear band of density consistent with atelectasis.   Right middle lobe 5 mm chronic calcified granuloma.   Left upper lobe lingular and left lower lobe linear scar.   No effusions.   Trachea and bronchi are unremarkable.   Lung  hyperinflation.     Mediastinum and dany:   No adenopathy.   Thoracic aorta is normal in caliber.   Pulmonary arteries exhibited no embolism.   Coronary arteries are heavily calcified.  Post sternotomy.  Heart is normal in size.     The distal esophagus and proximal stomach exhibits wall thickening.     Thoracic spine exhibits degenerative change.      IMPRESSION:   Wall thickening of the distal esophagus and proximal stomach is present of unknown etiology.   No evidence of metastatic disease to the chest          Pathology:  01/07/2025 SURGICAL PATHOLOGY CONSULTATION   FINAL DIAGNOSIS:  A. Sigmoid colon and rectum, low anterior resection:          Invasive moderately-differentiated adenocarcinoma (3.3 cm) (see           Tumor synoptic report).          Tumor invades into the pericolonic tissue.          Lymphovascular invasion is present.          Margins negative for tumor.          Five out of twenty lymph nodes, positive for metastatic           carcinoma (5/20).   B. Anastomotic donuts:          Benign colonic tissue, negative for dysplasia and malignancy.     COLON AND RECTUM: Resection     SPECIMEN    Procedure:  Low anterior resection   TUMOR    Tumor Site:  Sigmoid colon     Histologic Type:  Adenocarcinoma     Histologic Grade:  G2, moderately differentiated     Tumor Size:  Greatest dimension (Centimeters) - 3.3 cm     Tumor Extent:  Invades through muscularis propria into the     pericolonic or perirectal tissue     Macroscopic Tumor Perforation:  Not identified     Lymphatic and / or Vascular Invasion:  Small vessel, Large vessel     (venous), extramural     Perineural Invasion:  Present     Tumor Budding Score:  Low (0-4)     Treatment Effect:  No known presurgical therapy   MARGINS    Margin Status for Invasive Carcinoma:  All margins negative for     invasive carcinoma     Margin Status for Non-Invasive Tumor:  All margins negative for     high-grade dysplasia / intramucosal carcinoma and low-grade  dysplasia   REGIONAL LYMPH NODES     Regional Lymph Node Status:  Tumor present in regional lymph node(s)       Number of Lymph Nodes with Tumor:  5       Number of Lymph Nodes Examined:  20     Tumor Deposits:  Not identified   pTNM CLASSIFICATION (AJCC 8th Edition)   Reporting of pT, pN, and (when applicable) pM categories is based on   information available to the pathologist at the time the report is   issued. As per the AJCC (Chapter 1, 8th Ed.) it is the managing   physician's responsibility to establish the final pathologic stage   based upon all pertinent information, including but potentially not   limited to this pathology report.     pT Category:  pT3     pN Category:  pN2a   ADDITIONAL FINDINGS     Additional Findings:  Diverticulosis   SPECIAL STUDIES     MMR IHC: MMR proficient per report in EMR     Comment: Adequate tumor is present in Block A4 for further testing if   additional studies are clinically indicated.                                       Diagnosis performed by:                                       CLIFF MIRANDA MD      11/27/2024       Procedures  06/27/25: Colonoscopy     01/07/2025: laparoscopic low anterior resection   11/27/24: Colonoscopy And EGD         Diagnosis:     1. Malignant neoplasm of sigmoid colon (HCC)        2. Perirectal abscess                Medical Decision Making:  Today's Assessment / Status / Plan:   This patient will be scheduled for urgent incision and drainage of perirectal abscess with examination under anesthesia possible fistulotomy versus seton placement.  Risks, benefits, and alternatives were discussed with consenting person(s). Consenting person(s) were given an opportunity to ask questions and discuss other options. Risks including but not limited to failed or incomplete planned procedure, ineffective therapy, perforation, infection, bleeding, missed lesion(s), missed diagnosis, cardiac and/or pulmonary event, aspiration, stroke, possible need for  surgery, hospitalization possibly prolonged, discomfort, unsuccessful and/or incomplete procedure, possible need for repeat procedures and/or additional testings, damage to adjacent organs and/or vascular structures, medication reaction, disability, death, and other adverse events possibly life-threatening. Discussion was undertaken with Layman's terms. Consenting persons stated understanding and acceptance of these risks, and wished to proceed. Consent was given in clear state of mind.  Heladio Bautista MD PhD  [facility]  Colon and Rectal Surgeon  [phone]              [1]   Past Medical History:  Diagnosis Date    Arthritis 04/28/2023    general body    Blood clotting disorder ([facility]) 1996    leg    CAD (coronary artery disease)     Cancer ([facility]) 12/23/2024    Malignant neoplasm of sigmoid colon    Dental disorder 04/28/2023    upper partial, bridge lower front times 3, having right upper posterior tooth removed 4/28/23    Heart attack ([facility]) 08/16/2016    cardiology, Dr. Frias    Heart burn 04/28/2023    medicated    Hiatus hernia syndrome     High cholesterol 04/28/2023    medicated    Hyperlipidemia     Hypertension 04/28/2023    medicated    Iron deficiency anemia     Kidney disease     Kidney stone 04/28/2023    history of    Obesity (BMI 30-39.9) 02/12/2015    Osteoarthritis 03/02/2015    Pneumonia 2016   [2]   Past Surgical History:  Procedure Laterality Date    WA SIGMOIDOSCOPY,DIAGNOSTIC  1/7/2025    Procedure: SIGMOIDOSCOPY, FLEXIBLE;  Surgeon: Heladio Bautista M.D.;  Location: SURGERY [facility];  Service: Gen Robotic    LOW ANTERIOR RESECTION ROBOTIC XI  1/7/2025    Procedure: ROBOTIC LOW ANTERIOR RESECTION WITH FLEXIBLE SIGMOIDOSCOPY;  Surgeon: Heladio Bautista M.D.;  Location: SURGERY [facility];  Service: Gen Robotic    COLONOSCOPY  12/2024    WA LAP ESOPHAGOGAST FUNDOPLASTY N/A 05/01/2023    Procedure: ROBOTIC HIATAL HERNIA REPAIR WITH TOUPET FUNDOPLICATION;  Surgeon: Campbell Berg,  "M.D.;  Location: SURGERY [facility];  Service: Gen Robotic    MT UPPER GI ENDOSCOPY,DIAGNOSIS N/A 2023    Procedure: ESOPHAGOGASTRODUODENOSCOPY;  Surgeon: Campbell Berg M.D.;  Location: SURGERY [facility];  Service: Gen Robotic    MT UPPER GI ENDOSCOPY,DIAGNOSIS N/A 2023    Procedure: GASTROSCOPY;  Surgeon: Hieu Arevalo M.D.;  Location: SURGERY SAME DAY [facility];  Service: Gastroenterology    MT UPPER GI ENDOSCOPY,CTRL BLEED N/A 2023    Procedure: EGD, WITH CLIP PLACEMENT;  Surgeon: Hieu Arevalo M.D.;  Location: SURGERY SAME DAY [facility];  Service: Gastroenterology    KNEE ARTHROPLASTY TOTAL Left 2017    Procedure: KNEE ARTHROPLASTY TOTAL;  Surgeon: Charles Castellanos M.D.;  Location: SURGERY [facility];  Service:     MULTIPLE CORONARY ARTERY BYPASS  2016    LAMINOTOMY  2012    lumbar    KNEE ARTHROSCOPY Left 1990    OTHER SURGICAL PROCEDURE Left 1990    \"removal of vein left leg due to blood clot\"    SHOULDER ARTHROTOMY Right 1980   [3] No Known Allergies  [4]   Outpatient Encounter Medications as of 2025   Medication Sig Dispense Refill    [] sulfamethoxazole-trimethoprim (BACTRIM DS) 800-160 MG tablet Take 1 Tablet by mouth 2 times a day for 7 days. 14 Tablet 0    diphenoxylate-atropine (LOMOTIL) 2.5-0.025 MG Tab Take 1 Tablet by mouth 4 times a day as needed for Diarrhea. (Patient not taking: No sig reported)      aspirin 81 MG EC tablet Take 81 mg by mouth every day.      capecitabine (XELODA) 500 MG tablet Take 4 tablets of 500mg (total dose 2,000mg) by mouth 2 times a day on day 1-14 of each 21 day cycle.  Starting on day 1 of IV chemotherapy. (Patient not taking: No sig reported) 112 Tablet 5    losartan (COZAAR) 25 MG Tab Take 1 Tablet by mouth every day. (Patient taking differently: Take 25 mg by mouth every evening.) 90 Tablet 4    metoprolol tartrate (LOPRESSOR) 25 MG Tab Take 1 Tablet by mouth 2 times a day. 180 Tablet 4    " rosuvastatin (CRESTOR) 40 MG tablet Take 1 Tablet by mouth every evening. 90 Tablet 4    ferrous sulfate 325 (65 Fe) MG tablet Take 1 Tab by mouth every morning with breakfast. (Patient not taking: Reported on 7/3/2025) 30 Tab 3    therapeutic multivitamin-minerals (THERAGRAN-M) Tab Take 1 Tab by mouth every day. (Patient not taking: No sig reported) 30 Tab 11    ascorbic acid (VITAMIN C) 500 MG tablet Take 1 Tab by mouth every day. 30 Tab 3     No facility-administered encounter medications on file as of 2025.   [5]   Social History  Tobacco Use   Smoking Status Former    Current packs/day: 0.00    Average packs/day: 0.3 packs/day for 10.0 years (2.5 ttl pk-yrs)    Types: Cigarettes    Start date: 3/19/2006    Quit date: 3/19/2016    Years since quittin.3   Smokeless Tobacco Never

## 2025-07-25 NOTE — H&P (VIEW-ONLY)
Subjective:   2025 12:57 PM  Primary care physician:Roger Gonzalez M.D.  Medical Oncologist: Dr. Emily MABRY     Chief Complaint:   Chief Complaint   Patient presents with    Follow-Up     abscess of right buttock     Diagnosis:   1. Malignant neoplasm of sigmoid colon (HCC)        2. Perirectal abscess            History of presenting illness:  2025: Talib Ortiz is a pleasant 69 y.o. male who presented with obstructing sigmoid colon cancer with incomplete colonoscopy.  Patient underwent robotic assisted laparoscopic low anterior resection on 2025.  2025: Patient returns to the clinic for a follow up after finishing chemotherapy on May 18,2025.  Patient was seen by Dr. Kendall for his completion colonoscopy on 2025.  He is currently no evidence of disease.  Plan to repeat colonoscopy for personal risk of colorectal cancer in 3 years.  2025: Patient returns to the clinic with a buttock abscess and would like to have this checked.       Past Medical History[1]  Past Surgical History[2]  Allergies[3]  Encounter Medications[4]  Social History     Socioeconomic History    Marital status:      Spouse name: Not on file    Number of children: Not on file    Years of education: Not on file    Highest education level: Not on file   Occupational History    Not on file   Tobacco Use    Smoking status: Former     Current packs/day: 0.00     Average packs/day: 0.3 packs/day for 10.0 years (2.5 ttl pk-yrs)     Types: Cigarettes     Start date: 3/19/2006     Quit date: 3/19/2016     Years since quittin.3    Smokeless tobacco: Never   Vaping Use    Vaping status: Never Used   Substance and Sexual Activity    Alcohol use: Not Currently     Comment: Quit years    Drug use: No    Sexual activity: Yes     Partners: Female   Other Topics Concern    Not on file   Social History Narrative    Not on file     Social Drivers of Health     Financial Resource Strain: Not on file   Food  Insecurity: No Food Insecurity (1/7/2025)    Hunger Vital Sign     Worried About Running Out of Food in the Last Year: Never true     Ran Out of Food in the Last Year: Never true   Transportation Needs: No Transportation Needs (1/7/2025)    PRAPARE - Transportation     Lack of Transportation (Medical): No     Lack of Transportation (Non-Medical): No   Physical Activity: Not on file   Stress: Not on file   Social Connections: Not on file   Intimate Partner Violence: Not At Risk (1/7/2025)    Humiliation, Afraid, Rape, and Kick questionnaire     Fear of Current or Ex-Partner: No     Emotionally Abused: No     Physically Abused: No     Sexually Abused: No   Housing Stability: Low Risk  (1/7/2025)    Housing Stability Vital Sign     Unable to Pay for Housing in the Last Year: No     Number of Times Moved in the Last Year: 0     Homeless in the Last Year: No      Tobacco Use History[5]  Social History     Substance and Sexual Activity   Alcohol Use Not Currently    Comment: Quit years     Social History     Substance and Sexual Activity   Drug Use No        Family History   Problem Relation Age of Onset    Arthritis Mother     Hyperlipidemia Mother     Heart Disease Mother         bypass x4    Diabetes Mother     Hyperlipidemia Father     Heart Attack Father 61    Other Sister         back surgery    Other Brother         joint problems    Cancer Paternal Aunt         Breast    Cancer Paternal Grandfather         Colon    Colorectal Cancer Paternal Grandfather        Review of Systems   Constitutional:  Negative for chills, fever, malaise/fatigue and weight loss.   Eyes:  Negative for blurred vision.   Respiratory:  Negative for cough, hemoptysis and shortness of breath.    Cardiovascular:  Negative for chest pain and leg swelling.   Gastrointestinal:  Negative for blood in stool, constipation, diarrhea, nausea and vomiting.   Genitourinary:  Negative for dysuria, hematuria and urgency.   Musculoskeletal:  Negative for  "back pain, falls and joint pain.   Skin:  Negative for rash.   Neurological:  Negative for dizziness, weakness and headaches.   Endo/Heme/Allergies: Negative.  Does not bruise/bleed easily.   Psychiatric/Behavioral:  Negative for depression. The patient is not nervous/anxious.         Objective:   /56 (BP Location: Left arm, Patient Position: Sitting, BP Cuff Size: Adult)   Pulse 81   Temp 36.9 °C (98.5 °F) (Temporal)   Ht 1.803 m (5' 11\")   Wt 78 kg (171 lb 15.3 oz)   SpO2 96%   BMI 23.98 kg/m²     Physical Exam  Vitals and nursing note reviewed.   Constitutional:       Appearance: Normal appearance. He is normal weight.   HENT:      Head: Normocephalic and atraumatic.      Nose: Nose normal.      Mouth/Throat:      Mouth: Mucous membranes are moist.   Eyes:      Extraocular Movements: Extraocular movements intact.   Cardiovascular:      Rate and Rhythm: Normal rate.      Pulses: Normal pulses.   Pulmonary:      Effort: Pulmonary effort is normal.      Breath sounds: Normal breath sounds.   Abdominal:      General: Abdomen is flat.      Palpations: Abdomen is soft.   Musculoskeletal:         General: Normal range of motion.      Cervical back: Normal range of motion.   Skin:     General: Skin is warm and dry.      Comments: Patient has a perirectal abscess without evidence of fistula with small amount of fluctuance and erythema without evidence of drainage   Neurological:      Mental Status: He is alert and oriented to person, place, and time.   Psychiatric:         Mood and Affect: Mood normal.         Behavior: Behavior normal.         Thought Content: Thought content normal.         Judgment: Judgment normal.         Labs:   Latest Reference Range & Units 05/16/25 14:50   WBC 4.8 - 10.8 K/uL 6.1   RBC 4.70 - 6.10 M/uL 4.14 (L)   Hemoglobin 14.0 - 18.0 g/dL 14.1   Hematocrit 42.0 - 52.0 % 40.7 (L)   MCV 81.4 - 97.8 fL 98.3 (H)   MCH 27.0 - 33.0 pg 34.1 (H)   MCHC 32.3 - 36.5 g/dL 34.6   RDW 35.9 - " 50.0 fL 63.7 (H)   Platelet Count 164 - 446 K/uL 292   MPV 9.0 - 12.9 fL 9.2   Neutrophils-Polys 44.00 - 72.00 % 56.90   Neutrophils (Absolute) 1.82 - 7.42 K/uL 3.45   Lymphocytes 22.00 - 41.00 % 20.00 (L)   Lymphs (Absolute) 1.00 - 4.80 K/uL 1.21   Monocytes 0.00 - 13.40 % 18.60 (H)   Monos (Absolute) 0.00 - 0.85 K/uL 1.13 (H)   Eosinophils 0.00 - 6.90 % 3.00   Eos (Absolute) 0.00 - 0.51 K/uL 0.18   Basophils 0.00 - 1.80 % 1.00   Baso (Absolute) 0.00 - 0.12 K/uL 0.06   Immature Granulocytes 0.00 - 0.90 % 0.50   Immature Granulocytes (abs) 0.00 - 0.11 K/uL 0.03   Nucleated RBC 0.00 - 0.20 /100 WBC 0.00   NRBC (Absolute) K/uL 0.00   Sodium 135 - 145 mmol/L 136   Potassium 3.6 - 5.5 mmol/L 3.0 (L)   Chloride 96 - 112 mmol/L 104   Co2 20 - 33 mmol/L 16 (L)   Anion Gap 7.0 - 16.0  16.0   Glucose 65 - 99 mg/dL 133 (H)   Bun 8 - 22 mg/dL 16   Creatinine 0.50 - 1.40 mg/dL 1.08   GFR (CKD-EPI) >60 mL/min/1.73 m 2 74   Calcium 8.5 - 10.5 mg/dL 9.1   Correct Calcium 8.5 - 10.5 mg/dL 9.1   AST(SGOT) 12 - 45 U/L 41   ALT(SGPT) 2 - 50 U/L 25   Alkaline Phosphatase 30 - 99 U/L 46   Total Bilirubin 0.1 - 1.5 mg/dL 0.8   Albumin 3.2 - 4.9 g/dL 4.0   Total Protein 6.0 - 8.2 g/dL 6.9   Globulin 1.9 - 3.5 g/dL 2.9   A-G Ratio g/dL 1.4       Imaging:  CT chest/abdomen/pelvis 06/16/2025 6/16/2025 8:10 AM     HISTORY/REASON FOR EXAM:  Follow-up sigmoid colon cancer.   TECHNIQUE/EXAM DESCRIPTION:  CT scan of the chest, abdomen and pelvis with contrast.  Thin-section helical scanning was obtained with intravenous contrast from the lung apices through the pubic symphysis to include the chest, abdomen and pelvis. 100 mL of Omnipaque 350 nonionic contrast was administered intravenously without complication.  Low dose optimization technique was utilized for this CT exam including automated exposure control and adjustment of the mA and/or kV according to patient size.     COMPARISON: 12/23/2024, 11/28/2024 and additional     FINDINGS:  CT  Chest:  Lungs: Mild lower lung zone scarring. Calcified right lower lung granuloma. No airspace infiltrate.  Mediastinum/Felisa: No adenopathy.  Pleura: Small simple bilateral pleural effusions.  Cardiac: Heart is normal size. Previous CABG.. There is coronary artery calcification.  Vascular: Aorta is nonaneurysmal.  Soft tissues: Small sliding hiatal hernia.  Bones: Old left rib fractures.     CT Abdomen:  Liver: No focal liver lesion  Spleen: Normal in size, without focal lesion  Adrenal glands: Normal  Pancreas: Normal  Gallbladder: No radiodense stone visualized.  Biliary: No biliary duct dilation visualized.  Kidneys: 1 mm and 2 mm nonobstructing left renal stones. Small simple bilateral renal cysts.  Vasculature: Nonaneurysmal  Lymph nodes: No adenopathy  Bowel: Previous partial sigmoid resection. No bowel obstruction. Moderate stool.  Appendix:  Appendix appears normal  Peritoneum: No ascites  Pelvis:  Urinary bladder is grossly normal. No pelvic mass or adenopathy identified.  Musculoskeletal: Lumbar degenerative changes. Stable 2.1 cm lytic lesion within the left posterior aspect of the L3 vertebral body. Therefore, likely benign. No additional osseous lesion.     IMPRESSION:  1.  No evidence of disease recurrence or metastatic disease.  2.  Small simple bilateral pleural effusions.  3.  Small hiatal hernia.  4.  Tiny nonobstructing left renal stones.  5.  Small simple bilateral renal cysts.  6.  Stable 2.1 cm lytic lesion within the L3 vertebral body, likely benign.     MRI Abdomen 12/23/2024 10:50 AM     HISTORY/REASON FOR EXAM:  sigmoid colon mass, hepatic lesion.  TECHNIQUE/EXAM DESCRIPTION: MRI of the liver with dynamic IV gadolinium enhancement.     MR imaging of the liver was performed.  MR images of the liver were obtained with coronal and axial single-shot fast spin-echo T2, fat-suppressed axial FRFSE T2, axial in-phase and out-of-phase FSPGR T1, axial DWI with a b-value of 600, 3D MRCP,     precontrast fat-suppressed FSPGR T1, dynamic gadolinium enhanced axial fat-suppressed T1 FSPGR in the arterial dominant, portal venous, 2-minute and 4-minute delayed phases, with delayed coronal fat-suppressed T1 FSPGR sequence.     The study was performed on a [employer] 1.5 [employer] MRI scanner.     18 mL ProHance contrast was administered intravenously.     COMPARISON: CT abdomen and pelvis 11/28/2024     FINDINGS:  Artifact from prior median sternotomy.  Liver shows no intrahepatic biliary dilation.  No enhancing mass demonstrated.  Small T2 hyperintense nonenhancing lesion adjacent the RIGHT lobe liver measuring 4 x 8 mm.  The spleen is unremarkable.  Adrenal glands are unremarkable.  The kidneys are unremarkable.  The pancreas is unremarkable.  The gallbladder is unremarkable.  No biliary dilation.     IMPRESSION:  1.  No liver mass demonstrated.  No evidence for hepatic metastasis.  2.  Small cystic structure adjacent the RIGHT lobe liver measuring 8 mm, of doubtful significance.     CT chest/abdomen/pelvis 11/28/2024 10:29 AM     HISTORY/REASON FOR EXAM:  Colon cancer, staging.  TECHNIQUE/EXAM DESCRIPTION:  CT scan of the chest, abdomen and pelvis with contrast.     Thin-section helical scanning was obtained with intravenous contrast from the lung apices through the pubic symphysis to include the chest, abdomen and pelvis. 100 mL of Omnipaque 350 nonionic contrast was administered intravenously without complication.     Low dose optimization technique was utilized for this CT exam including automated exposure control and adjustment of the mA and/or kV according to patient size.     COMPARISON: None.     FINDINGS:  CT CHEST:  Lungs: A few areas of linear atelectasis/scarring in the lower lungs. Calcified granuloma in the right lower lung. No suspicious nodules or masses. No pleural effusions.  Mediastinum: Unremarkable thyroid. No mediastinal mass.  Nodes: No enlarged nodes by size criteria.  Heart: Not  enlarged. No pericardial effusion. Coronary calcifications.  Aorta and great vessels: No aneurysm. Atherosclerosis.  Small hiatal hernia.     CT ABDOMEN/PELVIS:  Liver: Several small subcentimeter hepatic lesions, incompletely characterized on this study. No intrahepatic biliary dilatation.  Gallbladder: No mural thickening. No calcified gallstones.  Common bile duct: Nondilated.  Pancreas: Unremarkable.  Spleen: No mass.  Adrenals: No mass.  Kidneys: No suspicious mass lesions. No hydronephrosis.  Stomach, small bowel, colon: Sigmoid colon mass measures 4.1 cm. No colonic or small bowel obstruction. A few small colonic diverticula. Normal appendix.  Peritoneal cavity: No ascites.  Lymph nodes: No enlarged nodes by size criteria.  Aorta: No aneurysm. Atherosclerosis.  Pelvic organs: Unremarkable.  Fat-containing right inguinal hernia.     MUSCULOSKELETAL STRUCTURES: No acute fracture or destructive lesion. Median sternotomy. L3-L4 artificial disc.     IMPRESSION:  1.  Sigmoid colon mass, consistent with known malignancy.  2.  Several small subcentimeter hepatic lesions, incompletely characterized on this study. The need to be further evaluated with contrast enhanced liver MRI.  3.  No other suspicious lesions in the chest, abdomen or pelvis.     CT chest 11/27/2024  CLINICAL DATA: CHRONIC COLITIS  (MASS IN DECENDING COLON ),     TECHNICAL: CT scan of the chest was performed following intravenous administration of 100 cc of nonionic IV contrast.   Dose reduction techniques including automated exposure control were used.   This patient has received 0 CT scans and 0 myocardial perfusion scans during the past 12 months.     COMPARISON: None     FINDINGS:   LUNGS:   Right middle lobe linear band of density consistent with atelectasis.   Right middle lobe 5 mm chronic calcified granuloma.   Left upper lobe lingular and left lower lobe linear scar.   No effusions.   Trachea and bronchi are unremarkable.   Lung  hyperinflation.     Mediastinum and dany:   No adenopathy.   Thoracic aorta is normal in caliber.   Pulmonary arteries exhibited no embolism.   Coronary arteries are heavily calcified.  Post sternotomy.  Heart is normal in size.     The distal esophagus and proximal stomach exhibits wall thickening.     Thoracic spine exhibits degenerative change.      IMPRESSION:   Wall thickening of the distal esophagus and proximal stomach is present of unknown etiology.   No evidence of metastatic disease to the chest          Pathology:  01/07/2025 SURGICAL PATHOLOGY CONSULTATION   FINAL DIAGNOSIS:  A. Sigmoid colon and rectum, low anterior resection:          Invasive moderately-differentiated adenocarcinoma (3.3 cm) (see           Tumor synoptic report).          Tumor invades into the pericolonic tissue.          Lymphovascular invasion is present.          Margins negative for tumor.          Five out of twenty lymph nodes, positive for metastatic           carcinoma (5/20).   B. Anastomotic donuts:          Benign colonic tissue, negative for dysplasia and malignancy.     COLON AND RECTUM: Resection     SPECIMEN    Procedure:  Low anterior resection   TUMOR    Tumor Site:  Sigmoid colon     Histologic Type:  Adenocarcinoma     Histologic Grade:  G2, moderately differentiated     Tumor Size:  Greatest dimension (Centimeters) - 3.3 cm     Tumor Extent:  Invades through muscularis propria into the     pericolonic or perirectal tissue     Macroscopic Tumor Perforation:  Not identified     Lymphatic and / or Vascular Invasion:  Small vessel, Large vessel     (venous), extramural     Perineural Invasion:  Present     Tumor Budding Score:  Low (0-4)     Treatment Effect:  No known presurgical therapy   MARGINS    Margin Status for Invasive Carcinoma:  All margins negative for     invasive carcinoma     Margin Status for Non-Invasive Tumor:  All margins negative for     high-grade dysplasia / intramucosal carcinoma and low-grade  dysplasia   REGIONAL LYMPH NODES     Regional Lymph Node Status:  Tumor present in regional lymph node(s)       Number of Lymph Nodes with Tumor:  5       Number of Lymph Nodes Examined:  20     Tumor Deposits:  Not identified   pTNM CLASSIFICATION (AJCC 8th Edition)   Reporting of pT, pN, and (when applicable) pM categories is based on   information available to the pathologist at the time the report is   issued. As per the AJCC (Chapter 1, 8th Ed.) it is the managing   physician's responsibility to establish the final pathologic stage   based upon all pertinent information, including but potentially not   limited to this pathology report.     pT Category:  pT3     pN Category:  pN2a   ADDITIONAL FINDINGS     Additional Findings:  Diverticulosis   SPECIAL STUDIES     MMR IHC: MMR proficient per report in EMR     Comment: Adequate tumor is present in Block A4 for further testing if   additional studies are clinically indicated.                                       Diagnosis performed by:                                       CLIFF MIRANDA MD      11/27/2024       Procedures  06/27/25: Colonoscopy     01/07/2025: laparoscopic low anterior resection   11/27/24: Colonoscopy And EGD         Diagnosis:     1. Malignant neoplasm of sigmoid colon (HCC)        2. Perirectal abscess                Medical Decision Making:  Today's Assessment / Status / Plan:   This patient will be scheduled for urgent incision and drainage of perirectal abscess with examination under anesthesia possible fistulotomy versus seton placement.  Risks, benefits, and alternatives were discussed with consenting person(s). Consenting person(s) were given an opportunity to ask questions and discuss other options. Risks including but not limited to failed or incomplete planned procedure, ineffective therapy, perforation, infection, bleeding, missed lesion(s), missed diagnosis, cardiac and/or pulmonary event, aspiration, stroke, possible need for  surgery, hospitalization possibly prolonged, discomfort, unsuccessful and/or incomplete procedure, possible need for repeat procedures and/or additional testings, damage to adjacent organs and/or vascular structures, medication reaction, disability, death, and other adverse events possibly life-threatening. Discussion was undertaken with Layman's terms. Consenting persons stated understanding and acceptance of these risks, and wished to proceed. Consent was given in clear state of mind.  Heladio Bautista MD PhD  [facility]  Colon and Rectal Surgeon  [phone]              [1]   Past Medical History:  Diagnosis Date    Arthritis 04/28/2023    general body    Blood clotting disorder ([facility]) 1996    leg    CAD (coronary artery disease)     Cancer ([facility]) 12/23/2024    Malignant neoplasm of sigmoid colon    Dental disorder 04/28/2023    upper partial, bridge lower front times 3, having right upper posterior tooth removed 4/28/23    Heart attack ([facility]) 08/16/2016    cardiology, Dr. Frias    Heart burn 04/28/2023    medicated    Hiatus hernia syndrome     High cholesterol 04/28/2023    medicated    Hyperlipidemia     Hypertension 04/28/2023    medicated    Iron deficiency anemia     Kidney disease     Kidney stone 04/28/2023    history of    Obesity (BMI 30-39.9) 02/12/2015    Osteoarthritis 03/02/2015    Pneumonia 2016   [2]   Past Surgical History:  Procedure Laterality Date    AL SIGMOIDOSCOPY,DIAGNOSTIC  1/7/2025    Procedure: SIGMOIDOSCOPY, FLEXIBLE;  Surgeon: Heladio Bautista M.D.;  Location: SURGERY [facility];  Service: Gen Robotic    LOW ANTERIOR RESECTION ROBOTIC XI  1/7/2025    Procedure: ROBOTIC LOW ANTERIOR RESECTION WITH FLEXIBLE SIGMOIDOSCOPY;  Surgeon: Heladio Bautista M.D.;  Location: SURGERY [facility];  Service: Gen Robotic    COLONOSCOPY  12/2024    AL LAP ESOPHAGOGAST FUNDOPLASTY N/A 05/01/2023    Procedure: ROBOTIC HIATAL HERNIA REPAIR WITH TOUPET FUNDOPLICATION;  Surgeon: Campbell Berg,  "M.D.;  Location: SURGERY [facility];  Service: Gen Robotic    OK UPPER GI ENDOSCOPY,DIAGNOSIS N/A 2023    Procedure: ESOPHAGOGASTRODUODENOSCOPY;  Surgeon: Campbell Berg M.D.;  Location: SURGERY [facility];  Service: Gen Robotic    OK UPPER GI ENDOSCOPY,DIAGNOSIS N/A 2023    Procedure: GASTROSCOPY;  Surgeon: Hieu Arevalo M.D.;  Location: SURGERY SAME DAY [facility];  Service: Gastroenterology    OK UPPER GI ENDOSCOPY,CTRL BLEED N/A 2023    Procedure: EGD, WITH CLIP PLACEMENT;  Surgeon: Hieu Arevalo M.D.;  Location: SURGERY SAME DAY [facility];  Service: Gastroenterology    KNEE ARTHROPLASTY TOTAL Left 2017    Procedure: KNEE ARTHROPLASTY TOTAL;  Surgeon: Charles Castellanos M.D.;  Location: SURGERY [facility];  Service:     MULTIPLE CORONARY ARTERY BYPASS  2016    LAMINOTOMY  2012    lumbar    KNEE ARTHROSCOPY Left 1990    OTHER SURGICAL PROCEDURE Left 1990    \"removal of vein left leg due to blood clot\"    SHOULDER ARTHROTOMY Right 1980   [3] No Known Allergies  [4]   Outpatient Encounter Medications as of 2025   Medication Sig Dispense Refill    [] sulfamethoxazole-trimethoprim (BACTRIM DS) 800-160 MG tablet Take 1 Tablet by mouth 2 times a day for 7 days. 14 Tablet 0    diphenoxylate-atropine (LOMOTIL) 2.5-0.025 MG Tab Take 1 Tablet by mouth 4 times a day as needed for Diarrhea. (Patient not taking: No sig reported)      aspirin 81 MG EC tablet Take 81 mg by mouth every day.      capecitabine (XELODA) 500 MG tablet Take 4 tablets of 500mg (total dose 2,000mg) by mouth 2 times a day on day 1-14 of each 21 day cycle.  Starting on day 1 of IV chemotherapy. (Patient not taking: No sig reported) 112 Tablet 5    losartan (COZAAR) 25 MG Tab Take 1 Tablet by mouth every day. (Patient taking differently: Take 25 mg by mouth every evening.) 90 Tablet 4    metoprolol tartrate (LOPRESSOR) 25 MG Tab Take 1 Tablet by mouth 2 times a day. 180 Tablet 4    " rosuvastatin (CRESTOR) 40 MG tablet Take 1 Tablet by mouth every evening. 90 Tablet 4    ferrous sulfate 325 (65 Fe) MG tablet Take 1 Tab by mouth every morning with breakfast. (Patient not taking: Reported on 7/3/2025) 30 Tab 3    therapeutic multivitamin-minerals (THERAGRAN-M) Tab Take 1 Tab by mouth every day. (Patient not taking: No sig reported) 30 Tab 11    ascorbic acid (VITAMIN C) 500 MG tablet Take 1 Tab by mouth every day. 30 Tab 3     No facility-administered encounter medications on file as of 2025.   [5]   Social History  Tobacco Use   Smoking Status Former    Current packs/day: 0.00    Average packs/day: 0.3 packs/day for 10.0 years (2.5 ttl pk-yrs)    Types: Cigarettes    Start date: 3/19/2006    Quit date: 3/19/2016    Years since quittin.3   Smokeless Tobacco Never

## 2025-07-28 ENCOUNTER — OFFICE VISIT (OUTPATIENT)
Facility: MEDICAL CENTER | Age: 70
End: 2025-07-28
Payer: COMMERCIAL

## 2025-07-28 VITALS
WEIGHT: 171.96 LBS | BODY MASS INDEX: 24.07 KG/M2 | DIASTOLIC BLOOD PRESSURE: 56 MMHG | HEIGHT: 71 IN | OXYGEN SATURATION: 96 % | SYSTOLIC BLOOD PRESSURE: 106 MMHG | TEMPERATURE: 98.5 F | HEART RATE: 81 BPM

## 2025-07-28 DIAGNOSIS — C18.7 MALIGNANT NEOPLASM OF SIGMOID COLON (HCC): Primary | ICD-10-CM

## 2025-07-28 DIAGNOSIS — K61.1 PERIRECTAL ABSCESS: ICD-10-CM

## 2025-07-28 ASSESSMENT — ENCOUNTER SYMPTOMS
BLURRED VISION: 0
BLOOD IN STOOL: 0
CHILLS: 0
BACK PAIN: 0
FALLS: 0
CONSTIPATION: 0
WEIGHT LOSS: 0
COUGH: 0
BRUISES/BLEEDS EASILY: 0
SHORTNESS OF BREATH: 0
NAUSEA: 0
DIZZINESS: 0
HEMOPTYSIS: 0
VOMITING: 0
HEADACHES: 0
DIARRHEA: 0
DEPRESSION: 0
NERVOUS/ANXIOUS: 0
WEAKNESS: 0
FEVER: 0

## 2025-07-28 ASSESSMENT — FIBROSIS 4 INDEX: FIB4 SCORE: 1.94

## 2025-07-29 ENCOUNTER — PRE-ADMISSION TESTING (OUTPATIENT)
Dept: ADMISSIONS | Facility: MEDICAL CENTER | Age: 70
End: 2025-07-29
Attending: SURGERY
Payer: COMMERCIAL

## 2025-07-29 ENCOUNTER — ANESTHESIA EVENT (OUTPATIENT)
Dept: SURGERY | Facility: MEDICAL CENTER | Age: 70
End: 2025-07-29
Payer: COMMERCIAL

## 2025-07-29 NOTE — PREADMIT AVS NOTE
Current Medications   Medication Instructions    aspirin 81 MG EC tablet Follow instructions from surgeon or specialist.    losartan (COZAAR) 25 MG Tab Stop 24 hours before surgery    metoprolol tartrate (LOPRESSOR) 25 MG Tab Continue taking as prescribed.    rosuvastatin (CRESTOR) 40 MG tablet Continue taking as prescribed.    therapeutic multivitamin-minerals (THERAGRAN-M) Tab Stop 7 days before surgery    ascorbic acid (VITAMIN C) 500 MG tablet Stop 7 days before surgery

## 2025-07-29 NOTE — OR NURSING
PAT done with pt for upcoming procedure with Dr. Bautista on 07/30/2025.    Med instructions given to patient per Guidelines for Pre-operative Medication Management. Medication list with written instructions sent to pt via [email or web address]. Pt also advised to hold vitamins for 7 days prior to surgery and hold NSAIDs for 5 days prior to surgery. Pt has been taking aspirin 81 mg daily, last dose yesterday. Pt stated that he didn't receive any instructions to hold this medication. Pt stated that he wouldn't take it tonight. Will call Dr. Bautista's office to notify-spoke to Elda, surgery scheduler for Dr. Bautista, to notify. Elda to give pt a call today to advise.    Pre-procedure packet reviewed with patient. Patient to follow fasting guidelines/bathing instructions given per anesthesia/surgeon. Patient verbalized understanding of all instructions.     Encouraged increased oral fluid intake the day prior to procedure/surgery (if not fluid restricted) including intake of electrolyte drinks such as Gatorade or electrolyte water. Patient may have clear liquids until 2 hours prior to surgery, unless otherwise instructed from surgeon. Patient aware to follow surgeon's fasting guidelines if they are different from anesthesia guidelines that have been given.    Patient to have labs/testing done on DOS.     Patient has no further questions at this time.

## 2025-07-30 ENCOUNTER — ANESTHESIA (OUTPATIENT)
Dept: SURGERY | Facility: MEDICAL CENTER | Age: 70
End: 2025-07-30
Payer: COMMERCIAL

## 2025-07-30 ENCOUNTER — HOSPITAL ENCOUNTER (OUTPATIENT)
Facility: MEDICAL CENTER | Age: 70
End: 2025-07-30
Attending: SURGERY | Admitting: SURGERY
Payer: COMMERCIAL

## 2025-07-30 PROBLEM — K61.1 PERIRECTAL ABSCESS: Status: ACTIVE | Noted: 2025-07-30

## 2025-07-30 PROCEDURE — 700111 HCHG RX REV CODE 636 W/ 250 OVERRIDE (IP): Mod: JZ | Performed by: STUDENT IN AN ORGANIZED HEALTH CARE EDUCATION/TRAINING PROGRAM

## 2025-07-30 PROCEDURE — 700105 HCHG RX REV CODE 258: Performed by: SURGERY

## 2025-07-30 PROCEDURE — 700101 HCHG RX REV CODE 250: Performed by: STUDENT IN AN ORGANIZED HEALTH CARE EDUCATION/TRAINING PROGRAM

## 2025-07-30 RX ORDER — LIDOCAINE HYDROCHLORIDE 20 MG/ML
INJECTION, SOLUTION EPIDURAL; INFILTRATION; INTRACAUDAL; PERINEURAL PRN
Status: DISCONTINUED | OUTPATIENT
Start: 2025-07-30 | End: 2025-07-30 | Stop reason: SURG

## 2025-07-30 RX ORDER — METRONIDAZOLE 500 MG/100ML
INJECTION, SOLUTION INTRAVENOUS PRN
Status: DISCONTINUED | OUTPATIENT
Start: 2025-07-30 | End: 2025-07-30 | Stop reason: SURG

## 2025-07-30 RX ORDER — ROCURONIUM BROMIDE 10 MG/ML
INJECTION, SOLUTION INTRAVENOUS PRN
Status: DISCONTINUED | OUTPATIENT
Start: 2025-07-30 | End: 2025-07-30 | Stop reason: SURG

## 2025-07-30 RX ORDER — CEFAZOLIN SODIUM 1 G/3ML
INJECTION, POWDER, FOR SOLUTION INTRAMUSCULAR; INTRAVENOUS PRN
Status: DISCONTINUED | OUTPATIENT
Start: 2025-07-30 | End: 2025-07-30 | Stop reason: SURG

## 2025-07-30 RX ADMIN — PROPOFOL 180 MG: 10 INJECTION, EMULSION INTRAVENOUS at 10:42

## 2025-07-30 RX ADMIN — FENTANYL CITRATE 75 MCG: 50 INJECTION, SOLUTION INTRAMUSCULAR; INTRAVENOUS at 10:50

## 2025-07-30 RX ADMIN — CEFAZOLIN 2 G: 1 INJECTION, POWDER, FOR SOLUTION INTRAMUSCULAR; INTRAVENOUS at 10:46

## 2025-07-30 RX ADMIN — SUGAMMADEX 200 MG: 100 INJECTION, SOLUTION INTRAVENOUS at 10:54

## 2025-07-30 RX ADMIN — ROCURONIUM BROMIDE 40 MG: 10 INJECTION INTRAVENOUS at 10:42

## 2025-07-30 RX ADMIN — METRONIDAZOLE 500 MG: 5 INJECTION, SOLUTION INTRAVENOUS at 10:50

## 2025-07-30 RX ADMIN — PROPOFOL 20 MG: 10 INJECTION, EMULSION INTRAVENOUS at 10:54

## 2025-07-30 RX ADMIN — FENTANYL CITRATE 25 MCG: 50 INJECTION, SOLUTION INTRAMUSCULAR; INTRAVENOUS at 10:42

## 2025-07-30 RX ADMIN — LIDOCAINE HYDROCHLORIDE 100 MG: 20 INJECTION, SOLUTION EPIDURAL; INFILTRATION; INTRACAUDAL; PERINEURAL at 10:42

## 2025-07-30 RX ADMIN — SODIUM CHLORIDE, POTASSIUM CHLORIDE, SODIUM LACTATE AND CALCIUM CHLORIDE: 600; 310; 30; 20 INJECTION, SOLUTION INTRAVENOUS at 10:38

## 2025-07-30 ASSESSMENT — PAIN DESCRIPTION - PAIN TYPE: TYPE: ACUTE PAIN

## 2025-07-30 ASSESSMENT — FIBROSIS 4 INDEX: FIB4 SCORE: 1.94

## 2025-07-30 NOTE — DISCHARGE INSTR - OTHER INFO
DISCHARGE INSTRUCTIONS AFTER DRAINAGE OF PERIRECTAL ABSCESS    An abscess around the anus develops as a result of an infection in glands within the anal canal. Although this infection usually does not become serious, occasionally it may reach the deeper tissues surrounding the anus. This results in the formation of a painful collection of fluid and pus. An incision and drainage of the infected fluid can relieve this pain. The wound that is made is left open to allow any residual pus to drain. Sometimes a dressing is placed in the wound as well.    Following the drainage of an abscess, there is approximately a 50-50 chance for the further development of a fistula. A fistula is a tunnel beneath the skin that starts at the gland which caused the infection and runs to the area of the abscess and then out onto the anal skin. This causes persistent drainage. A fistula may result in the development of further abscesses in the future. It is therefore imperative that patients follow up with their surgeon following drainage  of their abscess.    HOME CARE:  A dressing has been placed over the wound. This should be left in place until you take your first tub bath (sitz bath). This may be in the evening or next morning following your surgery. The outer dressing should be removed. There is usually a dressing in the wound and this should be soaked and then pulled out while sitting in a tub of very warm water. You should then continue to take sitz baths with warm water three times a day for 10-15 minutes. Your wound may continue to drain a large amount over the next several days as the infection slowly heals. Wear a gauze dressing to the wound to protect your clothing. You may also use a sanitary napkin for further protection of your clothing. You may notice bloody discharge for the next four to seven days.    Patients have much less pain after the surgery than they had prior to the surgery. However, depending on the size of the  abscess there may be residual discomfort for a few days. Pain should slowly decrease. After a few days if there is a change in course and pain begins to intensify call the office. You will be given a prescription for pain medication. Follow the directions given by your doctor for taking this medication. After a day or two, if the pain is subsiding try to use just plain Tylenol to ease residual discomfort. To avoid upset stomach, take your pain medication as prescribed with food in your stomach. Take these drugs exactly as directed. Never take more than the recommended dose, and do not  take the drugs more often than directed. If the drugs do not seem to be working, call the office for advice. Do not share these or any other prescription drugs with others because the drug may have a completely different effect on the person for whom it was not prescribed.    Some people experience drowsiness, dizziness, lightheadedness, or a false sense of well-being after taking opioid analgesics. Anyone who takes these drugs should not drive, use machines, or do anything else that might be dangerous until they know how the drug affects them.    Nausea and vomiting are common side effects, especially when first beginning to take the medicine. If these symptoms do not go away after the first few doses, check with the physician who prescribed the medicine. Side effects may include: dizziness, lightheadedness, nausea, sedation, vomiting, if these side effects occur, it may help if you lie down after taking the medication.     Avoid strenuous activity for 1 week after your procedure.    Take sitz baths (sit for 15-20 minutes in warm water) three times a day and after each bowel movement for the first few days.    Don’t worry if you have some bleeding, discharge, or itching during your recovery. This is normal.    Avoid constipation.    Take Benefiber or other psyllium product (Metamucil, Citrucel, Konsyl, etc) one teaspoon twice a day.  Take a stool softener such as Colace or Surfak twice a day as well.    The use of dry toilet tissue should be avoided. After bowel movements use a wet Kleenex, cotton or Tuck’s pads to clean yourself, or if possible, take a warm bath.    Eat a regular diet including plenty of fresh fruit and vegetables. Drink 6-8 glasses of water a day.    Call the office if your temperature is greater than 101 degrees.    APPOINTMENT: Contact our office for a follow-up appointment in 1-2 weeks following your procedure.    If you have any additional questions, please do not hesitate to call the office and speak to either myself or the physician on call.    Office address:   [address] [address]    Heladio Bautista MD PhD  Colon and Rectal Surgeon  [phone]

## 2025-07-30 NOTE — ANESTHESIA POSTPROCEDURE EVALUATION
Patient: Talib Ortiz    Procedure Summary       Date: 07/30/25 Room / Location: [place] [address] / SURGERY [facility]    Anesthesia Start: 1038 Anesthesia Stop: 1107    Procedure: INCISION AND DRAINAGE OF PERIRECTAL ABSCESS (Perineum) Diagnosis: (PERIRECTAL ABSCESS)    Surgeons: Heladio Bautista M.D. Responsible Provider: Avni Nichols D.O.    Anesthesia Type: general ASA Status: 3            Final Anesthesia Type: general  Last vitals  BP   Blood Pressure : 119/63    Temp   36.3 °C (97.4 °F)    Pulse   65   Resp   (!) 28    SpO2   100 %      Anesthesia Post Evaluation    Patient location during evaluation: PACU  Patient participation: complete - patient participated  Level of consciousness: awake and alert    Airway patency: patent  Anesthetic complications: no  Cardiovascular status: hemodynamically stable  Respiratory status: acceptable  Hydration status: euvolemic    PONV: none          No notable events documented.     Nurse Pain Score: 0 (NPRS)

## 2025-07-30 NOTE — ANESTHESIA PROCEDURE NOTES
Airway    Date/Time: 7/30/2025 10:43 AM    Performed by: Avni Nichols D.O.  Authorized by: Avni Nichols D.O.    Location:  OR  Urgency:  Elective  Difficult Airway: No    Indications for Airway Management:  Anesthesia      Spontaneous Ventilation: absent    Sedation Level:  Deep  Preoxygenated: Yes    Patient Position:  Sniffing  MILS Maintained Throughout: No    Mask Difficulty Assessment:  0 - not attempted  Final Airway Type:  Endotracheal airway  Final Endotracheal Airway:  ETT  Cuffed: Yes    Technique Used for Successful ETT Placement:  Direct laryngoscopy    Insertion Site:  Oral  Blade Type:  Pierre  Laryngoscope Blade/Videolaryngoscope Blade Size:  2  ETT Size (mm):  7.0  Measured from:  Teeth  ETT to Teeth (cm):  22  Placement Verified by: auscultation and capnometry    Cormack-Lehane Classification:  Grade IIa - partial view of glottis  Number of Attempts at Approach:  1  Ventilation Between Attempts:  None  Number of Other Approaches Attempted:  0

## 2025-07-30 NOTE — ANESTHESIA PREPROCEDURE EVALUATION
Case: [id number] Date/Time: 07/30/25 1210    Procedure: INCISION AND DRAINAGE OF PERIRECTAL ABSCESS    Pre-op diagnosis: PERIRECTAL ABSCESS    Location: [place] OR 12 / SURGERY [facility]    Surgeons: SAROJ Caseys H&P:  PAST MEDICAL HISTORY:   69 y.o. male who presents for Procedure(s):  INCISION AND DRAINAGE OF PERIRECTAL ABSCESS.  He has current and past medical problems significant for:    Past Medical History[1]    SMOKING/ALCOHOL/RECREATIONAL DRUG USE:  Social History[2]  Social History     Substance and Sexual Activity   Drug Use No       PAST SURGICAL HISTORY:  Past Surgical History[3]    ALLERGIES:   Allergies[4]    MEDICATIONS:  Medications Ordered Prior to Encounter[5]    LABS:  Lab Results   Component Value Date/Time    HEMOGLOBIN 14.1 05/16/2025 1450    HEMATOCRIT 40.7 (L) 05/16/2025 1450    WBC 6.1 05/16/2025 1450     Lab Results   Component Value Date/Time    SODIUM 136 05/16/2025 1450    POTASSIUM 3.0 (L) 05/16/2025 1450    CHLORIDE 104 05/16/2025 1450    CO2 16 (L) 05/16/2025 1450    GLUCOSE 133 (H) 05/16/2025 1450    BUN 16 05/16/2025 1450    CALCIUM 9.1 05/16/2025 1450         PREVIOUS ANESTHETICS: See EMR  __________________________________________    Relevant Problems   CARDIAC   (positive) Coronary artery disease due to calcified coronary lesion: CABG ×2 in August 2016   (positive) Essential hypertension, benign      GI   (positive) Gastroesophageal reflux disease without esophagitis   (positive) Hiatal hernia with GERD      Other   (positive) Osteoarthritis   (positive) Primary osteoarthritis of left knee       Physical Exam    Airway   Mallampati: II  TM distance: >3 FB  Neck ROM: full       Cardiovascular - normal exam  Rhythm: regular  Rate: normal    (-) murmur     Dental - normal exam  Comments: Partials on upper           Pulmonary - normal examBreath sounds clear to auscultation     Abdominal    Neurological - normal exam                   Anesthesia Plan    ASA 3    ASA physical status 3 criteria: CAD (>3 months)    Plan - general       Airway plan will be ETT          Induction: intravenous    Postoperative Plan: Postoperative administration of opioids is intended.    Pertinent diagnostic labs and testing reviewed    Informed Consent:    Anesthetic plan and risks discussed with patient.    Use of blood products discussed with: patient whom consented to blood products.                [1]   Past Medical History:  Diagnosis Date    Arthritis 2023    general body    Blood clotting disorder (HCC)     left leg    CAD (coronary artery disease)     Cancer (HCC) 2024    Malignant neoplasm of sigmoid colon-chemo    Dental disorder 2023    upper partial, bridge lower front times 3, having right upper posterior tooth removed 23    Heart attack (HCC) 2016    Dr. Harvey-cardiologist; triple bypass    Hiatus hernia syndrome     surgery done    High cholesterol 2023    medicated    Hyperlipidemia     Hypertension 2023    medicated    Iron deficiency anemia     Kidney disease     Kidney stone 2023    history of    Obesity (BMI 30-39.9) 2015    Osteoarthritis 2015    Pneumonia 2016   [2]   Social History  Tobacco Use    Smoking status: Former     Current packs/day: 0.00     Average packs/day: 0.3 packs/day for 10.0 years (2.5 ttl pk-yrs)     Types: Cigarettes     Start date: 3/19/2006     Quit date: 3/19/2016     Years since quittin.3    Smokeless tobacco: Never   Vaping Use    Vaping status: Never Used   Substance Use Topics    Alcohol use: Not Currently     Comment: Quit years    Drug use: No   [3]   Past Surgical History:  Procedure Laterality Date    LA SIGMOIDOSCOPY,DIAGNOSTIC  2025    Procedure: SIGMOIDOSCOPY, FLEXIBLE;  Surgeon: Heladio Bautista M.D.;  Location: SURGERY [facility];  Service: Gen Robotic    LOW ANTERIOR RESECTION ROBOTIC XI  2025    Procedure: ROBOTIC LOW ANTERIOR RESECTION WITH FLEXIBLE  "SIGMOIDOSCOPY;  Surgeon: Heladio Bautista M.D.;  Location: SURGERY [facility];  Service: Gen Robotic    COLONOSCOPY  12/2024    [place] LAP ESOPHAGOGAST FUNDOPLASTY N/A 05/01/2023    Procedure: ROBOTIC HIATAL HERNIA REPAIR WITH TOUPET FUNDOPLICATION;  Surgeon: Campbell Berg M.D.;  Location: SURGERY [facility];  Service: Gen Robotic    N[place] UPPER GI ENDOSCOPY,DIAGNOSIS N/A 05/01/2023    Procedure: ESOPHAGOGASTRODUODENOSCOPY;  Surgeon: Campbell Berg M.D.;  Location: SURGERY [facility];  Service: Gen Robotic    [place] UPPER GI ENDOSCOPY,DIAGNOSIS N/A 02/08/2023    Procedure: GASTROSCOPY;  Surgeon: Hieu Arevalo M.D.;  Location: SURGERY SAME DAY [facility];  Service: Gastroenterology    [place] UPPER GI ENDOSCOPY,CTRL BLEED N/A 02/08/2023    Procedure: EGD, WITH CLIP PLACEMENT;  Surgeon: Hieu Arevalo M.D.;  Location: SURGERY SAME DAY [facility];  Service: Gastroenterology    KNEE ARTHROPLASTY TOTAL Left 06/19/2017    Procedure: KNEE ARTHROPLASTY TOTAL;  Surgeon: Charles Castellanos M.D.;  Location: SURGERY [facility];  Service:     MULTIPLE CORONARY ARTERY BYPASS  08/16/2016    LAMINOTOMY  01/01/2012    lumbar    KNEE ARTHROSCOPY Left 09/01/1990    OTHER SURGICAL PROCEDURE Left 09/01/1990    \"removal of vein left leg due to blood clot\"    SHOULDER ARTHROTOMY Right 09/01/1980    OTHER ORTHOPEDIC SURGERY Right     right hand surgery   [4] No Known Allergies  [5]   No current facility-administered medications on file prior to encounter.     Current Outpatient Medications on File Prior to Encounter   Medication Sig Dispense Refill    diphenoxylate-atropine (LOMOTIL) 2.5-0.025 MG Tab Take 1 Tablet by mouth 4 times a day as needed for Diarrhea. (Patient not taking: No sig reported)      aspirin 81 MG EC tablet Take 81 mg by mouth every day.      capecitabine (XELODA) 500 MG tablet Take 4 tablets of 500mg (total dose 2,000mg) by mouth 2 times a day on day 1-14 of each 21 day cycle.  Starting on day 1 of IV chemotherapy. (Patient not " taking: No sig reported) 112 Tablet 5    losartan (COZAAR) 25 MG Tab Take 1 Tablet by mouth every day. (Patient taking differently: Take 25 mg by mouth every evening.) 90 Tablet 4    metoprolol tartrate (LOPRESSOR) 25 MG Tab Take 1 Tablet by mouth 2 times a day. 180 Tablet 4    rosuvastatin (CRESTOR) 40 MG tablet Take 1 Tablet by mouth every evening. 90 Tablet 4    ferrous sulfate 325 (65 Fe) MG tablet Take 1 Tab by mouth every morning with breakfast. (Patient not taking: Reported on 7/3/2025) 30 Tab 3    therapeutic multivitamin-minerals (THERAGRAN-M) Tab Take 1 Tab by mouth every day. 30 Tab 11    ascorbic acid (VITAMIN C) 500 MG tablet Take 1 Tab by mouth every day. 30 Tab 3

## 2025-07-30 NOTE — INTERVAL H&P NOTE
Consented Procedure: INCISION AND DRAINAGE OF PERIRECTAL ABSCESS  I have examined the patient, provided the risks, benefits, and alternatives to the procedure(s) indicated on the signed consent form, and the patient wishes to proceed.    H&P reviewed. The patient was examined and there are no changes to the H&P      Heladio Bautista M.D.  07/30/25 10:12 AM

## 2025-07-30 NOTE — DISCHARGE INSTRUCTIONS
HOME CARE INSTRUCTIONS    ACTIVITY: Rest and take it easy for the first 24 hours.  A responsible adult is recommended to remain with you during that time.  It is normal to feel sleepy.  We encourage you to not do anything that requires balance, judgment or coordination.    FOR 24 HOURS DO NOT:  Drive, operate machinery or run household appliances.  Drink beer or alcoholic beverages.  Make important decisions or sign legal documents.    SPECIAL INSTRUCTIONS: see above    DIET: To avoid nausea, slowly advance diet as tolerated, avoiding spicy or greasy foods for the first day.  Add more substantial food to your diet according to your physician's instructions.  Babies can be fed formula or breast milk as soon as they are hungry.  INCREASE FLUIDS AND FIBER TO AVOID CONSTIPATION.    SURGICAL DRESSING/BATHING: see above    MEDICATIONS: Resume taking daily medication.  Take prescribed pain medication with food.  If no medication is prescribed, you may take non-aspirin pain medication if needed.  PAIN MEDICATION CAN BE VERY CONSTIPATING.  Take a stool softener or laxative such as senokot, pericolace, or milk of magnesia if needed.        A follow-up appointment should be arranged with your doctor in 1-2 weeks; call to schedule.    You should CALL YOUR PHYSICIAN if you develop:  Fever greater than 101 degrees F.  Pain not relieved by medication, or persistent nausea or vomiting.  Excessive bleeding (blood soaking through dressing) or unexpected drainage from the wound.  Extreme redness or swelling around the incision site, drainage of pus or foul smelling drainage.  Inability to urinate or empty your bladder within 8 hours.  Problems with breathing or chest pain.    You should call 911 if you develop problems with breathing or chest pain.  If you are unable to contact your doctor or surgical center, you should go to the nearest emergency room or urgent care center.  Physician's telephone #: [phone]    MILD FLU-LIKE  SYMPTOMS ARE NORMAL.  YOU MAY EXPERIENCE GENERALIZED MUSCLE ACHES, THROAT IRRITATION, HEADACHE AND/OR SOME NAUSEA.    If any questions arise, call your doctor.  If your doctor is not available, please feel free to call the Surgical Center at [phone].  The Center is open Monday through Friday from 7AM to 7PM.      A registered nurse may call you a few days after your surgery to see how you are doing after your procedure.    You may also receive a survey in the mail within the next two weeks and we ask that you take a few moments to complete the survey and return it to us.  Our goal is to provide you with very good care and we value your comments.     Depression / Suicide Risk    As you are discharged from this [facility] facility, it is important to learn how to keep safe from harming yourself.    Recognize the warning signs:  Abrupt changes in personality, positive or negative- including increase in energy   Giving away possessions  Change in eating patterns- significant weight changes-  positive or negative  Change in sleeping patterns- unable to sleep or sleeping all the time   Unwillingness or inability to communicate  Depression  Unusual sadness, discouragement and loneliness  Talk of wanting to die  Neglect of personal appearance   Rebelliousness- reckless behavior  Withdrawal from people/activities they love  Confusion- inability to concentrate     If you or a loved one observes any of these behaviors or has concerns about self-harm, here's what you can do:  Talk about it- your feelings and reasons for harming yourself  Remove any means that you might use to hurt yourself (examples: pills, rope, extension cords, firearm)  Get professional help from the community (Mental Health, Substance Abuse, psychological counseling)  Do not be alone:Call your Safe Contact- someone whom you trust who will be there for you.  Call your local CRISIS HOTLINE 204-0913 or 577-495-6495  Call your local Children's Mobile  Crisis Response Team [place] (622) 668-0534 or www.[employer]  Call the toll free National Suicide Prevention Hotlines   National Suicide Prevention Lifeline 220-685-LFCE (7344)  [facility] Line Network 800-SUICIDE (697-8572)    I acknowledge receipt and understanding of these Home Care instructions.

## 2025-07-30 NOTE — OR NURSING
Report to Awilda BELTRAN. Plan of care discussed. Patient denies pain or nausea. VSS. Tolerating juice without difficulty. Patient expresses readiness for discharge.

## 2025-07-30 NOTE — ANESTHESIA TIME REPORT
Anesthesia Start and Stop Event Times       Date Time Event    7/30/2025 1014 Ready for Procedure     1038 Anesthesia Start     1107 Anesthesia Stop          Responsible Staff  07/30/25      Name Role Begin End    Avni Nichols D.O. Anesth 1038 1107          Overtime Reason:  no overtime (within assigned shift)    Comments:

## 2025-07-30 NOTE — OR NURSING
Discharge instruct ions to pt and spouse. Both verbalized understanding. Wg=heelchair to car for discharge.

## 2025-07-31 NOTE — OP REPORT
DATE OF OPERATION: 7/30/2025    PreOp Diagnosis: Perirectal abscess    PostOp Diagnosis: Perirectal abscess    Procedure(s) (LRB):  Incision and drainage of perirectal abscess    Surgeon(s):  Adeline Bautista M.D.    Anesthesiologist/Type of Anesthesia:  Anesthesiologist: Dr. Avni Nichols./General    Specimen: none    Estimated Blood Loss: 10mL    Findings: 2 cm perirectal abscess not involving the anus    Complications: none    INDICATIONS FOR PROCEDURE: This 69 y.o. male  with a history of perirectal abscess. His possible risks and complications of treatment were described in detail to the patient including incontinence to stool, liquid or gas, infection, bleeding, trouble urinating and very rarely narrowing in the anal canal. The patient expresses understanding of the risks and benefits of the proposed procedure and wished to proceed.    PROCEDURE IN BRIEF:   The patient was brought to the operating room placed under general anesthesia with bilateral SCDs in place. The patient was then moved into the prone jackknife position. a timeout was completed verifying the correct patient and procedure site positioning and availability of equipment prior to the start of surgery. The buttocks were taped apart and the perineum was prepped and draped in the usual standard sterile fashion. Half percent Marcaine with epinephrine was injected as a perianal nerve block. The anus was carefully dilated until 3 fingers could be introduced. An anoscope was introduced and abnormalities were identified.  An incision was made overlying the area of fluctuance in the perirectal abscess and a small amount of purulence was obtained and material appearing to be a sebaceous cyst which was sent for culture as well as permanent pathology.  The patient tolerated the procedure well and was extubated and taken to the postanesthesia care unit in stable condition.    ____________________________________  ADELINE BAUTISTA MD

## (undated) DEVICE — COVER LIGHT HANDLE ALC PLUS DISP (18EA/BX)

## (undated) DEVICE — ELECTRODE DUAL RETURN W/ CORD - (50/PK)

## (undated) DEVICE — KIT ENDOSCOPIC LIGHT SIGMOIDOSCOPE WITH OBTURATOR SUCTION INSTRUMENT AND TUBING SET 8FR (10EA/CA)

## (undated) DEVICE — KIT ANESTHESIA W/CIRCUIT & 3/LT BAG W/FILTER (20EA/CA)

## (undated) DEVICE — GOWN SURGEONS X-LARGE - DISP. (30/CA)

## (undated) DEVICE — SHEARS MONOPOLAR CURVED DA VINCI 10X'S REUSABLE

## (undated) DEVICE — NEPTUNE 4 PORT MANIFOLD - (20/PK)

## (undated) DEVICE — TUBING O2 7FT TIP SMTH BORE - (50/CA)

## (undated) DEVICE — GLOVE SURGICAL PROTEXIS PI 8.0 LF - (50PR/BX)

## (undated) DEVICE — SET LEADWIRE 5 LEAD BEDSIDE DISPOSABLE ECG (1SET OF 5/EA)

## (undated) DEVICE — KIT CUSTOM PROCEDURE SINGLE FOR ENDO (15/CA)

## (undated) DEVICE — GLOVE BIOGEL SZ 8 SURGICAL PF LTX - (50PR/BX 4BX/CA)

## (undated) DEVICE — ROBOTIC SURGERY SERVICES

## (undated) DEVICE — LACTATED RINGERS INJ 1000 ML - (14EA/CA 60CA/PF)

## (undated) DEVICE — SUTURE 0 LIGATING REEL VICRYL PLUS (12PK/BX)

## (undated) DEVICE — COVER TIP ENDOWRIST HOT SHEAR - (10EA/BX) DA VINCI

## (undated) DEVICE — SEALER VESSEL EXTEND FROM DAVINCI ENERGY (6EA/BX)

## (undated) DEVICE — OBTURATOR BLADELESS STANDARD 8MM (6EA/BX)

## (undated) DEVICE — SUTURE 1 PDS PLUS CT-1 36IN (36EA/BX)

## (undated) DEVICE — SUCTION INSTRUMENT YANKAUER BULBOUS TIP W/O VENT (50EA/CA)

## (undated) DEVICE — DRAPE COLUMN BOX OF 20

## (undated) DEVICE — GLOVE, LITE (PAIR)

## (undated) DEVICE — HANDPIECE 10FT INTPLS SCT PLS IRRIGATION HAND CONTROL SET (6/PK)

## (undated) DEVICE — SLEEVE VASO CALF MED - (10PR/CA)

## (undated) DEVICE — CHLORAPREP 26 ML APPLICATOR - ORANGE TINT(25/CA)

## (undated) DEVICE — PACK TOTAL KNEE  (1/CA)

## (undated) DEVICE — BANDAGE ELASTIC STERILE VELCRO 6 X 5 YDS (25EA/CA)

## (undated) DEVICE — PAD UNIVERSAL MULTI USE (1/EA)

## (undated) DEVICE — SODIUM CHL IRRIGATION 0.9% 1000ML (12EA/CA)

## (undated) DEVICE — PAD OR TABLE DA VINCI 2IN X 20IN X 72IN - (12EA/CA)

## (undated) DEVICE — TROCAR Z THREAD12MM OPTICAL - NON BLADED (6/BX)

## (undated) DEVICE — SUTURE 2-0 20CM STRATAFIX SPIRAL SH NEEDLE (12/BX)

## (undated) DEVICE — TOWELS CLOTH SURGICAL - (4/PK 20PK/CA)

## (undated) DEVICE — CANISTER SUCTION 3000ML MECHANICAL FILTER AUTO SHUTOFF MEDI-VAC NONSTERILE LF DISP (40EA/CA)

## (undated) DEVICE — BITE BLOCK, DISP.

## (undated) DEVICE — SYRINGE 50ML CATHETER TIP (40EA/BX 4BX/CA)

## (undated) DEVICE — TUBE NG SALEM SUMP 16FR (50EA/CA)

## (undated) DEVICE — GLOVE BIOGEL PI INDICATOR SZ 7.0 SURGICAL PF LF - (50/BX 4BX/CA)

## (undated) DEVICE — TOURNIQUET CUFF 34 X 4 ONE PORT DISP - STERILE (10/BX)

## (undated) DEVICE — BLOCK

## (undated) DEVICE — WATER IRRIGATION STERILE 1000ML (12EA/CA)

## (undated) DEVICE — SUTURE 2-0 SILK SH (36PK/BX)

## (undated) DEVICE — SENSOR OXIMETER ADULT SPO2 RD SET (20EA/BX)

## (undated) DEVICE — TRAY CATHETER FOLEY URINE METER W/STATLOCK 350ML (10EA/CA)

## (undated) DEVICE — SUTURE NABSB 2-0 KS 30IN PRLN BLUE (36PK/BX)

## (undated) DEVICE — SYSTEM CLEARIFY VISUALIZATION (10EA/PK)

## (undated) DEVICE — DERMABOND ADVANCED - (12EA/BX)

## (undated) DEVICE — HEAD HOLDER JUNIOR/ADULT

## (undated) DEVICE — BAG SPONGE COUNT 10.25 X 32 - BLUE (250/CA)

## (undated) DEVICE — TUBE CONNECTING SUCTION - CLEAR PLASTIC STERILE 72 IN (50EA/CA)

## (undated) DEVICE — STAPLER SUREFORM 60 12 MM (6EA/BX)

## (undated) DEVICE — CANISTER SUCTION RIGID RED 1500CC (40EA/CA)

## (undated) DEVICE — DRAPE ARM BOX OF 20

## (undated) DEVICE — DRAPE COLUMN  BOX OF 20

## (undated) DEVICE — Device

## (undated) DEVICE — SUTURE 1 VICRYL PLUS CTX - 8 X 18 INCH (12/BX)

## (undated) DEVICE — NEEDLE W/FACET TIP DULL VERSION W/STIMULATION CABLE SONOPLEX 21G X 4 (10/EA)"

## (undated) DEVICE — BLADE SAGITTAL SAW DUAL CUT 25.0 X 90.0 X 1.27MM (1/EA)

## (undated) DEVICE — TOWEL STOP TIMEOUT SAFETY FLAG (40EA/CA)

## (undated) DEVICE — SUTURE GENERAL

## (undated) DEVICE — CANISTER SUCTION 3000ML MECHANICAL FILTER AUTO SHUTOFF MEDI-VAC NONSTERILE LF DISP  (40EA/CA)

## (undated) DEVICE — TUBE E-T HI-LO CUFF 8.0MM (10EA/PK)

## (undated) DEVICE — TROCAR 5X100 NON BLADED Z-TH - READ KII (6/BX)

## (undated) DEVICE — DRAPE ARM  BOX OF 20

## (undated) DEVICE — GLOVE BIOGEL SZ 7 SURGICAL PF LTX - (50PR/BX 4BX/CA)

## (undated) DEVICE — PROTECTOR ULNA NERVE - (36PR/CA)

## (undated) DEVICE — GLOVE BIOGEL PI ORTHO SZ 8 PF LF (40PR/BX)

## (undated) DEVICE — TUBING CLEARLINK DUO-VENT - C-FLO (48EA/CA)

## (undated) DEVICE — RELOAD 12MM STAPLER SUREFORM 60 GREEN (12EA/BX)

## (undated) DEVICE — BIPOLAR FORCE DA VINCI 12X'S REISABLE

## (undated) DEVICE — GRASPER SMALL DA VINCI 10X'S REUSABLE

## (undated) DEVICE — GOWN WARMING STANDARD FLEX - (30/CA)

## (undated) DEVICE — SEAL 5MM-8MM UNIVERSAL  BOX OF 10

## (undated) DEVICE — ELECTRODE 850 FOAM ADHESIVE - HYDROGEL RADIOTRNSPRNT (50/PK)

## (undated) DEVICE — GLOVE SZ 7.5 BIOGEL PI MICRO - PF LF (50PR/BX)

## (undated) DEVICE — SEALER SYNCROSEAL ENERGY DAVINCI SINGLE USE (6EA/BX)

## (undated) DEVICE — STOCKINETTE IMPERVIOUS 12X48 - STERILELF (10/CA)"

## (undated) DEVICE — NEEDLE INSFL 120MM 14GA VRRS - (20/BX)

## (undated) DEVICE — SODIUM CHL. IRRIGATION 0.9% 3000ML (4EA/CA 65CA/PF)

## (undated) DEVICE — RELOAD 12MM STAPLER SUREFORM 60 WHITE (12EA/BX)

## (undated) DEVICE — SUTURE 4-0 MONOCRYL PLUS PS-2 - 27 INCH (36/BX)

## (undated) DEVICE — FILM CASSETTE ENDO

## (undated) DEVICE — SENSOR SPO2 NEO LNCS ADHESIVE (20/BX) SEE USER NOTES

## (undated) DEVICE — SUTURE 2-0 VICRYL PLUS CT-1 - 8 X 18 INCH(12/BX)

## (undated) DEVICE — SEAL UNIVERSAL 5MM-12MM (10EA/BX)

## (undated) DEVICE — NEEDLE DRIVER MEGA SUTURECUT DA VINCI 15X'S REUSABLE

## (undated) DEVICE — MASK AIRWAY SIZE 4 UNIQUE SILICON (10EA/BX)

## (undated) DEVICE — PACK DAVINCI LOW ANTERIOR RESECTION (1EA/CA)

## (undated) DEVICE — MASK PANORAMIC OXYGEN PRO2 (30EA/CA)

## (undated) DEVICE — SYS BN CMNT HI VAC KT MXR BWL - (MIX-E-VAC II)  (10EA/CA)

## (undated) DEVICE — SHEARS MONOPOLAR CURVED  DA VINCI 10X'S REUSABLE

## (undated) DEVICE — HUMID-VENT HEAT AND MOISTURE EXCHANGE- (50/BX)

## (undated) DEVICE — DRAPE IOBAN II INCISE 23X17 - (10EA/BX 4BX/CA)

## (undated) DEVICE — MANIFOLD NEPTUNE 1 PORT (20/PK)

## (undated) DEVICE — LENS/HOOD FOR SPACESUIT - (32/PK) PEEL AWAY FACE

## (undated) DEVICE — TIP INTPLS HFLO ML ORFC BTRY - (12/CS)  FOR SURGILAV

## (undated) DEVICE — SUTURE 4-0 VICRYL PLUS FS-2 - 27 INCH (36/BX)

## (undated) DEVICE — GLOVE BIOGEL PI ORTHO SZ 7 PF LF (40PR/BX)

## (undated) DEVICE — SLEEVE VASO DVT COMPRESSION CALF MED - (10PR/CA)

## (undated) DEVICE — SET EXTENSION WITH 2 PORTS (48EA/CA) ***PART #2C8610 IS A SUBSTITUTE*****

## (undated) DEVICE — GLOVE SZ 7 BIOGEL PI MICRO - PF LF (50PR/BX 4BX/CA)

## (undated) DEVICE — KIT ROOM DECONTAMINATION

## (undated) DEVICE — ARMREST CRADLE FOAM - (2PR/PK 12PR/CA)

## (undated) DEVICE — GLOVE BIOGEL INDICATOR SZ 6.5 SURGICAL PF LTX - (50PR/BX 4BX/CA)

## (undated) DEVICE — MASK ANESTHESIA ADULT  - (100/CA)

## (undated) DEVICE — GLOVE BIOGEL SZ 8.5 SURGICAL PF LTX - (50PR/BX 4BX/CA)

## (undated) DEVICE — CLIP HEMOSTASIS ASSURANCE 16MM

## (undated) DEVICE — KIT CUSTOM PROCEDURE SINGLE FOR ENDO  (15/CA)

## (undated) DEVICE — STAPLER CIRCULAR POWERED 29MM ECHELON (3EA/BX)

## (undated) DEVICE — BAG, SPONGE COUNT 50600